# Patient Record
Sex: MALE | Race: WHITE | NOT HISPANIC OR LATINO | Employment: OTHER | URBAN - METROPOLITAN AREA
[De-identification: names, ages, dates, MRNs, and addresses within clinical notes are randomized per-mention and may not be internally consistent; named-entity substitution may affect disease eponyms.]

---

## 2017-01-03 ENCOUNTER — LAB REQUISITION (OUTPATIENT)
Dept: LAB | Facility: HOSPITAL | Age: 52
End: 2017-01-03
Payer: MEDICARE

## 2017-01-03 DIAGNOSIS — M54.16 RADICULOPATHY OF LUMBAR REGION: ICD-10-CM

## 2017-01-03 PROCEDURE — 80307 DRUG TEST PRSMV CHEM ANLYZR: CPT | Performed by: NURSE PRACTITIONER

## 2017-01-03 PROCEDURE — 80301: CPT | Performed by: NURSE PRACTITIONER

## 2017-01-09 LAB
OXYCODONE UR QL CFM: 386 NG/ML
OXYCODONE+OXYMORPHONE SERPLBLD QL SCN: POSITIVE
OXYCODONE+OXYMORPHONE UR QL SCN: NORMAL NG/ML
OXYCODONE+OXYMORPHONE UR QL SCN: POSITIVE
OXYMORPHONE UR CFM-MCNC: 738 NG/ML
OXYMORPHONE UR QL CFM: POSITIVE

## 2017-01-17 ENCOUNTER — ALLSCRIPTS OFFICE VISIT (OUTPATIENT)
Dept: FAMILY MEDICINE CLINIC | Facility: CLINIC | Age: 52
End: 2017-01-17
Payer: MEDICARE

## 2017-01-17 PROCEDURE — 99213 OFFICE O/P EST LOW 20 MIN: CPT | Performed by: NURSE PRACTITIONER

## 2017-01-31 ENCOUNTER — GENERIC CONVERSION - ENCOUNTER (OUTPATIENT)
Dept: OTHER | Facility: OTHER | Age: 52
End: 2017-01-31

## 2017-02-14 ENCOUNTER — ALLSCRIPTS OFFICE VISIT (OUTPATIENT)
Dept: FAMILY MEDICINE CLINIC | Facility: CLINIC | Age: 52
End: 2017-02-14
Payer: MEDICARE

## 2017-02-14 PROCEDURE — 99213 OFFICE O/P EST LOW 20 MIN: CPT | Performed by: NURSE PRACTITIONER

## 2017-03-13 ENCOUNTER — ALLSCRIPTS OFFICE VISIT (OUTPATIENT)
Dept: FAMILY MEDICINE CLINIC | Facility: CLINIC | Age: 52
End: 2017-03-13
Payer: MEDICARE

## 2017-03-13 PROCEDURE — 99213 OFFICE O/P EST LOW 20 MIN: CPT | Performed by: NURSE PRACTITIONER

## 2017-04-10 ENCOUNTER — ALLSCRIPTS OFFICE VISIT (OUTPATIENT)
Dept: FAMILY MEDICINE CLINIC | Facility: CLINIC | Age: 52
End: 2017-04-10
Payer: MEDICARE

## 2017-04-10 ENCOUNTER — LAB REQUISITION (OUTPATIENT)
Dept: LAB | Facility: HOSPITAL | Age: 52
End: 2017-04-10
Payer: MEDICARE

## 2017-04-10 DIAGNOSIS — M54.16 RADICULOPATHY OF LUMBAR REGION: ICD-10-CM

## 2017-04-10 PROCEDURE — 99213 OFFICE O/P EST LOW 20 MIN: CPT | Performed by: NURSE PRACTITIONER

## 2017-04-10 PROCEDURE — 80307 DRUG TEST PRSMV CHEM ANLYZR: CPT | Performed by: NURSE PRACTITIONER

## 2017-04-15 LAB
OXYCODONE UR QL CFM: 494 NG/ML
OXYCODONE+OXYMORPHONE SERPLBLD QL SCN: POSITIVE
OXYCODONE+OXYMORPHONE UR QL SCN: POSITIVE
OXYMORPHONE UR QL CFM: NEGATIVE

## 2017-05-08 ENCOUNTER — ALLSCRIPTS OFFICE VISIT (OUTPATIENT)
Dept: FAMILY MEDICINE CLINIC | Facility: CLINIC | Age: 52
End: 2017-05-08
Payer: MEDICARE

## 2017-05-08 PROCEDURE — 99213 OFFICE O/P EST LOW 20 MIN: CPT | Performed by: NURSE PRACTITIONER

## 2017-06-05 ENCOUNTER — ALLSCRIPTS OFFICE VISIT (OUTPATIENT)
Dept: FAMILY MEDICINE CLINIC | Facility: CLINIC | Age: 52
End: 2017-06-05
Payer: MEDICARE

## 2017-06-05 PROCEDURE — 96372 THER/PROPH/DIAG INJ SC/IM: CPT | Performed by: NURSE PRACTITIONER

## 2017-06-05 PROCEDURE — 99213 OFFICE O/P EST LOW 20 MIN: CPT | Performed by: NURSE PRACTITIONER

## 2017-06-26 ENCOUNTER — APPOINTMENT (OUTPATIENT)
Dept: LAB | Facility: HOSPITAL | Age: 52
End: 2017-06-26
Payer: MEDICARE

## 2017-06-26 ENCOUNTER — APPOINTMENT (OUTPATIENT)
Dept: FAMILY MEDICINE CLINIC | Facility: CLINIC | Age: 52
End: 2017-06-26
Payer: MEDICARE

## 2017-06-26 ENCOUNTER — GENERIC CONVERSION - ENCOUNTER (OUTPATIENT)
Dept: OTHER | Facility: OTHER | Age: 52
End: 2017-06-26

## 2017-06-26 DIAGNOSIS — M54.16 RADICULOPATHY OF LUMBAR REGION: ICD-10-CM

## 2017-06-26 LAB
ANION GAP SERPL CALCULATED.3IONS-SCNC: 7 MMOL/L (ref 4–13)
BUN SERPL-MCNC: 11 MG/DL (ref 5–25)
CALCIUM SERPL-MCNC: 9.4 MG/DL (ref 8.3–10.1)
CHLORIDE SERPL-SCNC: 108 MMOL/L (ref 100–108)
CO2 SERPL-SCNC: 26 MMOL/L (ref 21–32)
CREAT SERPL-MCNC: 0.72 MG/DL (ref 0.6–1.3)
ERYTHROCYTE [DISTWIDTH] IN BLOOD BY AUTOMATED COUNT: 14 % (ref 11.6–15.1)
ERYTHROCYTE [SEDIMENTATION RATE] IN BLOOD: 5 MM/HOUR (ref 0–10)
GFR SERPL CREATININE-BSD FRML MDRD: >60 ML/MIN/1.73SQ M
GLUCOSE SERPL-MCNC: 94 MG/DL (ref 65–140)
HCT VFR BLD AUTO: 45.1 % (ref 36.5–49.3)
HGB BLD-MCNC: 15.4 G/DL (ref 12–17)
MCH RBC QN AUTO: 31.5 PG (ref 26.8–34.3)
MCHC RBC AUTO-ENTMCNC: 34.1 G/DL (ref 31.4–37.4)
MCV RBC AUTO: 92 FL (ref 82–98)
PLATELET # BLD AUTO: 341 THOUSANDS/UL (ref 149–390)
PMV BLD AUTO: 10 FL (ref 8.9–12.7)
POTASSIUM SERPL-SCNC: 4.6 MMOL/L (ref 3.5–5.3)
RBC # BLD AUTO: 4.89 MILLION/UL (ref 3.88–5.62)
SODIUM SERPL-SCNC: 141 MMOL/L (ref 136–145)
WBC # BLD AUTO: 7.62 THOUSAND/UL (ref 4.31–10.16)

## 2017-06-26 PROCEDURE — 99213 OFFICE O/P EST LOW 20 MIN: CPT | Performed by: FAMILY MEDICINE

## 2017-06-26 PROCEDURE — 36415 COLL VENOUS BLD VENIPUNCTURE: CPT

## 2017-06-26 PROCEDURE — 85027 COMPLETE CBC AUTOMATED: CPT

## 2017-06-26 PROCEDURE — 85652 RBC SED RATE AUTOMATED: CPT

## 2017-06-26 PROCEDURE — 80048 BASIC METABOLIC PNL TOTAL CA: CPT

## 2017-06-28 ENCOUNTER — GENERIC CONVERSION - ENCOUNTER (OUTPATIENT)
Dept: OTHER | Facility: OTHER | Age: 52
End: 2017-06-28

## 2017-06-28 ENCOUNTER — APPOINTMENT (OUTPATIENT)
Dept: FAMILY MEDICINE CLINIC | Facility: CLINIC | Age: 52
End: 2017-06-28
Payer: MEDICARE

## 2017-06-28 PROCEDURE — 99213 OFFICE O/P EST LOW 20 MIN: CPT | Performed by: FAMILY MEDICINE

## 2017-07-27 ENCOUNTER — APPOINTMENT (OUTPATIENT)
Dept: LAB | Facility: HOSPITAL | Age: 52
End: 2017-07-27
Payer: MEDICARE

## 2017-07-27 ENCOUNTER — LAB REQUISITION (OUTPATIENT)
Dept: LAB | Facility: HOSPITAL | Age: 52
End: 2017-07-27
Payer: MEDICARE

## 2017-07-27 ENCOUNTER — ALLSCRIPTS OFFICE VISIT (OUTPATIENT)
Dept: FAMILY MEDICINE CLINIC | Facility: CLINIC | Age: 52
End: 2017-07-27
Payer: MEDICARE

## 2017-07-27 DIAGNOSIS — M54.16 RADICULOPATHY OF LUMBAR REGION: ICD-10-CM

## 2017-07-27 PROCEDURE — 99213 OFFICE O/P EST LOW 20 MIN: CPT | Performed by: FAMILY MEDICINE

## 2017-07-27 PROCEDURE — 80365 DRUG SCREENING OXYCODONE: CPT

## 2017-07-27 PROCEDURE — 80307 DRUG TEST PRSMV CHEM ANLYZR: CPT

## 2017-07-28 LAB
AMPHETAMINES UR QL SCN: NEGATIVE NG/ML
BARBITURATES UR QL SCN: NEGATIVE NG/ML
BENZODIAZ UR QL SCN: NEGATIVE NG/ML
BZE UR QL SCN: NEGATIVE NG/ML
CANNABINOIDS UR QL SCN: NEGATIVE NG/ML
METHADONE UR QL SCN: NEGATIVE NG/ML
OPIATES UR QL: NEGATIVE NG/ML
PCP UR QL: NEGATIVE NG/ML
PROPOXYPH UR QL: NEGATIVE NG/ML

## 2017-08-01 LAB
OXYCODONE+OXYMORPHONE SERPLBLD QL SCN: NEGATIVE
OXYCODONE+OXYMORPHONE UR QL SCN: POSITIVE
OXYMORPHONE UR CFM-MCNC: 866 NG/ML
OXYMORPHONE UR QL CFM: POSITIVE

## 2017-08-24 ENCOUNTER — ALLSCRIPTS OFFICE VISIT (OUTPATIENT)
Dept: FAMILY MEDICINE CLINIC | Facility: CLINIC | Age: 52
End: 2017-08-24
Payer: MEDICARE

## 2017-08-24 PROCEDURE — 99213 OFFICE O/P EST LOW 20 MIN: CPT | Performed by: FAMILY MEDICINE

## 2017-09-21 ENCOUNTER — LAB REQUISITION (OUTPATIENT)
Dept: LAB | Facility: HOSPITAL | Age: 52
End: 2017-09-21
Payer: MEDICARE

## 2017-09-21 ENCOUNTER — GENERIC CONVERSION - ENCOUNTER (OUTPATIENT)
Dept: OTHER | Facility: OTHER | Age: 52
End: 2017-09-21

## 2017-09-21 ENCOUNTER — APPOINTMENT (OUTPATIENT)
Dept: FAMILY MEDICINE CLINIC | Facility: CLINIC | Age: 52
End: 2017-09-21
Payer: MEDICARE

## 2017-09-21 DIAGNOSIS — M54.9 DORSALGIA: ICD-10-CM

## 2017-09-21 PROCEDURE — 99213 OFFICE O/P EST LOW 20 MIN: CPT | Performed by: FAMILY MEDICINE

## 2017-09-21 PROCEDURE — 80365 DRUG SCREENING OXYCODONE: CPT | Performed by: NURSE PRACTITIONER

## 2017-09-26 LAB
OXYCODONE UR QL CFM: 253 NG/ML
OXYCODONE+OXYMORPHONE SERPLBLD QL SCN: POSITIVE
OXYCODONE+OXYMORPHONE UR QL SCN: POSITIVE
OXYMORPHONE UR CFM-MCNC: 382 NG/ML
OXYMORPHONE UR QL CFM: POSITIVE

## 2017-10-18 ENCOUNTER — APPOINTMENT (OUTPATIENT)
Dept: FAMILY MEDICINE CLINIC | Facility: CLINIC | Age: 52
End: 2017-10-18
Payer: MEDICARE

## 2017-10-18 ENCOUNTER — GENERIC CONVERSION - ENCOUNTER (OUTPATIENT)
Dept: OTHER | Facility: OTHER | Age: 52
End: 2017-10-18

## 2017-10-18 PROCEDURE — 99213 OFFICE O/P EST LOW 20 MIN: CPT | Performed by: FAMILY MEDICINE

## 2017-11-15 ENCOUNTER — APPOINTMENT (OUTPATIENT)
Dept: FAMILY MEDICINE CLINIC | Facility: CLINIC | Age: 52
End: 2017-11-15
Payer: MEDICARE

## 2017-11-15 ENCOUNTER — GENERIC CONVERSION - ENCOUNTER (OUTPATIENT)
Dept: OTHER | Facility: OTHER | Age: 52
End: 2017-11-15

## 2017-11-15 PROCEDURE — 99213 OFFICE O/P EST LOW 20 MIN: CPT | Performed by: FAMILY MEDICINE

## 2017-12-12 ENCOUNTER — GENERIC CONVERSION - ENCOUNTER (OUTPATIENT)
Dept: OTHER | Facility: OTHER | Age: 52
End: 2017-12-12

## 2017-12-12 ENCOUNTER — APPOINTMENT (OUTPATIENT)
Dept: FAMILY MEDICINE CLINIC | Facility: CLINIC | Age: 52
End: 2017-12-12
Payer: MEDICARE

## 2017-12-12 PROCEDURE — 99213 OFFICE O/P EST LOW 20 MIN: CPT | Performed by: FAMILY MEDICINE

## 2018-01-09 ENCOUNTER — APPOINTMENT (OUTPATIENT)
Dept: FAMILY MEDICINE CLINIC | Facility: CLINIC | Age: 53
End: 2018-01-09
Payer: MEDICARE

## 2018-01-09 ENCOUNTER — GENERIC CONVERSION - ENCOUNTER (OUTPATIENT)
Dept: OTHER | Facility: OTHER | Age: 53
End: 2018-01-09

## 2018-01-09 PROCEDURE — 99213 OFFICE O/P EST LOW 20 MIN: CPT | Performed by: FAMILY MEDICINE

## 2018-01-12 VITALS
WEIGHT: 150 LBS | SYSTOLIC BLOOD PRESSURE: 106 MMHG | DIASTOLIC BLOOD PRESSURE: 72 MMHG | BODY MASS INDEX: 21.47 KG/M2 | RESPIRATION RATE: 17 BRPM | OXYGEN SATURATION: 99 % | HEIGHT: 70 IN | HEART RATE: 118 BPM | TEMPERATURE: 96.7 F

## 2018-01-12 VITALS
WEIGHT: 151 LBS | HEART RATE: 102 BPM | RESPIRATION RATE: 20 BRPM | BODY MASS INDEX: 21.62 KG/M2 | DIASTOLIC BLOOD PRESSURE: 74 MMHG | SYSTOLIC BLOOD PRESSURE: 112 MMHG | TEMPERATURE: 97.6 F | HEIGHT: 70 IN | OXYGEN SATURATION: 98 %

## 2018-01-12 VITALS
RESPIRATION RATE: 20 BRPM | HEART RATE: 93 BPM | TEMPERATURE: 96 F | DIASTOLIC BLOOD PRESSURE: 70 MMHG | SYSTOLIC BLOOD PRESSURE: 110 MMHG | OXYGEN SATURATION: 96 % | BODY MASS INDEX: 21.33 KG/M2 | WEIGHT: 149 LBS | HEIGHT: 70 IN

## 2018-01-12 VITALS
WEIGHT: 146 LBS | SYSTOLIC BLOOD PRESSURE: 142 MMHG | HEIGHT: 70 IN | TEMPERATURE: 97.6 F | DIASTOLIC BLOOD PRESSURE: 82 MMHG | OXYGEN SATURATION: 97 % | RESPIRATION RATE: 16 BRPM | BODY MASS INDEX: 20.9 KG/M2 | HEART RATE: 110 BPM

## 2018-01-12 NOTE — PROGRESS NOTES
Chief Complaint  Toradol shot      Active Problems     1  Asthma (493 90) (J45 909)   2  Backache (724 5) (M54 9)   3  Bilateral back pain, unspecified location   4  Bipolar disorder (296 80) (F31 9)   5  Chronic obstructive pulmonary disease (496) (J44 9)   6  Chronic thoracic back pain (724 1,338 29) (M54 6,G89 29)   7  Encounter for screening fecal occult blood testing (V76 51) (Z12 11)   8  Encounter for smoking cessation counseling (V65 42,305 1) (Z71 6,Z72 0)   9  Esophageal reflux (530 81) (K21 9)   10  Fatigue (780 79) (R53 83)   11  Flu vaccine need (V04 81) (Z23)   12  Hypercholesterolemia (272 0) (E78 00)   13  Hyperlipidemia (272 4) (E78 5)   14  Hyperthyroidism (242 90) (E05 90)   15  Migraine headache (346 90) (G43 909)   16  Need for vaccine for TD (tetanus-diphtheria) (V06 5) (Z23)   17  Screening for colon cancer (V76 51) (Z12 11)   18  Screening PSA (prostate specific antigen) (V76 44) (Z12 5)   19  Tooth Abscess    Depression (311) (F32 9)       Anxiety disorder (300 00) (F41 9)       Chronic lumbar radiculopathy (724 4) (M54 16)       Encounter for screening colonoscopy (V76 51) (Z12 11)          Current Meds   1  Atorvastatin Calcium 20 MG Oral Tablet; one daily in the evening for cholesterol; Therapy: 38UVL8579 to [phone])  Requested for: 18BLX4567; Last   Rx:10Jun2016 Ordered   2  Butalbital-APAP-Caffeine -40 MG Oral Capsule; TAKE 1 TO 2 CAPSULES EVERY   4 TO 6 HOURS AS NEEDED FOR PAIN;   Therapy: 72BVO0650 to (Last Rx:42Gbd4903)  Requested for: 31Rnw0120 Ordered   3  Metoclopramide HCl - 5 MG Oral Tablet; TAKE 1 TABLET 3 TIMES DAILY; Therapy: 75AIK3311 to [phone])  Requested for: 49MWA8613; Last   Rx:15Ulu4829 Ordered   4  Omeprazole 20 MG Oral Capsule Delayed Release; TAKE 1 CAPSULE DAILY EVERY   MORNING BEFORE BREAKFAST; Therapy: 02Apr2013 to (Evaluate:73Zon9903)  Requested for: 25BOB0049; Last   Rx:10Jun2016 Ordered   5   Oxycodone-Acetaminophen 5-325 MG Oral Tablet; TAKE 1 TABLET TWICE DAILY AS   NEEDED FOR PAIN;   Therapy: 47LBW0771 to (Evaluate:23Nov2016); Last Rx:26Oct2016 Ordered   6  Ventolin  (90 Base) MCG/ACT Inhalation Aerosol Solution; INHALE 2 PUFFS   EVERY 4 HOURS AS NEEDED; Therapy: 51URR4034 to (Last Rx:10Jun2016)  Requested for: 10Jun2016 Ordered    Allergies    1  Toradol SOLN    Vitals  Signs    Temperature: 98 2 F  Heart Rate: 90  Respiration: 18  Systolic: 404  Diastolic: 78  Height: 5 ft 10 in  Weight: 155 lb   BMI Calculated: 22 24  BSA Calculated: 1 87  O2 Saturation: 99    Plan   Chronic lumbar radiculopathy    · Ketorolac Tromethamine 30 MG/ML Injection Solution    Chronic lumbar radiculopathy (724 4) (M54 16)          Future Appointments    Date/Time Provider Specialty Site   02/14/2017 07:30 AM Jennifer Walker,  [facility]     Signatures   Electronically signed by : Eli Sanchez, ; Oct 27 2016  3:10PM EST                       (Author)    Electronically signed by :  CHACHA Guerrero; Oct 27 2016  3:17PM EST                       (Author)    Electronically signed by : Gurinder Tipton MD; Feb 9 2017  6:28PM EST                       (Author)

## 2018-01-13 VITALS
OXYGEN SATURATION: 98 % | BODY MASS INDEX: 21.62 KG/M2 | DIASTOLIC BLOOD PRESSURE: 70 MMHG | TEMPERATURE: 97.4 F | SYSTOLIC BLOOD PRESSURE: 110 MMHG | WEIGHT: 151 LBS | HEIGHT: 70 IN | HEART RATE: 98 BPM | RESPIRATION RATE: 20 BRPM

## 2018-01-13 VITALS
SYSTOLIC BLOOD PRESSURE: 128 MMHG | BODY MASS INDEX: 21.33 KG/M2 | HEART RATE: 98 BPM | RESPIRATION RATE: 16 BRPM | TEMPERATURE: 97.1 F | WEIGHT: 149 LBS | HEIGHT: 70 IN | DIASTOLIC BLOOD PRESSURE: 70 MMHG | OXYGEN SATURATION: 99 %

## 2018-01-13 VITALS
HEIGHT: 70 IN | RESPIRATION RATE: 16 BRPM | DIASTOLIC BLOOD PRESSURE: 78 MMHG | SYSTOLIC BLOOD PRESSURE: 110 MMHG | BODY MASS INDEX: 21.33 KG/M2 | HEART RATE: 88 BPM | TEMPERATURE: 98.1 F | OXYGEN SATURATION: 99 % | WEIGHT: 149 LBS

## 2018-01-14 VITALS
BODY MASS INDEX: 21.76 KG/M2 | TEMPERATURE: 96.2 F | SYSTOLIC BLOOD PRESSURE: 120 MMHG | RESPIRATION RATE: 16 BRPM | DIASTOLIC BLOOD PRESSURE: 68 MMHG | WEIGHT: 152 LBS | HEART RATE: 94 BPM | OXYGEN SATURATION: 98 % | HEIGHT: 70 IN

## 2018-01-14 NOTE — MISCELLANEOUS
Message  LAB notified office of error with urine drug screen  Message from lab consisted of mistake of cobalt for oxycodone  Repeat urine will be needed  To contact patient  Active Problems    1  Anxiety disorder (300 00) (F41 9)   2  Asthma (493 90) (J45 909)   3  Backache (724 5) (M54 9)   4  Bilateral back pain, unspecified location   5  Bipolar disorder (296 80) (F31 9)   6  Chronic lumbar radiculopathy (724 4) (M54 16)   7  Chronic obstructive pulmonary disease (496) (J44 9)   8  Chronic thoracic back pain (724 1,338 29) (M54 6,G89 29)   9  Depression (311) (F32 9)   10  Encounter for screening colonoscopy (V76 51) (Z12 11)   11  Encounter for screening fecal occult blood testing (V76 51) (Z12 11)   12  Encounter for smoking cessation counseling (V65 42,305 1) (Z71 6,Z72 0)   13  Esophageal reflux (530 81) (K21 9)   14  Fatigue (780 79) (R53 83)   15  Flu vaccine need (V04 81) (Z23)   16  Hypercholesterolemia (272 0) (E78 00)   17  Hyperlipidemia (272 4) (E78 5)   18  Hyperthyroidism (242 90) (E05 90)   19  Migraine headache (346 90) (G43 909)   20  Need for vaccine for TD (tetanus-diphtheria) (V06 5) (Z23)   21  Screening for colon cancer (V76 51) (Z12 11)   22  Screening PSA (prostate specific antigen) (V76 44) (Z12 5)   23  Tooth Abscess    Current Meds   1  Atorvastatin Calcium 20 MG Oral Tablet; one daily in the evening for cholesterol; Therapy: 21RSK8538 to (Sheryle Barbone)  Requested for: 36ERP8223; Last   Rx:04Bzp6675 Ordered   2  Butalbital-APAP-Caffeine -40 MG Oral Capsule; TAKE 1 TO 2 CAPSULES EVERY   4 TO 6 HOURS AS NEEDED FOR PAIN;   Therapy: 10VKP0197 to (Last Rx:11Tlr0975)  Requested for: 60Cfx6966 Ordered   3  Metoclopramide HCl - 5 MG Oral Tablet; TAKE 1 TABLET 3 TIMES DAILY; Therapy: 95VSY9197 to [phone])  Requested for: 69NWI2084; Last   Rx:30Saz2213 Ordered   4   Omeprazole 20 MG Oral Capsule Delayed Release (PriLOSEC); TAKE 1 CAPSULE   DAILY EVERY MORNING BEFORE BREAKFAST; Therapy: 02Apr2013 to (Evaluate:82Nde0719)  Requested for: 56ZSW5974; Last   Rx:10Jun2016 Ordered   5  Oxycodone-Acetaminophen 5-325 MG Oral Tablet; TAKE 1 TABLET TWICE DAILY AS   NEEDED FOR PAIN;   Therapy: 85JZJ9057 to (Evaluate:28Oct2016); Last Rx:30Sep2016 Ordered   6  Ventolin  (90 Base) MCG/ACT Inhalation Aerosol Solution; INHALE 2 PUFFS   EVERY 4 HOURS AS NEEDED; Therapy: 07TXP4632 to (Last Rx:10Jun2016)  Requested for: 10Jun2016 Ordered    Allergies    1  Toradol SOLN    Signatures   Electronically signed by :  CHACHA Babin; Oct 14 2016  2:33PM EST                       (Author)

## 2018-01-22 VITALS
HEIGHT: 70 IN | WEIGHT: 149 LBS | OXYGEN SATURATION: 98 % | SYSTOLIC BLOOD PRESSURE: 140 MMHG | DIASTOLIC BLOOD PRESSURE: 88 MMHG | HEART RATE: 110 BPM | BODY MASS INDEX: 21.33 KG/M2 | RESPIRATION RATE: 16 BRPM | TEMPERATURE: 97.3 F

## 2018-01-22 VITALS
HEIGHT: 70 IN | OXYGEN SATURATION: 97 % | HEART RATE: 95 BPM | WEIGHT: 147 LBS | RESPIRATION RATE: 17 BRPM | DIASTOLIC BLOOD PRESSURE: 74 MMHG | SYSTOLIC BLOOD PRESSURE: 128 MMHG | BODY MASS INDEX: 21.05 KG/M2 | TEMPERATURE: 96.5 F

## 2018-01-22 VITALS
SYSTOLIC BLOOD PRESSURE: 154 MMHG | DIASTOLIC BLOOD PRESSURE: 90 MMHG | OXYGEN SATURATION: 98 % | RESPIRATION RATE: 16 BRPM | BODY MASS INDEX: 21.33 KG/M2 | TEMPERATURE: 97.3 F | WEIGHT: 149 LBS | HEART RATE: 102 BPM | HEIGHT: 70 IN

## 2018-01-22 VITALS
SYSTOLIC BLOOD PRESSURE: 138 MMHG | OXYGEN SATURATION: 97 % | HEART RATE: 98 BPM | TEMPERATURE: 96.5 F | HEIGHT: 70 IN | DIASTOLIC BLOOD PRESSURE: 82 MMHG | BODY MASS INDEX: 20.62 KG/M2 | RESPIRATION RATE: 16 BRPM | WEIGHT: 144 LBS

## 2018-01-22 VITALS
RESPIRATION RATE: 16 BRPM | WEIGHT: 144 LBS | OXYGEN SATURATION: 98 % | HEART RATE: 118 BPM | TEMPERATURE: 97 F | DIASTOLIC BLOOD PRESSURE: 76 MMHG | BODY MASS INDEX: 20.62 KG/M2 | HEIGHT: 70 IN | SYSTOLIC BLOOD PRESSURE: 114 MMHG

## 2018-01-24 VITALS
HEART RATE: 98 BPM | RESPIRATION RATE: 17 BRPM | HEIGHT: 70 IN | BODY MASS INDEX: 21.19 KG/M2 | SYSTOLIC BLOOD PRESSURE: 126 MMHG | DIASTOLIC BLOOD PRESSURE: 76 MMHG | TEMPERATURE: 96.7 F | OXYGEN SATURATION: 97 % | WEIGHT: 148 LBS

## 2018-01-24 VITALS
BODY MASS INDEX: 21.47 KG/M2 | HEIGHT: 70 IN | TEMPERATURE: 98.1 F | WEIGHT: 150 LBS | HEART RATE: 99 BPM | OXYGEN SATURATION: 99 % | RESPIRATION RATE: 16 BRPM

## 2018-02-05 RX ORDER — ALBUTEROL SULFATE 90 UG/1
2 AEROSOL, METERED RESPIRATORY (INHALATION) EVERY 4 HOURS PRN
COMMUNITY
Start: 2016-02-11 | End: 2018-02-06 | Stop reason: SDUPTHER

## 2018-02-05 RX ORDER — OXYCODONE HYDROCHLORIDE AND ACETAMINOPHEN 5; 325 MG/1; MG/1
1 TABLET ORAL 2 TIMES DAILY PRN
COMMUNITY
Start: 2015-10-19 | End: 2018-02-06 | Stop reason: SDUPTHER

## 2018-02-05 RX ORDER — OMEPRAZOLE 40 MG/1
1 CAPSULE, DELAYED RELEASE ORAL
COMMUNITY
Start: 2017-08-24 | End: 2018-05-02 | Stop reason: SDUPTHER

## 2018-02-05 RX ORDER — FLUTICASONE PROPIONATE 50 MCG
2 SPRAY, SUSPENSION (ML) NASAL DAILY
COMMUNITY
Start: 2017-10-18 | End: 2018-11-08

## 2018-02-06 ENCOUNTER — OFFICE VISIT (OUTPATIENT)
Dept: FAMILY MEDICINE CLINIC | Facility: CLINIC | Age: 53
End: 2018-02-06
Payer: MEDICARE

## 2018-02-06 VITALS
BODY MASS INDEX: 20.9 KG/M2 | SYSTOLIC BLOOD PRESSURE: 130 MMHG | OXYGEN SATURATION: 98 % | HEART RATE: 95 BPM | HEIGHT: 70 IN | RESPIRATION RATE: 18 BRPM | WEIGHT: 146 LBS | DIASTOLIC BLOOD PRESSURE: 78 MMHG | TEMPERATURE: 97 F

## 2018-02-06 DIAGNOSIS — G89.29 CHRONIC BILATERAL LOW BACK PAIN WITHOUT SCIATICA: Primary | ICD-10-CM

## 2018-02-06 DIAGNOSIS — M54.50 CHRONIC BILATERAL LOW BACK PAIN WITHOUT SCIATICA: Primary | ICD-10-CM

## 2018-02-06 DIAGNOSIS — J45.20 MILD INTERMITTENT ASTHMA WITHOUT COMPLICATION: ICD-10-CM

## 2018-02-06 PROBLEM — J30.9 ALLERGIC RHINITIS: Status: ACTIVE | Noted: 2017-10-18

## 2018-02-06 PROCEDURE — 99213 OFFICE O/P EST LOW 20 MIN: CPT | Performed by: FAMILY MEDICINE

## 2018-02-06 RX ORDER — OXYCODONE HYDROCHLORIDE AND ACETAMINOPHEN 5; 325 MG/1; MG/1
1 TABLET ORAL 2 TIMES DAILY PRN
Qty: 56 TABLET | Refills: 0 | Status: SHIPPED | OUTPATIENT
Start: 2018-02-06 | End: 2018-03-05 | Stop reason: SDUPTHER

## 2018-02-06 RX ORDER — ALBUTEROL SULFATE 90 UG/1
2 AEROSOL, METERED RESPIRATORY (INHALATION) EVERY 4 HOURS PRN
Qty: 1 INHALER | Refills: 0 | Status: SHIPPED | OUTPATIENT
Start: 2018-02-06 | End: 2018-02-08 | Stop reason: CLARIF

## 2018-02-06 NOTE — PROGRESS NOTES
OFFICE VISIT  Jamaica Hull 47 y o  male MRN: [id number]      Assessment / Plan:  Diagnoses and all orders for this visit:    Chronic bilateral low back pain without sciatica  -     oxyCODONE-acetaminophen (PERCOCET) 5-325 mg per tablet; Take 1 tablet by mouth 2 (two) times a day as needed for moderate pain Max Daily Amount: 2 tablets    Mild intermittent asthma without complication  -     albuterol (VENTOLIN HFA) 90 mcg/act inhaler; Inhale 2 puffs every 4 (four) hours as needed for shortness of breath     Discharge summary-  Mild intermittent asthma, Ventolin renewed  Patient made aware of worsening symptoms to call office  Chronic low back pain-medication renewed  PDMP portal reviewed, no red flags  Reason For Visit / Chief Complaint  Chief Complaint   Patient presents with    Medication Refill        HPI:  Jamaica Hull is a 47 y o  male presents today for chronic lower back pain  He has been having pain for years  He is contracted through this office with good compliance  He is taking a Percocet twice daily at most for chronic back pain relief  His pain is aggravated with bending and lifting  He has tried alternative methods in the past without relief  He is unable to establish with pain management due to transportation  He has known anxiety, which he is established with  DS  He has known asthma, has used his inhaler 3 times this month  He reports his asthma is triggered by cold  Today he reports no shortness of breath, no cough no wheeze  Historical Information   No past medical history on file  No past surgical history on file    Social History   History   Alcohol Use No     History   Drug Use No     History   Smoking Status    Current Every Day Smoker   Smokeless Tobacco    Never Used     Family History   Problem Relation Age of Onset    Heart disease Mother     Hyperthyroidism Mother     Heart disease Father     Hyperthyroidism Father        Meds/Allergies   Allergies   Allergen Reactions    Ketorolac Hives       Meds:    Current Outpatient Prescriptions:     albuterol (VENTOLIN HFA) 90 mcg/act inhaler, Inhale 2 puffs every 4 (four) hours as needed for shortness of breath, Disp: 1 Inhaler, Rfl: 0    fluticasone (FLONASE) 50 mcg/act nasal spray, 2 sprays into each nostril daily, Disp: , Rfl:     omeprazole (PriLOSEC) 40 MG capsule, Take 1 capsule by mouth daily before breakfast, Disp: , Rfl:     oxyCODONE-acetaminophen (PERCOCET) 5-325 mg per tablet, Take 1 tablet by mouth 2 (two) times a day as needed for moderate pain Max Daily Amount: 2 tablets, Disp: 56 tablet, Rfl: 0      REVIEW OF SYSTEMS  A comprehensive review of systems was negative  Current Vitals:   Blood Pressure: 130/78 (02/06/18 0754)  Pulse: 95 (02/06/18 0754)  Temperature: (!) 97 °F (36 1 °C) (02/06/18 0754)  Respirations: 18 (02/06/18 0754)  Height: 5' 10" (177 8 cm) (02/06/18 0754)  Weight - Scale: 66 2 kg (146 lb) (02/06/18 0754)  SpO2: 98 % (02/06/18 0754)  [unfilled]    PHYSICAL EXAMS:  General appearance: alert and oriented, in no acute distress  Lungs: clear to auscultation bilaterally  Heart: regular rate and rhythm, S1, S2 normal, no murmur, click, rub or LJBPAA{JGD/YX:25        Follow up at this office in28  days    Counseling / Coordination of Care  Total floor / unit time spent today 20 minutes  Greater than 50% of total time was spent with the patient and / or family counseling and / or coordination of care

## 2018-02-07 NOTE — PROGRESS NOTES
I have reviewed the notes, assessments, and/or procedures performed by [facility] and agree with the above    Brendalyn Formica D O

## 2018-02-08 RX ORDER — FLUTICASONE PROPIONATE 220 UG/1
2 AEROSOL, METERED RESPIRATORY (INHALATION) EVERY 4 HOURS PRN
COMMUNITY
End: 2018-11-08

## 2018-03-05 ENCOUNTER — OFFICE VISIT (OUTPATIENT)
Dept: FAMILY MEDICINE CLINIC | Facility: CLINIC | Age: 53
End: 2018-03-05
Payer: MEDICARE

## 2018-03-05 VITALS
HEIGHT: 70 IN | BODY MASS INDEX: 20.9 KG/M2 | HEART RATE: 100 BPM | DIASTOLIC BLOOD PRESSURE: 80 MMHG | RESPIRATION RATE: 18 BRPM | TEMPERATURE: 97.6 F | SYSTOLIC BLOOD PRESSURE: 130 MMHG | WEIGHT: 146 LBS | OXYGEN SATURATION: 99 %

## 2018-03-05 DIAGNOSIS — M54.50 CHRONIC BILATERAL LOW BACK PAIN WITHOUT SCIATICA: ICD-10-CM

## 2018-03-05 DIAGNOSIS — G89.29 CHRONIC BILATERAL LOW BACK PAIN WITHOUT SCIATICA: ICD-10-CM

## 2018-03-05 DIAGNOSIS — M54.16 CHRONIC LUMBAR RADICULOPATHY: ICD-10-CM

## 2018-03-05 DIAGNOSIS — J45.20 MILD INTERMITTENT ASTHMA WITHOUT COMPLICATION: Primary | ICD-10-CM

## 2018-03-05 PROCEDURE — 99213 OFFICE O/P EST LOW 20 MIN: CPT | Performed by: FAMILY MEDICINE

## 2018-03-05 RX ORDER — OXYCODONE HYDROCHLORIDE AND ACETAMINOPHEN 5; 325 MG/1; MG/1
1 TABLET ORAL 2 TIMES DAILY PRN
Qty: 56 TABLET | Refills: 0 | Status: SHIPPED | OUTPATIENT
Start: 2018-03-05 | End: 2018-04-02 | Stop reason: SDUPTHER

## 2018-03-05 NOTE — PROGRESS NOTES
OFFICE VISIT  Didi Perea 47 y o  male MRN: [id number]      Assessment / Plan:  Diagnoses and all orders for this visit:    Mild intermittent asthma without complication    Chronic bilateral low back pain without sciatica  -     oxyCODONE-acetaminophen (PERCOCET) 5-325 mg per tablet; Take 1 tablet by mouth 2 (two) times a day as needed for moderate pain Max Daily Amount: 2 tablets    Chronic lumbar radiculopathy    PDMP portal reviewed, no red flags      Reason For Visit / Chief Complaint  Chief Complaint   Patient presents with    Back Pain        HPI:  Didi Perea is a 47 y o  male presents today for routine monthly appt  Pt has known asthma, controlled  He reports no use of inhaler over the last month  He has no desire to quit smoking at this time  He has chronic lower back pain, he has tried multple modalities to help pain, stretching, exercise, tens unit  He has been taking percocet twice daily with good relief  Historical Information   No past medical history on file  No past surgical history on file    Social History   History   Alcohol Use No     History   Drug Use No     History   Smoking Status    Current Every Day Smoker   Smokeless Tobacco    Never Used     Family History   Problem Relation Age of Onset    Heart disease Mother     Hyperthyroidism Mother     Heart disease Father     Hyperthyroidism Father        Meds/Allergies   Allergies   Allergen Reactions    Ketorolac Hives       Meds:    Current Outpatient Prescriptions:     fluticasone (FLONASE) 50 mcg/act nasal spray, 2 sprays into each nostril daily, Disp: , Rfl:     fluticasone (FLOVENT HFA) 220 mcg/act inhaler, Inhale 2 puffs every 4 (four) hours as needed, Disp: , Rfl:     omeprazole (PriLOSEC) 40 MG capsule, Take 1 capsule by mouth daily before breakfast, Disp: , Rfl:     oxyCODONE-acetaminophen (PERCOCET) 5-325 mg per tablet, Take 1 tablet by mouth 2 (two) times a day as needed for moderate pain Max Daily Amount: 2 tablets, Disp: 56 tablet, Rfl: 0      REVIEW OF SYSTEMS  A comprehensive review of systems was negative except for: chronic pain       Current Vitals:   Blood Pressure: 130/80 (03/05/18 1204)  Pulse: 100 (03/05/18 1204)  Temperature: 97 6 °F (36 4 °C) (03/05/18 1204)  Respirations: 18 (03/05/18 1204)  Height: 5' 10" (177 8 cm) (03/05/18 1204)  Weight - Scale: 66 2 kg (146 lb) (03/05/18 1204)  SpO2: 99 % (03/05/18 1204)  [unfilled]    PHYSICAL EXAMS:  General appearance: alert and oriented, in no acute distress  Lungs: clear to auscultation bilaterally  Heart: regular rate and rhythm, S1, S2 normal, no murmur, click, rub or gallop  Pulses: 2+ and symmetric  Skin: Skin color, texture, turgor normal  No rashes or lesions{YES/NO:20        Follow up at this office in 28 days   Counseling / Coordination of Care  Total floor / unit time spent today 20 minutes  Greater than 50% of total time was spent with the patient and / or family counseling and / or coordination of care

## 2018-04-02 ENCOUNTER — OFFICE VISIT (OUTPATIENT)
Dept: FAMILY MEDICINE CLINIC | Facility: CLINIC | Age: 53
End: 2018-04-02
Payer: MEDICARE

## 2018-04-02 VITALS
OXYGEN SATURATION: 98 % | WEIGHT: 146 LBS | TEMPERATURE: 97.6 F | RESPIRATION RATE: 18 BRPM | HEIGHT: 70 IN | BODY MASS INDEX: 20.9 KG/M2 | DIASTOLIC BLOOD PRESSURE: 80 MMHG | HEART RATE: 97 BPM | SYSTOLIC BLOOD PRESSURE: 132 MMHG

## 2018-04-02 DIAGNOSIS — M54.50 CHRONIC BILATERAL LOW BACK PAIN WITHOUT SCIATICA: ICD-10-CM

## 2018-04-02 DIAGNOSIS — G89.29 CHRONIC BILATERAL LOW BACK PAIN WITHOUT SCIATICA: ICD-10-CM

## 2018-04-02 PROCEDURE — 99213 OFFICE O/P EST LOW 20 MIN: CPT | Performed by: FAMILY MEDICINE

## 2018-04-02 RX ORDER — OXYCODONE HYDROCHLORIDE AND ACETAMINOPHEN 5; 325 MG/1; MG/1
1 TABLET ORAL 2 TIMES DAILY PRN
Qty: 56 TABLET | Refills: 0 | Status: SHIPPED | OUTPATIENT
Start: 2018-04-02 | End: 2018-04-30 | Stop reason: SDUPTHER

## 2018-04-02 NOTE — PROGRESS NOTES
Assessment/Plan:      Diagnoses and all orders for this visit:    Chronic bilateral low back pain without sciatica  -     oxyCODONE-acetaminophen (PERCOCET) 5-325 mg per tablet; Take 1 tablet by mouth 2 (two) times a day as needed for moderate pain Max Daily Amount: 2 tablets        PDMP reviewed  F/U 1 month  Subjective:     Patient ID: Mini Dailey is a 46 y o  male  Patient presents to the office today for med refill  Patient has PMHx of chronic LBP that is managed with opiod therapy  Review of Systems   Constitutional: Negative  Respiratory: Negative  Cardiovascular: Negative  Objective:     Physical Exam   Constitutional: He is oriented to person, place, and time  He appears well-developed and well-nourished  HENT:   Head: Normocephalic and atraumatic  Eyes: Pupils are equal, round, and reactive to light  Neck: Neck supple  Neurological: He is alert and oriented to person, place, and time  Psychiatric: He has a normal mood and affect   His behavior is normal

## 2018-04-30 ENCOUNTER — OFFICE VISIT (OUTPATIENT)
Dept: FAMILY MEDICINE CLINIC | Facility: CLINIC | Age: 53
End: 2018-04-30
Payer: MEDICARE

## 2018-04-30 VITALS
TEMPERATURE: 97.3 F | RESPIRATION RATE: 18 BRPM | DIASTOLIC BLOOD PRESSURE: 82 MMHG | HEIGHT: 70 IN | HEART RATE: 109 BPM | SYSTOLIC BLOOD PRESSURE: 130 MMHG | WEIGHT: 149 LBS | OXYGEN SATURATION: 97 % | BODY MASS INDEX: 21.33 KG/M2

## 2018-04-30 DIAGNOSIS — M54.50 CHRONIC BILATERAL LOW BACK PAIN WITHOUT SCIATICA: ICD-10-CM

## 2018-04-30 DIAGNOSIS — G89.29 CHRONIC BILATERAL LOW BACK PAIN WITHOUT SCIATICA: ICD-10-CM

## 2018-04-30 PROCEDURE — 99213 OFFICE O/P EST LOW 20 MIN: CPT | Performed by: FAMILY MEDICINE

## 2018-04-30 RX ORDER — OXYCODONE HYDROCHLORIDE AND ACETAMINOPHEN 5; 325 MG/1; MG/1
1 TABLET ORAL 2 TIMES DAILY PRN
Qty: 56 TABLET | Refills: 0 | Status: SHIPPED | OUTPATIENT
Start: 2018-04-30 | End: 2018-05-29 | Stop reason: SDUPTHER

## 2018-04-30 NOTE — PROGRESS NOTES
Assessment/Plan:     Diagnoses and all orders for this visit:    Chronic bilateral low back pain without sciatica  -     oxyCODONE-acetaminophen (PERCOCET) 5-325 mg per tablet; Take 1 tablet by mouth 2 (two) times a day as needed for moderate pain Max Daily Amount: 2 tablets        Discussion/Plan:  Chronic low back pain - Percocet renewed x 28 days  PDMP reviewed  F/U 28 days or sooner if needed  Subjective:      Patient ID: Mini Dailey is a 46 y o  male  Chief Complaint   Patient presents with    Anxiety     Med refill       Patient presents to the office today for med refill on percocet that is used for chronic pain  Patient has tried other modalities without relief  He takes percocet twice daily with relief  The following portions of the patient's history were reviewed and updated as appropriate: allergies, current medications, past family history, past medical history, past social history, past surgical history and problem list     Review of Systems   Constitutional: Negative  Respiratory: Negative  Cardiovascular: Negative  Musculoskeletal: Positive for arthralgias and back pain  Neurological: Negative  Objective:      /82   Pulse (!) 109   Temp (!) 97 3 °F (36 3 °C)   Resp 18   Ht 5' 10" (1 778 m)   Wt 67 6 kg (149 lb)   SpO2 97%   BMI 21 38 kg/m²          Physical Exam   Constitutional: He is oriented to person, place, and time  He appears well-developed and well-nourished  HENT:   Head: Normocephalic and atraumatic  Eyes: Conjunctivae are normal  Pupils are equal, round, and reactive to light  Neck: Neck supple  Cardiovascular: Normal rate and regular rhythm  Pulmonary/Chest: Effort normal and breath sounds normal    Neurological: He is alert and oriented to person, place, and time  Skin: Skin is warm and dry

## 2018-05-02 DIAGNOSIS — K21.9 GASTROESOPHAGEAL REFLUX DISEASE WITHOUT ESOPHAGITIS: Primary | ICD-10-CM

## 2018-05-02 RX ORDER — OMEPRAZOLE 40 MG/1
CAPSULE, DELAYED RELEASE ORAL
Qty: 30 CAPSULE | Refills: 3 | Status: SHIPPED | OUTPATIENT
Start: 2018-05-02 | End: 2018-09-07 | Stop reason: SDUPTHER

## 2018-05-29 ENCOUNTER — OFFICE VISIT (OUTPATIENT)
Dept: FAMILY MEDICINE CLINIC | Facility: CLINIC | Age: 53
End: 2018-05-29
Payer: MEDICARE

## 2018-05-29 VITALS
RESPIRATION RATE: 16 BRPM | OXYGEN SATURATION: 98 % | HEIGHT: 70 IN | SYSTOLIC BLOOD PRESSURE: 110 MMHG | TEMPERATURE: 97.7 F | WEIGHT: 156 LBS | BODY MASS INDEX: 22.33 KG/M2 | HEART RATE: 104 BPM | DIASTOLIC BLOOD PRESSURE: 84 MMHG

## 2018-05-29 DIAGNOSIS — G89.29 CHRONIC BILATERAL LOW BACK PAIN WITHOUT SCIATICA: ICD-10-CM

## 2018-05-29 DIAGNOSIS — F31.76 BIPOLAR DISORDER, IN FULL REMISSION, MOST RECENT EPISODE DEPRESSED (HCC): ICD-10-CM

## 2018-05-29 DIAGNOSIS — M54.50 CHRONIC BILATERAL LOW BACK PAIN WITHOUT SCIATICA: ICD-10-CM

## 2018-05-29 DIAGNOSIS — M54.2 NECK PAIN: Primary | ICD-10-CM

## 2018-05-29 PROCEDURE — 99213 OFFICE O/P EST LOW 20 MIN: CPT | Performed by: FAMILY MEDICINE

## 2018-05-29 RX ORDER — METHOCARBAMOL 500 MG/1
500 TABLET, FILM COATED ORAL 3 TIMES DAILY
Qty: 90 TABLET | Refills: 0 | Status: SHIPPED | OUTPATIENT
Start: 2018-05-29 | End: 2018-09-26 | Stop reason: SDUPTHER

## 2018-05-29 RX ORDER — OXYCODONE HYDROCHLORIDE AND ACETAMINOPHEN 5; 325 MG/1; MG/1
1 TABLET ORAL 2 TIMES DAILY PRN
Qty: 56 TABLET | Refills: 0 | Status: SHIPPED | OUTPATIENT
Start: 2018-05-29 | End: 2018-06-26 | Stop reason: SDUPTHER

## 2018-05-29 RX ORDER — KETOROLAC TROMETHAMINE 30 MG/ML
30 INJECTION, SOLUTION INTRAMUSCULAR; INTRAVENOUS ONCE
Status: COMPLETED | OUTPATIENT
Start: 2018-05-29 | End: 2018-05-29

## 2018-05-29 RX ADMIN — KETOROLAC TROMETHAMINE 30 MG: 30 INJECTION, SOLUTION INTRAMUSCULAR; INTRAVENOUS at 07:58

## 2018-05-29 NOTE — PROGRESS NOTES
OFFICE VISIT  Yesenia Reynoso 46 y o  male MRN: [id number]      Assessment / Plan:  Diagnoses and all orders for this visit:    Bipolar disorder, in full remission, most recent episode depressed (HCC)    Chronic bilateral low back pain without sciatica  -     oxyCODONE-acetaminophen (PERCOCET) 5-325 mg per tablet; Take 1 tablet by mouth 2 (two) times a day as needed for moderate pain Max Daily Amount: 2 tablets  -     methocarbamol (ROBAXIN) 500 mg tablet; Take 1 tablet (500 mg total) by mouth 3 (three) times a day    Other orders  -     ketorolac (TORADOL) injection 30 mg; Inject 1 mL (30 mg total) into the shoulder, thigh, or buttocks once     PDMP portal reviewed, no red flags    He denies colonoscopy  Reason For Visit / Chief Complaint  Chief Complaint   Patient presents with    Medication Refill    Neck Pain        HPI:  Yesenia Reynoso is a 46 y o  male presents today for routine check up  Pt has known chronic pain in lower back and neck  He has tried other modalities in the past without relief, he is contracted at this office  He reports increase neck pain for the last week  He has tried heat, nsaids without relief   Has hx of bipolar, controlled under own therapeutic exercises at home     Historical Information   Past Medical History:   Diagnosis Date    COPD [facility]chronic obstructive pulmonary disease) ([facility] [address] )     Hypercholesterolemia     Hyperthyroidism      Past Surgical History:   Procedure Laterality Date    HERNIA REPAIR       Social History   History   Alcohol Use No     History   Drug Use No     History   Smoking Status    Current Every Day Smoker   Smokeless Tobacco    Never Used     Family History   Problem Relation Age of Onset    Heart disease Mother     Hyperthyroidism Mother     Heart disease Father     Hyperthyroidism Father        Meds/Allergies   Allergies   Allergen Reactions    Ketorolac Hives       Meds:    Current Outpatient Prescriptions:     fluticasone (FLONASE) 50 mcg/act nasal spray, 2 sprays into each nostril daily, Disp: , Rfl:     fluticasone (FLOVENT HFA) 220 mcg/act inhaler, Inhale 2 puffs every 4 (four) hours as needed, Disp: , Rfl:     omeprazole (PriLOSEC) 40 MG capsule, TAKE 1 CAPSULE BY MOUTH EVERY MORNING BEFORE BREAKFAST, Disp: 30 capsule, Rfl: 3    oxyCODONE-acetaminophen (PERCOCET) 5-325 mg per tablet, Take 1 tablet by mouth 2 (two) times a day as needed for moderate pain Max Daily Amount: 2 tablets, Disp: 56 tablet, Rfl: 0    methocarbamol (ROBAXIN) 500 mg tablet, Take 1 tablet (500 mg total) by mouth 3 (three) times a day, Disp: 90 tablet, Rfl: 0      REVIEW OF SYSTEMS  A comprehensive review of systems was negative except for: Musculoskeletal: positive for  joint stiffness and muscle pain      Current Vitals:   Blood Pressure: 110/84 (05/29/18 0730)  Pulse: 104 (05/29/18 0730)  Temperature: 97 7 °F (36 5 °C) (05/29/18 0730)  Temp Source: Tympanic (05/29/18 0730)  Respirations: 16 (05/29/18 0730)  Height: 5' 10" (177 8 cm) (05/29/18 0730)  Weight - Scale: 70 8 kg (156 lb) (05/29/18 0730)  SpO2: 98 % (05/29/18 0730)  [unfilled]    PHYSICAL EXAMS:  General appearance: alert and oriented, in no acute distress  Lungs: clear to auscultation bilaterally  Heart: regular rate and rhythm, S1, S2 normal, no murmur, click, rub or gallop  Neurologic: Grossly normal{YES/NO:20        Follow up at this office in 28 days     Counseling / Coordination of Care  Total floor / unit time spent today 20 minutes  Greater than 50% of total time was spent with the patient and / or family counseling and / or coordination of care

## 2018-05-30 DIAGNOSIS — M62.838 MUSCLE SPASM: Primary | ICD-10-CM

## 2018-05-30 RX ORDER — CYCLOBENZAPRINE HCL 5 MG
5 TABLET ORAL 2 TIMES DAILY
Qty: 60 TABLET | Refills: 0 | Status: SHIPPED | OUTPATIENT
Start: 2018-05-30 | End: 2018-06-29 | Stop reason: SDUPTHER

## 2018-06-14 ENCOUNTER — TELEPHONE (OUTPATIENT)
Dept: FAMILY MEDICINE CLINIC | Facility: CLINIC | Age: 53
End: 2018-06-14

## 2018-06-14 DIAGNOSIS — Z71.6 ENCOUNTER FOR SMOKING CESSATION COUNSELING: Primary | ICD-10-CM

## 2018-06-14 RX ORDER — NICOTINE 21 MG/24HR
1 PATCH, TRANSDERMAL 24 HOURS TRANSDERMAL EVERY 24 HOURS
Qty: 28 PATCH | Refills: 0 | Status: SHIPPED | OUTPATIENT
Start: 2018-06-14 | End: 2018-09-18

## 2018-06-26 ENCOUNTER — OFFICE VISIT (OUTPATIENT)
Dept: FAMILY MEDICINE CLINIC | Facility: CLINIC | Age: 53
End: 2018-06-26
Payer: MEDICARE

## 2018-06-26 VITALS
HEIGHT: 70 IN | SYSTOLIC BLOOD PRESSURE: 132 MMHG | RESPIRATION RATE: 15 BRPM | DIASTOLIC BLOOD PRESSURE: 84 MMHG | HEART RATE: 108 BPM | WEIGHT: 153 LBS | BODY MASS INDEX: 21.9 KG/M2 | OXYGEN SATURATION: 98 % | TEMPERATURE: 98.3 F

## 2018-06-26 DIAGNOSIS — M54.50 CHRONIC BILATERAL LOW BACK PAIN WITHOUT SCIATICA: ICD-10-CM

## 2018-06-26 DIAGNOSIS — G89.29 CHRONIC BILATERAL LOW BACK PAIN WITHOUT SCIATICA: ICD-10-CM

## 2018-06-26 PROCEDURE — 99213 OFFICE O/P EST LOW 20 MIN: CPT | Performed by: FAMILY MEDICINE

## 2018-06-26 RX ORDER — OXYCODONE HYDROCHLORIDE AND ACETAMINOPHEN 5; 325 MG/1; MG/1
1 TABLET ORAL 2 TIMES DAILY PRN
Qty: 56 TABLET | Refills: 0 | Status: SHIPPED | OUTPATIENT
Start: 2018-06-26 | End: 2018-07-24 | Stop reason: SDUPTHER

## 2018-06-26 NOTE — PROGRESS NOTES
Assessment/Plan:     Diagnoses and all orders for this visit:    Chronic bilateral low back pain without sciatica  -     oxyCODONE-acetaminophen (PERCOCET) 5-325 mg per tablet; Take 1 tablet by mouth 2 (two) times a day as needed for moderate pain Max Daily Amount: 2 tablets      Discussion/Plan:    Chronic bilateral low back pain - percocet renewed x 28 days  pdmp reviewed without red flags  Anxiety - symptoms controlled at this time  Monitor symptoms and f/u if symptoms worsen or seek ED if SI/HI/AH/VH/manic sx  F/U in 1 month for medication refill and evaluation or sooner if needed  Subjective:      Patient ID: Yehuda Gordon is a 46 y o  male  Chief Complaint   Patient presents with    Follow-up    Medication Refill     percocet    Back Pain       Patient is a 46year old male who presents to the office today for medication refill for chronic low back pain/lumbar radiculopathy  He is on chronic opioid therapy for this and has tried other modalities without relief  He is on Perocet 5-235 mg BID PRN with relief  He is feeling well, and  has no other complaints/concerns today  He reports some anxiety and stress lately, but he states he is managing on his own  He states he is coping on his own without an issue  He denies depressive symptoms, SI/HI/AH/VH, feeling of euphoria  He states he gets anxiety about dirt at times, but keeps house clean to control anxiety  No trouble eating or sleeping           The following portions of the patient's history were reviewed and updated as appropriate: allergies, current medications, past family history, past medical history, past social history, past surgical history and problem list     Patient Active Problem List   Diagnosis    Allergic rhinitis    Anxiety disorder    Bipolar disorder ([facility] [address] )    Chronic lumbar radiculopathy    Contact dermatitis    Depression    Esophageal reflux    Hyperlipidemia    Migraine headache    Mild intermittent asthma without complication     Current Outpatient Prescriptions on File Prior to Visit   Medication Sig Dispense Refill    cyclobenzaprine (FLEXERIL) 5 mg tablet Take 1 tablet (5 mg total) by mouth 2 (two) times a day 60 tablet 0    fluticasone (FLONASE) 50 mcg/act nasal spray 2 sprays into each nostril daily      fluticasone (FLOVENT HFA) 220 mcg/act inhaler Inhale 2 puffs every 4 (four) hours as needed      nicotine (NICODERM CQ) 21 mg/24 hr TD 24 hr patch Place 1 patch on the skin every 24 hours 28 patch 0    omeprazole (PriLOSEC) 40 MG capsule TAKE 1 CAPSULE BY MOUTH EVERY MORNING BEFORE BREAKFAST 30 capsule 3    [DISCONTINUED] oxyCODONE-acetaminophen (PERCOCET) 5-325 mg per tablet Take 1 tablet by mouth 2 (two) times a day as needed for moderate pain Max Daily Amount: 2 tablets 56 tablet 0    methocarbamol (ROBAXIN) 500 mg tablet Take 1 tablet (500 mg total) by mouth 3 (three) times a day 90 tablet 0     No current facility-administered medications on file prior to visit  Review of Systems   Respiratory: Negative  Cardiovascular: Negative  Gastrointestinal: Negative  Genitourinary: Negative  Musculoskeletal: Positive for arthralgias and back pain  Neurological: Negative  Psychiatric/Behavioral: Negative for agitation, behavioral problems, confusion, decreased concentration, dysphoric mood, hallucinations, self-injury, sleep disturbance and suicidal ideas  The patient is nervous/anxious  The patient is not hyperactive  Objective:      /84 (BP Location: Left arm, Patient Position: Sitting, Cuff Size: Large)   Pulse (!) 108   Temp 98 3 °F (36 8 °C) (Tympanic)   Resp 15   Ht 5' 10" (1 778 m)   Wt 69 4 kg (153 lb)   SpO2 98%   BMI 21 95 kg/m²          Physical Exam   Constitutional: He is oriented to person, place, and time  He appears well-developed and well-nourished  HENT:   Head: Normocephalic and atraumatic     Left Ear: External ear normal    Nose: Nose normal  Mouth/Throat: Oropharynx is clear and moist    Eyes: Conjunctivae are normal  Pupils are equal, round, and reactive to light  Neck: Neck supple  Cardiovascular: Normal rate and regular rhythm  Pulmonary/Chest: Effort normal and breath sounds normal    Abdominal: Soft  Bowel sounds are normal    Musculoskeletal:        Lumbar back: He exhibits tenderness and bony tenderness  (+) mild tenderness to palpation of lumbar spinous process and paraspinal muscles   Neurological: He is alert and oriented to person, place, and time  He has normal reflexes  Skin: Skin is warm and dry  Psychiatric: He has a normal mood and affect   His behavior is normal  Judgment and thought content normal

## 2018-06-29 DIAGNOSIS — M62.838 MUSCLE SPASM: ICD-10-CM

## 2018-07-01 RX ORDER — CYCLOBENZAPRINE HCL 5 MG
TABLET ORAL
Qty: 60 TABLET | Refills: 0 | Status: SHIPPED | OUTPATIENT
Start: 2018-07-01 | End: 2018-07-27 | Stop reason: SDUPTHER

## 2018-07-24 ENCOUNTER — OFFICE VISIT (OUTPATIENT)
Dept: FAMILY MEDICINE CLINIC | Facility: CLINIC | Age: 53
End: 2018-07-24
Payer: MEDICARE

## 2018-07-24 VITALS
DIASTOLIC BLOOD PRESSURE: 82 MMHG | OXYGEN SATURATION: 97 % | TEMPERATURE: 97.7 F | HEIGHT: 70 IN | HEART RATE: 85 BPM | BODY MASS INDEX: 22.19 KG/M2 | SYSTOLIC BLOOD PRESSURE: 126 MMHG | RESPIRATION RATE: 17 BRPM | WEIGHT: 155 LBS

## 2018-07-24 DIAGNOSIS — G89.29 CHRONIC BILATERAL LOW BACK PAIN WITHOUT SCIATICA: ICD-10-CM

## 2018-07-24 DIAGNOSIS — F31.76 BIPOLAR DISORDER, IN FULL REMISSION, MOST RECENT EPISODE DEPRESSED (HCC): Primary | ICD-10-CM

## 2018-07-24 DIAGNOSIS — M54.50 CHRONIC BILATERAL LOW BACK PAIN WITHOUT SCIATICA: ICD-10-CM

## 2018-07-24 PROCEDURE — 99213 OFFICE O/P EST LOW 20 MIN: CPT | Performed by: FAMILY MEDICINE

## 2018-07-24 RX ORDER — OXYCODONE HYDROCHLORIDE AND ACETAMINOPHEN 5; 325 MG/1; MG/1
1 TABLET ORAL 2 TIMES DAILY PRN
Qty: 56 TABLET | Refills: 0 | Status: SHIPPED | OUTPATIENT
Start: 2018-07-24 | End: 2018-08-21 | Stop reason: SDUPTHER

## 2018-07-24 NOTE — PROGRESS NOTES
OFFICE VISIT  Herbert Orta 46 y o  male MRN: [id number]      Assessment / Plan:  Diagnoses and all orders for this visit:    Chronic bilateral low back pain without sciatica  -     oxyCODONE-acetaminophen (PERCOCET) 5-325 mg per tablet; Take 1 tablet by mouth 2 (two) times a day as needed for moderate pain Max Daily Amount: 2 tablets    PDMP portal reviewed, no red flags      Reason For Visit / Chief Complaint  Chief Complaint   Patient presents with    Follow-up        HPI:  Herbert Orta is a 46 y o  male presents today for routine follow up, Pt has been having chronic lower back pain  He is contracted here at this office, good compliance  He is able to perform light duties with pain control with use of percocet prn  He has known bipolar, controlled on no medication at this time  He surrounds himself with friends, spirits good       Historical Information   Past Medical History:   Diagnosis Date    COPD [facility]chronic obstructive [facility] disease) ([facility] [address]     Hypercholesterolemia     Hyperthyroidism      Past Surgical History:   Procedure Laterality Date    HERNIA REPAIR       Social History   History   Alcohol Use No     History   Drug Use No     History   Smoking Status    Current Every Day Smoker   Smokeless Tobacco    Never Used     Family History   Problem Relation Age of Onset    Heart disease Mother     Hyperthyroidism Mother     Heart disease Father     Hyperthyroidism Father        Meds/Allergies   Allergies   Allergen Reactions    Ketorolac Hives       Meds:    Current Outpatient Prescriptions:     cyclobenzaprine (FLEXERIL) 5 mg tablet, TAKE 1 TABLET TWICE DAILY, Disp: 60 tablet, Rfl: 0    fluticasone (FLONASE) 50 mcg/act nasal spray, 2 sprays into each nostril daily, Disp: , Rfl:     fluticasone (FLOVENT HFA) 220 mcg/act inhaler, Inhale 2 puffs every 4 (four) hours as needed, Disp: , Rfl:     methocarbamol (ROBAXIN) 500 mg tablet, Take 1 tablet (500 mg total) by mouth 3 (three) times a day, Disp: 90 tablet, Rfl: 0    nicotine (NICODERM CQ) 21 mg/24 hr TD 24 hr patch, Place 1 patch on the skin every 24 hours, Disp: 28 patch, Rfl: 0    omeprazole (PriLOSEC) 40 MG capsule, TAKE 1 CAPSULE BY MOUTH EVERY MORNING BEFORE BREAKFAST, Disp: 30 capsule, Rfl: 3    oxyCODONE-acetaminophen (PERCOCET) 5-325 mg per tablet, Take 1 tablet by mouth 2 (two) times a day as needed for moderate pain Max Daily Amount: 2 tablets, Disp: 56 tablet, Rfl: 0      REVIEW OF SYSTEMS  A comprehensive review of systems was negative except for: Musculoskeletal: positive for  chronic low back pain       Current Vitals:   Blood Pressure: 126/82 (07/24/18 0727)  Pulse: 85 (07/24/18 0727)  Temperature: 97 7 °F (36 5 °C) (07/24/18 0727)  Respirations: 17 (07/24/18 0727)  Height: 5' 10" (177 8 cm) (07/24/18 0727)  Weight - Scale: 70 3 kg (155 lb) (07/24/18 0727)  SpO2: 97 % (07/24/18 0727)  [unfilled]    PHYSICAL EXAMS:  General appearance: alert and oriented, in no acute distress  Lungs: clear to auscultation bilaterally  Heart: regular rate and rhythm, S1, S2 normal, no murmur, click, rub or gallop  Extremities: extremities normal, warm and well-perfused; no cyanosis, clubbing, or edema  Pulses: 2+ and symmetric  Skin: Skin color, texture, turgor normal  No rashes or lesions  Lymph nodes: Cervical, supraclavicular, and axillary nodes normal   Neurologic: Grossly normal{YES/NO:20        Follow up at this office in 28 days    Counseling / Coordination of Care  Total floor / unit time spent today 20 minutes  Greater than 50% of total time was spent with the patient and / or family counseling and / or coordination of care

## 2018-07-27 DIAGNOSIS — M62.838 MUSCLE SPASM: ICD-10-CM

## 2018-07-27 RX ORDER — CYCLOBENZAPRINE HCL 5 MG
TABLET ORAL
Qty: 60 TABLET | Refills: 0 | Status: SHIPPED | OUTPATIENT
Start: 2018-07-27 | End: 2018-08-29 | Stop reason: SDUPTHER

## 2018-08-21 ENCOUNTER — OFFICE VISIT (OUTPATIENT)
Dept: FAMILY MEDICINE CLINIC | Facility: CLINIC | Age: 53
End: 2018-08-21
Payer: COMMERCIAL

## 2018-08-21 VITALS
TEMPERATURE: 97.6 F | HEART RATE: 94 BPM | BODY MASS INDEX: 22.19 KG/M2 | RESPIRATION RATE: 17 BRPM | SYSTOLIC BLOOD PRESSURE: 130 MMHG | WEIGHT: 155 LBS | OXYGEN SATURATION: 97 % | HEIGHT: 70 IN | DIASTOLIC BLOOD PRESSURE: 80 MMHG

## 2018-08-21 DIAGNOSIS — G89.29 CHRONIC BILATERAL LOW BACK PAIN WITHOUT SCIATICA: ICD-10-CM

## 2018-08-21 DIAGNOSIS — M54.50 CHRONIC BILATERAL LOW BACK PAIN WITHOUT SCIATICA: ICD-10-CM

## 2018-08-21 PROCEDURE — 99213 OFFICE O/P EST LOW 20 MIN: CPT | Performed by: FAMILY MEDICINE

## 2018-08-21 RX ORDER — OXYCODONE HYDROCHLORIDE AND ACETAMINOPHEN 5; 325 MG/1; MG/1
1 TABLET ORAL 2 TIMES DAILY PRN
Qty: 56 TABLET | Refills: 0 | Status: SHIPPED | OUTPATIENT
Start: 2018-08-21 | End: 2018-09-18 | Stop reason: SDUPTHER

## 2018-08-21 NOTE — PROGRESS NOTES
OFFICE VISIT  Herbert Orta 46 y o  male MRN: [id number]      Assessment / Plan:  Diagnoses and all orders for this visit:    Chronic bilateral low back pain without sciatica  -     oxyCODONE-acetaminophen (PERCOCET) 5-325 mg per tablet; Take 1 tablet by mouth 2 (two) times a day as needed for moderate pain Max Daily Amount: 2 tablets          Reason For Visit / Chief Complaint  Chief Complaint   Patient presents with    Follow-up        HPI:  Herbert Orta is a 46 y o  male who presents today for routine follow up   Pt has known chronic lower back pain, aggravated by sitting, standing  He has tried many alternative methods without relief  He is contracted, good compliance, He takes one percocet daily and as needed with good relief     Historical Information   Past Medical History:   Diagnosis Date    COPD (chronic obstructive pulmonary disease) ([facility] [address] )     Hypercholesterolemia     Hyperthyroidism      Past Surgical History:   Procedure Laterality Date    HERNIA REPAIR       Social History   History   Alcohol Use No     History   Drug Use No     History   Smoking Status    Current Every Day Smoker   Smokeless Tobacco    Never Used     Family History   Problem Relation Age of Onset    Heart disease Mother     Hyperthyroidism Mother     Heart disease Father     Hyperthyroidism Father        Meds/Allergies   Allergies   Allergen Reactions    Ketorolac Hives       Meds:    Current Outpatient Prescriptions:     cyclobenzaprine (FLEXERIL) 5 mg tablet, TAKE 1 TABLET TWICE DAILY, Disp: 60 tablet, Rfl: 0    fluticasone (FLONASE) 50 mcg/act nasal spray, 2 sprays into each nostril daily, Disp: , Rfl:     fluticasone (FLOVENT HFA) 220 mcg/act inhaler, Inhale 2 puffs every 4 (four) hours as needed, Disp: , Rfl:     methocarbamol (ROBAXIN) 500 mg tablet, Take 1 tablet (500 mg total) by mouth 3 (three) times a day, Disp: 90 tablet, Rfl: 0    nicotine (NICODERM CQ) 21 mg/24 hr TD 24 hr patch, Place 1 patch on the skin every 24 hours, Disp: 28 patch, Rfl: 0    omeprazole (PriLOSEC) 40 MG capsule, TAKE 1 CAPSULE BY MOUTH EVERY MORNING BEFORE BREAKFAST, Disp: 30 capsule, Rfl: 3    oxyCODONE-acetaminophen (PERCOCET) 5-325 mg per tablet, Take 1 tablet by mouth 2 (two) times a day as needed for moderate pain Max Daily Amount: 2 tablets, Disp: 56 tablet, Rfl: 0      REVIEW OF SYSTEMS  Review of Systems   Constitutional: Negative for activity change, chills, fatigue and fever  HENT: Negative  Negative for tinnitus and trouble swallowing  Eyes: Negative for photophobia, pain, discharge, itching and visual disturbance  Respiratory: Negative for cough, chest tightness, shortness of breath and wheezing  Cardiovascular: Negative for chest pain and leg swelling  Gastrointestinal: Negative for abdominal pain  Endocrine: Negative for polydipsia, polyphagia and polyuria  Genitourinary: Negative for dysuria and frequency  Musculoskeletal: Positive for arthralgias and back pain  Negative for myalgias, neck pain and neck stiffness  Skin: Negative for color change  Neurological: Negative for dizziness, syncope, weakness, numbness and headaches  Hematological: Does not bruise/bleed easily  Psychiatric/Behavioral: Negative for confusion, sleep disturbance and suicidal ideas  Current Vitals:   Blood Pressure: 130/80 (08/21/18 0923)  Pulse: 94 (08/21/18 0923)  Temperature: 97 6 °F (36 4 °C) (08/21/18 0923)  Respirations: 17 (08/21/18 0923)  Height: 5' 10" (177 8 cm) (08/21/18 6619)  Weight - Scale: 70 3 kg (155 lb) (08/21/18 0923)  SpO2: 97 % (08/21/18 0923)  [unfilled]    PHYSICAL EXAMS:  Physical Exam   Constitutional: He is oriented to person, place, and time  He appears well-nourished  HENT:   Head: Normocephalic and atraumatic     Right Ear: External ear normal    Left Ear: External ear normal    Nose: Nose normal    Mouth/Throat: Oropharynx is clear and moist    Eyes: Conjunctivae are normal  Pupils are equal, round, and reactive to light  Right eye exhibits no discharge  Left eye exhibits no discharge  Neck: Normal range of motion  Neck supple  Cardiovascular: Regular rhythm and normal heart sounds  Pulmonary/Chest: Effort normal and breath sounds normal    Abdominal: Soft  Bowel sounds are normal    Musculoskeletal: Normal range of motion  He exhibits tenderness  Neurological: He is alert and oriented to person, place, and time  Skin: Skin is dry  Follow up at this office in one month     Counseling / Coordination of Care  Total floor / unit time spent today 20 minutes  Greater than 50% of total time was spent with the patient and / or family counseling and / or coordination of care

## 2018-08-29 DIAGNOSIS — M62.838 MUSCLE SPASM: ICD-10-CM

## 2018-08-29 RX ORDER — CYCLOBENZAPRINE HCL 5 MG
TABLET ORAL
Qty: 60 TABLET | Refills: 0 | Status: SHIPPED | OUTPATIENT
Start: 2018-08-29 | End: 2018-09-26 | Stop reason: SDUPTHER

## 2018-09-07 DIAGNOSIS — K21.9 GASTROESOPHAGEAL REFLUX DISEASE WITHOUT ESOPHAGITIS: ICD-10-CM

## 2018-09-07 RX ORDER — OMEPRAZOLE 40 MG/1
CAPSULE, DELAYED RELEASE ORAL
Qty: 30 CAPSULE | Refills: 3 | Status: SHIPPED | OUTPATIENT
Start: 2018-09-07 | End: 2018-11-08

## 2018-09-18 ENCOUNTER — OFFICE VISIT (OUTPATIENT)
Dept: FAMILY MEDICINE CLINIC | Facility: CLINIC | Age: 53
End: 2018-09-18
Payer: COMMERCIAL

## 2018-09-18 VITALS
WEIGHT: 140 LBS | RESPIRATION RATE: 17 BRPM | OXYGEN SATURATION: 99 % | BODY MASS INDEX: 20.04 KG/M2 | HEART RATE: 107 BPM | DIASTOLIC BLOOD PRESSURE: 88 MMHG | HEIGHT: 70 IN | TEMPERATURE: 98.6 F | SYSTOLIC BLOOD PRESSURE: 148 MMHG

## 2018-09-18 DIAGNOSIS — M54.50 CHRONIC BILATERAL LOW BACK PAIN WITHOUT SCIATICA: ICD-10-CM

## 2018-09-18 DIAGNOSIS — G89.29 CHRONIC BILATERAL LOW BACK PAIN WITHOUT SCIATICA: ICD-10-CM

## 2018-09-18 DIAGNOSIS — R03.0 ELEVATED BLOOD PRESSURE READING: ICD-10-CM

## 2018-09-18 DIAGNOSIS — Z11.59 NEED FOR HEPATITIS C SCREENING TEST: Primary | ICD-10-CM

## 2018-09-18 DIAGNOSIS — F31.76 BIPOLAR DISORDER, IN FULL REMISSION, MOST RECENT EPISODE DEPRESSED (HCC): ICD-10-CM

## 2018-09-18 DIAGNOSIS — E78.00 HYPERCHOLESTEREMIA: ICD-10-CM

## 2018-09-18 DIAGNOSIS — Z12.5 SCREENING PSA (PROSTATE SPECIFIC ANTIGEN): ICD-10-CM

## 2018-09-18 PROCEDURE — 82570 ASSAY OF URINE CREATININE: CPT | Performed by: NURSE PRACTITIONER

## 2018-09-18 PROCEDURE — 82043 UR ALBUMIN QUANTITATIVE: CPT | Performed by: NURSE PRACTITIONER

## 2018-09-18 PROCEDURE — 99213 OFFICE O/P EST LOW 20 MIN: CPT | Performed by: FAMILY MEDICINE

## 2018-09-18 RX ORDER — OXYCODONE HYDROCHLORIDE AND ACETAMINOPHEN 5; 325 MG/1; MG/1
1 TABLET ORAL 2 TIMES DAILY PRN
Qty: 56 TABLET | Refills: 0 | Status: SHIPPED | OUTPATIENT
Start: 2018-09-18 | End: 2018-11-08

## 2018-09-18 NOTE — PROGRESS NOTES
OFFICE VISIT  Mariel White 46 y o  male MRN: [id number]      Assessment / Plan:  Diagnoses and all orders for this visit:    Need for hepatitis C screening test  -     Hepatitis C antibody; Future    Chronic bilateral low back pain without sciatica  -     oxyCODONE-acetaminophen (PERCOCET) 5-325 mg per tablet; Take 1 tablet by mouth 2 (two) times a day as needed for moderate pain Max Daily Amount: 2 tablets    Screening PSA (prostate specific antigen)  -     PSA, total and free; Future    Elevated blood pressure reading  -     CBC and differential; Future  -     TSH, 3rd generation with Free T4 reflex; Future  -     Microalbumin / creatinine urine ratio  -     Comprehensive metabolic panel; Future  -     Lipid Panel with Direct LDL reflex; Future    Bipolar disorder, in full remission, most recent episode depressed ([facility] [address] )  -     TSH, 3rd generation with Free T4 reflex; Future    Hypercholesteremia  -     Lipid Panel with Direct LDL reflex; Future      PDMP portal reviewed, no red flags  Declined colonoscopy     Reason For Visit / Chief Complaint  Chief Complaint   Patient presents with    Back Pain        HPI:  Mariel White is a 46 y o  male who presents today for routine med refill  Patient has chronic low back pain, he has tried multiple therapies over the years  He has noted elevated blood pressure today  He is excited over non payment of landlord   He has known bipolar, no longer seeing [facility], mood stable/    Historical Information   Past Medical History:   Diagnosis Date    [facility]     Hypercholesterolemia     Hyperthyroidism      Past Surgical History:   Procedure Laterality Date    HERNIA REPAIR       Social History   History   Alcohol Use No     History   Drug Use No     History   Smoking Status    Current Every Day Smoker   Smokeless Tobacco    Never Used     Family History   Problem Relation Age of Onset    Heart disease Mother     Hyperthyroidism Mother  Heart disease Father     Hyperthyroidism Father        Meds/Allergies   Allergies   Allergen Reactions    Ketorolac Hives       Meds:    Current Outpatient Prescriptions:     cyclobenzaprine (FLEXERIL) 5 mg tablet, TAKE 1 TABLET TWICE DAILY, Disp: 60 tablet, Rfl: 0    fluticasone (FLONASE) 50 mcg/act nasal spray, 2 sprays into each nostril daily, Disp: , Rfl:     fluticasone (FLOVENT HFA) 220 mcg/act inhaler, Inhale 2 puffs every 4 (four) hours as needed, Disp: , Rfl:     methocarbamol (ROBAXIN) 500 mg tablet, Take 1 tablet (500 mg total) by mouth 3 (three) times a day, Disp: 90 tablet, Rfl: 0    nicotine (NICODERM CQ) 21 mg/24 hr TD 24 hr patch, Place 1 patch on the skin every 24 hours, Disp: 28 patch, Rfl: 0    omeprazole (PriLOSEC) 40 MG capsule, TAKE 1 CAPSULE BY MOUTH EVERY MORNING BEFORE BREAKFAST, Disp: 30 capsule, Rfl: 3    oxyCODONE-acetaminophen (PERCOCET) 5-325 mg per tablet, Take 1 tablet by mouth 2 (two) times a day as needed for moderate pain Max Daily Amount: 2 tablets, Disp: 56 tablet, Rfl: 0      REVIEW OF SYSTEMS  Review of Systems   Constitutional: Negative for appetite change, fatigue and fever  HENT: Negative for congestion, ear discharge, ear pain and postnasal drip  Eyes: Negative for pain, discharge, redness, itching and visual disturbance  Respiratory: Negative for chest tightness, shortness of breath and wheezing  Cardiovascular: Negative for chest pain, palpitations and leg swelling  Gastrointestinal: Negative for abdominal distention, abdominal pain, blood in stool, diarrhea, nausea and vomiting  Endocrine: Negative for cold intolerance, heat intolerance, polydipsia, polyphagia and polyuria  Genitourinary: Negative for decreased urine volume, difficulty urinating, dysuria, frequency, hematuria, testicular pain and urgency  Musculoskeletal: Positive for arthralgias and back pain  Negative for myalgias, neck pain and neck stiffness     Skin: Negative for color change, pallor, rash and wound  Neurological: Negative for dizziness, light-headedness, numbness and headaches  Hematological: Negative for adenopathy  Does not bruise/bleed easily  Psychiatric/Behavioral: Negative for agitation, behavioral problems, self-injury, sleep disturbance and suicidal ideas  The patient is not nervous/anxious  Current Vitals:   Blood Pressure: 148/88 (09/18/18 0732)  Pulse: (!) 107 (09/18/18 0732)  Temperature: 98 6 °F (37 °C) (09/18/18 0732)  Respirations: 17 (09/18/18 0732)  Height: 5' 10" (177 8 cm) (09/18/18 0732)  Weight - Scale: 63 5 kg (140 lb) (09/18/18 0732)  SpO2: 99 % (09/18/18 0732)  [unfilled]    PHYSICAL EXAMS:  Physical Exam   Constitutional: He is oriented to person, place, and time  He appears well-developed and well-nourished  HENT:   Head: Normocephalic and atraumatic  Right Ear: External ear normal    Left Ear: External ear normal    Nose: Nose normal    Mouth/Throat: Oropharynx is clear and moist    Eyes: Conjunctivae are normal  Pupils are equal, round, and reactive to light  Right eye exhibits no discharge  Left eye exhibits no discharge  Neck: Normal range of motion  Neck supple  No thyromegaly present  Cardiovascular: Normal rate, regular rhythm and normal heart sounds  Pulmonary/Chest: Effort normal and breath sounds normal    Abdominal: Soft  Bowel sounds are normal  He exhibits no distension  There is no tenderness  Musculoskeletal: Normal range of motion  He exhibits tenderness  He exhibits no edema or deformity  Neurological: He is alert and oriented to person, place, and time  Skin: Skin is warm and dry  No rash noted  No erythema  Psychiatric: He has a normal mood and affect  His behavior is normal            Follow up at this office in 28 days     Counseling / Coordination of Care  Total floor / unit time spent today 20 minutes    Greater than 50% of total time was spent with the patient and / or family counseling and / or coordination of care

## 2018-09-21 ENCOUNTER — HOSPITAL ENCOUNTER (INPATIENT)
Facility: HOSPITAL | Age: 53
LOS: 1 days | Discharge: HOME | DRG: 917 | End: 2018-09-22
Attending: EMERGENCY MEDICINE | Admitting: FAMILY MEDICINE
Payer: COMMERCIAL

## 2018-09-21 ENCOUNTER — APPOINTMENT (EMERGENCY)
Dept: CT IMAGING | Facility: HOSPITAL | Age: 53
DRG: 917 | End: 2018-09-21
Payer: COMMERCIAL

## 2018-09-21 ENCOUNTER — APPOINTMENT (EMERGENCY)
Dept: RADIOLOGY | Facility: HOSPITAL | Age: 53
DRG: 917 | End: 2018-09-21
Payer: COMMERCIAL

## 2018-09-21 DIAGNOSIS — R74.8 ABNORMAL TRANSAMINASES: ICD-10-CM

## 2018-09-21 DIAGNOSIS — J69.0 ASPIRATION PNEUMONIA OF LOWER LOBE, UNSPECIFIED ASPIRATION PNEUMONIA TYPE, UNSPECIFIED LATERALITY (HCC): ICD-10-CM

## 2018-09-21 DIAGNOSIS — R41.82 ALTERED MENTAL STATUS: Primary | ICD-10-CM

## 2018-09-21 DIAGNOSIS — R41.82 ALTERED MENTAL STATUS, UNSPECIFIED ALTERED MENTAL STATUS TYPE: ICD-10-CM

## 2018-09-21 DIAGNOSIS — J69.0 ASPIRATION PNEUMONITIS (HCC): ICD-10-CM

## 2018-09-21 DIAGNOSIS — F19.10 POLYSUBSTANCE ABUSE (HCC): ICD-10-CM

## 2018-09-21 DIAGNOSIS — F11.929 OPIOID INTOXICATION (HCC): ICD-10-CM

## 2018-09-21 PROBLEM — F31.9 BIPOLAR DEPRESSION (HCC): Chronic | Status: ACTIVE | Noted: 2018-09-21

## 2018-09-21 PROBLEM — J43.8 OTHER EMPHYSEMA (HCC): Status: ACTIVE | Noted: 2018-09-21

## 2018-09-21 PROBLEM — J96.02 ACUTE RESPIRATORY FAILURE WITH HYPERCAPNIA (HCC): Status: ACTIVE | Noted: 2018-09-21

## 2018-09-21 PROBLEM — E87.2 LACTIC ACIDOSIS: Status: ACTIVE | Noted: 2018-09-21

## 2018-09-21 PROBLEM — R82.5 POSITIVE URINE DRUG SCREEN: Status: ACTIVE | Noted: 2018-09-21

## 2018-09-21 PROBLEM — D72.829 LEUKOCYTOSIS: Status: ACTIVE | Noted: 2018-09-21

## 2018-09-21 PROBLEM — G93.40 ACUTE ENCEPHALOPATHY: Status: ACTIVE | Noted: 2018-09-21

## 2018-09-21 LAB
ALBUMIN SERPL BCP-MCNC: 3 G/DL (ref 3.5–5)
ALBUMIN SERPL BCP-MCNC: 3.2 G/DL (ref 3.5–5)
ALP SERPL-CCNC: 104 U/L (ref 46–116)
ALP SERPL-CCNC: 98 U/L (ref 46–116)
ALT SERPL W P-5'-P-CCNC: 107 U/L (ref 12–78)
ALT SERPL W P-5'-P-CCNC: 108 U/L (ref 12–78)
AMMONIA PLAS-SCNC: 25 UMOL/L (ref 11–35)
AMPHETAMINES SERPL QL SCN: POSITIVE
ANION GAP BLD CALC-SCNC: 16 MMOL/L (ref 4–13)
ANION GAP SERPL CALCULATED.3IONS-SCNC: 3 MMOL/L (ref 4–13)
ANION GAP SERPL CALCULATED.3IONS-SCNC: 7 MMOL/L (ref 4–13)
APAP SERPL-MCNC: <2 UG/ML (ref 10–30)
ARTERIAL PATENCY WRIST A: YES
AST SERPL W P-5'-P-CCNC: 66 U/L (ref 5–45)
AST SERPL W P-5'-P-CCNC: 83 U/L (ref 5–45)
ATRIAL RATE: 112 BPM
BACTERIA UR QL AUTO: ABNORMAL /HPF
BARBITURATES UR QL: NEGATIVE
BASE EXCESS BLDA CALC-SCNC: -0.4 MMOL/L
BASE EXCESS BLDA CALC-SCNC: -3 MMOL/L (ref -2–3)
BASOPHILS # BLD AUTO: 0.03 THOUSANDS/ΜL (ref 0–0.1)
BASOPHILS # BLD AUTO: 0.05 THOUSANDS/ΜL (ref 0–0.1)
BASOPHILS NFR BLD AUTO: 0 % (ref 0–1)
BASOPHILS NFR BLD AUTO: 0 % (ref 0–1)
BENZODIAZ UR QL: NEGATIVE
BILIRUB SERPL-MCNC: 0.2 MG/DL (ref 0.2–1)
BILIRUB SERPL-MCNC: 0.6 MG/DL (ref 0.2–1)
BILIRUB UR QL STRIP: NEGATIVE
BUN BLD-MCNC: 20 MG/DL (ref 5–25)
BUN SERPL-MCNC: 16 MG/DL (ref 5–25)
BUN SERPL-MCNC: 18 MG/DL (ref 5–25)
CA-I BLD-SCNC: 1.18 MMOL/L (ref 1.12–1.32)
CA-I BLD-SCNC: 1.19 MMOL/L (ref 1.12–1.32)
CALCIUM SERPL-MCNC: 8.1 MG/DL (ref 8.3–10.1)
CALCIUM SERPL-MCNC: 8.2 MG/DL (ref 8.3–10.1)
CHLORIDE BLD-SCNC: 107 MMOL/L (ref 100–108)
CHLORIDE SERPL-SCNC: 105 MMOL/L (ref 100–108)
CHLORIDE SERPL-SCNC: 108 MMOL/L (ref 100–108)
CK SERPL-CCNC: 158 U/L (ref 39–308)
CLARITY UR: ABNORMAL
CO2 SERPL-SCNC: 26 MMOL/L (ref 21–32)
CO2 SERPL-SCNC: 28 MMOL/L (ref 21–32)
COCAINE UR QL: NEGATIVE
COLOR UR: YELLOW
CREAT BLD-MCNC: 1 MG/DL (ref 0.6–1.3)
CREAT SERPL-MCNC: 0.7 MG/DL (ref 0.6–1.3)
CREAT SERPL-MCNC: 1.1 MG/DL (ref 0.6–1.3)
EOSINOPHIL # BLD AUTO: 0.04 THOUSAND/ΜL (ref 0–0.61)
EOSINOPHIL # BLD AUTO: 0.13 THOUSAND/ΜL (ref 0–0.61)
EOSINOPHIL NFR BLD AUTO: 0 % (ref 0–6)
EOSINOPHIL NFR BLD AUTO: 1 % (ref 0–6)
ERYTHROCYTE [DISTWIDTH] IN BLOOD BY AUTOMATED COUNT: 14.1 % (ref 11.6–15.1)
ERYTHROCYTE [DISTWIDTH] IN BLOOD BY AUTOMATED COUNT: 14.2 % (ref 11.6–15.1)
ETHANOL SERPL-MCNC: <3 MG/DL (ref 0–3)
FIO2 GAS DIL.REBREATH: 60 L
GFR SERPL CREATININE-BSD FRML MDRD: 107 ML/MIN/1.73SQ M
GFR SERPL CREATININE-BSD FRML MDRD: 76 ML/MIN/1.73SQ M
GFR SERPL CREATININE-BSD FRML MDRD: 85 ML/MIN/1.73SQ M
GLUCOSE SERPL-MCNC: 108 MG/DL (ref 65–140)
GLUCOSE SERPL-MCNC: 47 MG/DL (ref 65–140)
GLUCOSE SERPL-MCNC: 62 MG/DL (ref 65–140)
GLUCOSE SERPL-MCNC: 67 MG/DL (ref 65–140)
GLUCOSE SERPL-MCNC: 76 MG/DL (ref 65–140)
GLUCOSE SERPL-MCNC: 84 MG/DL (ref 65–140)
GLUCOSE SERPL-MCNC: 88 MG/DL (ref 65–140)
GLUCOSE UR STRIP-MCNC: NEGATIVE MG/DL
HCO3 BLDA-SCNC: 25.1 MMOL/L (ref 22–28)
HCO3 BLDA-SCNC: 25.7 MMOL/L (ref 22–28)
HCT VFR BLD AUTO: 35.9 % (ref 36.5–49.3)
HCT VFR BLD AUTO: 43.4 % (ref 36.5–49.3)
HCT VFR BLD CALC: 34 % (ref 36.5–49.3)
HCT VFR BLD CALC: 43 % (ref 36.5–49.3)
HGB BLD-MCNC: 11.8 G/DL (ref 12–17)
HGB BLD-MCNC: 14.1 G/DL (ref 12–17)
HGB BLDA-MCNC: 11.6 G/DL (ref 12–17)
HGB BLDA-MCNC: 14.6 G/DL (ref 12–17)
HGB UR QL STRIP.AUTO: ABNORMAL
HOROWITZ INDEX BLDA+IHG-RTO: 30 MM[HG]
I-TIME: 1
IMM GRANULOCYTES # BLD AUTO: 0.05 THOUSAND/UL (ref 0–0.2)
IMM GRANULOCYTES # BLD AUTO: 0.2 THOUSAND/UL (ref 0–0.2)
IMM GRANULOCYTES NFR BLD AUTO: 0 % (ref 0–2)
IMM GRANULOCYTES NFR BLD AUTO: 1 % (ref 0–2)
INR PPP: 1.06 (ref 0.86–1.17)
KETONES UR STRIP-MCNC: NEGATIVE MG/DL
LACTATE SERPL-SCNC: 0.6 MMOL/L (ref 0.5–2)
LACTATE SERPL-SCNC: 2.3 MMOL/L (ref 0.5–2)
LACTATE SERPL-SCNC: 4 MMOL/L (ref 0.5–2)
LEUKOCYTE ESTERASE UR QL STRIP: NEGATIVE
LYMPHOCYTES # BLD AUTO: 0.98 THOUSANDS/ΜL (ref 0.6–4.47)
LYMPHOCYTES # BLD AUTO: 1.36 THOUSANDS/ΜL (ref 0.6–4.47)
LYMPHOCYTES NFR BLD AUTO: 11 % (ref 14–44)
LYMPHOCYTES NFR BLD AUTO: 5 % (ref 14–44)
MAGNESIUM SERPL-MCNC: 2.1 MG/DL (ref 1.6–2.6)
MCH RBC QN AUTO: 30.6 PG (ref 26.8–34.3)
MCH RBC QN AUTO: 30.7 PG (ref 26.8–34.3)
MCHC RBC AUTO-ENTMCNC: 32.5 G/DL (ref 31.4–37.4)
MCHC RBC AUTO-ENTMCNC: 32.9 G/DL (ref 31.4–37.4)
MCV RBC AUTO: 93 FL (ref 82–98)
MCV RBC AUTO: 95 FL (ref 82–98)
METHADONE UR QL: NEGATIVE
MONOCYTES # BLD AUTO: 0.89 THOUSAND/ΜL (ref 0.17–1.22)
MONOCYTES # BLD AUTO: 1.95 THOUSAND/ΜL (ref 0.17–1.22)
MONOCYTES NFR BLD AUTO: 8 % (ref 4–12)
MONOCYTES NFR BLD AUTO: 9 % (ref 4–12)
NEUTROPHILS # BLD AUTO: 18.48 THOUSANDS/ΜL (ref 1.85–7.62)
NEUTROPHILS # BLD AUTO: 9.52 THOUSANDS/ΜL (ref 1.85–7.62)
NEUTS SEG NFR BLD AUTO: 81 % (ref 43–75)
NEUTS SEG NFR BLD AUTO: 84 % (ref 43–75)
NITRITE UR QL STRIP: NEGATIVE
NON-SQ EPI CELLS URNS QL MICRO: ABNORMAL /HPF
NRBC BLD AUTO-RTO: 0 /100 WBCS
NRBC BLD AUTO-RTO: 0 /100 WBCS
O2 CT BLDA-SCNC: 17.1 ML/DL (ref 16–23)
OPIATES UR QL SCN: NEGATIVE
OXYHGB MFR BLDA: 94.6 % (ref 94–97)
P AXIS: 76 DEGREES
PCO2 BLD: 25 MMOL/L (ref 21–32)
PCO2 BLD: 28 MMOL/L (ref 21–32)
PCO2 BLD: 60.7 MM HG (ref 36–44)
PCO2 BLDA: 44.6 MM HG (ref 36–44)
PCP UR QL: NEGATIVE
PEEP RESPIRATORY: 5 CM[H2O]
PH BLD: 7.23 [PH] (ref 7.35–7.45)
PH BLDA: 7.37 [PH] (ref 7.35–7.45)
PH UR STRIP.AUTO: 5.5 [PH] (ref 4.5–8)
PLATELET # BLD AUTO: 228 THOUSANDS/UL (ref 149–390)
PLATELET # BLD AUTO: 350 THOUSANDS/UL (ref 149–390)
PMV BLD AUTO: 8.5 FL (ref 8.9–12.7)
PMV BLD AUTO: 9.2 FL (ref 8.9–12.7)
PO2 BLD: 292 MM HG (ref 75–129)
PO2 BLDA: 83.8 MM HG (ref 75–129)
POTASSIUM BLD-SCNC: 4.3 MMOL/L (ref 3.5–5.3)
POTASSIUM BLD-SCNC: 4.4 MMOL/L (ref 3.5–5.3)
POTASSIUM SERPL-SCNC: 4.5 MMOL/L (ref 3.5–5.3)
POTASSIUM SERPL-SCNC: 5.3 MMOL/L (ref 3.5–5.3)
PR INTERVAL: 134 MS
PROCALCITONIN SERPL-MCNC: 0.56 NG/ML
PROT SERPL-MCNC: 6.5 G/DL (ref 6.4–8.2)
PROT SERPL-MCNC: 6.6 G/DL (ref 6.4–8.2)
PROT UR STRIP-MCNC: ABNORMAL MG/DL
PROTHROMBIN TIME: 13.3 SECONDS (ref 11.8–14.2)
QRS AXIS: 50 DEGREES
QRSD INTERVAL: 92 MS
QT INTERVAL: 350 MS
QTC INTERVAL: 477 MS
RBC # BLD AUTO: 3.85 MILLION/UL (ref 3.88–5.62)
RBC # BLD AUTO: 4.59 MILLION/UL (ref 3.88–5.62)
RBC #/AREA URNS AUTO: ABNORMAL /HPF
SALICYLATES SERPL-MCNC: <3 MG/DL (ref 3–20)
SAO2 % BLD FROM PO2: 100 % (ref 95–98)
SODIUM BLD-SCNC: 138 MMOL/L (ref 136–145)
SODIUM BLD-SCNC: 143 MMOL/L (ref 136–145)
SODIUM SERPL-SCNC: 136 MMOL/L (ref 136–145)
SODIUM SERPL-SCNC: 141 MMOL/L (ref 136–145)
SP GR UR STRIP.AUTO: >=1.03 (ref 1–1.03)
SPECIMEN SOURCE: ABNORMAL
T WAVE AXIS: 74 DEGREES
THC UR QL: NEGATIVE
TROPONIN I SERPL-MCNC: <0.02 NG/ML
TSH SERPL DL<=0.05 MIU/L-ACNC: 0.32 UIU/ML (ref 0.36–3.74)
UROBILINOGEN UR QL STRIP.AUTO: 0.2 E.U./DL
VENT AC: 24
VENT- AC: AC
VENTRICULAR RATE: 112 BPM
VT SETTING VENT: 400 ML
WBC # BLD AUTO: 11.89 THOUSAND/UL (ref 4.31–10.16)
WBC # BLD AUTO: 21.79 THOUSAND/UL (ref 4.31–10.16)
WBC #/AREA URNS AUTO: ABNORMAL /HPF

## 2018-09-21 PROCEDURE — 82140 ASSAY OF AMMONIA: CPT | Performed by: EMERGENCY MEDICINE

## 2018-09-21 PROCEDURE — 84443 ASSAY THYROID STIM HORMONE: CPT | Performed by: INTERNAL MEDICINE

## 2018-09-21 PROCEDURE — 96361 HYDRATE IV INFUSION ADD-ON: CPT

## 2018-09-21 PROCEDURE — 94760 N-INVAS EAR/PLS OXIMETRY 1: CPT

## 2018-09-21 PROCEDURE — 82550 ASSAY OF CK (CPK): CPT | Performed by: EMERGENCY MEDICINE

## 2018-09-21 PROCEDURE — 96365 THER/PROPH/DIAG IV INF INIT: CPT

## 2018-09-21 PROCEDURE — 96374 THER/PROPH/DIAG INJ IV PUSH: CPT

## 2018-09-21 PROCEDURE — 83605 ASSAY OF LACTIC ACID: CPT | Performed by: INTERNAL MEDICINE

## 2018-09-21 PROCEDURE — 81001 URINALYSIS AUTO W/SCOPE: CPT | Performed by: EMERGENCY MEDICINE

## 2018-09-21 PROCEDURE — 93005 ELECTROCARDIOGRAM TRACING: CPT

## 2018-09-21 PROCEDURE — 74177 CT ABD & PELVIS W/CONTRAST: CPT

## 2018-09-21 PROCEDURE — 82330 ASSAY OF CALCIUM: CPT

## 2018-09-21 PROCEDURE — 80329 ANALGESICS NON-OPIOID 1 OR 2: CPT | Performed by: EMERGENCY MEDICINE

## 2018-09-21 PROCEDURE — 83605 ASSAY OF LACTIC ACID: CPT | Performed by: EMERGENCY MEDICINE

## 2018-09-21 PROCEDURE — 36415 COLL VENOUS BLD VENIPUNCTURE: CPT

## 2018-09-21 PROCEDURE — 94002 VENT MGMT INPAT INIT DAY: CPT

## 2018-09-21 PROCEDURE — 84295 ASSAY OF SERUM SODIUM: CPT

## 2018-09-21 PROCEDURE — 80053 COMPREHEN METABOLIC PANEL: CPT | Performed by: EMERGENCY MEDICINE

## 2018-09-21 PROCEDURE — 82948 REAGENT STRIP/BLOOD GLUCOSE: CPT

## 2018-09-21 PROCEDURE — 85025 COMPLETE CBC W/AUTO DIFF WBC: CPT | Performed by: EMERGENCY MEDICINE

## 2018-09-21 PROCEDURE — 96375 TX/PRO/DX INJ NEW DRUG ADDON: CPT

## 2018-09-21 PROCEDURE — 84132 ASSAY OF SERUM POTASSIUM: CPT

## 2018-09-21 PROCEDURE — 85025 COMPLETE CBC W/AUTO DIFF WBC: CPT | Performed by: INTERNAL MEDICINE

## 2018-09-21 PROCEDURE — 36415 COLL VENOUS BLD VENIPUNCTURE: CPT | Performed by: EMERGENCY MEDICINE

## 2018-09-21 PROCEDURE — 93010 ELECTROCARDIOGRAM REPORT: CPT | Performed by: INTERNAL MEDICINE

## 2018-09-21 PROCEDURE — 84145 PROCALCITONIN (PCT): CPT | Performed by: INTERNAL MEDICINE

## 2018-09-21 PROCEDURE — 99291 CRITICAL CARE FIRST HOUR: CPT | Performed by: INTERNAL MEDICINE

## 2018-09-21 PROCEDURE — 86803 HEPATITIS C AB TEST: CPT | Performed by: INTERNAL MEDICINE

## 2018-09-21 PROCEDURE — 99291 CRITICAL CARE FIRST HOUR: CPT

## 2018-09-21 PROCEDURE — 80320 DRUG SCREEN QUANTALCOHOLS: CPT | Performed by: EMERGENCY MEDICINE

## 2018-09-21 PROCEDURE — 70450 CT HEAD/BRAIN W/O DYE: CPT

## 2018-09-21 PROCEDURE — 5A1935Z RESPIRATORY VENTILATION, LESS THAN 24 CONSECUTIVE HOURS: ICD-10-PCS | Performed by: EMERGENCY MEDICINE

## 2018-09-21 PROCEDURE — 80047 BASIC METABLC PNL IONIZED CA: CPT

## 2018-09-21 PROCEDURE — 94640 AIRWAY INHALATION TREATMENT: CPT

## 2018-09-21 PROCEDURE — 82803 BLOOD GASES ANY COMBINATION: CPT

## 2018-09-21 PROCEDURE — 71045 X-RAY EXAM CHEST 1 VIEW: CPT

## 2018-09-21 PROCEDURE — 71260 CT THORAX DX C+: CPT

## 2018-09-21 PROCEDURE — 85610 PROTHROMBIN TIME: CPT | Performed by: EMERGENCY MEDICINE

## 2018-09-21 PROCEDURE — 0BH18EZ INSERTION OF ENDOTRACHEAL AIRWAY INTO TRACHEA, VIA NATURAL OR ARTIFICIAL OPENING ENDOSCOPIC: ICD-10-PCS | Performed by: EMERGENCY MEDICINE

## 2018-09-21 PROCEDURE — 80053 COMPREHEN METABOLIC PANEL: CPT | Performed by: INTERNAL MEDICINE

## 2018-09-21 PROCEDURE — 82805 BLOOD GASES W/O2 SATURATION: CPT | Performed by: EMERGENCY MEDICINE

## 2018-09-21 PROCEDURE — 80307 DRUG TEST PRSMV CHEM ANLYZR: CPT | Performed by: EMERGENCY MEDICINE

## 2018-09-21 PROCEDURE — 87040 BLOOD CULTURE FOR BACTERIA: CPT | Performed by: EMERGENCY MEDICINE

## 2018-09-21 PROCEDURE — 83735 ASSAY OF MAGNESIUM: CPT | Performed by: EMERGENCY MEDICINE

## 2018-09-21 PROCEDURE — 36600 WITHDRAWAL OF ARTERIAL BLOOD: CPT

## 2018-09-21 PROCEDURE — 84484 ASSAY OF TROPONIN QUANT: CPT | Performed by: EMERGENCY MEDICINE

## 2018-09-21 PROCEDURE — 85014 HEMATOCRIT: CPT

## 2018-09-21 PROCEDURE — 72125 CT NECK SPINE W/O DYE: CPT

## 2018-09-21 PROCEDURE — 82947 ASSAY GLUCOSE BLOOD QUANT: CPT

## 2018-09-21 RX ORDER — DOCUSATE SODIUM 100 MG/1
100 CAPSULE, LIQUID FILLED ORAL 2 TIMES DAILY
Status: DISCONTINUED | OUTPATIENT
Start: 2018-09-21 | End: 2018-09-22 | Stop reason: HOSPADM

## 2018-09-21 RX ORDER — FLUTICASONE PROPIONATE 220 UG/1
2 AEROSOL, METERED RESPIRATORY (INHALATION) EVERY 4 HOURS PRN
COMMUNITY
End: 2019-02-27 | Stop reason: SDUPTHER

## 2018-09-21 RX ORDER — OXYCODONE HYDROCHLORIDE AND ACETAMINOPHEN 5; 325 MG/1; MG/1
1 TABLET ORAL 2 TIMES DAILY PRN
COMMUNITY
End: 2018-11-08

## 2018-09-21 RX ORDER — PROPOFOL 10 MG/ML
50 INJECTION, EMULSION INTRAVENOUS ONCE
Status: COMPLETED | OUTPATIENT
Start: 2018-09-21 | End: 2018-09-21

## 2018-09-21 RX ORDER — METHOCARBAMOL 500 MG/1
500 TABLET, FILM COATED ORAL 3 TIMES DAILY
Status: ON HOLD | COMMUNITY
End: 2018-09-21 | Stop reason: CLARIF

## 2018-09-21 RX ORDER — CHLORHEXIDINE GLUCONATE 0.12 MG/ML
15 RINSE ORAL EVERY 12 HOURS SCHEDULED
Status: DISCONTINUED | OUTPATIENT
Start: 2018-09-21 | End: 2018-09-21

## 2018-09-21 RX ORDER — NICOTINE 21 MG/24HR
21 PATCH, TRANSDERMAL 24 HOURS TRANSDERMAL EVERY 24 HOURS
Status: DISCONTINUED | OUTPATIENT
Start: 2018-09-21 | End: 2018-09-22 | Stop reason: HOSPADM

## 2018-09-21 RX ORDER — ETOMIDATE 2 MG/ML
20 INJECTION INTRAVENOUS ONCE
Status: COMPLETED | OUTPATIENT
Start: 2018-09-21 | End: 2018-09-21

## 2018-09-21 RX ORDER — LEVALBUTEROL 1.25 MG/.5ML
1.25 SOLUTION, CONCENTRATE RESPIRATORY (INHALATION)
Status: DISCONTINUED | OUTPATIENT
Start: 2018-09-21 | End: 2018-09-22 | Stop reason: HOSPADM

## 2018-09-21 RX ORDER — SODIUM CHLORIDE 9 MG/ML
125 INJECTION, SOLUTION INTRAVENOUS CONTINUOUS
Status: DISCONTINUED | OUTPATIENT
Start: 2018-09-21 | End: 2018-09-21

## 2018-09-21 RX ORDER — METHOCARBAMOL 500 MG/1
500 TABLET, FILM COATED ORAL 3 TIMES DAILY
Status: DISCONTINUED | OUTPATIENT
Start: 2018-09-21 | End: 2018-09-21

## 2018-09-21 RX ORDER — ALBUTEROL SULFATE 2.5 MG/3ML
2.5 SOLUTION RESPIRATORY (INHALATION) EVERY 6 HOURS PRN
Status: DISCONTINUED | OUTPATIENT
Start: 2018-09-21 | End: 2018-09-22 | Stop reason: HOSPADM

## 2018-09-21 RX ORDER — ONDANSETRON 2 MG/ML
4 INJECTION INTRAMUSCULAR; INTRAVENOUS EVERY 6 HOURS PRN
Status: DISCONTINUED | OUTPATIENT
Start: 2018-09-21 | End: 2018-09-22 | Stop reason: HOSPADM

## 2018-09-21 RX ORDER — PANTOPRAZOLE SODIUM 40 MG/1
40 TABLET, DELAYED RELEASE ORAL
Status: DISCONTINUED | OUTPATIENT
Start: 2018-09-22 | End: 2018-09-22 | Stop reason: HOSPADM

## 2018-09-21 RX ORDER — VECURONIUM BROMIDE 1 MG/ML
0.1 INJECTION, POWDER, LYOPHILIZED, FOR SOLUTION INTRAVENOUS ONCE
Status: COMPLETED | OUTPATIENT
Start: 2018-09-21 | End: 2018-09-21

## 2018-09-21 RX ORDER — PROPOFOL 10 MG/ML
5-50 INJECTION, EMULSION INTRAVENOUS
Status: DISCONTINUED | OUTPATIENT
Start: 2018-09-21 | End: 2018-09-21

## 2018-09-21 RX ORDER — FLUTICASONE PROPIONATE 50 MCG
2 SPRAY, SUSPENSION (ML) NASAL DAILY
Status: DISCONTINUED | OUTPATIENT
Start: 2018-09-21 | End: 2018-09-22 | Stop reason: HOSPADM

## 2018-09-21 RX ORDER — CYCLOBENZAPRINE HCL 10 MG
5 TABLET ORAL 2 TIMES DAILY
Status: DISCONTINUED | OUTPATIENT
Start: 2018-09-21 | End: 2018-09-22 | Stop reason: HOSPADM

## 2018-09-21 RX ORDER — OMEPRAZOLE 40 MG/1
40 CAPSULE, DELAYED RELEASE ORAL
COMMUNITY
End: 2020-02-19 | Stop reason: SDUPTHER

## 2018-09-21 RX ORDER — LEVALBUTEROL 1.25 MG/.5ML
1.25 SOLUTION, CONCENTRATE RESPIRATORY (INHALATION)
Status: DISCONTINUED | OUTPATIENT
Start: 2018-09-21 | End: 2018-09-21

## 2018-09-21 RX ORDER — CYCLOBENZAPRINE HCL 5 MG
5 TABLET ORAL 2 TIMES DAILY
COMMUNITY
End: 2018-11-08

## 2018-09-21 RX ORDER — FLUTICASONE PROPIONATE 50 MCG
2 SPRAY, SUSPENSION (ML) NASAL DAILY
COMMUNITY
End: 2020-02-19 | Stop reason: SDUPTHER

## 2018-09-21 RX ORDER — LEVALBUTEROL INHALATION SOLUTION 0.63 MG/3ML
0.63 SOLUTION RESPIRATORY (INHALATION)
Status: DISCONTINUED | OUTPATIENT
Start: 2018-09-21 | End: 2018-09-21

## 2018-09-21 RX ORDER — OXYCODONE HYDROCHLORIDE AND ACETAMINOPHEN 5; 325 MG/1; MG/1
1 TABLET ORAL 2 TIMES DAILY PRN
Status: DISCONTINUED | OUTPATIENT
Start: 2018-09-21 | End: 2018-09-22 | Stop reason: HOSPADM

## 2018-09-21 RX ORDER — ALBUTEROL SULFATE 2.5 MG/3ML
2.5 SOLUTION RESPIRATORY (INHALATION) EVERY 4 HOURS PRN
Status: DISCONTINUED | OUTPATIENT
Start: 2018-09-21 | End: 2018-09-21

## 2018-09-21 RX ORDER — DEXTROSE MONOHYDRATE 25 G/50ML
25 INJECTION, SOLUTION INTRAVENOUS ONCE
Status: COMPLETED | OUTPATIENT
Start: 2018-09-21 | End: 2018-09-21

## 2018-09-21 RX ORDER — DEXTROSE AND SODIUM CHLORIDE 5; .45 G/100ML; G/100ML
75 INJECTION, SOLUTION INTRAVENOUS CONTINUOUS
Status: DISCONTINUED | OUTPATIENT
Start: 2018-09-21 | End: 2018-09-22 | Stop reason: HOSPADM

## 2018-09-21 RX ADMIN — DEXTROSE MONOHYDRATE 25 ML: 500 INJECTION PARENTERAL at 10:25

## 2018-09-21 RX ADMIN — DEXTROSE AND SODIUM CHLORIDE 75 ML/HR: 5; .45 INJECTION, SOLUTION INTRAVENOUS at 23:36

## 2018-09-21 RX ADMIN — VECURONIUM BROMIDE 7 MG: 1 INJECTION, POWDER, LYOPHILIZED, FOR SOLUTION INTRAVENOUS at 08:53

## 2018-09-21 RX ADMIN — LEVALBUTEROL 1.25 MG: 1.25 SOLUTION, CONCENTRATE RESPIRATORY (INHALATION) at 19:30

## 2018-09-21 RX ADMIN — PROPOFOL 10 MCG/KG/MIN: 10 INJECTION, EMULSION INTRAVENOUS at 10:28

## 2018-09-21 RX ADMIN — PROPOFOL 50 MG: 10 INJECTION, EMULSION INTRAVENOUS at 10:24

## 2018-09-21 RX ADMIN — CEFEPIME HYDROCHLORIDE 2000 MG: 2 INJECTION, SOLUTION INTRAVENOUS at 11:13

## 2018-09-21 RX ADMIN — DEXTROSE AND SODIUM CHLORIDE 75 ML/HR: 5; .45 INJECTION, SOLUTION INTRAVENOUS at 10:36

## 2018-09-21 RX ADMIN — IOHEXOL 100 ML: 350 INJECTION, SOLUTION INTRAVENOUS at 10:02

## 2018-09-21 RX ADMIN — SODIUM CHLORIDE, SODIUM LACTATE, POTASSIUM CHLORIDE, AND CALCIUM CHLORIDE 1000 ML: .6; .31; .03; .02 INJECTION, SOLUTION INTRAVENOUS at 09:26

## 2018-09-21 RX ADMIN — VANCOMYCIN HYDROCHLORIDE 1000 MG: 1 INJECTION, POWDER, LYOPHILIZED, FOR SOLUTION INTRAVENOUS at 12:11

## 2018-09-21 RX ADMIN — IPRATROPIUM BROMIDE 0.5 MG: 0.5 SOLUTION RESPIRATORY (INHALATION) at 13:37

## 2018-09-21 RX ADMIN — FLUTICASONE PROPIONATE 2 SPRAY: 50 SPRAY, METERED NASAL at 16:00

## 2018-09-21 RX ADMIN — OXYCODONE HYDROCHLORIDE AND ACETAMINOPHEN 1 TABLET: 5; 325 TABLET ORAL at 20:05

## 2018-09-21 RX ADMIN — Medication 70 MG: at 09:33

## 2018-09-21 RX ADMIN — NICOTINE 21 MG: 21 PATCH, EXTENDED RELEASE TRANSDERMAL at 15:37

## 2018-09-21 RX ADMIN — SODIUM CHLORIDE 1000 ML: 0.9 INJECTION, SOLUTION INTRAVENOUS at 08:45

## 2018-09-21 RX ADMIN — DOCUSATE SODIUM 100 MG: 100 CAPSULE, LIQUID FILLED ORAL at 17:25

## 2018-09-21 RX ADMIN — CYCLOBENZAPRINE HYDROCHLORIDE 5 MG: 10 TABLET, FILM COATED ORAL at 17:25

## 2018-09-21 RX ADMIN — LEVALBUTEROL 1.25 MG: 1.25 SOLUTION, CONCENTRATE RESPIRATORY (INHALATION) at 13:38

## 2018-09-21 RX ADMIN — IPRATROPIUM BROMIDE 0.5 MG: 0.5 SOLUTION RESPIRATORY (INHALATION) at 19:30

## 2018-09-21 RX ADMIN — ETOMIDATE 20 MG: 20 INJECTION, SOLUTION INTRAVENOUS at 08:54

## 2018-09-21 NOTE — SOCIAL WORK
The patient lives in a half double home and he has a cane at home that he does not use he has a bed and bath on the 2nd floor and he does drive

## 2018-09-21 NOTE — RESPIRATORY THERAPY NOTE
Pt was extubated to Room Air, doctor and nurse bedside, no stridor, held a full conversation  Breath sound were coarse

## 2018-09-21 NOTE — H&P
H&P- Wash Marc 1965, 46 y o  male MRN: [id number]    Unit/Bed#:  Encounter: [id number]    Primary Care Provider: CHACHA Bates   Date and time admitted to hospital: 9/21/2018  8:41 AM        * Acute encephalopathy   Assessment & Plan    Patient presented with altered mental status, required intubation due to severe encephalopathy, hypercapnic respiratory failure  Patient's mental status appears to have returned to baseline, patient extubated upon arrival to the critical care unit        Aspiration pneumonia [facility])   Assessment & Plan    Suspected aspiration event, possible aspiration pneumonia, patient received antibiotics, will check a procalcitonin level, and continue monitor off of antibiotics for any fever, plan repeat chest x-ray tomorrow        Acute respiratory failure with hypercapnia [facility])   Assessment & Plan    Patient reported to have apneic episode by EMS, patient was intubated early today due to hypercapnic respiratory failure  Patient had significant acute encephalopathy  Lactic acidosis   Assessment & Plan    Improving with supportive care        Leukocytosis   Assessment & Plan    Follow up repeat labs, likely reactive due to aspiration and acute respiratory failure with hypercapnia        Positive urine drug screen   Assessment & Plan    Suspected history of both methamphetamine use and misuse of oral narcotics        Other emphysema (HCC)   Assessment & Plan    Chronic tobacco use, nicotine patch ordered  Bipolar depression [facility])   Assessment & Plan    Outpatient follow-up with PCP, patient denies any suicidal ideation  VTE Prophylaxis: Enoxaparin (Lovenox)  / sequential compression device       Anticipated Length of Stay:  Patient will be admitted on an Inpatient basis with an anticipated length of stay of  greater than 2 midnights     Justification for Hospital Stay:  Continued respiratory monitoring in the ICU, patient is increasingly somnolent despite being extubated    Total Time for Visit, including Counseling / Coordination of Care: 45 minutes  Critical care time 35 minutes      Chief Complaint:   Unresponsive episode    History of Present Illness:    Matilda Chavez is a 46 y o  male who presents with unresponsive EMS  Patient seen and examined the ICU, he has no recollection of the events that led to his hospital stay  History is obtained by review of the chart and review of ER notes as recorded below by Dr Roslyn White  "Overdose - Accidental        Called for unresponsive  Unknown substance  Patient improved with narcan"      "21-year-old male presents from home by EMS for evaluation of altered mental status  EMS dispatched after patient's housemate returned home to find the patient lying on the floor, unconscious and unresponsive to voice and physical stimulation  Patient had last been seen awake/alert and reportedly at his baseline at midnight last night  Patient's housemate dumped water on him without any response  Responding EMS found patient again lying on floor unresponsive and apneic; he did have a pulse  Was administered 2 mg naloxone intranasally with gradual return of spontaneous respirations and subsequently was given 0 5 mg naloxone IV; he did awaken slightly to the point where he was continuously moaning and occasionally vocalizing single words after IV naloxone  He was then able to actively turn on his right side and move himself into a lateral recumbent position which he maintained throughout EMS transport  No history is available from the patient secondary to his altered mental status  Per EMS, patient has a history of oral opiate abuse and methamphetamine use also  No known PMH at time of my initial evaluation   No hx available regarding allergies/medications/etc       Patient was seen immediately upon arrival   Because of patient's profoundly altered mental status and inability to cooperate with the exam, I elected to intubate him for airway control and to assist with further diagnostic evaluation   "     Review of Systems:    Review of Systems   Unable to perform ROS: Acuity of condition       Past Medical and Surgical History:     Past Medical History:   Diagnosis Date    Asthma     Bipolar disorder ([facility] [place] [facility] )     COPD (chronic obstructive pulmonary disease) ([facility] [place] [facility] )     Disease of thyroid gland     Hypercholesteremia        Past Surgical History:   Procedure Laterality Date    HERNIA REPAIR         Meds/Allergies:    Prior to Admission medications    Medication Sig Start Date End Date Taking? Authorizing Provider   cyclobenzaprine (FLEXERIL) 5 mg tablet Take 5 mg by mouth 2 (two) times a day   Yes Historical Provider, MD   fluticasone (FLONASE) 50 mcg/act nasal spray 2 sprays into each nostril daily   Yes Historical Provider, MD   fluticasone (FLOVENT HFA) 220 mcg/act inhaler Inhale 2 puffs every 4 (four) hours as needed Rinse mouth after use  Yes Historical Provider, MD   methocarbamol (ROBAXIN) 500 mg tablet Take 500 mg by mouth 3 (three) times a day   Yes Historical Provider, MD   omeprazole (PriLOSEC) 40 MG capsule Take 40 mg by mouth daily before breakfast   Yes Historical Provider, MD   oxyCODONE-acetaminophen (PERCOCET) 5-325 mg per tablet Take 1 tablet by mouth 2 (two) times a day as needed for moderate pain   Yes Historical Provider, MD     I have reviewed home medications with a medical source (PCP, Pharmacy, other)  Allergies:  Allergies not on file    Social History:     Marital Status: Single   Substance Use History:   History   Alcohol Use No     History   Smoking Status    Current Every Day Smoker    Packs/day: 3 00   Smokeless Tobacco    Never Used     History   Drug Use No       Family History:    non-contributory    Physical Exam:     Vitals:   Blood Pressure: 101/58 (09/21/18 1212)  Pulse: 93 (09/21/18 1212)  Temperature: 98 1 °F (36 7 °C) (09/21/18 1200)  Temp Source: Temporal (09/21/18 4258)  Respirations: 22 (09/21/18 1212)  Weight - Scale: 67 3 kg (148 lb 5 9 oz) (09/21/18 1009)  SpO2: 100 % (09/21/18 1338)    Physical Exam   Constitutional: He is oriented to person, place, and time  He appears well-developed and well-nourished  No distress  Cardiovascular: Normal rate, regular rhythm and intact distal pulses  Exam reveals no friction rub  No murmur heard  Pulmonary/Chest: Effort normal and breath sounds normal  No respiratory distress  He has no wheezes  Abdominal: Soft  Bowel sounds are normal  He exhibits no distension  There is no tenderness  Musculoskeletal: Normal range of motion  Neurological: He is alert and oriented to person, place, and time  No cranial nerve deficit  Coordination normal    Skin: Skin is warm and dry  No rash noted  He is not diaphoretic  No erythema  Psychiatric: He has a normal mood and affect  His behavior is normal  Judgment and thought content normal    Nursing note and vitals reviewed  Additional Data:     Lab Results: I have personally reviewed pertinent reports  Results from last 7 days  Lab Units 09/21/18  1011 09/21/18  0918   WBC Thousand/uL  --  21 79*   HEMOGLOBIN g/dL  --  14 1   I STAT HEMOGLOBIN g/dl 11 6* 14 6   HEMATOCRIT % 34* 43 4  43   PLATELETS Thousands/uL  --  350   NEUTROS PCT %  --  84*   LYMPHS PCT %  --  5*   MONOS PCT %  --  9   EOS PCT %  --  1       Results from last 7 days  Lab Units 09/21/18  1011 09/21/18  0918   SODIUM mmol/L  --  141   POTASSIUM mmol/L  --  4 5   CHLORIDE mmol/L  --  108   CO2 mmol/L  --  26   BUN mg/dL  --  18   CREATININE mg/dL  --  1 10   CALCIUM mg/dL  --  8 1*   ALK PHOS U/L  --  104   ALT U/L  --  107*   AST U/L  --  66*   GLUCOSE, ISTAT mg/dl 88 47*       Results from last 7 days  Lab Units 09/21/18  1033   INR  1 06       Imaging: I have personally reviewed pertinent reports        Ct Head Without Contrast    Result Date: 9/21/2018  Narrative: CT BRAIN - WITHOUT CONTRAST INDICATION: unresponsive from suspected overdose; no focal neurologic deficit  COMPARISON:  None  TECHNIQUE:  CT examination of the brain was performed  In addition to axial images, coronal 2D reformatted images were created and submitted for interpretation  Radiation dose length product (DLP) for this visit:  1161 47 mGy-cm   This examination, like all CT scans performed in the [facility], was performed utilizing techniques to minimize radiation dose exposure, including the use of iterative reconstruction and automated exposure control  IMAGE QUALITY:  Diagnostic  FINDINGS: PARENCHYMA:  No intracranial mass, mass effect or midline shift  No CT signs of acute infarction  No acute parenchymal hemorrhage  VENTRICLES AND EXTRA-AXIAL SPACES:  Normal for the patient's age  VISUALIZED ORBITS AND PARANASAL SINUSES:  Mild mucosal thickening seen in the ethmoidal air cells  Mild mucosal thickening seen in the inferior aspect of the both frontal sinuses  CALVARIUM AND EXTRACRANIAL SOFT TISSUES:  Normal  Fluid noted within the nasopharynx  related to pooling of secretions    Impression: No acute intracranial hemorrhage seen No extra-axial collection seen No mass effect or midline shift seen Workstation performed: [id number]     Ct Spine Cervical Wo Contrast    Result Date: 9/21/2018  Narrative: CT CERVICAL SPINE - WITHOUT CONTRAST INDICATION:   unresponsive; no known hx of trauma  COMPARISON:  None  TECHNIQUE:  CT examination of the cervical spine was performed without intravenous contrast   Contiguous axial images were obtained  Sagittal and coronal reconstructions were performed  Radiation dose length product (DLP) for this visit:  450 48 mGy-cm   This examination, like all CT scans performed in the [facility], was performed utilizing techniques to minimize radiation dose exposure, including the use of iterative  reconstruction and automated exposure control  IMAGE QUALITY:  Diagnostic  FINDINGS: ALIGNMENT:  Mild retrolisthesis of C3 over C4 seen VERTEBRAL BODIES:  No acute compression collapse of the vertebra seen DEGENERATIVE CHANGES:  C2-3 level appear unremarkable C3-4 level demonstrates degenerative changes with the loss of the disc height  There are changes of intervertebral osteochondrosis  There is a ridging  There is severe right and severe left foraminal narrowing  There is mild central canal narrowing  At C4-5 level there is uncovertebral spurring with mild left foraminal narrowing  There is facet joint disease and no significant right foraminal narrowing  At C5-6 level degenerative changes are seen within the distal radius with uncovertebral spurring with mild right and mild left foraminal narrowing  There is facet joint disease At C6/7 there is uncovertebral spurring with no significant right and moderate left foraminal narrowing  At C7-T1 level there is no significant central canal narrowing PREVERTEBRAL AND PARASPINAL SOFT TISSUES:  Unremarkable  THORACIC INLET:  Emphysema seen A feeding tube is seen extending into the esophagus     Impression: No acute compression collapse of the vertebra seen Degenerative cervical disc disease at C3-4  Workstation performed: [id number]     Xr Chest 1 View Portable    Result Date: 9/21/2018  Narrative: CHEST INDICATION:   f/u intubation  COMPARISON:  None EXAM PERFORMED/VIEWS:  XR CHEST 1 VIEW FINDINGS:  ET tube is noted with tip approximately 3 3 cm from the aram  A feeding tube is seen extending down below the dome of diaphragm Cardiomediastinal silhouette appears unremarkable  No acute consolidation seen  Mild increased lung markings seen Osseous structures appear within normal limits for patient age       Impression: ET tube in appropriate position Feeding tube seen extending below the dome of diaphragm No acute consolidation Mild increased markings seen in the right lung, nonspecific Workstation performed: [id number]     Ct Chest Abdomen Pelvis W Contrast    Result Date: 9/21/2018  Narrative: CT CHEST, ABDOMEN AND PELVIS WITH IV CONTRAST INDICATION:   unresponsive 2/2 overdose; unknown hx of preceding trauma  COMPARISON:  None  TECHNIQUE: CT examination of the chest, abdomen and pelvis was performed  Axial, sagittal, and coronal 2D reformatted images were created from the source data and submitted for interpretation  Radiation dose length product (DLP) for this visit:  760 17 mGy-cm   This examination, like all CT scans performed in the [facility], was performed utilizing techniques to minimize radiation dose exposure, including the use of iterative  reconstruction and automated exposure control  IV Contrast:  100 mL of iohexol (OMNIPAQUE) Enteric Contrast: Enteric contrast was administered  FINDINGS: CHEST LUNGS:  Mild emphysema seen  Patchy opacity seen in the right upper lobe at its posterior aspect, suggest aspiration  Nodular groundglass density is also noted just into area of this patchy opacity measuring about 2 cm PLEURA:  Right basilar density seen suggests atelectasis HEART/GREAT VESSELS:  The ascending aorta measures 4 1 cm A feeding tube is seen extending into the stomach MEDIASTINUM AND JAMARI:  The ET tube is in appropriate position Feeding tube is seen extending into stomach CHEST WALL AND LOWER NECK: Left-sided thyroid nodule nodule which measures about 9 mm  Incidental discovery of one or more thyroid nodule(s) measuring less than 1 5 cm and without suspicious features is noted in this patient who is above 28years old; according to guidelines published in the February 2015 white paper on incidental thyroid nodules in the Journal of the [employer] of Radiology Vito Noble), no further evaluation is recommended  ABDOMEN LIVER/BILIARY TREE:  Mild periportal edema seen no liver laceration seen GALLBLADDER:  Trace pericholecystic fluid SPLEEN:  Intact   PANCREAS:  Multiple liver foci of calcification seen within the pancreas  Suggested chronic pancreatitis Prominent the pancreatic duct seen ADRENAL GLANDS:  Unremarkable  KIDNEYS/URETERS:  Unremarkable  No hydronephrosis  STOMACH AND BOWEL:  A feeding tube is seen within the stomach  The stomach remains distended  A feeding tube appears to be appropriately placed APPENDIX:  No findings to suggest appendicitis  ABDOMINOPELVIC CAVITY:  No ascites or free intraperitoneal air  No lymphadenopathy  VESSELS:  The celiac trunk, SMA are patent PELVIS REPRODUCTIVE ORGANS:  Unremarkable for patient's age  URINARY BLADDER:  Urinary bladder catheter is noted  Air is noted in the urinary bladder related to recent instrumentation ABDOMINAL WALL/INGUINAL REGIONS:  [ OSSEOUS STRUCTURES:  No acute compression collapse of the vertebra seen No gross lytic lesion seen     Impression: No solid visceral injury seen Patchy airspace opacity within groundglass nodularity suggest aspiration  Follow-up suggested in 3 months to demonstrate complete resolution Emphysema Right basal density seen suggests atelectasis Distended stomach however the feeding tube is in appropriate position , evaluate Chronic pancreatitis Dilated ascending aorta measuring about 4 1 cm, consider cardiology evaluation on nonemergent basis to exclude any aortic valvular disease The study was marked in EPIC for immediate notification  Workstation performed: [id number]         [employer] / [employer] Records Reviewed: No     ** Please Note: This note has been constructed using a voice recognition system   **

## 2018-09-21 NOTE — ASSESSMENT & PLAN NOTE
Patient presented with altered mental status, required intubation due to severe encephalopathy, hypercapnic respiratory failure      Patient's mental status appears to have returned to baseline, patient extubated upon arrival to the critical care unit

## 2018-09-21 NOTE — SOCIAL WORK
Case management met with the patient to assess the prior level of function and form a safe d/c plan  The patient was given and I reviewed the case management  discharge checklist with the patient  The patient is aware that the d/c planning starts on the day of admission and that if he has any questions or needs they  is to contact case management  The patient lives home alone and he is independent at home with adls the patient has cats at home and his neighbor has a key and they will take care of his cats  He has a daughter Jassi Mantilla and she is aware that he is here and extubated

## 2018-09-21 NOTE — ASSESSMENT & PLAN NOTE
Suspected aspiration event, possible aspiration pneumonia, patient received antibiotics, will check a procalcitonin level, and continue monitor off of antibiotics for any fever, plan repeat chest x-ray tomorrow

## 2018-09-21 NOTE — ED PROVIDER NOTES
History  Chief Complaint   Patient presents with    Overdose - Accidental     Called for unresponsive  Unknown substance  Patient improved with narcan     62-year-old male presents from home by EMS for evaluation of altered mental status  EMS dispatched after patient's housemate returned home to find the patient lying on the floor, unconscious and unresponsive to voice and physical stimulation  Patient had last been seen awake/alert and reportedly at his baseline at midnight last night  Patient's housemate dumped water on him without any response  Responding EMS found patient again lying on floor unresponsive and apneic; he did have a pulse  Was administered 2 mg naloxone intranasally with gradual return of spontaneous respirations and subsequently was given 0 5 mg naloxone IV; he did awaken slightly to the point where he was continuously moaning and occasionally vocalizing single words after IV naloxone  He was then able to actively turn on his right side and move himself into a lateral recumbent position which he maintained throughout EMS transport  No history is available from the patient secondary to his altered mental status  Per EMS, patient has a history of oral opiate abuse and methamphetamine use also  No known PMH at time of my initial evaluation  No hx available regarding allergies/medications/etc      Patient was seen immediately upon arrival   Because of patient's profoundly altered mental status and inability to cooperate with the exam, I elected to intubate him for airway control and to assist with further diagnostic evaluation  He will undergo extensive metabolic evaluation to check for potential causes of his symptoms as well as any end-organ damage given what I suspect was a prolonged period of hypoxemia and immobilization  Will check CT head/C-spine/chest abdomen pelvis to further evaluate any traumatic or other occult etiologies of symptoms  Critical care admission          History provided by:  EMS personnel and medical records  Overdose - Accidental       Prior to Admission Medications   Prescriptions Last Dose Informant Patient Reported? Taking? cyclobenzaprine (FLEXERIL) 5 mg tablet   Yes Yes   Sig: Take 5 mg by mouth 2 (two) times a day   fluticasone (FLONASE) 50 mcg/act nasal spray   Yes Yes   Si sprays into each nostril daily   fluticasone (FLOVENT HFA) 220 mcg/act inhaler   Yes Yes   Sig: Inhale 2 puffs every 4 (four) hours as needed Rinse mouth after use  methocarbamol (ROBAXIN) 500 mg tablet   Yes Yes   Sig: Take 500 mg by mouth 3 (three) times a day   omeprazole (PriLOSEC) 40 MG capsule   Yes Yes   Sig: Take 40 mg by mouth daily before breakfast   oxyCODONE-acetaminophen (PERCOCET) 5-325 mg per tablet   Yes Yes   Sig: Take 1 tablet by mouth 2 (two) times a day as needed for moderate pain      Facility-Administered Medications: None       Past Medical History:   Diagnosis Date    Asthma     Bipolar disorder ([facility] [address] )     COPD [facility]ch[facility] obstructive [facility] disease) [facility]     Disease of thyroid gland     Hypercholesteremia        Past Surgical History:   Procedure Laterality Date    HERNIA REPAIR         History reviewed  No pertinent family history  I have reviewed and agree with the history as documented  Social History   Substance Use Topics    Smoking status: Current Every Day Smoker     Packs/day: 3 00    Smokeless tobacco: Never Used    Alcohol use No        Review of Systems   Unable to perform ROS: Mental status change       Physical Exam  Physical Exam   Constitutional: He appears cachectic  He is uncooperative  He appears distressed (patient lying in right lateral recumbent position upon arrival and transfer from EMS stretcher, moaning and crying)  HENT:   Head: Normocephalic and atraumatic     Right Ear: Hearing and external ear normal    Left Ear: Hearing and external ear normal    Nose: Nose normal    Mouth/Throat: Uvula is midline, oropharynx is clear and moist and mucous membranes are normal  No oropharyngeal exudate  Eyes: Conjunctivae and lids are normal  Pupils are equal, round, and reactive to light  Neck: Trachea normal, normal range of motion and phonation normal  Neck supple  No JVD present  No tracheal tenderness, no spinous process tenderness and no muscular tenderness present  No tracheal deviation present  No thyroid mass and no thyromegaly present  Cardiovascular: Normal rate, regular rhythm, S1 normal, S2 normal, normal heart sounds and intact distal pulses  Exam reveals no gallop and no friction rub  No murmur heard  Pulses:       Radial pulses are 2+ on the right side, and 2+ on the left side  Dorsalis pedis pulses are 2+ on the right side, and 2+ on the left side  Posterior tibial pulses are 2+ on the right side, and 2+ on the left side  Pulmonary/Chest: Effort normal and breath sounds normal  No stridor  Tachypnea (RR 25 on my exam) noted  No respiratory distress  He has no decreased breath sounds  He has no wheezes  He has no rhonchi  He has no rales  He exhibits no tenderness  Abdominal: Soft  He exhibits no distension and no mass  There is no tenderness  There is no rigidity, no rebound, no guarding and no CVA tenderness  Musculoskeletal: Normal range of motion  He exhibits no edema, tenderness or deformity  Lymphadenopathy:     He has no cervical adenopathy  Neurological: Disoriented: Not able to state person/place/time  He exhibits abnormal muscle tone (Forceful and suspected voluntary hypertonicity of bilateral UE and LE at elbow/hip/knee)  GCS eye subscore is 2  GCS verbal subscore is 2  GCS motor subscore is 6  PERRLA  Unable to assess remainder of cranial nerves for full exam for strength/sensation/DTR secondary to patient's altered mental status      He does not actively range either upper or lower extremity but does actively resist movement of the upper and lower extremities; he was able to cooperate when asked to relax his right arm for placement of blood pressure cuff  Skin: Skin is warm and dry  Capillary refill takes less than 2 seconds  Abrasion (Superficial paired 3 cm abrasions upper L chest wall without crepitus/deformity) noted  No rash noted  He is not diaphoretic  No erythema  Psychiatric:   Unable to assess secondary to altered mental status   Nursing note and vitals reviewed        Vital Signs  ED Triage Vitals [09/21/18 0843]   Temperature Pulse Respirations Blood Pressure SpO2   (!) 97 3 °F (36 3 °C) (!) 121 (!) 25 135/66 100 %      Temp Source Heart Rate Source Patient Position - Orthostatic VS BP Location FiO2 (%)   Temporal Monitor Lying Right arm --      Pain Score       No Pain           Vitals:    09/21/18 1115 09/21/18 1145 09/21/18 1200 09/21/18 1212   BP: 123/69 113/60 98/54 101/58   Pulse: 93 94 94 93   Patient Position - Orthostatic VS: Lying          Visual Acuity      ED Medications  Medications   dextrose 5 % and sodium chloride 0 45 % infusion (75 mL/hr Intravenous New Bag 9/21/18 1036)   docusate sodium (COLACE) capsule 100 mg (not administered)   ondansetron (ZOFRAN) injection 4 mg (not administered)   ipratropium (ATROVENT) 0 02 % inhalation solution 0 5 mg (0 5 mg Nebulization Given 9/21/18 1337)   albuterol inhalation solution 2 5 mg (not administered)   nicotine (NICODERM CQ) 21 mg/24 hr TD 24 hr patch 21 mg (not administered)   levalbuterol (XOPENEX) inhalation solution 1 25 mg (1 25 mg Nebulization Given 9/21/18 1338)   cyclobenzaprine (FLEXERIL) tablet 5 mg (not administered)   fluticasone (FLONASE) 50 mcg/act nasal spray 2 spray (not administered)   methocarbamol (ROBAXIN) tablet 500 mg (not administered)   pantoprazole (PROTONIX) EC tablet 40 mg (not administered)   oxyCODONE-acetaminophen (PERCOCET) 5-325 mg per tablet 1 tablet (not administered)   sodium chloride 0 9 % bolus 1,000 mL (0 mL Intravenous Stopped 9/21/18 0902)   lactated ringers bolus 1,000 mL (0 mL Intravenous Stopped 9/21/18 1005)   ketamine (KETALAR) 10 mg/mL IV use 70 mg (70 mg Intravenous Given 9/21/18 0933)   etomidate (AMIDATE) 2 mg/mL injection 20 mg (20 mg Intravenous Given 9/21/18 0854)   vecuronium (NORCURON) injection 7 mg (7 mg Intravenous Given 9/21/18 0853)    EMS REPLENISHMENT MED ( Does not apply Given to EMS 9/21/18 0915)   dextrose 50 % IV solution 25 mL (25 mL Intravenous Given 9/21/18 1025)   propofol (DIPRIVAN) 200 MG/20ML bolus injection 50 mg (50 mg Intravenous Given by Other 9/21/18 1024)   iohexol (OMNIPAQUE) 350 MG/ML injection (MULTI-DOSE) 100 mL (100 mL Intravenous Given 9/21/18 1002)   vancomycin (VANCOCIN) 1,000 mg in sodium chloride 0 9 % 250 mL IVPB (1,000 mg Intravenous New Bag 9/21/18 1211)   cefepime (MAXIPIME) IVPB (premix) 2,000 mg (0 mg Intravenous Stopped 9/21/18 1150)       Diagnostic Studies  Results Reviewed     Procedure Component Value Units Date/Time    Blood gas, arterial [[id number]]  (Abnormal) Collected:  09/21/18 1152    Lab Status:  Final result Specimen:  Blood, Arterial from Radial, Right Updated:  09/21/18 1209     pH, Arterial 7 369     pCO2, Arterial 44 6 (H) mm Hg      pO2, Arterial 83 8 mm Hg      HCO3, Arterial 25 1 mmol/L      Base Excess, Arterial -0 4 mmol/L      O2 Content, Arterial 17 1 mL/dL      O2 HGB,Arterial  94 6 %      SOURCE Radial, Right     FLOR TEST Yes     Vent Type- AC AC     AC Rate 24     Tidal Volume 400 ml      I-TIME 1     Inspired Air (FIO2) 30     PEEP 5    Narrative:        Therapeutic levels (1 mg/mL and 2 mg/mL) of hydroxocobalamin may interfere with the fCOHb and fMetHb where it may cause lower than expected values    Fingerstick Glucose (POCT) [[id number]]  (Normal) Collected:  09/21/18 1140    Lab Status:  Final result Updated:  09/21/18 1141     POC Glucose 76 mg/dl     Lactic acid, plasma [[id number]]  (Abnormal) Collected:  09/21/18 1021    Lab Status:  Final result Specimen:  Blood from Arm, Right Updated: 09/21/18 1053     LACTIC ACID 2 3 (HH) mmol/L     Narrative:         Result may be elevated if tourniquet was used during collection  Priscila Rivera [[id number]]  (Normal) Collected:  09/21/18 1033    Lab Status:  Final result Specimen:  Blood from Arm, Right Updated:  09/21/18 1048     Protime 13 3 seconds      INR 1 06    Blood culture #1 [[id number]] Collected:  09/21/18 1028    Lab Status: In process Specimen:  Blood from Hand, Right Updated:  09/21/18 1037    Blood culture #2 [[id number]] Collected:  09/21/18 1027    Lab Status: In process Specimen:  Blood from Arm, Left Updated:  89/29/50 5934    Salicylate level [[id number]]  (Abnormal) Collected:  09/21/18 0918    Lab Status:  Final result Specimen:  Blood from Arm, Left Updated:  05/30/47 1770     Salicylate Lvl <3 (L) mg/dL     Comprehensive metabolic panel [[id number]]  (Abnormal) Collected:  09/21/18 0918    Lab Status:  Final result Specimen:  Blood from Arm, Left Updated:  09/21/18 1025     Sodium 141 mmol/L      Potassium 4 5 mmol/L      Chloride 108 mmol/L      CO2 26 mmol/L      ANION GAP 7 mmol/L      BUN 18 mg/dL      Creatinine 1 10 mg/dL      Glucose 67 mg/dL      Calcium 8 1 (L) mg/dL      AST 66 (H) U/L       (H) U/L      Alkaline Phosphatase 104 U/L      Total Protein 6 5 g/dL      Albumin 3 2 (L) g/dL      Total Bilirubin 0 20 mg/dL      eGFR 76 ml/min/1 73sq m     Narrative:         National Kidney Disease Education Program recommendations are as follows:  GFR calculation is accurate only with a steady state creatinine  Chronic Kidney disease less than 60 ml/min/1 73 sq  meters  Kidney failure less than 15 ml/min/1 73 sq  meters      Magnesium [[id number]]  (Normal) Collected:  09/21/18 0918    Lab Status:  Final result Specimen:  Blood from Arm, Left Updated:  09/21/18 1025     Magnesium 2 1 mg/dL     Acetaminophen level [[id number]]  (Abnormal) Collected:  09/21/18 0918    Lab Status:  Final result Specimen:  Blood from Arm, Left Updated: 09/21/18 1019     Acetaminophen Level <2 (L) ug/mL     POCT Blood Gas (CG8+) [[id number]]  (Abnormal) Collected:  09/21/18 1011    Lab Status:  Final result Updated:  09/21/18 1015     pH, Art i-STAT 7 234 (LL)     pCO2, Art i-STAT 60 7 (H) mm HG      pO2, ART i-STAT 292 0 (H) mm HG      BE, i-STAT -3 (L) mmol/L      HCO3, Art i-STAT 25 7 mmol/L      CO2, i-STAT 28 mmol/L      O2 Sat, i-STAT 100 (H) %      SODIUM, I-STAT 138 mmol/l      Potassium, i-STAT 4 3 mmol/L      Calcium, Ionized i-STAT 1 19 mmol/L      Hct, i-STAT 34 (L) %      Hgb, i-STAT 11 6 (L) g/dl      Glucose, i-STAT 88 mg/dl      POC FIO2 60 L      Specimen Type ARTERIAL    CK (with reflex to MB) [[id number]]  (Normal) Collected:  09/21/18 0918    Lab Status:  Final result Specimen:  Blood from Arm, Left Updated:  09/21/18 1014     Total  U/L     Ethanol [[id number]]  (Normal) Collected:  09/21/18 0918    Lab Status:  Final result Specimen:  Blood from Arm, Left Updated:  09/21/18 1011     Ethanol Lvl <3 mg/dL     Lactic acid, plasma [[id number]]  (Abnormal) Collected:  09/21/18 0918    Lab Status:  Final result Specimen:  Blood from Arm, Left Updated:  09/21/18 1007     LACTIC ACID 4 0 (HH) mmol/L     Narrative:         Result may be elevated if tourniquet was used during collection      Troponin I [[id number]]  (Normal) Collected:  09/21/18 0918    Lab Status:  Final result Specimen:  Blood from Arm, Left Updated:  09/21/18 0957     Troponin I <0 02 ng/mL     Urine Microscopic [[id number]]  (Abnormal) Collected:  09/21/18 0922    Lab Status:  Final result Specimen:  Urine from Urine, Indwelling [place]wick Catheter Updated:  09/21/18 0957     RBC, UA 4-10 (A) /hpf      WBC, UA 2-4 (A) /hpf      Epithelial Cells Occasional /hpf      Bacteria, UA Moderate (A) /hpf     Rapid drug screen, urine [[id number]]  (Abnormal) Collected:  09/21/18 0922    Lab Status:  Final result Specimen:  Urine from Urine, Catheter Updated:  09/21/18 0954     Amph/Meth UR Positive (A) Barbiturate Ur Negative     Benzodiazepine Urine Negative     Cocaine Urine Negative     Methadone Urine Negative     Opiate Urine Negative     PCP Ur Negative     THC Urine Negative    Narrative:         FOR MEDICAL PURPOSES ONLY  IF CONFIRMATION NEEDED PLEASE CONTACT THE LAB WITHIN 5 DAYS  Drug Screen Cutoff Levels:  AMPHETAMINE/METHAMPHETAMINES  1000 ng/mL  BARBITURATES     200 ng/mL  BENZODIAZEPINES     200 ng/mL  COCAINE      300 ng/mL  METHADONE      300 ng/mL  OPIATES      300 ng/mL  PHENCYCLIDINE     25 ng/mL  THC       50 ng/mL    Ammonia [[id number]]  (Normal) Collected:  09/21/18 0918    Lab Status:  Final result Specimen:  Blood from Arm, Left Updated:  09/21/18 0948     Ammonia 25 umol/L     [place] draw [[id number]] Collected:  09/21/18 0922    Lab Status:  Final result Specimen:  Blood Updated:  09/21/18 0948    Narrative: The following orders were created for panel order [place] draw  Procedure                               Abnormality         Status                     ---------                               -----------         ------                     [place] Top 7ml on SCMA[[id number]]                          Final result                 Please view results for these tests on the individual orders      UA w Reflex to Microscopic w Reflex to Culture [[id number]]  (Abnormal) Collected:  09/21/18 0922    Lab Status:  Final result Specimen:  Urine from Urine, Indwelling Chadwick Catheter Updated:  09/21/18 0933     Color, UA Yellow     Clarity, UA Slightly Cloudy     Specific Gravity, UA >=1 030     pH, UA 5 5     Leukocytes, UA Negative     Nitrite, UA Negative     Protein, UA 30 (1+) (A) mg/dl      Glucose, UA Negative mg/dl      Ketones, UA Negative mg/dl      Urobilinogen, UA 0 2 E U /dl      Bilirubin, UA Negative     Blood, UA Moderate (A)    CBC and differential [[id number]]  (Abnormal) Collected:  09/21/18 0918    Lab Status:  Final result Specimen:  Blood from Arm, Left Updated:  09/21/18 0929     WBC 21 79 (H) Thousand/uL      RBC 4 59 Million/uL      Hemoglobin 14 1 g/dL      Hematocrit 43 4 %      MCV 95 fL      MCH 30 7 pg      MCHC 32 5 g/dL      RDW 14 1 %      MPV 8 5 (L) fL      Platelets 041 Thousands/uL      nRBC 0 /100 WBCs      Neutrophils Relative 84 (H) %      Immat GRANS % 1 %      Lymphocytes Relative 5 (L) %      Monocytes Relative 9 %      Eosinophils Relative 1 %      Basophils Relative 0 %      Neutrophils Absolute 18 48 (H) Thousands/µL      Immature Grans Absolute 0 20 Thousand/uL      Lymphocytes Absolute 0 98 Thousands/µL      Monocytes Absolute 1 95 (H) Thousand/µL      Eosinophils Absolute 0 13 Thousand/µL      Basophils Absolute 0 05 Thousands/µL     POCT Chem 8+ [[id number]]  (Abnormal) Collected:  09/21/18 0918    Lab Status:  Final result Updated:  09/21/18 0923     SODIUM, I-STAT 143 mmol/l      Potassium, i-STAT 4 4 mmol/L      Chloride, istat 107 mmol/L      CO2, i-STAT 25 mmol/L      Anion Gap, Istat 16 (H) mmol/L      Calcium, Ionized i-STAT 1 18 mmol/L      BUN, I-STAT 20 mg/dl      Creatinine, i-STAT 1 0 mg/dl      eGFR 85 ml/min/1 73sq m      Glucose, i-STAT 47 (L) mg/dl      Hct, i-STAT 43 %      Hgb, i-STAT 14 6 g/dl      Specimen Type VENOUS    Fingerstick Glucose (POCT) [[id number]]  (Abnormal) Collected:  09/21/18 0916    Lab Status:  Final result Updated:  09/21/18 0917     POC Glucose 62 (L) mg/dl                  CT chest abdomen pelvis w contrast   Final Result by Mari Huang MD (09/21 1034)      No solid visceral injury seen      Patchy airspace opacity within groundglass nodularity suggest aspiration    Follow-up suggested in 3 months to demonstrate complete resolution      Emphysema      Right basal density seen suggests atelectasis      Distended stomach however the feeding tube is in appropriate position , evaluate      Chronic pancreatitis      Dilated ascending aorta measuring about 4 1 cm, consider cardiology evaluation on nonemergent basis to exclude any aortic valvular disease       The study was marked in EPIC for immediate notification         Workstation performed: [id number]         CT spine cervical wo contrast   Final Result by Jerrell Gonzalez MD (09/21 1020)      No acute compression collapse of the vertebra seen   Degenerative cervical disc disease at C3-4                Workstation performed: [id number]         CT head without contrast   Final Result by Jerrell Gonzalez MD (09/21 1006)      No acute intracranial hemorrhage seen   No extra-axial collection seen   No mass effect or midline shift seen                  Workstation performed: [id number]         XR chest 1 view portable   Final Result by Jerrell Gonzalez MD (09/21 0935)      ET tube in appropriate position   Feeding tube seen extending below the dome of diaphragm   No acute consolidation   Mild increased markings seen in the right lung, nonspecific         Workstation performed: [id number]         XR chest portable    (Results Pending)              Procedures  ECG 12 Lead Documentation  Date/Time: 9/21/2018 9:08 AM  Performed by: Teddy Ku  Authorized by: Teddy Ku     Indications / Diagnosis:  Altered mental status/suspected overdose  ECG reviewed by me, the ED Provider: yes    Patient location:  ED  Previous ECG:     Previous ECG:  Unavailable    Comparison to cardiac monitor: Yes    Interpretation:     Interpretation: abnormal    Rate:     ECG rate:  112    ECG rate assessment: tachycardic    Rhythm:     Rhythm: sinus tachycardia    Ectopy:     Ectopy: PVCs      PVCs:  Infrequent and unifocal  QRS:     QRS axis:  Normal    QRS intervals:  Normal  Conduction:     Conduction: normal    ST segments:     ST segments:  Normal  T waves:     T waves: normal    Comments:      Pr 134 qrs 92qtc 477  Intubation  Date/Time: 9/21/2018 8:45 AM  Performed by: Cal Mcguire by: Teddy Ku     Patient location:  ED  Consent:     Consent obtained:  Emergent situation  Universal protocol:     Patient identity confirmed:  Arm band  Pre-procedure details:     Patient status:  Altered mental status    Mallampati score:  1    Pretreatment medications:  Etomidate (20 mg)    Paralytics:  Vecuronium (7 mg)  Procedure details:     Preoxygenation:  Nonrebreather mask    CPR in progress: no      Intubation method:  Oral    Oral intubation technique:  Direct    Laryngoscope blade: Mac 4    Tube size (mm):  8 0    Tube type:  Cuffed    Number of attempts:  1    Cricoid pressure: no      Tube visualized through cords: yes    Placement assessment:     ETT to lip:  23 cm    ETT to teeth:  22 cm    Tube secured with:  ETT reed    Breath sounds:  Equal    Placement verification: chest rise, CXR verification, direct visualization, equal breath sounds, fiberoptic scope and capnography      End Tidal CO2 (mm HG):  45    CXR findings:  ETT in proper place    Ventilator settings:  AC RR 18  FiO2 0 6 PEEP 5  Post-procedure details:     Patient tolerance of procedure: Tolerated well, no immediate complications           Phone Contacts  ED Phone Contact    ED Course  ED Course as of Sep 21 1421   Fri Sep 21, 2018   0946 1  WBC markedly elevated likely  2  Hg/Hcg wnl  3  Plt wnl   4  UA: 1+ proteinuria with moderate blood  Otherwise wnl  Concentrated sample  5  Chem-8: Electrolytes wnl  BUN/Cr wnl  Hypoglycemia: will give 25 g D50x1 and follow with D5 0 5 NS infusion  4329 CTs completed and awaiting interpretation at this time  1010 Elevated lactic acid  Will give IV fluid bolus and recheck after infusion complete  Empiric antibiotic therapy including cefepime/vancomycin: suspect either GI source or aspiration pneumonia given the preceding hx  Otherwise, troponin negative  UDS positive for amphetamine  Ammonia wnl     1023 ABG: respiratory acidosis with hyperoxia  RR increased following this result  CPK wnl  Acetaminophen/EtOH wnl   CMP: electrolyte wnl apart from hypocalcemia which corrects for hypoalbuminemia  Mild elevation of transaminases likely related to period of hypoxemia  1058 CT results noted: no acute intracranial abnormality  No C-spine abnormality  RUL infiltrate likely related to aspiration given clinical hx  No traumatic findings  Proximal aortic abnormality that will require further evaluation nonemergently  Repeat lactic acid now improving  INR wnl  When effect of sedation wanes, patient is intermittently dyssynchronous with the ventilator and attempts to lift his head off the bed  Does not open eyes spontaneously  Does not follow commands  Propofol sedation started with good effect  Page to hospitalist to discuss critical care admission  1128 D/w Dr Bola Farris of [facility]  Accepts in inpatient admission to ICU     1200 Patient able to transiently open eyes and nod/shake head appropriately in response to questions  Remains synchronous with ventilator now without issue  Repeat ABG pending  Pending physical transfer to ICU now            MDM  Number of Diagnoses or Management Options  Abnormal transaminases: new and requires workup  Altered mental status: new and requires workup  Aspiration pneumonitis ([address] ): new and requires workup  Opioid intoxication ([address] ): established and improving  Polysubstance abuse: new and requires workup     Amount and/or Complexity of Data Reviewed  Clinical lab tests: ordered and reviewed  Tests in the radiology section of CPT®: ordered and reviewed  Decide to obtain previous medical records or to obtain history from someone other than the patient: yes  Obtain history from someone other than the patient: yes  Review and summarize past medical records: yes  Discuss the patient with other providers: yes  Independent visualization of images, tracings, or specimens: yes    Risk of Complications, Morbidity, and/or Mortality  Presenting problems: high  Diagnostic procedures: high  Management options: high    Patient Progress  Patient progress: improved    The patient presented with a condition in which there was a high probability of imminent or life-threatening deterioration, and critical care services (excluding separately billable procedures) totalled  minutes  Disposition  Final diagnoses: Altered mental status   Opioid intoxication ([facility] [address] )   Aspiration pneumonitis ([facility] [address] )   Abnormal transaminases   Polysubstance abuse     Time reflects when diagnosis was documented in both MDM as applicable and the Disposition within this note     Time User Action Codes Description Comment    9/21/2018 11:29 AM Guerline Pee Add [R41 82] Altered mental status     9/21/2018 11:29 AM Guerline Pee Add [R77 652] Opioid intoxication ([facility] [address] )     9/21/2018 11:29 AM Guerline Pee Add [J69 0] Aspiration pneumonitis ([facility] [address] )     9/21/2018 11:29 AM Guerline Pee Add [R74 8] Abnormal transaminases     9/21/2018 11:29 AM Guerline Pee Add [F19 10] Polysubstance abuse     9/21/2018 12:02 PM Sneha Sánchez A Add [R41 82] Altered mental status, unspecified altered mental status type       ED Disposition     ED Disposition Condition Comment    Admit  Case was discussed with Dr Kylie Olmedo and the patient's admission status was agreed to be Admission Status: inpatient status to the service of Dr Kristofer Calderón  Follow-up Information    None         Current Discharge Medication List      CONTINUE these medications which have NOT CHANGED    Details   cyclobenzaprine (FLEXERIL) 5 mg tablet Take 5 mg by mouth 2 (two) times a day      fluticasone (FLONASE) 50 mcg/act nasal spray 2 sprays into each nostril daily      fluticasone (FLOVENT HFA) 220 mcg/act inhaler Inhale 2 puffs every 4 (four) hours as needed Rinse mouth after use        methocarbamol (ROBAXIN) 500 mg tablet Take 500 mg by mouth 3 (three) times a day      omeprazole (PriLOSEC) 40 MG capsule Take 40 mg by mouth daily before breakfast      oxyCODONE-acetaminophen (PERCOCET) 5-325 mg per tablet Take 1 tablet by mouth 2 (two) times a day as needed for moderate pain           No discharge procedures on file      ED Provider  Electronically Signed by           Laura Najera DO  09/21/18 Jolene Philippe,   09/21/18 [id number]

## 2018-09-21 NOTE — ASSESSMENT & PLAN NOTE
Patient reported to have apneic episode by EMS, patient was intubated early today due to hypercapnic respiratory failure  Patient had significant acute encephalopathy

## 2018-09-21 NOTE — RESPIRATORY THERAPY NOTE
RT Protocol Note  Hardik Tran 46 y o  male MRN: [id number]  Unit/Bed#:  Encounter: [id number]    Assessment    Active Problems:    * No active hospital problems  *      Home Pulmonary Medications:  Albuterol 4-6 times a day  History reviewed  No pertinent past medical history  Social History     Social History    Marital status: Single     Spouse name: N/A    Number of children: N/A    Years of education: N/A     Social History Main Topics    Smoking status: None    Smokeless tobacco: None    Alcohol use None    Drug use: Unknown    Sexual activity: Not Asked     Other Topics Concern    None     Social History Narrative    None       Subjective         Objective    Physical Exam:   Assessment Type: Assess only  General Appearance: Alert, Awake  Respiratory Pattern: Assisted  Chest Assessment: Chest expansion symmetrical  R Breath Sounds: Clear, Diminished  L Breath Sounds: Coarse, Diminished  O2 Device: Vent    Vitals:  Blood pressure 101/58, pulse 93, temperature 98 1 °F (36 7 °C), resp  rate 22, weight 67 3 kg (148 lb 5 9 oz), SpO2 100 %  Results from last 7 days  Lab Units 09/21/18  1152   PH ART  7 369   PCO2 ART mm Hg 44 6*   PO2 ART mm Hg 83 8   HCO3 ART mmol/L 25 1   BASE EXC ART mmol/L -0 4   O2 CONTENT ART mL/dL 17 1   O2 HGB, ARTERIAL % 94 6   ABG SOURCE  Radial, Right   FLOR TEST  Yes       Imaging and other studies: I have personally reviewed pertinent reports  O2 Device: Vent     Plan    Respiratory Plan: Vent/NIV/HFNC  Airway Clearance Plan: Flutter     Resp Comments: increased RR to 24 and Decreased FiO2 to 30% as per ABG  Ordering TID Xopenx and atrovent and PRN Albuterol  Pt smokes 2-3 packs of cigarettes a day

## 2018-09-21 NOTE — ASSESSMENT & PLAN NOTE
Follow up repeat labs, likely reactive due to aspiration and acute respiratory failure with hypercapnia

## 2018-09-21 NOTE — PLAN OF CARE
CARDIOVASCULAR - ADULT     Maintains optimal cardiac output and hemodynamic stability Progressing     Absence of cardiac dysrhythmias or at baseline rhythm Progressing        GASTROINTESTINAL - ADULT     Minimal or absence of nausea and/or vomiting Progressing     Maintains or returns to baseline bowel function Progressing     Maintains adequate nutritional intake Progressing        GENITOURINARY - ADULT     Maintains or returns to baseline urinary function Progressing     Absence of urinary retention Progressing     Urinary catheter remains patent Progressing        HEMATOLOGIC - ADULT     Maintains hematologic stability Progressing        METABOLIC, FLUID AND ELECTROLYTES - ADULT     Electrolytes maintained within normal limits Progressing     Fluid balance maintained Progressing     Glucose maintained within target range Progressing        MUSCULOSKELETAL - ADULT     Maintain or return mobility to safest level of function Progressing     Maintain proper alignment of affected body part Progressing        NEUROSENSORY - ADULT     Achieves stable or improved neurological status Progressing     Achieves maximal functionality and self care Progressing        Nutrition/Hydration-ADULT     Nutrient/Hydration intake appropriate for improving, restoring or maintaining nutritional needs Progressing        Potential for Falls     Patient will remain free of falls Progressing        RESPIRATORY - ADULT     Achieves optimal ventilation and oxygenation Progressing        SKIN/TISSUE INTEGRITY - ADULT     Skin integrity remains intact Progressing     Oral mucous membranes remain intact Progressing

## 2018-09-22 ENCOUNTER — APPOINTMENT (INPATIENT)
Dept: RADIOLOGY | Facility: HOSPITAL | Age: 53
DRG: 917 | End: 2018-09-22
Payer: COMMERCIAL

## 2018-09-22 VITALS
SYSTOLIC BLOOD PRESSURE: 140 MMHG | HEART RATE: 97 BPM | TEMPERATURE: 97.1 F | OXYGEN SATURATION: 99 % | RESPIRATION RATE: 14 BRPM | WEIGHT: 148.37 LBS | DIASTOLIC BLOOD PRESSURE: 78 MMHG

## 2018-09-22 PROBLEM — J96.02 ACUTE RESPIRATORY FAILURE WITH HYPERCAPNIA (HCC): Status: RESOLVED | Noted: 2018-09-21 | Resolved: 2018-09-22

## 2018-09-22 PROBLEM — E87.2 LACTIC ACIDOSIS: Status: RESOLVED | Noted: 2018-09-21 | Resolved: 2018-09-22

## 2018-09-22 PROBLEM — R82.5 POSITIVE URINE DRUG SCREEN: Status: RESOLVED | Noted: 2018-09-21 | Resolved: 2018-09-22

## 2018-09-22 PROBLEM — G93.40 ACUTE ENCEPHALOPATHY: Status: RESOLVED | Noted: 2018-09-21 | Resolved: 2018-09-22

## 2018-09-22 LAB
ALBUMIN SERPL BCP-MCNC: 2.8 G/DL (ref 3.5–5)
ALP SERPL-CCNC: 99 U/L (ref 46–116)
ALT SERPL W P-5'-P-CCNC: 110 U/L (ref 12–78)
ANION GAP SERPL CALCULATED.3IONS-SCNC: 5 MMOL/L (ref 4–13)
AST SERPL W P-5'-P-CCNC: 64 U/L (ref 5–45)
BASOPHILS # BLD AUTO: 0.05 THOUSANDS/ΜL (ref 0–0.1)
BASOPHILS NFR BLD AUTO: 0 % (ref 0–1)
BILIRUB SERPL-MCNC: 0.7 MG/DL (ref 0.2–1)
BUN SERPL-MCNC: 9 MG/DL (ref 5–25)
CALCIUM SERPL-MCNC: 8.3 MG/DL (ref 8.3–10.1)
CHLORIDE SERPL-SCNC: 99 MMOL/L (ref 100–108)
CO2 SERPL-SCNC: 30 MMOL/L (ref 21–32)
CREAT SERPL-MCNC: 0.66 MG/DL (ref 0.6–1.3)
EOSINOPHIL # BLD AUTO: 0.34 THOUSAND/ΜL (ref 0–0.61)
EOSINOPHIL NFR BLD AUTO: 2 % (ref 0–6)
ERYTHROCYTE [DISTWIDTH] IN BLOOD BY AUTOMATED COUNT: 14 % (ref 11.6–15.1)
GFR SERPL CREATININE-BSD FRML MDRD: 110 ML/MIN/1.73SQ M
GLUCOSE SERPL-MCNC: 110 MG/DL (ref 65–140)
HCT VFR BLD AUTO: 36.4 % (ref 36.5–49.3)
HCV AB SER QL: ABNORMAL
HGB BLD-MCNC: 12.3 G/DL (ref 12–17)
IMM GRANULOCYTES # BLD AUTO: 0.07 THOUSAND/UL (ref 0–0.2)
IMM GRANULOCYTES NFR BLD AUTO: 0 % (ref 0–2)
INR PPP: 1.11 (ref 0.86–1.17)
LYMPHOCYTES # BLD AUTO: 1.33 THOUSANDS/ΜL (ref 0.6–4.47)
LYMPHOCYTES NFR BLD AUTO: 8 % (ref 14–44)
MAGNESIUM SERPL-MCNC: 1.7 MG/DL (ref 1.6–2.6)
MCH RBC QN AUTO: 31 PG (ref 26.8–34.3)
MCHC RBC AUTO-ENTMCNC: 33.8 G/DL (ref 31.4–37.4)
MCV RBC AUTO: 92 FL (ref 82–98)
MONOCYTES # BLD AUTO: 1.2 THOUSAND/ΜL (ref 0.17–1.22)
MONOCYTES NFR BLD AUTO: 7 % (ref 4–12)
NEUTROPHILS # BLD AUTO: 14.2 THOUSANDS/ΜL (ref 1.85–7.62)
NEUTS SEG NFR BLD AUTO: 83 % (ref 43–75)
NRBC BLD AUTO-RTO: 0 /100 WBCS
PHOSPHATE SERPL-MCNC: 2.9 MG/DL (ref 2.7–4.5)
PLATELET # BLD AUTO: 276 THOUSANDS/UL (ref 149–390)
PMV BLD AUTO: 8.5 FL (ref 8.9–12.7)
POTASSIUM SERPL-SCNC: 3.8 MMOL/L (ref 3.5–5.3)
PROCALCITONIN SERPL-MCNC: 0.56 NG/ML
PROT SERPL-MCNC: 6.4 G/DL (ref 6.4–8.2)
PROTHROMBIN TIME: 13.8 SECONDS (ref 11.8–14.2)
RBC # BLD AUTO: 3.97 MILLION/UL (ref 3.88–5.62)
SODIUM SERPL-SCNC: 134 MMOL/L (ref 136–145)
WBC # BLD AUTO: 17.19 THOUSAND/UL (ref 4.31–10.16)

## 2018-09-22 PROCEDURE — 94761 N-INVAS EAR/PLS OXIMETRY MLT: CPT

## 2018-09-22 PROCEDURE — 94760 N-INVAS EAR/PLS OXIMETRY 1: CPT

## 2018-09-22 PROCEDURE — 71045 X-RAY EXAM CHEST 1 VIEW: CPT

## 2018-09-22 PROCEDURE — 85610 PROTHROMBIN TIME: CPT | Performed by: INTERNAL MEDICINE

## 2018-09-22 PROCEDURE — 85025 COMPLETE CBC W/AUTO DIFF WBC: CPT | Performed by: INTERNAL MEDICINE

## 2018-09-22 PROCEDURE — 83735 ASSAY OF MAGNESIUM: CPT | Performed by: INTERNAL MEDICINE

## 2018-09-22 PROCEDURE — 99239 HOSP IP/OBS DSCHRG MGMT >30: CPT | Performed by: INTERNAL MEDICINE

## 2018-09-22 PROCEDURE — 94640 AIRWAY INHALATION TREATMENT: CPT

## 2018-09-22 PROCEDURE — 80053 COMPREHEN METABOLIC PANEL: CPT | Performed by: INTERNAL MEDICINE

## 2018-09-22 PROCEDURE — 84145 PROCALCITONIN (PCT): CPT | Performed by: INTERNAL MEDICINE

## 2018-09-22 PROCEDURE — 90732 PPSV23 VACC 2 YRS+ SUBQ/IM: CPT | Performed by: INTERNAL MEDICINE

## 2018-09-22 PROCEDURE — 84100 ASSAY OF PHOSPHORUS: CPT | Performed by: INTERNAL MEDICINE

## 2018-09-22 PROCEDURE — G0009 ADMIN PNEUMOCOCCAL VACCINE: HCPCS | Performed by: INTERNAL MEDICINE

## 2018-09-22 RX ORDER — LEVOFLOXACIN 750 MG/1
750 TABLET ORAL EVERY 24 HOURS
Qty: 5 TABLET | Refills: 0 | Status: SHIPPED | OUTPATIENT
Start: 2018-09-22 | End: 2018-09-27

## 2018-09-22 RX ADMIN — CYCLOBENZAPRINE HYDROCHLORIDE 5 MG: 10 TABLET, FILM COATED ORAL at 08:33

## 2018-09-22 RX ADMIN — FLUTICASONE PROPIONATE 2 SPRAY: 50 SPRAY, METERED NASAL at 08:34

## 2018-09-22 RX ADMIN — LEVALBUTEROL 1.25 MG: 1.25 SOLUTION, CONCENTRATE RESPIRATORY (INHALATION) at 09:25

## 2018-09-22 RX ADMIN — PNEUMOCOCCAL VACCINE POLYVALENT 0.5 ML
25; 25; 25; 25; 25; 25; 25; 25; 25; 25; 25; 25; 25; 25; 25; 25; 25; 25; 25; 25; 25; 25; 25 INJECTION, SOLUTION INTRAMUSCULAR; SUBCUTANEOUS at 11:01

## 2018-09-22 RX ADMIN — DOCUSATE SODIUM 100 MG: 100 CAPSULE, LIQUID FILLED ORAL at 08:32

## 2018-09-22 RX ADMIN — PANTOPRAZOLE SODIUM 40 MG: 40 TABLET, DELAYED RELEASE ORAL at 05:20

## 2018-09-22 RX ADMIN — OXYCODONE HYDROCHLORIDE AND ACETAMINOPHEN 1 TABLET: 5; 325 TABLET ORAL at 08:33

## 2018-09-22 RX ADMIN — IPRATROPIUM BROMIDE 0.5 MG: 0.5 SOLUTION RESPIRATORY (INHALATION) at 09:25

## 2018-09-22 NOTE — DISCHARGE SUMMARY
Discharge- Sofia Alves 1965, 46 y o  male MRN: [id number]    Unit/Bed#:  Encounter: [id number]    Primary Care Provider: CHACHA Luz   Date and time admitted to hospital: 9/21/2018  8:41 AM        * Acute encephalopathy   Assessment & Plan    Patient presented with altered mental status, required intubation due to severe encephalopathy, hypercapnic respiratory failure  Patient's mental status appears to have returned to baseline, patient extubated upon arrival to the critical care unit    Patient doing well without complaint, will check ambulatory pulse ox and discharge patient home, acute encephalopathy has resolved        Aspiration pneumonia ([facility] [address] )   Assessment & Plan    Suspected aspiration event, possible aspiration pneumonia, patient received antibiotics  Discharge on 5 day course of p o  Levaquin        Acute respiratory failure with hypercapnia (HCC)   Assessment & Plan    Resolved        Lactic acidosis   Assessment & Plan    Resolved        Leukocytosis   Assessment & Plan    Likely reactive due to aspiration, possible aspiration pneumonia, patient is medically stable for discharge, recommend follow-up with PCP, repeat lab work in 2 weeks        Positive urine drug screen   Assessment & Plan    Suspected history of both methamphetamine use and misuse of oral narcotics        Other emphysema (HCC)   Assessment & Plan    Chronic tobacco use, nicotine patch ordered  Smoking cessation advised at time of discharge, patient does not think that is going to be able to quit smoking    No nicotine patch will be ordered at discharge        Bipolar depression [facility])   Assessment & Plan    Outpatient follow-up with PCP, patient denies any suicidal ideation            Discharging Physician / Practitioner: Pai Yates DO  PCP: Zeyad Luz [facility]   Admission Date:   Admission Orders     Ordered        09/21/18 1128  Inpatient Admission  Once             Discharge Date: 09/22/18    Resolved Problems  Date Reviewed: 9/22/2018    None          Consultations During Hospital Stay:  · None      Significant Findings / Test Results:     · None    Incidental Findings:   · Patchy airspace opacity, recommended follow-up 3 months     Test Results Pending at Discharge (will require follow up): · Hepatitis-C antibody screen     Outpatient Tests Requested:  · Repeat blood work 2 weeks    Complications:  None    Reason for Admission:  Acute encephalopathy with lactic acidosis, acute respiratory failure with hypercapnia    Hospital Course:     Trudy Chritsine is a 46 y o  male patient who originally presented to the hospital on 9/21/2018 due to unresponsive episode, reports of possible apnea, no reported loss of pulse, patient intubated in the emergency room for airway protection, patient had rapid resolution of symptoms and was extubated within 12 hours  Patient is awake and alert, he has no acute complaints other than a mild cough, patient is medically stable for discharge home, complete 5 days of p o  Levaquin    Please see above list of diagnoses and related plan for additional information  Condition at Discharge: good     Discharge Day Visit / Exam:     Subjective:  No pain, minimal cough  Vitals: Blood Pressure: 140/78 (09/22/18 0836)  Pulse: 97 (09/22/18 0836)  Temperature: (!) 97 1 °F (36 2 °C) (09/22/18 0700)  Temp Source: Temporal (09/22/18 0700)  Respirations: 14 (09/22/18 0836)  Weight - Scale: 67 3 kg (148 lb 5 9 oz) (09/21/18 1009)  SpO2: 99 % (09/22/18 0836)  Exam:   Physical Exam   Constitutional: He is oriented to person, place, and time  He appears well-developed and well-nourished  No distress  Cardiovascular: Normal rate  Pulmonary/Chest: Effort normal and breath sounds normal  No respiratory distress  He has no wheezes  Abdominal: Soft  Bowel sounds are normal    Neurological: He is alert and oriented to person, place, and time  No cranial nerve deficit  Coordination normal    Skin: He is not diaphoretic  Psychiatric: He has a normal mood and affect  His behavior is normal  Judgment and thought content normal    Nursing note and vitals reviewed  Discharge instructions/Information to patient and family:   See after visit summary for information provided to patient and family  Provisions for Follow-Up Care:  See after visit summary for information related to follow-up care and any pertinent home health orders  Disposition:     Home    For Discharges to [facility] SNF:   · Not Applicable to this Patient - Not Applicable to this Patient    Planned Readmission:  No     Discharge Statement:  I spent 35 minutes discharging the patient  This time was spent on the day of discharge  I had direct contact with the patient on the day of discharge  Greater than 50% of the total time was spent examining patient, answering all patient questions, arranging and discussing plan of care with patient as well as directly providing post-discharge instructions  Additional time then spent on discharge activities  Discharge Medications:  See after visit summary for reconciled discharge medications provided to patient and family        ** Please Note: This note has been constructed using a voice recognition system **

## 2018-09-22 NOTE — SOCIAL WORK
The patient was d/c to home with no needs he is aware of the importance of following up with the oco and he states that he will make an appointment with her on Monday he is transported home by family

## 2018-09-22 NOTE — PLAN OF CARE
Problem: Potential for Falls  Goal: Patient will remain free of falls  INTERVENTIONS:  - Assess patient frequently for physical needs  -  Identify cognitive and physical deficits and behaviors that affect risk of falls  -  [place] fall precautions as indicated by assessment   - Educate patient/family on patient safety including physical limitations  - Instruct patient to call for assistance with activity based on assessment  - Modify environment to reduce risk of injury  - Consider OT/PT consult to assist with strengthening/mobility   Outcome: Adequate for Discharge      Problem: Nutrition/Hydration-ADULT  Goal: Nutrient/Hydration intake appropriate for improving, restoring or maintaining nutritional needs  Monitor and assess patient's nutrition/hydration status for malnutrition (ex- brittle hair, bruises, dry skin, pale skin and conjunctiva, muscle wasting, smooth red tongue, and disorientation)  Collaborate with interdisciplinary team and initiate plan and interventions as ordered  Monitor patient's weight and dietary intake as ordered or per policy  Utilize nutrition screening tool and intervene per policy  Determine patient's food preferences and provide high-protein, high-caloric foods as appropriate       INTERVENTIONS:  - Monitor oral intake, urinary output, labs, and treatment plans  - Assess nutrition and hydration status and recommend course of action  - Evaluate amount of meals eaten  - Assist patient with eating if necessary   - Allow adequate time for meals  - Recommend/ encourage appropriate diets, oral nutritional supplements, and vitamin/mineral supplements  - Order, calculate, and assess calorie counts as needed  - Recommend, monitor, and adjust tube feedings and TPN/PPN based on assessed needs  - Assess need for intravenous fluids  - Provide specific nutrition/hydration education as appropriate  - Include patient/family/caregiver in decisions related to nutrition   Outcome: Not Progressing      Problem: NEUROSENSORY - ADULT  Goal: Achieves stable or improved neurological status  INTERVENTIONS  - Monitor and report changes in neurological status  - Initiate measures to prevent increased intracranial pressure  - Maintain blood pressure and fluid volume within ordered parameters to optimize cerebral perfusion  - Monitor temperature, glucose, and sodium or any other associated labs  Initiate appropriate interventions as ordered  - Monitor for seizure activity   - Administer anti-seizure medications as ordered   Outcome: Adequate for Discharge    Goal: Achieves maximal functionality and self care  INTERVENTIONS  - Monitor swallowing and airway patency with patient fatigue and changes in neurological status  - Encourage and assist patient to increase activity and self care with guidance from rehab services  - Encourage visually impaired, hearing impaired and aphasic patients to use assistive/communication devices   Outcome: Adequate for Discharge      Problem: CARDIOVASCULAR - ADULT  Goal: Maintains optimal cardiac output and hemodynamic stability  INTERVENTIONS:  - Monitor I/O, vital signs and rhythm  - Monitor for S/S and trends of decreased cardiac output i e  bleeding, hypotension  - Administer and titrate ordered vasoactive medications to optimize hemodynamic stability  - Assess quality of pulses, skin color and temperature  - Assess for signs of decreased coronary artery perfusion - ex   Angina  - Instruct patient to report change in severity of symptoms   Outcome: Adequate for Discharge    Goal: Absence of cardiac dysrhythmias or at baseline rhythm  INTERVENTIONS:  - Continuous cardiac monitoring, monitor vital signs, obtain 12 lead EKG if indicated  - Administer antiarrhythmic and heart rate control medications as ordered  - Monitor electrolytes and administer replacement therapy as ordered   Outcome: Adequate for Discharge      Problem: RESPIRATORY - ADULT  Goal: Achieves optimal ventilation and oxygenation  INTERVENTIONS:  - Assess for changes in respiratory status  - Assess for changes in mentation and behavior  - Position to facilitate oxygenation and minimize respiratory effort  - Oxygen administration by appropriate delivery method based on oxygen saturation (per order) or ABGs  - Initiate smoking cessation education as indicated  - Encourage broncho-pulmonary hygiene including cough, deep breathe, Incentive Spirometry  - Assess the need for suctioning and aspirate as needed  - Assess and instruct to report SOB or any respiratory difficulty  - Respiratory Therapy support as indicated   Outcome: Adequate for Discharge      Problem: GASTROINTESTINAL - ADULT  Goal: Minimal or absence of nausea and/or vomiting  INTERVENTIONS:  - Administer IV fluids as ordered to ensure adequate hydration  - Maintain NPO status until nausea and vomiting are resolved  - Nasogastric tube as ordered  - Administer ordered antiemetic medications as needed  - Provide nonpharmacologic comfort measures as appropriate  - Advance diet as tolerated, if ordered  - Nutrition services referral to assist patient with adequate nutrition and appropriate food choices   Outcome: Adequate for Discharge    Goal: Maintains or returns to baseline bowel function  INTERVENTIONS:  - Assess bowel function  - Encourage oral fluids to ensure adequate hydration  - Administer IV fluids as ordered to ensure adequate hydration  - Administer ordered medications as needed  - Encourage mobilization and activity  - Nutrition services referral to assist patient with appropriate food choices   Outcome: Adequate for Discharge    Goal: Maintains adequate nutritional intake  INTERVENTIONS:  - Monitor percentage of each meal consumed  - Identify factors contributing to decreased intake, treat as appropriate  - Assist with meals as needed  - Monitor I&O, WT and lab values  - Obtain nutrition services referral as needed   Outcome: Adequate for Discharge      Problem: GENITOURINARY - ADULT  Goal: Maintains or returns to baseline urinary function  INTERVENTIONS:  - Assess urinary function  - Encourage oral fluids to ensure adequate hydration  - Administer IV fluids as ordered to ensure adequate hydration  - Administer ordered medications as needed  - Offer frequent toileting  - Follow urinary retention protocol if ordered   Outcome: Adequate for Discharge    Goal: Absence of urinary retention  INTERVENTIONS:  - Assess patients ability to void and empty bladder  - Monitor I/O  - Bladder scan as needed  - Discuss with physician/AP medications to alleviate retention as needed  - Discuss catheterization for long term situations as appropriate   Outcome: Adequate for Discharge    Goal: Urinary catheter remains patent  INTERVENTIONS:  - Assess patency of urinary catheter  - If patient has a chronic green, consider changing catheter if non-functioning  - Follow guidelines for intermittent irrigation of non-functioning urinary catheter   Outcome: Adequate for Discharge      Problem: METABOLIC, FLUID AND ELECTROLYTES - ADULT  Goal: Electrolytes maintained within normal limits  INTERVENTIONS:  - Monitor labs and assess patient for signs and symptoms of electrolyte imbalances  - Administer electrolyte replacement as ordered  - Monitor response to electrolyte replacements, including repeat lab results as appropriate  - Instruct patient on fluid and nutrition as appropriate   Outcome: Adequate for Discharge    Goal: Fluid balance maintained  INTERVENTIONS:  - Monitor labs and assess for signs and symptoms of volume excess or deficit  - Monitor I/O and WT  - Instruct patient on fluid and nutrition as appropriate   Outcome: Adequate for Discharge    Goal: Glucose maintained within target range  INTERVENTIONS:  - Monitor Blood Glucose as ordered  - Assess for signs and symptoms of hyperglycemia and hypoglycemia  - Administer ordered medications to maintain glucose within target range  - Assess nutritional intake and initiate nutrition service referral as needed   Outcome: Adequate for Discharge      Problem: SKIN/TISSUE INTEGRITY - ADULT  Goal: Skin integrity remains intact  INTERVENTIONS  - Identify patients at risk for skin breakdown  - Assess and monitor skin integrity  - Assess and monitor nutrition and hydration status  - Monitor labs (i e  albumin)  - Assess for incontinence   - Turn and reposition patient  - Assist with mobility/ambulation  - Relieve pressure over bony prominences  - Avoid friction and shearing  - Provide appropriate hygiene as needed including keeping skin clean and dry  - Evaluate need for skin moisturizer/barrier cream  - Collaborate with interdisciplinary team (i e  Nutrition, Rehabilitation, etc )   - Patient/family teaching   Outcome: Adequate for Discharge    Goal: Oral mucous membranes remain intact  INTERVENTIONS  - Assess oral mucosa and hygiene practices  - Implement preventative oral hygiene regimen  - Implement oral medicated treatments as ordered  - Initiate Nutrition services referral as needed   Outcome: Adequate for Discharge      Problem: HEMATOLOGIC - ADULT  Goal: Maintains hematologic stability  INTERVENTIONS  - Assess for signs and symptoms of bleeding or hemorrhage  - Monitor labs  - Administer supportive blood products/factors as ordered and appropriate   Outcome: Adequate for Discharge      Problem: MUSCULOSKELETAL - ADULT  Goal: Maintain or return mobility to safest level of function  INTERVENTIONS:  - Assess patient's ability to carry out ADLs; assess patient's baseline for ADL function and identify physical deficits which impact ability to perform ADLs (bathing, care of mouth/teeth, toileting, grooming, dressing, etc )  - Assess/evaluate cause of self-care deficits   - Assess range of motion  - Assess patient's mobility; develop plan if impaired  - Assess patient's need for assistive devices and provide as appropriate  - Encourage maximum independence but intervene and supervise when necessary  - Involve family in performance of ADLs  - Assess for home care needs following discharge   - Request OT consult to assist with ADL evaluation and planning for discharge  - Provide patient education as appropriate   Outcome: Adequate for Discharge    Goal: Maintain proper alignment of affected body part  INTERVENTIONS:  - Support, maintain and protect limb and body alignment  - Provide pt/fam with appropriate education   Outcome: Adequate for Discharge      Problem: DISCHARGE PLANNING - CARE MANAGEMENT  Goal: Discharge to post-acute care or home with appropriate resources  INTERVENTIONS:  - Conduct assessment to determine patient/family and health care team treatment goals, and need for post-acute services based on payer coverage, community resources, and patient preferences, and barriers to discharge  - Address psychosocial, clinical, and financial barriers to discharge as identified in assessment in conjunction with the patient/family and health care team  - Arrange appropriate level of post-acute services according to patient's   needs and preference and payer coverage in collaboration with the physician and health care team  - Communicate with and update the patient/family, physician, and health care team regarding progress on the discharge plan  - Arrange appropriate transportation to post-acute venues   Outcome: Adequate for Discharge

## 2018-09-22 NOTE — ASSESSMENT & PLAN NOTE
Suspected aspiration event, possible aspiration pneumonia, patient received antibiotics  Discharge on 5 day course of p o   Levaquin

## 2018-09-22 NOTE — RESPIRATORY THERAPY NOTE
Home Oxygen Qualifying Test       Patient name: Gustave Meckel        : 1965   Date of Test:  2018  Diagnosis: Leukocytosis     Home Oxygen Test:    **Medicare Guidelines require item(s) 1-5 on all ambulatory patients or 1 and 2 on non-ambulatory patients  1   Baseline SPO2 on Room Air at rest 99 %  2   SPO2 during exercise on Room Air  99 %  During exercise monitor SpO2  If SPO2 increases >=88% with ambulation do not add supplemental             oxygen  If < 88% on room air add O2 via NC and titrate patient  Patient must be ambulated with O2 and titrated to > 88% with exertion  3   SPO2 on Oxygen at rest n/a % n/a lpm     4   SPO2 during exercise on Oxygen  n/a% a liter flow of n/a lpm     5   Exercise performed:          walking, duration 10 (min), distance 250 (feet)          []  Supplemental Home Oxygen is indicated  [x]  Client does not qualify for home oxygen        Respiratory Additional Notes-   None    Jose Eduardo Farley, RT

## 2018-09-22 NOTE — ASSESSMENT & PLAN NOTE
Patient presented with altered mental status, required intubation due to severe encephalopathy, hypercapnic respiratory failure      Patient's mental status appears to have returned to baseline, patient extubated upon arrival to the critical care unit    Patient doing well without complaint, will check ambulatory pulse ox and discharge patient home, acute encephalopathy has resolved

## 2018-09-22 NOTE — ASSESSMENT & PLAN NOTE
Likely reactive due to aspiration, possible aspiration pneumonia, patient is medically stable for discharge, recommend follow-up with PCP, repeat lab work in 2 weeks

## 2018-09-25 NOTE — CASE MANAGEMENT
Initial Clinical Review  Admission: Date/Time/Statement: 9/21/18 @ 1129   Orders Placed This Encounter   Procedures    Inpatient Admission     Standing Status:   Standing     Number of Occurrences:   1     Order Specific Question:   Admitting Physician     Answer:   Primo Diaz [[id number]]     Order Specific Question:   Level of Care     Answer:   Critical Care [15]     Order Specific Question:   Estimated length of stay     Answer:   More than 2 Midnights     Order Specific Question:   Certification     Answer:   I certify that inpatient services are medically necessary for this patient for a duration of greater than two midnights  See H&P and MD Progress Notes for additional information about the patient's course of treatment  ED: Date/Time/Mode of Arrival:   ED Arrival Information     Expected Arrival Acuity Means of Arrival Escorted By Service Admission Type    - 9/21/2018 08:41 Emergent [address] [place] [address] Ambulance General Medicine Emergency    Arrival Complaint    overdose      Chief Complaint:   Chief Complaint   Patient presents with    Overdose - Accidental     Called for unresponsive  Unknown substance  Patient improved with narcan   History of Illness:   59-year-old male presents from home by EMS for evaluation of altered mental status  EMS dispatched after patient's housemate returned home to find the patient lying on the floor, unconscious and unresponsive to voice and physical stimulation  Patient had Patient's housemate dumped water on him without any response  Responding EMS found patient again lying on floor unresponsive and apneic; he did have a pulse  Was administered 2 mg naloxone intranasally with gradual return of spontaneous respirations and subsequently was given 0 5 mg naloxone IV; he did awaken slightly to the point where he was continuously moaning and occasionally vocalizing single words after IV naloxone   No history is available from the patient secondary to his altered mental status  Per EMS, patient has a history of oral opiate abuse and methamphetamine use also  Patient was seen immediately upon arrival   Because of patient's profoundly altered mental status and inability to cooperate with the exam, I elected to intubate him for airway control and to assist with further diagnostic evaluation      ED Vital Signs:   ED Triage Vitals [09/21/18 0843]   Temperature Pulse Respirations Blood Pressure SpO2   (!) 97 3 °F (36 3 °C) (!) 121 (!) 25 135/66 100 %      Temp Source Heart Rate Source Patient Position - Orthostatic VS BP Location FiO2 (%)   Temporal Monitor Lying Right arm --      Pain Score       No Pain        Wt Readings from Last 1 Encounters:   09/21/18 67 3 kg (148 lb 5 9 oz)   Vital Signs (abnormal):   T 96 1  Abnormal Labs/Diagnostic Test Results:   ANION GAP 16 GLUC 47 WBC 21 79 CA 8 1 AST 66  ALB 3 2  LACTIC ACID 4 0   Procalcitonin <=0 25 ng/ml 0 56     Hepatitis C Ab  High Reactive   U/A+PROT, BLOOD, BACTERIA  UDS+AMPH/METH  pH, Art i-STAT 7 350 - 7 450 7 234   LL    pCO2, Art i-STAT 36 0 - 44 0 mm HG 60 7   H    pO2, ART i-STAT 75 0 - 129 0 mm  0   H    BE, i-STAT -2 - 3 mmol/L -3   L    HCO3, Art i-STAT 22 0 - 28 0 mmol/L 25 7     CO2, i-STAT 21 - 32 mmol/L 28     O2 Sat, i-STAT 95 - 98 % 100   H    SODIUM, I-STAT 136 - 145 mmol/l 138     Potassium, i-STAT 3 5 - 5 3 mmol/L 4 3     Calcium, Ionized i-STAT 1 12 - 1 32 mmol/L 1 19     Hct, i-STAT 36 5 - 49 3 % 34   L    Hgb, i-STAT 12 0 - 17 0 g/dl 11 6   L    Glucose, i-STAT 65 - 140 mg/dl 88     POC FIO2 L 60       CXR=ET tube in appropriate position  Feeding tube seen extending below the dome of diaphragm  No acute consolidation  Mild increased markings seen in the right lung, nonspecific  CT HEAD=No acute intracranial hemorrhage seen  No extra-axial collection seen  No mass effect or midline shift seen  CT CERVICAL SPINE=No acute compression collapse of the vertebra seen  Degenerative cervical disc disease at C3-4  CT CHEST, A/P=No solid visceral injury seen  Patchy airspace opacity within groundglass nodularity suggest aspiration    Follow-up suggested in 3 months to demonstrate complete resolution  Emphysema  Right basal density seen suggests atelectasis  Distended stomach however the feeding tube is in appropriate position , evaluate  Chronic pancreatitis  Dilated ascending aorta measuring about 4 1 cm, consider cardiology evaluation on nonemergent basis to exclude any aortic valvular disease   EKG=  Ventricular Rate     Atrial Rate     AR Interval ms 134    QRSD Interval ms 92    QT Interval ms 350    QTC Interval ms 477    P Axis degrees 76    QRS Axis degrees 50    T Wave Axis degrees 74      Sinus tachycardia with occasional Premature ventricular complexes      ED Treatment:   Medication Administration from 09/21/2018 0841 to 09/21/2018 1237       Date/Time Order Dose Route Action Action by Comments     09/21/2018 0928 sodium chloride 0 9 % bolus 1,000 mL 0 mL Intravenous Stopped Misti Franklin RN      09/21/2018 0845 sodium chloride 0 9 % bolus 1,000 mL 1,000 mL Intravenous New Bag Misti Franklin RN      09/21/2018 1005 lactated ringers bolus 1,000 mL 0 mL Intravenous Stopped Misti Franklin RN      09/21/2018 8654 lactated ringers bolus 1,000 mL 1,000 mL Intravenous New Bag Misti Franklin RN      09/21/2018 0933 ketamine (KETALAR) 10 mg/mL IV use 70 mg 70 mg Intravenous Given Misti Franklin RN      09/21/2018 0854 etomidate (AMIDATE) 2 mg/mL injection 20 mg 20 mg Intravenous Given Misti Franklin RN      09/21/2018 0853 vecuronium (NORCURON) injection 7 mg 7 mg Intravenous Given Misti Franklin RN      09/21/2018 0915  EMS REPLENISHMENT MED 0  Does not apply Given to EMS Butch Wall RN      09/21/2018 1212 chlorhexidine (PERIDEX) 0 12 % oral rinse 15 mL 15 mL Swish & Spit Not Given Noelle Chew RN      09/21/2018 1025 dextrose 50 % IV solution 25 mL 25 mL Intravenous Given Aj Alexander RN      09/21/2018 1036 dextrose 5 % and sodium chloride 0 45 % infusion 75 mL/hr Intravenous New Bag Aj Alexander RN      09/21/2018 1024 propofol (DIPRIVAN) 200 MG/20ML bolus injection 50 mg 50 mg Intravenous Given by Other Aj Alexander RN Given by Dr Tammi Fonseca     09/21/2018 1028 propofol (DIPRIVAN) 1000 mg in 100 mL infusion (premix) 10 mcg/kg/min Intravenous [address] Aj Alexander RN      09/21/2018 1002 iohexol (OMNIPAQUE) 350 MG/ML injection (MULTI-DOSE) 100 mL 100 mL Intravenous Given Morenita Julien      09/21/2018 1211 vancomycin (VANCOCIN) 1,000 mg in sodium chloride 0 9 % 250 mL IVPB 1,000 mg Intravenous New [facility] Elvia Max RN      09/21/2018 1150 cefepime (MAXIPIME) IVPB (premix) 2,000 mg 0 mg Intravenous Stopped Linda Tang RN      09/21/2018 1113 cefepime (MAXIPIME) IVPB (premix) 2,000 mg 2,000 mg Intravenous New Bag Aj Alexander RN       Past Medical/Surgical History: Active Ambulatory Problems     Diagnosis Date Noted    No Active Ambulatory Problems     Resolved Ambulatory Problems     Diagnosis Date Noted    No Resolved Ambulatory Problems     Past Medical History:   Diagnosis Date    Asthma     [facility]     COPD (chronic obstructive pulmonary disease) ([facility] )     Disease of thyroid gland     Hypercholesteremia    Admitting Diagnosis: Aspiration pneumonitis ([facility] ) [J69 0]  Opioid intoxication ([facility] ) [F11 929]  Overdose [T50 901A]  Altered mental status [R41 82]  Polysubstance abuse [F19 10]  Abnormal transaminases [R74 8]  Altered mental status, unspecified altered mental status type [R41 82]  Age/Sex: 46 y o  male  Assessment/Plan:   47 YO MALE TO ER VIA EMS FROM HOME  FOUND UNRESPONSIVE & APNEIC BY ROOMMATE  NARCAN GIVEN IN THE FIELD BY EMS WITH SOME RESPONSE  PT INTUBATED FOR AIRWAY PROTECTION  ADMITTED TO INPATIENT STATUS FOR ASPIRATION PNEUMONITIS, S/P OVERDOSE WITH ALTERED MENTAL STATUS     Admission Orders:  ICU  O2 TO KEEP SATS>92%; VENTED  NPO/OGT  [place] CATH  PT/OT EVAL & TX  Scheduled Meds: FLEXERIL BID  COLACE BID  FLONASE QD  ATROVENT/XOPENEX NEBS TID  Continuous Infusions:   IV DIPRIVAN GTT  IVF @ 75CC/HR  PRN Meds:   ALBUTEROL NEBS  IV ZOFRAN  PERCOCET   Thank you,  [address] Utilization Review Department  Phone: [phone]; Fax [phone]  ATTENTION: Please call with any questions or concerns to [phone]  and carefully follow the prompts so that you are directed to the right person  Send all requests for admission clinical reviews, approved or denied determinations and any other requests to fax [phone]   All voicemails are confidential

## 2018-09-26 DIAGNOSIS — M62.838 MUSCLE SPASM: ICD-10-CM

## 2018-09-26 LAB
BACTERIA BLD CULT: NORMAL
BACTERIA BLD CULT: NORMAL

## 2018-09-26 RX ORDER — CYCLOBENZAPRINE HCL 5 MG
TABLET ORAL
Qty: 60 TABLET | Refills: 0 | Status: SHIPPED | OUTPATIENT
Start: 2018-09-26 | End: 2018-10-26 | Stop reason: SDUPTHER

## 2018-09-27 NOTE — CASE MANAGEMENT
Notification of Discharge  This is a Notification of Discharge from our facility [address]  Please be advised that this patient has been discharge from our facility  Below you will find the admission and discharge date and time including the patients disposition  PRESENTATION DATE: 9/21/2018  8:41 AM  IP ADMISSION DATE: 9/21/18 1129  DISCHARGE DATE: 9/22/2018 11:32 AM  DISPOSITION: Home/Self Care    St  [address] [facility] in the [facility] [place] Utilization Review Department  Phone: [phone]; Fax [phone]  ATTENTION: The Network Utilization Review Department is now centralized for our 9 Facilities  Make a note that we have a new phone and fax numbers for our Department  Please call with any questions or concerns to [phone] and carefully follow the prompts so that you are directed to the right person  All voicemails are confidential  Fax any determinations, approvals, denials, and requests for initial or continue stay review clinical to [phone]  Due to HIGH CALL volume, it would be easier if you could please send faxed requests to expedite your requests and in part, help us provide discharge notifications faster

## 2018-10-26 DIAGNOSIS — M62.838 MUSCLE SPASM: ICD-10-CM

## 2018-10-26 RX ORDER — CYCLOBENZAPRINE HCL 5 MG
TABLET ORAL
Qty: 60 TABLET | Refills: 0 | Status: SHIPPED | OUTPATIENT
Start: 2018-10-26 | End: 2018-12-03 | Stop reason: SDUPTHER

## 2018-11-07 DIAGNOSIS — Z12.5 SCREENING PSA (PROSTATE SPECIFIC ANTIGEN): ICD-10-CM

## 2018-11-07 DIAGNOSIS — F31.76 BIPOLAR DISORDER, IN FULL REMISSION, MOST RECENT EPISODE DEPRESSED (HCC): ICD-10-CM

## 2018-11-07 DIAGNOSIS — E78.00 HYPERCHOLESTEREMIA: ICD-10-CM

## 2018-11-07 DIAGNOSIS — R03.0 ELEVATED BLOOD PRESSURE READING: ICD-10-CM

## 2018-11-07 LAB
ALBUMIN SERPL BCP-MCNC: 3.7 G/DL (ref 3.5–5)
ALP SERPL-CCNC: 104 U/L (ref 46–116)
ALT SERPL W P-5'-P-CCNC: 43 U/L (ref 12–78)
ANION GAP SERPL CALCULATED.3IONS-SCNC: 6 MMOL/L (ref 4–13)
AST SERPL W P-5'-P-CCNC: 31 U/L (ref 5–45)
BASOPHILS # BLD AUTO: 0.06 THOUSANDS/ΜL (ref 0–0.1)
BASOPHILS NFR BLD AUTO: 1 % (ref 0–1)
BILIRUB SERPL-MCNC: 0.78 MG/DL (ref 0.2–1)
BUN SERPL-MCNC: 7 MG/DL (ref 5–25)
CALCIUM SERPL-MCNC: 9 MG/DL (ref 8.3–10.1)
CHLORIDE SERPL-SCNC: 104 MMOL/L (ref 100–108)
CHOLEST SERPL-MCNC: 163 MG/DL (ref 50–200)
CO2 SERPL-SCNC: 27 MMOL/L (ref 21–32)
CREAT SERPL-MCNC: 0.75 MG/DL (ref 0.6–1.3)
CREAT UR-MCNC: 16.1 MG/DL
EOSINOPHIL # BLD AUTO: 0.36 THOUSAND/ΜL (ref 0–0.61)
EOSINOPHIL NFR BLD AUTO: 5 % (ref 0–6)
ERYTHROCYTE [DISTWIDTH] IN BLOOD BY AUTOMATED COUNT: 14.3 % (ref 11.6–15.1)
GFR SERPL CREATININE-BSD FRML MDRD: 104 ML/MIN/1.73SQ M
GLUCOSE SERPL-MCNC: 98 MG/DL (ref 65–140)
HCT VFR BLD AUTO: 41.7 % (ref 36.5–49.3)
HDLC SERPL-MCNC: 43 MG/DL (ref 40–60)
HGB BLD-MCNC: 13.7 G/DL (ref 12–17)
IMM GRANULOCYTES # BLD AUTO: 0.02 THOUSAND/UL (ref 0–0.2)
IMM GRANULOCYTES NFR BLD AUTO: 0 % (ref 0–2)
LDLC SERPL CALC-MCNC: 98 MG/DL (ref 0–100)
LYMPHOCYTES # BLD AUTO: 1.4 THOUSANDS/ΜL (ref 0.6–4.47)
LYMPHOCYTES NFR BLD AUTO: 20 % (ref 14–44)
MCH RBC QN AUTO: 30.9 PG (ref 26.8–34.3)
MCHC RBC AUTO-ENTMCNC: 32.9 G/DL (ref 31.4–37.4)
MCV RBC AUTO: 94 FL (ref 82–98)
MICROALBUMIN UR-MCNC: <5 MG/L (ref 0–20)
MICROALBUMIN/CREAT 24H UR: <31 MG/G CREATININE (ref 0–30)
MONOCYTES # BLD AUTO: 0.64 THOUSAND/ΜL (ref 0.17–1.22)
MONOCYTES NFR BLD AUTO: 9 % (ref 4–12)
NEUTROPHILS # BLD AUTO: 4.55 THOUSANDS/ΜL (ref 1.85–7.62)
NEUTS SEG NFR BLD AUTO: 65 % (ref 43–75)
NRBC BLD AUTO-RTO: 0 /100 WBCS
PLATELET # BLD AUTO: 365 THOUSANDS/UL (ref 149–390)
PMV BLD AUTO: 9.9 FL (ref 8.9–12.7)
POTASSIUM SERPL-SCNC: 4.7 MMOL/L (ref 3.5–5.3)
PROT SERPL-MCNC: 7.3 G/DL (ref 6.4–8.2)
RBC # BLD AUTO: 4.43 MILLION/UL (ref 3.88–5.62)
SODIUM SERPL-SCNC: 137 MMOL/L (ref 136–145)
T4 FREE SERPL-MCNC: 1.17 NG/DL (ref 0.76–1.46)
TRIGL SERPL-MCNC: 111 MG/DL
TSH SERPL DL<=0.05 MIU/L-ACNC: 0.29 UIU/ML (ref 0.36–3.74)
WBC # BLD AUTO: 7.03 THOUSAND/UL (ref 4.31–10.16)

## 2018-11-07 PROCEDURE — 84153 ASSAY OF PSA TOTAL: CPT | Performed by: NURSE PRACTITIONER

## 2018-11-07 PROCEDURE — 84154 ASSAY OF PSA FREE: CPT | Performed by: NURSE PRACTITIONER

## 2018-11-07 PROCEDURE — 84443 ASSAY THYROID STIM HORMONE: CPT | Performed by: NURSE PRACTITIONER

## 2018-11-07 PROCEDURE — 80061 LIPID PANEL: CPT | Performed by: NURSE PRACTITIONER

## 2018-11-07 PROCEDURE — 84439 ASSAY OF FREE THYROXINE: CPT

## 2018-11-07 PROCEDURE — 85025 COMPLETE CBC W/AUTO DIFF WBC: CPT | Performed by: NURSE PRACTITIONER

## 2018-11-07 PROCEDURE — 80053 COMPREHEN METABOLIC PANEL: CPT | Performed by: NURSE PRACTITIONER

## 2018-11-08 ENCOUNTER — OFFICE VISIT (OUTPATIENT)
Dept: FAMILY MEDICINE CLINIC | Facility: CLINIC | Age: 53
End: 2018-11-08
Payer: COMMERCIAL

## 2018-11-08 VITALS
RESPIRATION RATE: 17 BRPM | DIASTOLIC BLOOD PRESSURE: 80 MMHG | BODY MASS INDEX: 20.9 KG/M2 | HEART RATE: 97 BPM | OXYGEN SATURATION: 98 % | TEMPERATURE: 97.2 F | SYSTOLIC BLOOD PRESSURE: 130 MMHG | WEIGHT: 146 LBS | HEIGHT: 70 IN

## 2018-11-08 DIAGNOSIS — J43.8 OTHER EMPHYSEMA (HCC): ICD-10-CM

## 2018-11-08 DIAGNOSIS — R79.89 LOW THYROID STIMULATING HORMONE (TSH) LEVEL: Primary | ICD-10-CM

## 2018-11-08 DIAGNOSIS — R93.89 ABNORMAL CHEST XRAY: ICD-10-CM

## 2018-11-08 LAB
PSA FREE MFR SERPL: 14 %
PSA FREE SERPL-MCNC: 0.07 NG/ML
PSA SERPL-MCNC: 0.5 NG/ML (ref 0–4)

## 2018-11-08 PROCEDURE — 99213 OFFICE O/P EST LOW 20 MIN: CPT | Performed by: FAMILY MEDICINE

## 2018-11-08 NOTE — PROGRESS NOTES
OFFICE VISIT  Jamaica Hull 46 y o  male MRN: [id number]      Assessment / Plan:  Diagnoses and all orders for this visit:    Low thyroid stimulating hormone (TSH) level  -     NM thyroid imaging w uptake(s); Future    Abnormal chest xray  -     XR chest pa & lateral; Future    Other emphysema ([place] [address] )  -     Nebulizer Supplies          Reason For Visit / Chief Complaint  Chief Complaint   Patient presents with    Arm Pain     Wrist pain        HPI:  Jamaica Hull is a 46 y o  male who presents today for wrist pain  He is requesting pain medication for bilateral pain  We discussed his recent hospitalization and overdose, opioids not warranted  He then requested something for his nerves, specifically ativan or clonazepam  Discussed other options, declined  Pt has known copd, he is smoking one pack daily  He reports smoking more with his nerves  He is not interested in smoking  Chest xray in sept requesting follow up  He has intermittent sob, he is using flovent and ventolin  He report his nebulizer is broken  Blood work reveaeld low tsh, previous workup 10 years ago, per patient  He is requesting pain mediation     Historical Information   Past Medical History:   Diagnosis Date    Asthma     Bipolar disorder ([place] [address] )     [facility]ch[facility] disease) [facility] )     Disease of thyroid gland     Hypercholesteremia     Hypercholesterolemia     Hyperthyroidism      Past Surgical History:   Procedure Laterality Date    HERNIA REPAIR       Social History   History   Alcohol Use No     History   Drug Use No     History   Smoking Status    Current Every Day Smoker    Packs/day: 3 00   Smokeless Tobacco    Never Used     Family History   Problem Relation Age of Onset    Heart disease Mother     Hyperthyroidism Mother     Heart disease Father     Hyperthyroidism Father        Meds/Allergies   Allergies   Allergen Reactions    Ketorolac Hives       Meds:    Current Outpatient Prescriptions:    cyclobenzaprine (FLEXERIL) 5 mg tablet, TAKE 1 TABLET TWICE DAILY, Disp: 60 tablet, Rfl: 0    fluticasone (FLONASE) 50 mcg/act nasal spray, 2 sprays into each nostril daily, Disp: , Rfl:     fluticasone (FLOVENT HFA) 220 mcg/act inhaler, Inhale 2 puffs every 4 (four) hours as needed Rinse mouth after use , Disp: , Rfl:     omeprazole (PriLOSEC) 40 MG capsule, Take 40 mg by mouth daily before breakfast, Disp: , Rfl:       REVIEW OF SYSTEMS  Review of Systems   Constitutional: Negative for appetite change, fatigue and fever  HENT: Negative for congestion, ear discharge, ear pain and postnasal drip  Eyes: Negative for pain, discharge, redness, itching and visual disturbance  Respiratory: Negative for chest tightness, shortness of breath and wheezing  Cardiovascular: Negative for chest pain, palpitations and leg swelling  Gastrointestinal: Negative for abdominal distention, abdominal pain, blood in stool, diarrhea, nausea and vomiting  Endocrine: Negative for cold intolerance, heat intolerance, polydipsia, polyphagia and polyuria  Genitourinary: Negative for decreased urine volume, difficulty urinating, dysuria, frequency, hematuria, testicular pain and urgency  Musculoskeletal: Positive for arthralgias  Negative for back pain, myalgias, neck pain and neck stiffness  Skin: Negative for color change, pallor, rash and wound  Neurological: Negative for dizziness, light-headedness, numbness and headaches  Hematological: Negative for adenopathy  Does not bruise/bleed easily  Psychiatric/Behavioral: Negative for agitation, behavioral problems, self-injury, sleep disturbance and suicidal ideas  The patient is not nervous/anxious              Current Vitals:   Blood Pressure: 130/80 (11/08/18 0727)  Pulse: 97 (11/08/18 0727)  Temperature: (!) 97 2 °F (36 2 °C) (11/08/18 0727)  Respirations: 17 (11/08/18 0727)  Height: 5' 10" (177 8 cm) (11/08/18 0727)  Weight - Scale: 66 2 kg (146 lb) (11/08/18 7549)  SpO2: 98 % (11/08/18 0727)  [unfilled]    PHYSICAL EXAMS:  Physical Exam   Constitutional: He is oriented to person, place, and time  He appears well-developed and well-nourished  HENT:   Head: Normocephalic and atraumatic  Right Ear: External ear normal    Left Ear: External ear normal    Nose: Nose normal    Mouth/Throat: Oropharynx is clear and moist    Eyes: Pupils are equal, round, and reactive to light  Conjunctivae are normal  Right eye exhibits no discharge  Left eye exhibits no discharge  Neck: Normal range of motion  Neck supple  No thyromegaly present  Cardiovascular: Normal rate, regular rhythm and normal heart sounds  Pulmonary/Chest: Effort normal and breath sounds normal    Abdominal: Soft  Bowel sounds are normal  He exhibits no distension  There is no tenderness  Musculoskeletal: Normal range of motion  He exhibits no edema, tenderness or deformity  Neurological: He is alert and oriented to person, place, and time  Skin: Skin is warm and dry  No rash noted  No erythema  Psychiatric: He has a normal mood and affect  His behavior is normal            Follow up at this office in 6 months, will call with results  Counseling / Coordination of Care  Total floor / unit time spent today 20 minutes  Greater than 50% of total time was spent with the patient and / or family counseling and / or coordination of care

## 2018-12-03 DIAGNOSIS — M62.838 MUSCLE SPASM: ICD-10-CM

## 2018-12-03 RX ORDER — CYCLOBENZAPRINE HCL 5 MG
TABLET ORAL
Qty: 60 TABLET | Refills: 0 | Status: SHIPPED | OUTPATIENT
Start: 2018-12-03 | End: 2020-03-18 | Stop reason: SDUPTHER

## 2018-12-28 ENCOUNTER — TELEPHONE (OUTPATIENT)
Dept: FAMILY MEDICINE CLINIC | Facility: CLINIC | Age: 53
End: 2018-12-28

## 2019-01-10 ENCOUNTER — OFFICE VISIT (OUTPATIENT)
Dept: FAMILY MEDICINE CLINIC | Facility: CLINIC | Age: 54
End: 2019-01-10
Payer: COMMERCIAL

## 2019-01-10 VITALS
BODY MASS INDEX: 21.05 KG/M2 | HEIGHT: 70 IN | SYSTOLIC BLOOD PRESSURE: 122 MMHG | RESPIRATION RATE: 16 BRPM | DIASTOLIC BLOOD PRESSURE: 70 MMHG | WEIGHT: 147 LBS | HEART RATE: 90 BPM | OXYGEN SATURATION: 98 % | TEMPERATURE: 98 F

## 2019-01-10 DIAGNOSIS — K04.7 TOOTH ABSCESS: Primary | ICD-10-CM

## 2019-01-10 PROCEDURE — 99213 OFFICE O/P EST LOW 20 MIN: CPT | Performed by: FAMILY MEDICINE

## 2019-01-10 RX ORDER — AMOXICILLIN 500 MG/1
500 TABLET, FILM COATED ORAL 3 TIMES DAILY
Qty: 30 TABLET | Refills: 0 | Status: SHIPPED | OUTPATIENT
Start: 2019-01-10 | End: 2019-01-20

## 2019-01-10 NOTE — PROGRESS NOTES
OFFICE VISIT  Javy Miranda 46 y o  male MRN: [id number]      Assessment / Plan:  Diagnoses and all orders for this visit:    Tooth abscess  -     amoxicillin (AMOXIL) 500 MG tablet; Take 1 tablet (500 mg total) by mouth 3 (three) times a day for 10 days          Reason For Visit / Chief Complaint  Chief Complaint   Patient presents with    Abscess        HPI:  Javy Miranda is a 46 y o  male who presents today for acute sick visit  Patient reports intense pain and swelling to his left upper gum region  He reports a pocket of pus draining over the last day, with chills, increased sensitivity  No other complaints reported      Historical Information   Past Medical History:   Diagnosis Date    Asthma     [facility] )     COPD [facility]chronic obstructive pulmonary disease) ([facility] )     Disease of thyroid gland     Hypercholesteremia     Hypercholesterolemia     Hyperthyroidism      Past Surgical History:   Procedure Laterality Date    HERNIA REPAIR       Social History   History   Alcohol Use No     History   Drug Use No     History   Smoking Status    Current Every Day Smoker    Packs/day: 3 00   Smokeless Tobacco    Never Used     Family History   Problem Relation Age of Onset    Heart disease Mother     Hyperthyroidism Mother     Heart disease Father     Hyperthyroidism Father        Meds/Allergies   Allergies   Allergen Reactions    Ketorolac Hives       Meds:    Current Outpatient Prescriptions:     cyclobenzaprine (FLEXERIL) 5 mg tablet, TAKE 1 TABLET BY MOUTH TWICE A DAY, Disp: 60 tablet, Rfl: 0    fluticasone (FLONASE) 50 mcg/act nasal spray, 2 sprays into each nostril daily, Disp: , Rfl:     fluticasone (FLOVENT HFA) 220 mcg/act inhaler, Inhale 2 puffs every 4 (four) hours as needed Rinse mouth after use , Disp: , Rfl:     omeprazole (PriLOSEC) 40 MG capsule, Take 40 mg by mouth daily before breakfast, Disp: , Rfl:     amoxicillin (AMOXIL) 500 MG tablet, Take 1 tablet (500 mg total) by mouth 3 (three) times a day for 10 days, Disp: 30 tablet, Rfl: 0      REVIEW OF SYSTEMS  Review of Systems   Constitutional: Positive for chills  Negative for appetite change, fatigue and fever  HENT: Positive for dental problem  Negative for congestion, ear discharge, ear pain and postnasal drip  Eyes: Negative for pain, discharge, redness, itching and visual disturbance  Respiratory: Negative for chest tightness, shortness of breath and wheezing  Cardiovascular: Negative for chest pain, palpitations and leg swelling  Gastrointestinal: Negative for abdominal distention, abdominal pain, blood in stool, diarrhea, nausea and vomiting  Endocrine: Negative for cold intolerance, heat intolerance, polydipsia, polyphagia and polyuria  Genitourinary: Negative for decreased urine volume, difficulty urinating, dysuria, frequency, hematuria, testicular pain and urgency  Musculoskeletal: Negative for arthralgias, back pain, myalgias, neck pain and neck stiffness  Skin: Negative for color change, pallor, rash and wound  Neurological: Negative for dizziness, light-headedness, numbness and headaches  Hematological: Negative for adenopathy  Does not bruise/bleed easily  Psychiatric/Behavioral: Negative for agitation, behavioral problems, self-injury, sleep disturbance and suicidal ideas  The patient is not nervous/anxious  Current Vitals:   Blood Pressure: 122/70 (01/10/19 1125)  Pulse: 90 (01/10/19 1125)  Temperature: 98 °F (36 7 °C) (01/10/19 1125)  Temp Source: Tympanic (01/10/19 1125)  Respirations: 16 (01/10/19 1125)  Height: 5' 10" (177 8 cm) (01/10/19 1125)  Weight - Scale: 66 7 kg (147 lb) (01/10/19 1125)  SpO2: 98 % (01/10/19 1125)  [unfilled]    PHYSICAL EXAMS:  Physical Exam   Constitutional: He is oriented to person, place, and time  He appears well-developed and well-nourished  HENT:   Head: Normocephalic and atraumatic     Right Ear: External ear normal    Left Ear: External ear normal    Nose: Nose normal    Mouth/Throat: Oropharynx is clear and moist  Oral lesions present  Abnormal dentition  Eyes: Pupils are equal, round, and reactive to light  Conjunctivae are normal  Right eye exhibits no discharge  Left eye exhibits no discharge  Neck: Normal range of motion  Neck supple  No thyromegaly present  Cardiovascular: Normal rate, regular rhythm and normal heart sounds  Pulmonary/Chest: Effort normal and breath sounds normal    Abdominal: Soft  Bowel sounds are normal  He exhibits no distension  There is no tenderness  Musculoskeletal: Normal range of motion  He exhibits no edema, tenderness or deformity  Neurological: He is alert and oriented to person, place, and time  Skin: Skin is warm and dry  No rash noted  No erythema  Psychiatric: He has a normal mood and affect  His behavior is normal            Follow up at this office in if not better     Counseling / Coordination of Care  Total floor / unit time spent today 20 minutes  Greater than 50% of total time was spent with the patient and / or family counseling and / or coordination of care

## 2019-01-21 DIAGNOSIS — K21.9 GASTROESOPHAGEAL REFLUX DISEASE WITHOUT ESOPHAGITIS: ICD-10-CM

## 2019-01-22 RX ORDER — OMEPRAZOLE 40 MG/1
CAPSULE, DELAYED RELEASE ORAL
Qty: 30 CAPSULE | Refills: 3 | Status: SHIPPED | OUTPATIENT
Start: 2019-01-22 | End: 2019-02-27

## 2019-02-25 ENCOUNTER — TELEPHONE (OUTPATIENT)
Dept: OTHER | Facility: OTHER | Age: 54
End: 2019-02-25

## 2019-02-26 ENCOUNTER — TELEPHONE (OUTPATIENT)
Dept: FAMILY MEDICINE CLINIC | Facility: CLINIC | Age: 54
End: 2019-02-26

## 2019-02-27 ENCOUNTER — OFFICE VISIT (OUTPATIENT)
Dept: FAMILY MEDICINE CLINIC | Facility: CLINIC | Age: 54
End: 2019-02-27
Payer: COMMERCIAL

## 2019-02-27 VITALS
HEART RATE: 90 BPM | TEMPERATURE: 96.4 F | DIASTOLIC BLOOD PRESSURE: 74 MMHG | SYSTOLIC BLOOD PRESSURE: 132 MMHG | OXYGEN SATURATION: 99 % | WEIGHT: 151 LBS | HEIGHT: 70 IN | RESPIRATION RATE: 16 BRPM | BODY MASS INDEX: 21.62 KG/M2

## 2019-02-27 DIAGNOSIS — J45.20 MILD INTERMITTENT ASTHMA WITHOUT COMPLICATION: ICD-10-CM

## 2019-02-27 DIAGNOSIS — F31.76 BIPOLAR DISORDER, IN FULL REMISSION, MOST RECENT EPISODE DEPRESSED (HCC): ICD-10-CM

## 2019-02-27 DIAGNOSIS — J43.8 OTHER EMPHYSEMA (HCC): Primary | ICD-10-CM

## 2019-02-27 DIAGNOSIS — F17.200 TOBACCO USE DISORDER: ICD-10-CM

## 2019-02-27 PROCEDURE — 99213 OFFICE O/P EST LOW 20 MIN: CPT | Performed by: FAMILY MEDICINE

## 2019-02-27 RX ORDER — FLUTICASONE PROPIONATE 220 UG/1
2 AEROSOL, METERED RESPIRATORY (INHALATION) EVERY 4 HOURS PRN
Qty: 1 INHALER | Refills: 5 | Status: SHIPPED | OUTPATIENT
Start: 2019-02-27 | End: 2020-02-19 | Stop reason: SDUPTHER

## 2019-02-27 RX ORDER — VARENICLINE TARTRATE 25 MG
KIT ORAL
Qty: 53 TABLET | Refills: 0 | Status: SHIPPED | OUTPATIENT
Start: 2019-02-27 | End: 2019-03-27 | Stop reason: SDUPTHER

## 2019-02-27 NOTE — LETTER
Date: 2/27/2019    To whom it may concern: This is to certify that Frankie has been under my care for the following diagnosis: Bipolar Disorder, Emphysema  I feel that he is unable to serve on Jury Duty at this time for the above mentioned medical reasons                    Sincerely,   Elizabeth Skinner PA-C

## 2019-02-27 NOTE — PROGRESS NOTES
Assessment/Plan:     Diagnoses and all orders for this visit:    Other emphysema ([facility] [address] [facility] )    Mild intermittent asthma without complication  -     fluticasone (FLOVENT HFA) 220 mcg/act inhaler; Inhale 2 puffs every 4 (four) hours as needed (sob) Rinse mouth after use  Tobacco use disorder  -     varenicline (CHANTIX KIZZY) 0 5 MG X 11 & 1 MG X 42 tablet; Take one 0 5mg tab by mouth 1x daily for 3 days, then increase to one 0 5mg tab 2x daily for 3 days, then increase to one 1mg tab 2x daily    Bipolar disorder, in full remission, most recent episode depressed (HCC)          Discussion/Plan:  Emphysema/Asthma/COPD - flovent renewed  Chantix sent to [facility] for smoking cessation  Bipolar disorder - f/u psychiatry as scheduled  RTC for routine f/u or sooner if needed  Subjective:      Patient ID: Yoel Whiting is a 46 y o  male  Chief Complaint   Patient presents with   [employer] Duty Letter    Nicotine Dependence    Medication Refill       Patient is a 46year old male who presents to the office today for jury duty letter and refill on inhaler for asthma/emphysema  He also wants to quit smoking, is interested in Chantix  He states he tried chantix about one year ago with some relief, quit for a few weeks  He states his "nerves" got to him, causing him to start smoking again  He states he tolerated chantix well without complication  He is requesting note for jury duty due to Bipolar disorder and COPD  He will be seeing psychiatry/psychotherapy in [place]  No SI/HI/AH/VH/manic sx          The following portions of the patient's history were reviewed and updated as appropriate: allergies, current medications, past family history, past medical history, past social history, past surgical history and problem list   Patient Active Problem List   Diagnosis    Allergic rhinitis    Anxiety disorder    Bipolar disorder ([facility] [address] )    Chronic lumbar radiculopathy    Contact dermatitis    Depression    Esophageal reflux  Hyperlipidemia    Migraine headache    Mild intermittent asthma without complication    Leukocytosis    Aspiration pneumonia (HCC)    Other emphysema (HCC)    Bipolar depression (HCC)     Current Outpatient Medications on File Prior to Visit   Medication Sig Dispense Refill    cyclobenzaprine (FLEXERIL) 5 mg tablet TAKE 1 TABLET BY MOUTH TWICE A DAY 60 tablet 0    fluticasone (FLONASE) 50 mcg/act nasal spray 2 sprays into each nostril daily      omeprazole (PriLOSEC) 40 MG capsule Take 40 mg by mouth daily before breakfast      [DISCONTINUED] fluticasone (FLOVENT HFA) 220 mcg/act inhaler Inhale 2 puffs every 4 (four) hours as needed Rinse mouth after use   [DISCONTINUED] omeprazole (PriLOSEC) 40 MG capsule TAKE 1 CAPSULE BY MOUTH EVERY MORNING BEFORE BREAKFAST 30 capsule 3     No current facility-administered medications on file prior to visit  Review of Systems   Constitutional: Negative  Respiratory: Positive for cough, shortness of breath and wheezing  Chronic   Cardiovascular: Negative  Gastrointestinal: Negative  Psychiatric/Behavioral: Positive for behavioral problems, decreased concentration, dysphoric mood and sleep disturbance  Negative for agitation, confusion, hallucinations, self-injury and suicidal ideas  The patient is nervous/anxious  The patient is not hyperactive  Chronic         Objective:      /74 (BP Location: Right arm, Patient Position: Sitting, Cuff Size: Standard)   Pulse 90   Temp (!) 96 4 °F (35 8 °C) (Tympanic)   Resp 16   Ht 5' 10" (1 778 m)   Wt 68 5 kg (151 lb)   SpO2 99%   BMI 21 67 kg/m²          Physical Exam   Constitutional: He is oriented to person, place, and time  He appears well-developed and well-nourished  HENT:   Head: Normocephalic and atraumatic  Eyes: Pupils are equal, round, and reactive to light  Conjunctivae are normal    Neck: Neck supple     Cardiovascular: Normal rate, regular rhythm and normal heart sounds  Pulmonary/Chest: Effort normal  He has decreased breath sounds  He has no wheezes  He has no rhonchi  He has no rales  Abdominal: Soft  Bowel sounds are normal    Neurological: He is alert and oriented to person, place, and time  Skin: Skin is warm and dry  Psychiatric: His behavior is normal  Thought content normal  His mood appears anxious

## 2019-03-21 ENCOUNTER — TELEPHONE (OUTPATIENT)
Dept: FAMILY MEDICINE CLINIC | Facility: CLINIC | Age: 54
End: 2019-03-21

## 2019-03-27 DIAGNOSIS — F17.200 TOBACCO USE DISORDER: ICD-10-CM

## 2019-03-27 RX ORDER — VARENICLINE TARTRATE 25 MG
KIT ORAL
Qty: 53 TABLET | Refills: 0 | Status: SHIPPED | OUTPATIENT
Start: 2019-03-27 | End: 2020-04-27

## 2019-03-28 ENCOUNTER — TELEPHONE (OUTPATIENT)
Dept: FAMILY MEDICINE CLINIC | Facility: CLINIC | Age: 54
End: 2019-03-28

## 2019-03-28 NOTE — TELEPHONE ENCOUNTER
Patient was informed that I will not give him percocet secondary to history of opioid abuse/overdose  He states he will be seeking emergency care secondary to severe back pain

## 2019-05-02 DIAGNOSIS — F41.9 ANXIETY: Primary | ICD-10-CM

## 2019-05-02 DIAGNOSIS — F43.21 GRIEF REACTION: ICD-10-CM

## 2019-05-02 RX ORDER — HYDROXYZINE HYDROCHLORIDE 25 MG/1
25 TABLET, FILM COATED ORAL 2 TIMES DAILY PRN
Qty: 10 TABLET | Refills: 0 | Status: SHIPPED | OUTPATIENT
Start: 2019-05-02 | End: 2020-02-19 | Stop reason: SDUPTHER

## 2019-06-03 ENCOUNTER — TELEPHONE (OUTPATIENT)
Dept: OTHER | Facility: OTHER | Age: 54
End: 2019-06-03

## 2019-06-22 DIAGNOSIS — K21.9 GASTROESOPHAGEAL REFLUX DISEASE WITHOUT ESOPHAGITIS: ICD-10-CM

## 2019-06-24 RX ORDER — OMEPRAZOLE 40 MG/1
CAPSULE, DELAYED RELEASE ORAL
Qty: 30 CAPSULE | Refills: 3 | Status: SHIPPED | OUTPATIENT
Start: 2019-06-24 | End: 2020-02-19 | Stop reason: SDUPTHER

## 2020-02-19 ENCOUNTER — OFFICE VISIT (OUTPATIENT)
Dept: FAMILY MEDICINE CLINIC | Facility: CLINIC | Age: 55
End: 2020-02-19

## 2020-02-19 VITALS
SYSTOLIC BLOOD PRESSURE: 142 MMHG | WEIGHT: 150.8 LBS | TEMPERATURE: 98.5 F | HEART RATE: 90 BPM | BODY MASS INDEX: 24.23 KG/M2 | HEIGHT: 66 IN | RESPIRATION RATE: 18 BRPM | DIASTOLIC BLOOD PRESSURE: 98 MMHG

## 2020-02-19 DIAGNOSIS — E78.00 HYPERCHOLESTEREMIA: ICD-10-CM

## 2020-02-19 DIAGNOSIS — J30.9 ALLERGIC RHINITIS, UNSPECIFIED SEASONALITY, UNSPECIFIED TRIGGER: ICD-10-CM

## 2020-02-19 DIAGNOSIS — Z91.89 NEED FOR DENTAL CARE: ICD-10-CM

## 2020-02-19 DIAGNOSIS — K21.9 GASTROESOPHAGEAL REFLUX DISEASE WITHOUT ESOPHAGITIS: ICD-10-CM

## 2020-02-19 DIAGNOSIS — J45.20 MILD INTERMITTENT ASTHMA WITHOUT COMPLICATION: Primary | ICD-10-CM

## 2020-02-19 DIAGNOSIS — F41.9 ANXIETY: ICD-10-CM

## 2020-02-19 RX ORDER — FLUTICASONE PROPIONATE 220 UG/1
2 AEROSOL, METERED RESPIRATORY (INHALATION) 2 TIMES DAILY
Qty: 1 INHALER | Refills: 5 | Status: SHIPPED | OUTPATIENT
Start: 2020-02-19 | End: 2020-08-31 | Stop reason: SDUPTHER

## 2020-02-19 RX ORDER — HYDROXYZINE 50 MG/1
50 TABLET, FILM COATED ORAL EVERY 6 HOURS PRN
Qty: 60 TABLET | Refills: 0 | Status: SHIPPED | OUTPATIENT
Start: 2020-02-19 | End: 2020-03-02

## 2020-02-19 RX ORDER — FLUTICASONE PROPIONATE 50 MCG
2 SPRAY, SUSPENSION (ML) NASAL DAILY
Qty: 1 BOTTLE | Refills: 2 | Status: SHIPPED | OUTPATIENT
Start: 2020-02-19 | End: 2020-05-12

## 2020-02-19 RX ORDER — ALBUTEROL SULFATE 90 UG/1
2 AEROSOL, METERED RESPIRATORY (INHALATION) EVERY 6 HOURS PRN
Qty: 18 G | Refills: 2 | Status: SHIPPED | OUTPATIENT
Start: 2020-02-19 | End: 2020-08-31 | Stop reason: SDUPTHER

## 2020-02-19 RX ORDER — OMEPRAZOLE 40 MG/1
40 CAPSULE, DELAYED RELEASE ORAL
Qty: 30 CAPSULE | Refills: 2 | Status: SHIPPED | OUTPATIENT
Start: 2020-02-19 | End: 2020-05-12 | Stop reason: SDUPTHER

## 2020-02-19 NOTE — PROGRESS NOTES
Assessment/Plan:     Diagnoses and all orders for this visit:    Mild intermittent asthma without complication  -     fluticasone (FLOVENT HFA) 220 mcg/act inhaler; Inhale 2 puffs 2 (two) times a day Rinse mouth after use  -     albuterol (Ventolin HFA) 90 mcg/act inhaler; Inhale 2 puffs every 6 (six) hours as needed for wheezing  -     Complete PFT with post bronchodilator; Future    Gastroesophageal reflux disease without esophagitis  -     omeprazole (PriLOSEC) 40 MG capsule; Take 1 capsule (40 mg total) by mouth daily before breakfast    Allergic rhinitis, unspecified seasonality, unspecified trigger  -     fluticasone (FLONASE) 50 mcg/act nasal spray; 2 sprays into each nostril daily    Anxiety  -     CBC and differential; Future  -     Comprehensive metabolic panel; Future  -     TSH, 3rd generation with Free T4 reflex; Future  -     hydrOXYzine HCL (ATARAX) 50 mg tablet; Take 1 tablet (50 mg total) by mouth every 6 (six) hours as needed for anxiety    Hypercholesteremia  -     Lipid panel; Future    Need for dental care  -     Ambulatory referral to Dentistry; Future        - Will refill Flovent BID and albuterol PRN  Will order PFT  - Patient has history of drug and alcohol abuse  Will prescribe hydroxyzine PRN anxiety  - Patient is to call [facility] to be set up with psychiatry for further management of anxiety and bipolar diagnosis  Return in about 3 months (around 5/19/2020) for Next scheduled follow up  Subjective:        Patient ID: Mahsa Raman is a 53 y o  male  Chief Complaint   Patient presents with   Bianca Hill is a 53year old male presenting for routine follow up  Patient was released from senior care on 2/11/20 for drug possession  Had intake this morning for drug and alcohol counseling  History of GERD, previously on omeprazole 40 mg daily  Has been out of this medication  States he is having heart burn daily    History of Asthma, possible COPD, previously on Flovent and albuterol  Needs refills on these medications  States he often gets SOB and has wheezing with activity  Is very anxious today  History of bipolar disorder, not currently on medication  States he has a lot of adjusting with just getting out of half-way and is currently living with his daughter  He is requesting something to help with "jitters"  He does have the number for [facility] and plans to get set up with them for psych care  Denies SI/HI  The following portions of the patient's history were reviewed and updated as appropriate: allergies, current medications, past family history, past medical history, past social history, past surgical history and problem list     Patient Active Problem List   Diagnosis    Allergic rhinitis    Anxiety disorder    Bipolar disorder ([facility] )    Chronic lumbar radiculopathy    Contact dermatitis    Depression    Esophageal reflux    Hyperlipidemia    Migraine headache    Mild intermittent asthma without complication    Leukocytosis    Aspiration pneumonia ([facility] )    Other emphysema ([facility] )    Bipolar depression ([place] [address] )       Current Outpatient Medications   Medication Sig Dispense Refill    fluticasone (FLOVENT HFA) 220 mcg/act inhaler Inhale 2 puffs 2 (two) times a day Rinse mouth after use   1 Inhaler 5    omeprazole (PriLOSEC) 40 MG capsule Take 1 capsule (40 mg total) by mouth daily before breakfast 30 capsule 2    albuterol (Ventolin HFA) 90 mcg/act inhaler Inhale 2 puffs every 6 (six) hours as needed for wheezing 18 g 2    cyclobenzaprine (FLEXERIL) 5 mg tablet TAKE 1 TABLET BY MOUTH TWICE A DAY 60 tablet 0    fluticasone (FLONASE) 50 mcg/act nasal spray 2 sprays into each nostril daily 1 Bottle 2    hydrOXYzine HCL (ATARAX) 50 mg tablet Take 1 tablet (50 mg total) by mouth every 6 (six) hours as needed for anxiety 60 tablet 0    varenicline (CHANTIX STARTING MONTH KIZZY) 0 5 MG X 11 & 1 MG X 42 tablet TAKE 1 TABLET DAILY X 3 DAYS,THEN INCR TO 1 TABLET TWICE [id number],THEN 1MG TWICE DAILY 53 tablet 0     No current facility-administered medications for this visit  Past Medical History:   Diagnosis Date    Asthma     Bipolar disorder ([facility] )     COPD (chronic obstructive pulmonary disease) ([facility] )     Disease of thyroid gland     Hypercholesteremia     Hypercholesterolemia     Hyperthyroidism         Past Surgical History:   Procedure Laterality Date    HERNIA REPAIR          Social History     Socioeconomic History    Marital status: Single     Spouse name: Not on file    Number of children: Not on file    Years of education: Not on file    Highest education level: Not on file   Occupational History    Not on file   Social Needs    Financial resource strain: Not on file    Food insecurity:     Worry: Not on file     Inability: Not on file    Transportation needs:     Medical: Not on file     Non-medical: Not on file   Tobacco Use    Smoking status: Current Every Day Smoker     Packs/day: 3 00    Smokeless tobacco: Never Used   Substance and Sexual Activity    Alcohol use: No    Drug use: No    Sexual activity: Yes     Partners: Female   Lifestyle    Physical activity:     Days per week: Not on file     Minutes per session: Not on file    Stress: Not on file   Relationships    Social connections:     Talks on phone: Not on file     Gets together: Not on file     Attends [employer] service: Not on file     Active member of club or organization: Not on file     Attends meetings of clubs or organizations: Not on file     Relationship status: Not on file    Intimate partner violence:     Fear of current or ex partner: Not on file     Emotionally abused: Not on file     Physically abused: Not on file     Forced sexual activity: Not on file   Other Topics Concern    Not on file   Social History Narrative    ** Merged History Encounter **               Review of Systems   Constitutional: Negative for chills, diaphoresis and fever  HENT: Positive for rhinorrhea and sneezing  Negative for congestion, ear pain, sinus pressure, sinus pain and sore throat  Eyes: Negative for pain, discharge, redness, itching and visual disturbance  Respiratory: Positive for shortness of breath and wheezing  Negative for cough and chest tightness  Cardiovascular: Negative for chest pain, palpitations and leg swelling  Gastrointestinal: Negative for abdominal pain, blood in stool, constipation, diarrhea and vomiting  Skin: Negative for rash and wound  Allergic/Immunologic: Positive for environmental allergies  Neurological: Negative for dizziness, syncope, light-headedness and headaches  Psychiatric/Behavioral: Negative for self-injury and suicidal ideas  The patient is nervous/anxious and is hyperactive  Objective:      /98   Pulse 90   Temp 98 5 °F (36 9 °C)   Resp 18   Ht 5' 6" (1 676 m)   Wt 68 4 kg (150 lb 12 8 oz)   BMI 24 34 kg/m²          Physical Exam   Constitutional: He is oriented to person, place, and time  He appears well-developed and well-nourished  No distress  HENT:   Head: Normocephalic and atraumatic  Right Ear: External ear normal    Left Ear: External ear normal    Mouth/Throat: Oropharynx is clear and moist  No oropharyngeal exudate  Eyes: Pupils are equal, round, and reactive to light  Conjunctivae and EOM are normal    Neck: Normal range of motion  Neck supple  Cardiovascular: Normal rate, regular rhythm and normal heart sounds  No murmur heard  Pulmonary/Chest: Effort normal  No respiratory distress  He has decreased breath sounds  He has no wheezes  Musculoskeletal: Normal range of motion  He exhibits no edema  Lymphadenopathy:     He has no cervical adenopathy  Neurological: He is alert and oriented to person, place, and time  Skin: Skin is warm and dry  Psychiatric: His mood appears anxious  His speech is rapid and/or pressured     Nursing note and vitals reviewed

## 2020-02-29 DIAGNOSIS — F41.9 ANXIETY: ICD-10-CM

## 2020-03-02 RX ORDER — HYDROXYZINE 50 MG/1
50 TABLET, FILM COATED ORAL EVERY 6 HOURS PRN
Qty: 60 TABLET | Refills: 0 | Status: SHIPPED | OUTPATIENT
Start: 2020-03-02 | End: 2020-04-27

## 2020-03-03 DIAGNOSIS — I10 ESSENTIAL HYPERTENSION: Primary | ICD-10-CM

## 2020-03-03 RX ORDER — LISINOPRIL 10 MG/1
10 TABLET ORAL DAILY
Qty: 30 TABLET | Refills: 2 | Status: SHIPPED | OUTPATIENT
Start: 2020-03-03 | End: 2020-05-12 | Stop reason: SDUPTHER

## 2020-03-10 DIAGNOSIS — F41.9 ANXIETY: ICD-10-CM

## 2020-03-11 ENCOUNTER — CLINICAL SUPPORT (OUTPATIENT)
Dept: FAMILY MEDICINE CLINIC | Facility: CLINIC | Age: 55
End: 2020-03-11
Payer: COMMERCIAL

## 2020-03-11 VITALS
DIASTOLIC BLOOD PRESSURE: 80 MMHG | OXYGEN SATURATION: 99 % | RESPIRATION RATE: 18 BRPM | SYSTOLIC BLOOD PRESSURE: 140 MMHG | HEART RATE: 73 BPM

## 2020-03-11 DIAGNOSIS — E78.00 HYPERCHOLESTEREMIA: ICD-10-CM

## 2020-03-11 DIAGNOSIS — F41.9 ANXIETY: ICD-10-CM

## 2020-03-11 LAB
ALBUMIN SERPL BCP-MCNC: 3.7 G/DL (ref 3.5–5)
ALP SERPL-CCNC: 109 U/L (ref 46–116)
ALT SERPL W P-5'-P-CCNC: 15 U/L (ref 12–78)
ANION GAP SERPL CALCULATED.3IONS-SCNC: 4 MMOL/L (ref 4–13)
AST SERPL W P-5'-P-CCNC: 15 U/L (ref 5–45)
BASOPHILS # BLD AUTO: 0.08 THOUSANDS/ΜL (ref 0–0.1)
BASOPHILS NFR BLD AUTO: 1 % (ref 0–1)
BILIRUB SERPL-MCNC: 0.61 MG/DL (ref 0.2–1)
BUN SERPL-MCNC: 19 MG/DL (ref 5–25)
CALCIUM SERPL-MCNC: 9.1 MG/DL (ref 8.3–10.1)
CHLORIDE SERPL-SCNC: 106 MMOL/L (ref 100–108)
CHOLEST SERPL-MCNC: 172 MG/DL (ref 50–200)
CO2 SERPL-SCNC: 26 MMOL/L (ref 21–32)
CREAT SERPL-MCNC: 1 MG/DL (ref 0.6–1.3)
EOSINOPHIL # BLD AUTO: 0.54 THOUSAND/ΜL (ref 0–0.61)
EOSINOPHIL NFR BLD AUTO: 7 % (ref 0–6)
ERYTHROCYTE [DISTWIDTH] IN BLOOD BY AUTOMATED COUNT: 14.7 % (ref 11.6–15.1)
GFR SERPL CREATININE-BSD FRML MDRD: 84 ML/MIN/1.73SQ M
GLUCOSE P FAST SERPL-MCNC: 91 MG/DL (ref 65–99)
HCT VFR BLD AUTO: 41.1 % (ref 36.5–49.3)
HDLC SERPL-MCNC: 46 MG/DL
HGB BLD-MCNC: 13.3 G/DL (ref 12–17)
IMM GRANULOCYTES # BLD AUTO: 0.02 THOUSAND/UL (ref 0–0.2)
IMM GRANULOCYTES NFR BLD AUTO: 0 % (ref 0–2)
LDLC SERPL CALC-MCNC: 109 MG/DL (ref 0–100)
LYMPHOCYTES # BLD AUTO: 1.84 THOUSANDS/ΜL (ref 0.6–4.47)
LYMPHOCYTES NFR BLD AUTO: 25 % (ref 14–44)
MCH RBC QN AUTO: 30.5 PG (ref 26.8–34.3)
MCHC RBC AUTO-ENTMCNC: 32.4 G/DL (ref 31.4–37.4)
MCV RBC AUTO: 94 FL (ref 82–98)
MONOCYTES # BLD AUTO: 0.94 THOUSAND/ΜL (ref 0.17–1.22)
MONOCYTES NFR BLD AUTO: 13 % (ref 4–12)
NEUTROPHILS # BLD AUTO: 4.03 THOUSANDS/ΜL (ref 1.85–7.62)
NEUTS SEG NFR BLD AUTO: 54 % (ref 43–75)
NONHDLC SERPL-MCNC: 126 MG/DL
NRBC BLD AUTO-RTO: 0 /100 WBCS
PLATELET # BLD AUTO: 315 THOUSANDS/UL (ref 149–390)
PMV BLD AUTO: 9.5 FL (ref 8.9–12.7)
POTASSIUM SERPL-SCNC: 4.5 MMOL/L (ref 3.5–5.3)
PROT SERPL-MCNC: 7.4 G/DL (ref 6.4–8.2)
RBC # BLD AUTO: 4.36 MILLION/UL (ref 3.88–5.62)
SODIUM SERPL-SCNC: 136 MMOL/L (ref 136–145)
T4 FREE SERPL-MCNC: 1.31 NG/DL (ref 0.76–1.46)
TRIGL SERPL-MCNC: 85 MG/DL
TSH SERPL DL<=0.05 MIU/L-ACNC: 0.09 UIU/ML (ref 0.36–3.74)
WBC # BLD AUTO: 7.45 THOUSAND/UL (ref 4.31–10.16)

## 2020-03-11 PROCEDURE — 84443 ASSAY THYROID STIM HORMONE: CPT

## 2020-03-11 PROCEDURE — 84439 ASSAY OF FREE THYROXINE: CPT

## 2020-03-11 PROCEDURE — 80061 LIPID PANEL: CPT

## 2020-03-11 PROCEDURE — 80053 COMPREHEN METABOLIC PANEL: CPT

## 2020-03-11 PROCEDURE — 85025 COMPLETE CBC W/AUTO DIFF WBC: CPT

## 2020-03-11 NOTE — PROGRESS NOTES
Patient came today for blood pressure check after starting Omeprazole 40mg daily since last visit  Previous blood pressure was 142/98 (2/19/20)  Today's BP was 140/80  Labs were also collected today  Patient states he will be available via mobile phone number if any medications changes will be made

## 2020-03-17 ENCOUNTER — TELEPHONE (OUTPATIENT)
Dept: FAMILY MEDICINE CLINIC | Facility: CLINIC | Age: 55
End: 2020-03-17

## 2020-03-17 NOTE — TELEPHONE ENCOUNTER
Pt called an would like to know if he can come in for a Toradol injection for his shoulder/bursitis

## 2020-03-18 ENCOUNTER — OFFICE VISIT (OUTPATIENT)
Dept: FAMILY MEDICINE CLINIC | Facility: CLINIC | Age: 55
End: 2020-03-18
Payer: COMMERCIAL

## 2020-03-18 VITALS
TEMPERATURE: 98.1 F | HEART RATE: 88 BPM | HEIGHT: 66 IN | SYSTOLIC BLOOD PRESSURE: 112 MMHG | OXYGEN SATURATION: 98 % | RESPIRATION RATE: 18 BRPM | WEIGHT: 152.8 LBS | BODY MASS INDEX: 24.56 KG/M2 | DIASTOLIC BLOOD PRESSURE: 70 MMHG

## 2020-03-18 DIAGNOSIS — M25.512 LEFT SHOULDER PAIN, UNSPECIFIED CHRONICITY: Primary | ICD-10-CM

## 2020-03-18 DIAGNOSIS — M62.838 MUSCLE SPASM: ICD-10-CM

## 2020-03-18 DIAGNOSIS — M54.2 NECK PAIN: ICD-10-CM

## 2020-03-18 DIAGNOSIS — Z11.4 SCREENING FOR HIV (HUMAN IMMUNODEFICIENCY VIRUS): ICD-10-CM

## 2020-03-18 PROCEDURE — 99213 OFFICE O/P EST LOW 20 MIN: CPT | Performed by: FAMILY MEDICINE

## 2020-03-18 PROCEDURE — 96372 THER/PROPH/DIAG INJ SC/IM: CPT | Performed by: PHYSICIAN ASSISTANT

## 2020-03-18 RX ORDER — CYCLOBENZAPRINE HCL 5 MG
5 TABLET ORAL 3 TIMES DAILY PRN
Qty: 30 TABLET | Refills: 0 | Status: SHIPPED | OUTPATIENT
Start: 2020-03-18 | End: 2020-04-27

## 2020-03-18 RX ORDER — KETOROLAC TROMETHAMINE 30 MG/ML
30 INJECTION, SOLUTION INTRAMUSCULAR; INTRAVENOUS ONCE
Status: COMPLETED | OUTPATIENT
Start: 2020-03-18 | End: 2020-03-18

## 2020-03-18 RX ORDER — HYDROXYZINE 50 MG/1
50 TABLET, FILM COATED ORAL EVERY 6 HOURS PRN
Qty: 60 TABLET | Refills: 0 | OUTPATIENT
Start: 2020-03-18

## 2020-03-18 RX ORDER — NAPROXEN 500 MG/1
500 TABLET ORAL 2 TIMES DAILY WITH MEALS
Qty: 60 TABLET | Refills: 0 | Status: SHIPPED | OUTPATIENT
Start: 2020-03-18 | End: 2020-04-14

## 2020-03-18 RX ADMIN — KETOROLAC TROMETHAMINE 30 MG: 30 INJECTION, SOLUTION INTRAMUSCULAR; INTRAVENOUS at 10:40

## 2020-03-18 NOTE — PROGRESS NOTES
Assessment/Plan:     Diagnoses and all orders for this visit:    Left shoulder pain, unspecified chronicity  -     Ambulatory referral to Physical Therapy; Future  -     ketorolac (TORADOL) injection 30 mg  -     naproxen (NAPROSYN) 500 mg tablet; Take 1 tablet (500 mg total) by mouth 2 (two) times a day with meals    Neck pain    Muscle spasm  -     cyclobenzaprine (FLEXERIL) 5 mg tablet; Take 1 tablet (5 mg total) by mouth 3 (three) times a day as needed for muscle spasms    Screening for HIV (human immunodeficiency virus)  -     HIV 1/2 Antigen/Antibody (4th Generation) w Reflex [facility]; Future        - Pain likely due to muscle strain  Will give toradol injection in the office today  Sent scripts for flexeril and naproxen  Advised patient not to take naproxen for 24 hours after receiving toradol injection  Do not take naproxen with other NSAIDs   - PT referral given if pain persists    Return in 2 months (on 5/19/2020) for Next scheduled follow up  Subjective:        Patient ID: Katherine White is a 53 y o  male  Chief Complaint   Patient presents with    Shoulder Pain     left side/ pt requests toradol injection       Wash is a 53year old male with history of chronic neck and back pain, presenting with left shoulder pain  States this has been going on for a few months but has been worse this week  Denies injury  States the pain is shooting and throbbing, 8/10 today  Pain is worse with lifting  Pain radiates into his neck and down his arm  Has been using icy hot, heat, ice, and alternating tylenol and motrin with no relief  Patient has Ketorolac allergy listed  Patient states he had a cutaneous reaction to Toradol in pill form many years ago  He had a Toradol injection in 2018 with no reaction  Has also taken ibuprofen, tylenol, and aleve without problems        The following portions of the patient's history were reviewed and updated as appropriate: allergies, current medications, past family history, past medical history, past social history, past surgical history and problem list     Patient Active Problem List   Diagnosis    Allergic rhinitis    Anxiety disorder    Bipolar disorder ([place] [address] )    Chronic lumbar radiculopathy    Contact dermatitis    Depression    Esophageal reflux    Hyperlipidemia    Migraine headache    Mild intermittent asthma without complication    Leukocytosis    [facility])    Other emphysema ([place] [address] )    Bipolar depression ([facility])       Current Outpatient Medications   Medication Sig Dispense Refill    albuterol (Ventolin HFA) 90 mcg/act inhaler Inhale 2 puffs every 6 (six) hours as needed for wheezing 18 g 2    cyclobenzaprine (FLEXERIL) 5 mg tablet Take 1 tablet (5 mg total) by mouth 3 (three) times a day as needed for muscle spasms 30 tablet 0    fluticasone (FLONASE) 50 mcg/act nasal spray 2 sprays into each nostril daily 1 Bottle 2    fluticasone (FLOVENT HFA) 220 mcg/act inhaler Inhale 2 puffs 2 (two) times a day Rinse mouth after use  1 Inhaler 5    lisinopril (ZESTRIL) 10 mg tablet Take 1 tablet (10 mg total) by mouth daily 30 tablet 2    omeprazole (PriLOSEC) 40 MG capsule Take 1 capsule (40 mg total) by mouth daily before breakfast 30 capsule 2    hydrOXYzine HCL (ATARAX) 50 mg tablet TAKE 1 TABLET (50 MG TOTAL) BY MOUTH EVERY 6 (SIX) HOURS AS NEEDED FOR ANXIETY (Patient not taking: Reported on 3/18/2020) 60 tablet 0    naproxen (NAPROSYN) 500 mg tablet Take 1 tablet (500 mg total) by mouth 2 (two) times a day with meals 60 tablet 0    varenicline (CHANTIX STARTING MONTH PAK) 0 5 MG X 11 & 1 MG X 42 tablet TAKE 1 TABLET DAILY X 3 DAYS,THEN INCR TO 1 TABLET TWICE [id number],THEN 1MG TWICE DAILY (Patient not taking: Reported on 3/18/2020) 53 tablet 0     No current facility-administered medications for this visit           Past Medical History:   Diagnosis Date    Asthma     Bipolar disorder ([place] [address] )     [facility]     Disease of thyroid gland     Hypercholesteremia     Hypercholesterolemia     Hyperthyroidism         Past Surgical History:   Procedure Laterality Date    HERNIA REPAIR          Social History     Socioeconomic History    Marital status: Single     Spouse name: Not on file    Number of children: Not on file    Years of education: Not on file    Highest education level: Not on file   Occupational History    Not on file   Social Needs    Financial resource strain: Not on file    Food insecurity:     Worry: Not on file     Inability: Not on file    Transportation needs:     Medical: Not on file     Non-medical: Not on file   Tobacco Use    Smoking status: Current Every Day Smoker     Packs/day: 3 00    Smokeless tobacco: Never Used   Substance and Sexual Activity    Alcohol use: No    Drug use: No    Sexual activity: Yes     Partners: Female   Lifestyle    Physical activity:     Days per week: Not on file     Minutes per session: Not on file    Stress: Not on file   Relationships    Social connections:     Talks on phone: Not on file     Gets together: Not on file     Attends [employer] service: Not on file     Active member of club or organization: Not on file     Attends meetings of clubs or organizations: Not on file     Relationship status: Not on file    Intimate partner violence:     Fear of current or ex partner: Not on file     Emotionally abused: Not on file     Physically abused: Not on file     Forced sexual activity: Not on file   Other Topics Concern    Not on file   Social History Narrative    ** Merged History Encounter **             Review of Systems   Constitutional: Negative for chills, diaphoresis, fever and unexpected weight change  Respiratory: Negative for cough, chest tightness, shortness of breath and wheezing  Cardiovascular: Negative for chest pain, palpitations and leg swelling     Gastrointestinal: Negative for abdominal pain, constipation, diarrhea, nausea and vomiting  Musculoskeletal: Positive for arthralgias, myalgias, neck pain and neck stiffness  Negative for gait problem and joint swelling  Skin: Negative for rash and wound  Neurological: Negative for dizziness, syncope, weakness, light-headedness, numbness and headaches  Objective:      /70 (BP Location: Left arm, Patient Position: Sitting, Cuff Size: Adult)   Pulse 88   Temp 98 1 °F (36 7 °C) (Tympanic)   Resp 18   Ht 5' 6" (1 676 m)   Wt 69 3 kg (152 lb 12 8 oz)   SpO2 98%   BMI 24 66 kg/m²          Physical Exam   Constitutional: He is oriented to person, place, and time  He appears well-developed and well-nourished  No distress  Neck: Muscular tenderness present  No spinous process tenderness present  Decreased range of motion present  Limited range of motion and tenderness with palpation over cervical paraspinal muscles  No spinal tenderness   Cardiovascular: Normal rate, regular rhythm and normal heart sounds  No murmur heard  Pulmonary/Chest: Effort normal and breath sounds normal  No respiratory distress  He has no wheezes  Musculoskeletal: He exhibits no edema or deformity  Left shoulder: He exhibits decreased range of motion and tenderness  He exhibits no bony tenderness, no swelling and no deformity  Tenderness to palpation over left levator scapulae  Limited flexion, extension, and abduction of left shoulder with tenderness with palpation over deltoid   Neurological: He is alert and oriented to person, place, and time  Skin: Skin is warm and dry  No rash noted  Nursing note and vitals reviewed

## 2020-03-18 NOTE — PATIENT INSTRUCTIONS
Safe Use of NSAIDs   WHAT YOU NEED TO KNOW:   NSAIDs are medicines that are used to decrease pain, swelling, and fever  NSAIDs are available with or without a doctor's order  NSAIDs that you can buy without a doctor's order include aspirin, ibuprofen, and naproxen  DISCHARGE INSTRUCTIONS:   Return to the emergency department if:   · You have swelling around your mouth or trouble breathing  · You are breathing fast or you have a fast heartbeat  · You have nausea, vomiting, or abdominal pain  · You have blood in your vomit or bowel movements  · You have a seizure  Contact your healthcare provider if:   · You have a headache or become confused  · You develop hearing loss or ringing in your ears  · You develop itching, a rash, or hives  · You have swelling around your lower legs, feet, ankles, and hands  · You do not know how much NSAIDs to give to your child  · You have questions or concerns about your condition or care  How to give NSAIDs to your child safely:   · Read the directions on the label  Find out if the medicine is right for your child's age and how much to give to your child  The dose for your child's weight or age should be listed  Do not  give your child more than the recommended amount  · Use the measuring tool that came with the medicine  Do not  use another measuring tool, such as a kitchen spoon  Other measuring tools do not provide the right amount of medicine  How to take NSAIDs safely:   · Read the directions on the label to learn how much medicine you should take and often to take it  Do not take more than the recommended amount  · Talk to your healthcare provider if you need take NSAIDs for more than 30 days  The longer you take NSAIDs, the higher your risk of side effects will be  You may need to take other medicines to decrease your risk of side effects such as stomach bleeding  · Do not take an over-the-counter NSAIDs with prescription NSAIDs  The combined amount of NSAIDs may be too high  · Tell your healthcare provider about other medicines you take  Some medicines can increase the risk of side effects from NSAIDs  Your healthcare provider will tell you if it is okay to take NSAIDs and how to take them  Who should not take NSAIDs:  Certain people should avoid or limit NSAIDs  Do not  give NSAIDs to children under 10months of age without direction from your child's doctor  Do not give aspirin to children under 25years of age  Your child could develop Reye syndrome if he takes aspirin  Reye syndrome can cause life-threatening brain and liver damage  Check your child's medicine labels for aspirin, salicylates, or oil of wintergreen  Talk to your healthcare provider before you take NSAIDs if any of the following apply to you:  · You have reflux disease, a peptic ulcer, H pylori infection, or bleeding in your stomach or intestines  · You have a bleeding disorder, or you take blood-thinning medicine  · You are allergic to aspirin or other NSAIDs  · You have liver or kidney or disease  · You have high blood pressure or heart disease  · You have 3 or more alcoholic drinks each day  · You are pregnant  What you need to know about an NSAID overdose:  Certain health problems can occur if you take too much NSAID medicine at one time or over time  Problems include nausea, vomiting, and abdominal pain  You may develop gastritis, peptic ulcers, and stomach bleeding  You may also develop fluid retention, heart problems, and kidney problems  NSAIDs can worsen high blood pressure  You may become confused, or you may have a headache, hearing loss, or hallucinations  An overdose of aspirin may also cause rapid breathing, a rapid heartbeat, or seizures  What to do if you think you or your child took too much NSAID medicine:  Call the [facility] at [phone] immediately      © 2017 [employer] Information is for End User's use only and may not be sold, redistributed or otherwise used for commercial purposes  All illustrations and images included in CareNotes® are the copyrighted property of A D A M , Inc  or [employer]  The above information is an  only  It is not intended as medical advice for individual conditions or treatments  Talk to your doctor, nurse or pharmacist before following any medical regimen to see if it is safe and effective for you

## 2020-03-20 DIAGNOSIS — E05.90 SUBCLINICAL HYPERTHYROIDISM: Primary | ICD-10-CM

## 2020-03-23 ENCOUNTER — TELEPHONE (OUTPATIENT)
Dept: FAMILY MEDICINE CLINIC | Facility: CLINIC | Age: 55
End: 2020-03-23

## 2020-03-23 NOTE — TELEPHONE ENCOUNTER
Pt called wanted me to tell you that since yesterday when he bends over to get something or reaches down he becomes lightheaded and feels like he is going to pass out  He was wondering if it may be the meds causing it or if you think he's over exerting himself  Appt  was offered but at this time he just wanted to know what you thought

## 2020-03-25 ENCOUNTER — OFFICE VISIT (OUTPATIENT)
Dept: FAMILY MEDICINE CLINIC | Facility: CLINIC | Age: 55
End: 2020-03-25
Payer: COMMERCIAL

## 2020-03-25 VITALS
HEIGHT: 66 IN | SYSTOLIC BLOOD PRESSURE: 138 MMHG | BODY MASS INDEX: 25.1 KG/M2 | WEIGHT: 156.2 LBS | HEART RATE: 98 BPM | RESPIRATION RATE: 18 BRPM | TEMPERATURE: 97.9 F | OXYGEN SATURATION: 98 % | DIASTOLIC BLOOD PRESSURE: 78 MMHG

## 2020-03-25 DIAGNOSIS — F31.9 BIPOLAR AFFECTIVE DISORDER, REMISSION STATUS UNSPECIFIED (HCC): ICD-10-CM

## 2020-03-25 DIAGNOSIS — R42 INTERMITTENT LIGHTHEADEDNESS: Primary | ICD-10-CM

## 2020-03-25 DIAGNOSIS — E05.90 SUBCLINICAL HYPERTHYROIDISM: ICD-10-CM

## 2020-03-25 DIAGNOSIS — I10 ESSENTIAL HYPERTENSION: ICD-10-CM

## 2020-03-25 DIAGNOSIS — M25.512 LEFT SHOULDER PAIN, UNSPECIFIED CHRONICITY: ICD-10-CM

## 2020-03-25 PROCEDURE — 99213 OFFICE O/P EST LOW 20 MIN: CPT | Performed by: FAMILY MEDICINE

## 2020-03-25 NOTE — PROGRESS NOTES
Assessment/Plan:     Diagnoses and all orders for this visit:    Intermittent lightheadedness    Bipolar affective disorder, remission status unspecified (HCC)    Subclinical hyperthyroidism    Left shoulder pain, unspecified chronicity    Essential hypertension        - Encouraged increased water intake, avoid caffeine  Advised to rise slowly from seated or supine positions  - Follow up with Dr Santa Flores (psych) as scheduled  Continue with drug and alcohol counseling   - Encouraged stress relieving techniques and deep breathing exercises  - Advised to only take flexeril at night or when he will not be driving or leaving the house  Will start PT when offices re-open  Continue naproxen PRN  - Patient encouraged to schedule NM thyroid uptake scan as previously ordered to evaluate for thyroid nodules  Will then discuss treatment/endo referral   - Continue lisinopril 10 mg daily  CMP WNL on 3/11/20  Return if symptoms persist or worsen  Return in 8 weeks (on 5/19/2020) for Next scheduled follow up  Subjective:        Patient ID: Linh Noe is a 63 y o  male  Chief Complaint   Patient presents with    Follow-up     lightheadedness x4days w/vision disturbance       Rand Colmenares is a 63year old male with history of HTN, bipolar disorder, chronic neck and back pain, and sublinical hyperthyroidism, presenting with complain of lightheadedness x 4 days  Reports lightheadedness when he bends over or gets up too fast   Also reports blurred vision which he states has been intermittent for a few years and has been attributed to his anxiety  Denies HA, syncope, chest pain, palpitations  Reports drinking iced tea and coffee daily with water throughout the day  States he feels anxious because his daughter scratched his car today  Is also nervous and anxious as he has trial for his previous possesion charges coming up and is facing potential snf time  HTN managed with lisinopril 10 mg daily    /78 today   History of bipolar disorder, has not been on meds for over 4 years  Tried  hydroxyzine which patient states he stopped taking 2 weeks ago because it was not helping  Has an appointment with Dr Jeanette Lu (psych) in [place] in April  Continues with drug and alcohol counseling weekly  Patient was seen in the office 1 week ago for shoulder and neck pain, given Toradol shot in office and scripts for naproxen and flexeril  States that this has slightly improved  Was not able to start PT with current coronavirus closures  TSH noted to be low at 0 093 on 3/11  TSH has been trending down, previously 0 294 in 2018  Previous thyroid scan from 2015 did not show any nodules  Repeat scan was ordered in 2018 which was not completed  Patient has not been on any medication for this  The following portions of the patient's history were reviewed and updated as appropriate: allergies, current medications, past family history, past medical history, past social history, past surgical history and problem list     Patient Active Problem List   Diagnosis    Allergic rhinitis    Anxiety disorder    [facility] disorder [facility]    Chronic lumbar radiculopathy    Contact dermatitis    Esophageal reflux    Migraine headache    Mild intermittent asthma without complication    Other emphysema ([facility]    Subclinical hyperthyroidism    Essential hypertension       Current Outpatient Medications   Medication Sig Dispense Refill    albuterol (Ventolin HFA) 90 mcg/act inhaler Inhale 2 puffs every 6 (six) hours as needed for wheezing 18 g 2    cyclobenzaprine (FLEXERIL) 5 mg tablet Take 1 tablet (5 mg total) by mouth 3 (three) times a day as needed for muscle spasms 30 tablet 0    fluticasone (FLONASE) 50 mcg/act nasal spray 2 sprays into each nostril daily 1 Bottle 2    fluticasone (FLOVENT HFA) 220 mcg/act inhaler Inhale 2 puffs 2 (two) times a day Rinse mouth after use   1 Inhaler 5    lisinopril (ZESTRIL) 10 mg tablet Take 1 tablet (10 mg total) by mouth daily 30 tablet 2    naproxen (NAPROSYN) 500 mg tablet Take 1 tablet (500 mg total) by mouth 2 (two) times a day with meals 60 tablet 0    omeprazole (PriLOSEC) 40 MG capsule Take 1 capsule (40 mg total) by mouth daily before breakfast 30 capsule 2    hydrOXYzine HCL (ATARAX) 50 mg tablet TAKE 1 TABLET (50 MG TOTAL) BY MOUTH EVERY 6 (SIX) HOURS AS NEEDED FOR ANXIETY (Patient not taking: Reported on 3/18/2020) 60 tablet 0    varenicline (CHANTIX STARTING MONTH PAK) 0 5 MG X 11 & 1 MG X 42 tablet TAKE 1 TABLET DAILY X 3 DAYS,THEN INCR TO 1 TABLET TWICE [id number],THEN 1MG TWICE DAILY (Patient not taking: Reported on 3/18/2020) 53 tablet 0     No current facility-administered medications for this visit           Past Medical History:   Diagnosis Date    Asthma     Bipolar disorder ([facility] )     COPD (chronic obstructive pulmonary disease) ([facility] )     Disease of thyroid gland     Hypercholesteremia     Hypercholesterolemia     Hyperthyroidism         Past Surgical History:   Procedure Laterality Date    HERNIA REPAIR          Social History     Socioeconomic History    Marital status: Single     Spouse name: Not on file    Number of children: Not on file    Years of education: Not on file    Highest education level: Not on file   Occupational History    Not on file   Social Needs    Financial resource strain: Not on file    Food insecurity:     Worry: Not on file     Inability: Not on file    Transportation needs:     Medical: Not on file     Non-medical: Not on file   Tobacco Use    Smoking status: Current Every Day Smoker     Packs/day: 3 00    Smokeless tobacco: Never Used   Substance and Sexual Activity    Alcohol use: No    Drug use: No    Sexual activity: Yes     Partners: Female   Lifestyle    Physical activity:     Days per week: Not on file     Minutes per session: Not on file    Stress: Not on file   Relationships    Social connections:     Talks on phone: Not on file     Gets together: Not on file     Attends Islam service: Not on file     Active member of club or organization: Not on file     Attends meetings of clubs or organizations: Not on file     Relationship status: Not on file    Intimate partner violence:     Fear of current or ex partner: Not on file     Emotionally abused: Not on file     Physically abused: Not on file     Forced sexual activity: Not on file   Other Topics Concern    Not on file   Social History Narrative    ** Merged History Encounter **             Review of Systems   Constitutional: Negative for appetite change, chills, diaphoresis, fatigue, fever and unexpected weight change  HENT: Negative for congestion, ear pain, postnasal drip, rhinorrhea, sinus pressure, sinus pain and sore throat  Eyes: Positive for visual disturbance  Negative for photophobia, pain, discharge, redness and itching  Respiratory: Negative for cough, chest tightness, shortness of breath and wheezing  Cardiovascular: Negative for chest pain, palpitations and leg swelling  Gastrointestinal: Negative for abdominal pain, constipation, diarrhea, nausea and vomiting  Genitourinary: Negative for difficulty urinating, dysuria, hematuria and urgency  Musculoskeletal: Positive for arthralgias, myalgias and neck stiffness  Negative for gait problem and joint swelling  Skin: Negative for rash and wound  Neurological: Positive for light-headedness  Negative for dizziness, syncope, weakness and headaches  Psychiatric/Behavioral: Positive for decreased concentration  Negative for dysphoric mood, self-injury and suicidal ideas  The patient is nervous/anxious and is hyperactive            Objective:      /78 (BP Location: Right arm, Patient Position: Standing, Cuff Size: Adult)   Pulse 98   Temp 97 9 °F (36 6 °C) (Tympanic)   Resp 18   Ht 5' 6" (1 676 m)   Wt 70 9 kg (156 lb 3 2 oz)   SpO2 98%   BMI 25 21 kg/m²     Orthostatic vitals:  - supine 140/80, HR 98  - sitting 148/98,   - standing 138/78, HR 98     Physical Exam   Constitutional: He is oriented to person, place, and time  He appears well-developed and well-nourished  No distress  HENT:   Head: Normocephalic and atraumatic  Right Ear: Tympanic membrane, external ear and ear canal normal    Left Ear: Tympanic membrane, external ear and ear canal normal    Mouth/Throat: Oropharynx is clear and moist  No oropharyngeal exudate  Eyes: Pupils are equal, round, and reactive to light  Conjunctivae and EOM are normal    Neck: Normal range of motion  Neck supple  Cardiovascular: Normal rate, regular rhythm and normal heart sounds  No murmur heard  Pulmonary/Chest: Effort normal and breath sounds normal  No respiratory distress  He has no wheezes  Musculoskeletal: He exhibits no edema  Lymphadenopathy:     He has no cervical adenopathy  Neurological: He is alert and oriented to person, place, and time  No cranial nerve deficit  Coordination and gait normal    Skin: Skin is warm and dry  Psychiatric: His mood appears anxious  He is hyperactive  Nursing note and vitals reviewed

## 2020-03-27 ENCOUNTER — TELEPHONE (OUTPATIENT)
Dept: FAMILY MEDICINE CLINIC | Facility: CLINIC | Age: 55
End: 2020-03-27

## 2020-04-02 ENCOUNTER — TELEPHONE (OUTPATIENT)
Dept: FAMILY MEDICINE CLINIC | Facility: CLINIC | Age: 55
End: 2020-04-02

## 2020-04-06 ENCOUNTER — TELEMEDICINE (OUTPATIENT)
Dept: FAMILY MEDICINE CLINIC | Facility: CLINIC | Age: 55
End: 2020-04-06
Payer: COMMERCIAL

## 2020-04-06 DIAGNOSIS — F31.9 BIPOLAR AFFECTIVE DISORDER, REMISSION STATUS UNSPECIFIED (HCC): Primary | ICD-10-CM

## 2020-04-06 PROCEDURE — G0071 COMM SVCS BY RHC/FQHC 5 MIN: HCPCS | Performed by: FAMILY MEDICINE

## 2020-04-06 RX ORDER — CLONAZEPAM 0.5 MG/1
0.5 TABLET ORAL 2 TIMES DAILY PRN
Qty: 14 TABLET | Refills: 0 | Status: SHIPPED | OUTPATIENT
Start: 2020-04-06 | End: 2021-04-01

## 2020-04-07 ENCOUNTER — TELEPHONE (OUTPATIENT)
Dept: OTOLARYNGOLOGY | Facility: CLINIC | Age: 55
End: 2020-04-07

## 2020-04-14 DIAGNOSIS — M25.512 LEFT SHOULDER PAIN, UNSPECIFIED CHRONICITY: ICD-10-CM

## 2020-04-14 RX ORDER — NAPROXEN 500 MG/1
TABLET ORAL
Qty: 60 TABLET | Refills: 0 | Status: SHIPPED | OUTPATIENT
Start: 2020-04-14 | End: 2020-04-27

## 2020-04-27 ENCOUNTER — TELEMEDICINE (OUTPATIENT)
Dept: FAMILY MEDICINE CLINIC | Facility: CLINIC | Age: 55
End: 2020-04-27
Payer: COMMERCIAL

## 2020-04-27 DIAGNOSIS — K08.89 PAIN, DENTAL: Primary | ICD-10-CM

## 2020-04-27 DIAGNOSIS — M62.838 MUSCLE SPASM: ICD-10-CM

## 2020-04-27 PROCEDURE — 99213 OFFICE O/P EST LOW 20 MIN: CPT | Performed by: FAMILY MEDICINE

## 2020-04-27 RX ORDER — ESCITALOPRAM OXALATE 10 MG/1
10 TABLET ORAL
COMMUNITY
Start: 2020-04-14 | End: 2020-10-21

## 2020-04-27 RX ORDER — AMOXICILLIN 500 MG/1
CAPSULE ORAL
Qty: 10 CAPSULE | Refills: 0 | Status: SHIPPED | OUTPATIENT
Start: 2020-04-27 | End: 2020-05-01

## 2020-04-27 RX ORDER — ZOLPIDEM TARTRATE 10 MG/1
TABLET ORAL
COMMUNITY
Start: 2020-04-14 | End: 2021-06-07 | Stop reason: ALTCHOICE

## 2020-04-27 RX ORDER — METHOCARBAMOL 500 MG/1
500 TABLET, FILM COATED ORAL 3 TIMES DAILY PRN
Qty: 30 TABLET | Refills: 0 | Status: SHIPPED | OUTPATIENT
Start: 2020-04-27 | End: 2020-06-03

## 2020-04-30 ENCOUNTER — TELEPHONE (OUTPATIENT)
Dept: OTHER | Facility: OTHER | Age: 55
End: 2020-04-30

## 2020-05-01 ENCOUNTER — TELEMEDICINE (OUTPATIENT)
Dept: FAMILY MEDICINE CLINIC | Facility: CLINIC | Age: 55
End: 2020-05-01
Payer: MEDICARE

## 2020-05-01 DIAGNOSIS — K08.89 PAIN, DENTAL: Primary | ICD-10-CM

## 2020-05-01 DIAGNOSIS — M54.16 CHRONIC LUMBAR RADICULOPATHY: ICD-10-CM

## 2020-05-01 PROCEDURE — G2025 DIS SITE TELE SVCS RHC/FQHC: HCPCS | Performed by: FAMILY MEDICINE

## 2020-05-01 PROCEDURE — 99213 OFFICE O/P EST LOW 20 MIN: CPT | Performed by: FAMILY MEDICINE

## 2020-05-01 RX ORDER — ACETAMINOPHEN AND CODEINE PHOSPHATE 300; 30 MG/1; MG/1
1 TABLET ORAL EVERY 6 HOURS PRN
Qty: 28 TABLET | Refills: 0 | Status: SHIPPED | OUTPATIENT
Start: 2020-05-01 | End: 2020-05-14 | Stop reason: SDUPTHER

## 2020-05-05 ENCOUNTER — TELEPHONE (OUTPATIENT)
Dept: PHYSICAL THERAPY | Facility: OTHER | Age: 55
End: 2020-05-05

## 2020-05-06 ENCOUNTER — TELEPHONE (OUTPATIENT)
Dept: PHYSICAL THERAPY | Facility: OTHER | Age: 55
End: 2020-05-06

## 2020-05-08 DIAGNOSIS — J45.20 MILD INTERMITTENT ASTHMA WITHOUT COMPLICATION: ICD-10-CM

## 2020-05-12 ENCOUNTER — TELEMEDICINE (OUTPATIENT)
Dept: FAMILY MEDICINE CLINIC | Facility: CLINIC | Age: 55
End: 2020-05-12
Payer: MEDICARE

## 2020-05-12 ENCOUNTER — APPOINTMENT (OUTPATIENT)
Dept: LAB | Facility: CLINIC | Age: 55
End: 2020-05-12
Payer: MEDICARE

## 2020-05-12 DIAGNOSIS — R30.0 DYSURIA: ICD-10-CM

## 2020-05-12 DIAGNOSIS — K21.9 GASTROESOPHAGEAL REFLUX DISEASE WITHOUT ESOPHAGITIS: ICD-10-CM

## 2020-05-12 DIAGNOSIS — J30.9 ALLERGIC RHINITIS, UNSPECIFIED SEASONALITY, UNSPECIFIED TRIGGER: ICD-10-CM

## 2020-05-12 DIAGNOSIS — I10 ESSENTIAL HYPERTENSION: ICD-10-CM

## 2020-05-12 DIAGNOSIS — N50.811 PAIN IN RIGHT TESTICLE: Primary | ICD-10-CM

## 2020-05-12 DIAGNOSIS — N50.811 PAIN IN RIGHT TESTICLE: ICD-10-CM

## 2020-05-12 LAB
BILIRUB UR QL STRIP: NEGATIVE
CLARITY UR: CLEAR
COLOR UR: YELLOW
GLUCOSE UR STRIP-MCNC: NEGATIVE MG/DL
HGB UR QL STRIP.AUTO: NEGATIVE
KETONES UR STRIP-MCNC: NEGATIVE MG/DL
LEUKOCYTE ESTERASE UR QL STRIP: NEGATIVE
NITRITE UR QL STRIP: NEGATIVE
PH UR STRIP.AUTO: 6.5 [PH]
PROT UR STRIP-MCNC: NEGATIVE MG/DL
SP GR UR STRIP.AUTO: 1.01 (ref 1–1.03)
UROBILINOGEN UR QL STRIP.AUTO: 0.2 E.U./DL

## 2020-05-12 PROCEDURE — 87591 N.GONORRHOEAE DNA AMP PROB: CPT

## 2020-05-12 PROCEDURE — G2025 DIS SITE TELE SVCS RHC/FQHC: HCPCS | Performed by: FAMILY MEDICINE

## 2020-05-12 PROCEDURE — 81003 URINALYSIS AUTO W/O SCOPE: CPT | Performed by: PHYSICIAN ASSISTANT

## 2020-05-12 PROCEDURE — 99213 OFFICE O/P EST LOW 20 MIN: CPT | Performed by: FAMILY MEDICINE

## 2020-05-12 PROCEDURE — 87491 CHLMYD TRACH DNA AMP PROBE: CPT

## 2020-05-12 RX ORDER — FLUTICASONE PROPIONATE 50 MCG
SPRAY, SUSPENSION (ML) NASAL
Qty: 1 BOTTLE | Refills: 5 | Status: SHIPPED | OUTPATIENT
Start: 2020-05-12 | End: 2020-10-24 | Stop reason: SDUPTHER

## 2020-05-12 RX ORDER — OMEPRAZOLE 40 MG/1
40 CAPSULE, DELAYED RELEASE ORAL
Qty: 30 CAPSULE | Refills: 5 | Status: SHIPPED | OUTPATIENT
Start: 2020-05-12 | End: 2020-08-27 | Stop reason: SDUPTHER

## 2020-05-12 RX ORDER — LISINOPRIL 10 MG/1
10 TABLET ORAL DAILY
Qty: 30 TABLET | Refills: 5 | Status: SHIPPED | OUTPATIENT
Start: 2020-05-12 | End: 2020-08-22 | Stop reason: HOSPADM

## 2020-05-13 ENCOUNTER — TELEPHONE (OUTPATIENT)
Dept: FAMILY MEDICINE CLINIC | Facility: CLINIC | Age: 55
End: 2020-05-13

## 2020-05-14 ENCOUNTER — TELEPHONE (OUTPATIENT)
Dept: FAMILY MEDICINE CLINIC | Facility: CLINIC | Age: 55
End: 2020-05-14

## 2020-05-14 DIAGNOSIS — K08.89 PAIN, DENTAL: ICD-10-CM

## 2020-05-14 DIAGNOSIS — N50.811 PAIN IN RIGHT TESTICLE: Primary | ICD-10-CM

## 2020-05-14 LAB
C TRACH DNA SPEC QL NAA+PROBE: NEGATIVE
N GONORRHOEA DNA SPEC QL NAA+PROBE: NEGATIVE

## 2020-05-14 RX ORDER — ACETAMINOPHEN AND CODEINE PHOSPHATE 300; 30 MG/1; MG/1
1 TABLET ORAL EVERY 6 HOURS PRN
Qty: 28 TABLET | Refills: 0 | Status: SHIPPED | OUTPATIENT
Start: 2020-05-14 | End: 2020-06-11 | Stop reason: ALTCHOICE

## 2020-05-14 RX ORDER — ACETAMINOPHEN AND CODEINE PHOSPHATE 300; 30 MG/1; MG/1
1 TABLET ORAL EVERY 6 HOURS PRN
Qty: 28 TABLET | Refills: 0 | Status: CANCELLED | OUTPATIENT
Start: 2020-05-14

## 2020-05-20 ENCOUNTER — TELEPHONE (OUTPATIENT)
Dept: FAMILY MEDICINE CLINIC | Facility: CLINIC | Age: 55
End: 2020-05-20

## 2020-05-22 ENCOUNTER — HOSPITAL ENCOUNTER (OUTPATIENT)
Dept: PULMONOLOGY | Facility: HOSPITAL | Age: 55
Discharge: HOME | End: 2020-05-22
Payer: MEDICARE

## 2020-05-22 ENCOUNTER — DOCUMENTATION (OUTPATIENT)
Dept: FAMILY MEDICINE CLINIC | Facility: CLINIC | Age: 55
End: 2020-05-22

## 2020-05-22 ENCOUNTER — TELEPHONE (OUTPATIENT)
Dept: OTHER | Facility: HOSPITAL | Age: 55
End: 2020-05-22

## 2020-05-22 ENCOUNTER — HOSPITAL ENCOUNTER (EMERGENCY)
Facility: HOSPITAL | Age: 55
Discharge: HOME | End: 2020-05-22
Attending: EMERGENCY MEDICINE
Payer: MEDICARE

## 2020-05-22 ENCOUNTER — HOSPITAL ENCOUNTER (OUTPATIENT)
Dept: ULTRASOUND IMAGING | Facility: HOSPITAL | Age: 55
Discharge: HOME | End: 2020-05-22
Payer: MEDICARE

## 2020-05-22 ENCOUNTER — APPOINTMENT (EMERGENCY)
Dept: CT IMAGING | Facility: HOSPITAL | Age: 55
End: 2020-05-22
Payer: MEDICARE

## 2020-05-22 VITALS
WEIGHT: 151.9 LBS | RESPIRATION RATE: 16 BRPM | DIASTOLIC BLOOD PRESSURE: 64 MMHG | BODY MASS INDEX: 24.52 KG/M2 | HEART RATE: 81 BPM | TEMPERATURE: 98.1 F | OXYGEN SATURATION: 99 % | SYSTOLIC BLOOD PRESSURE: 122 MMHG

## 2020-05-22 DIAGNOSIS — N50.811 PAIN IN RIGHT TESTICLE: ICD-10-CM

## 2020-05-22 DIAGNOSIS — N13.30 HYDRONEPHROSIS OF RIGHT KIDNEY: Primary | ICD-10-CM

## 2020-05-22 DIAGNOSIS — J45.20 MILD INTERMITTENT ASTHMA WITHOUT COMPLICATION: ICD-10-CM

## 2020-05-22 PROCEDURE — 94010 BREATHING CAPACITY TEST: CPT

## 2020-05-22 PROCEDURE — 94727 GAS DIL/WSHOT DETER LNG VOL: CPT | Performed by: INTERNAL MEDICINE

## 2020-05-22 PROCEDURE — 76870 US EXAM SCROTUM: CPT

## 2020-05-22 PROCEDURE — 94010 BREATHING CAPACITY TEST: CPT | Performed by: INTERNAL MEDICINE

## 2020-05-22 PROCEDURE — 74176 CT ABD & PELVIS W/O CONTRAST: CPT

## 2020-05-22 PROCEDURE — 94729 DIFFUSING CAPACITY: CPT | Performed by: INTERNAL MEDICINE

## 2020-05-22 PROCEDURE — 99284 EMERGENCY DEPT VISIT MOD MDM: CPT | Performed by: EMERGENCY MEDICINE

## 2020-05-22 PROCEDURE — 94727 GAS DIL/WSHOT DETER LNG VOL: CPT

## 2020-05-22 PROCEDURE — 99284 EMERGENCY DEPT VISIT MOD MDM: CPT

## 2020-05-22 PROCEDURE — 94729 DIFFUSING CAPACITY: CPT

## 2020-05-22 PROCEDURE — 94760 N-INVAS EAR/PLS OXIMETRY 1: CPT

## 2020-05-22 RX ORDER — OXYCODONE HYDROCHLORIDE AND ACETAMINOPHEN 5; 325 MG/1; MG/1
1 TABLET ORAL EVERY 6 HOURS PRN
Qty: 21 TABLET | Refills: 0 | Status: SHIPPED | OUTPATIENT
Start: 2020-05-22 | End: 2020-05-27 | Stop reason: SDUPTHER

## 2020-05-22 RX ORDER — NAPROXEN 500 MG/1
500 TABLET ORAL 2 TIMES DAILY WITH MEALS
Qty: 10 TABLET | Refills: 0 | Status: SHIPPED | OUTPATIENT
Start: 2020-05-22 | End: 2020-05-27 | Stop reason: SDUPTHER

## 2020-05-22 RX ORDER — OXYCODONE HYDROCHLORIDE AND ACETAMINOPHEN 5; 325 MG/1; MG/1
1 TABLET ORAL ONCE
Status: DISCONTINUED | OUTPATIENT
Start: 2020-05-22 | End: 2020-05-22 | Stop reason: HOSPADM

## 2020-05-22 RX ORDER — ONDANSETRON 4 MG/1
4 TABLET, ORALLY DISINTEGRATING ORAL EVERY 6 HOURS PRN
Qty: 30 TABLET | Refills: 0 | Status: SHIPPED | OUTPATIENT
Start: 2020-05-22 | End: 2020-06-03

## 2020-05-22 RX ORDER — ONDANSETRON 4 MG/1
4 TABLET, ORALLY DISINTEGRATING ORAL ONCE
Status: COMPLETED | OUTPATIENT
Start: 2020-05-22 | End: 2020-05-22

## 2020-05-22 RX ADMIN — ONDANSETRON 4 MG: 4 TABLET, ORALLY DISINTEGRATING ORAL at 13:04

## 2020-05-27 ENCOUNTER — TELEMEDICINE (OUTPATIENT)
Dept: FAMILY MEDICINE CLINIC | Facility: CLINIC | Age: 55
End: 2020-05-27
Payer: MEDICARE

## 2020-05-27 ENCOUNTER — TELEPHONE (OUTPATIENT)
Dept: OTHER | Facility: OTHER | Age: 55
End: 2020-05-27

## 2020-05-27 DIAGNOSIS — N13.30 HYDRONEPHROSIS OF RIGHT KIDNEY: ICD-10-CM

## 2020-05-27 PROCEDURE — 99213 OFFICE O/P EST LOW 20 MIN: CPT | Performed by: FAMILY MEDICINE

## 2020-05-27 PROCEDURE — G2025 DIS SITE TELE SVCS RHC/FQHC: HCPCS | Performed by: FAMILY MEDICINE

## 2020-05-27 RX ORDER — OXYCODONE HYDROCHLORIDE AND ACETAMINOPHEN 5; 325 MG/1; MG/1
1 TABLET ORAL EVERY 6 HOURS PRN
Qty: 30 TABLET | Refills: 0 | Status: SHIPPED | OUTPATIENT
Start: 2020-05-27 | End: 2020-06-11 | Stop reason: ALTCHOICE

## 2020-05-27 RX ORDER — NAPROXEN 500 MG/1
500 TABLET ORAL 2 TIMES DAILY WITH MEALS
Qty: 30 TABLET | Refills: 0 | Status: SHIPPED | OUTPATIENT
Start: 2020-05-27 | End: 2020-07-06

## 2020-05-29 ENCOUNTER — TELEMEDICINE (OUTPATIENT)
Dept: UROLOGY | Facility: CLINIC | Age: 55
End: 2020-05-29
Payer: MEDICARE

## 2020-05-29 ENCOUNTER — TELEPHONE (OUTPATIENT)
Dept: UROLOGY | Facility: AMBULATORY SURGERY CENTER | Age: 55
End: 2020-05-29

## 2020-05-29 ENCOUNTER — NURSE TRIAGE (OUTPATIENT)
Dept: OTHER | Facility: OTHER | Age: 55
End: 2020-05-29

## 2020-05-29 DIAGNOSIS — N28.89 URETERAL MASS: ICD-10-CM

## 2020-05-29 DIAGNOSIS — N13.30 HYDRONEPHROSIS OF RIGHT KIDNEY: Primary | ICD-10-CM

## 2020-05-29 DIAGNOSIS — D49.59 URETERAL TUMOR: Primary | ICD-10-CM

## 2020-05-29 PROCEDURE — 99203 OFFICE O/P NEW LOW 30 MIN: CPT | Performed by: PHYSICIAN ASSISTANT

## 2020-05-29 RX ORDER — KETOROLAC TROMETHAMINE 10 MG/1
10 TABLET, FILM COATED ORAL EVERY 6 HOURS PRN
Qty: 15 TABLET | Refills: 0 | Status: SHIPPED | OUTPATIENT
Start: 2020-05-29 | End: 2020-06-03

## 2020-06-01 ENCOUNTER — TELEPHONE (OUTPATIENT)
Dept: FAMILY MEDICINE CLINIC | Facility: CLINIC | Age: 55
End: 2020-06-01

## 2020-06-01 ENCOUNTER — TELEPHONE (OUTPATIENT)
Dept: UROLOGY | Facility: CLINIC | Age: 55
End: 2020-06-01

## 2020-06-01 RX ORDER — CEFAZOLIN SODIUM 1 G/50ML
1000 SOLUTION INTRAVENOUS ONCE
Status: CANCELLED | OUTPATIENT
Start: 2020-06-15 | End: 2020-06-01

## 2020-06-03 ENCOUNTER — TELEPHONE (OUTPATIENT)
Dept: UROLOGY | Facility: CLINIC | Age: 55
End: 2020-06-03

## 2020-06-04 ENCOUNTER — TELEPHONE (OUTPATIENT)
Dept: UROLOGY | Facility: MEDICAL CENTER | Age: 55
End: 2020-06-04

## 2020-06-05 ENCOUNTER — TELEPHONE (OUTPATIENT)
Dept: FAMILY MEDICINE CLINIC | Facility: CLINIC | Age: 55
End: 2020-06-05

## 2020-06-11 ENCOUNTER — APPOINTMENT (OUTPATIENT)
Dept: LAB | Facility: MEDICAL CENTER | Age: 55
End: 2020-06-11
Payer: MEDICARE

## 2020-06-11 ENCOUNTER — TELEMEDICINE (OUTPATIENT)
Dept: FAMILY MEDICINE CLINIC | Facility: CLINIC | Age: 55
End: 2020-06-11
Payer: MEDICARE

## 2020-06-11 DIAGNOSIS — Z11.4 SCREENING FOR HIV (HUMAN IMMUNODEFICIENCY VIRUS): ICD-10-CM

## 2020-06-11 DIAGNOSIS — N13.30 HYDRONEPHROSIS OF RIGHT KIDNEY: ICD-10-CM

## 2020-06-11 DIAGNOSIS — N13.30 HYDRONEPHROSIS OF RIGHT KIDNEY: Primary | ICD-10-CM

## 2020-06-11 DIAGNOSIS — Z11.59 SPECIAL SCREENING EXAMINATION FOR VIRAL DISEASE: ICD-10-CM

## 2020-06-11 LAB
ANION GAP SERPL CALCULATED.3IONS-SCNC: 2 MMOL/L (ref 4–13)
BASOPHILS # BLD AUTO: 0.08 THOUSANDS/ΜL (ref 0–0.1)
BASOPHILS NFR BLD AUTO: 1 % (ref 0–1)
BUN SERPL-MCNC: 15 MG/DL (ref 5–25)
CALCIUM SERPL-MCNC: 9.4 MG/DL (ref 8.3–10.1)
CHLORIDE SERPL-SCNC: 108 MMOL/L (ref 100–108)
CO2 SERPL-SCNC: 26 MMOL/L (ref 21–32)
CREAT SERPL-MCNC: 0.96 MG/DL (ref 0.6–1.3)
EOSINOPHIL # BLD AUTO: 0.56 THOUSAND/ΜL (ref 0–0.61)
EOSINOPHIL NFR BLD AUTO: 7 % (ref 0–6)
ERYTHROCYTE [DISTWIDTH] IN BLOOD BY AUTOMATED COUNT: 14.7 % (ref 11.6–15.1)
GFR SERPL CREATININE-BSD FRML MDRD: 88 ML/MIN/1.73SQ M
GLUCOSE P FAST SERPL-MCNC: 97 MG/DL (ref 65–99)
HCT VFR BLD AUTO: 39.6 % (ref 36.5–49.3)
HGB BLD-MCNC: 12.8 G/DL (ref 12–17)
IMM GRANULOCYTES # BLD AUTO: 0.04 THOUSAND/UL (ref 0–0.2)
IMM GRANULOCYTES NFR BLD AUTO: 1 % (ref 0–2)
LYMPHOCYTES # BLD AUTO: 1.62 THOUSANDS/ΜL (ref 0.6–4.47)
LYMPHOCYTES NFR BLD AUTO: 19 % (ref 14–44)
MCH RBC QN AUTO: 30.9 PG (ref 26.8–34.3)
MCHC RBC AUTO-ENTMCNC: 32.3 G/DL (ref 31.4–37.4)
MCV RBC AUTO: 96 FL (ref 82–98)
MONOCYTES # BLD AUTO: 0.95 THOUSAND/ΜL (ref 0.17–1.22)
MONOCYTES NFR BLD AUTO: 11 % (ref 4–12)
NEUTROPHILS # BLD AUTO: 5.37 THOUSANDS/ΜL (ref 1.85–7.62)
NEUTS SEG NFR BLD AUTO: 61 % (ref 43–75)
NRBC BLD AUTO-RTO: 0 /100 WBCS
PLATELET # BLD AUTO: 357 THOUSANDS/UL (ref 149–390)
PMV BLD AUTO: 10.3 FL (ref 8.9–12.7)
POTASSIUM SERPL-SCNC: 4.3 MMOL/L (ref 3.5–5.3)
RBC # BLD AUTO: 4.14 MILLION/UL (ref 3.88–5.62)
SODIUM SERPL-SCNC: 136 MMOL/L (ref 136–145)
WBC # BLD AUTO: 8.62 THOUSAND/UL (ref 4.31–10.16)

## 2020-06-11 PROCEDURE — 80048 BASIC METABOLIC PNL TOTAL CA: CPT

## 2020-06-11 PROCEDURE — 87389 HIV-1 AG W/HIV-1&-2 AB AG IA: CPT

## 2020-06-11 PROCEDURE — 87086 URINE CULTURE/COLONY COUNT: CPT

## 2020-06-11 PROCEDURE — 99213 OFFICE O/P EST LOW 20 MIN: CPT | Performed by: FAMILY MEDICINE

## 2020-06-11 PROCEDURE — 85025 COMPLETE CBC W/AUTO DIFF WBC: CPT

## 2020-06-11 PROCEDURE — 36415 COLL VENOUS BLD VENIPUNCTURE: CPT

## 2020-06-11 PROCEDURE — G2025 DIS SITE TELE SVCS RHC/FQHC: HCPCS | Performed by: FAMILY MEDICINE

## 2020-06-11 PROCEDURE — U0003 INFECTIOUS AGENT DETECTION BY NUCLEIC ACID (DNA OR RNA); SEVERE ACUTE RESPIRATORY SYNDROME CORONAVIRUS 2 (SARS-COV-2) (CORONAVIRUS DISEASE [COVID-19]), AMPLIFIED PROBE TECHNIQUE, MAKING USE OF HIGH THROUGHPUT TECHNOLOGIES AS DESCRIBED BY CMS-2020-01-R: HCPCS

## 2020-06-12 LAB
BACTERIA UR CULT: NORMAL
HIV 1+2 AB+HIV1 P24 AG SERPL QL IA: NORMAL
SARS-COV-2 RNA SPEC QL NAA+PROBE: NOT DETECTED

## 2020-06-15 ENCOUNTER — APPOINTMENT (OUTPATIENT)
Dept: RADIOLOGY | Facility: HOSPITAL | Age: 55
End: 2020-06-15
Payer: MEDICARE

## 2020-06-15 ENCOUNTER — HOSPITAL ENCOUNTER (OUTPATIENT)
Facility: HOSPITAL | Age: 55
Setting detail: OUTPATIENT SURGERY
Discharge: HOME | End: 2020-06-15
Attending: UROLOGY | Admitting: UROLOGY
Payer: MEDICARE

## 2020-06-15 ENCOUNTER — TELEPHONE (OUTPATIENT)
Dept: UROLOGY | Facility: CLINIC | Age: 55
End: 2020-06-15

## 2020-06-15 ENCOUNTER — ANESTHESIA (OUTPATIENT)
Dept: PERIOP | Facility: HOSPITAL | Age: 55
End: 2020-06-15
Payer: MEDICARE

## 2020-06-15 ENCOUNTER — ANESTHESIA EVENT (OUTPATIENT)
Dept: PERIOP | Facility: HOSPITAL | Age: 55
End: 2020-06-15
Payer: MEDICARE

## 2020-06-15 VITALS
WEIGHT: 152 LBS | TEMPERATURE: 98.5 F | SYSTOLIC BLOOD PRESSURE: 102 MMHG | RESPIRATION RATE: 20 BRPM | HEIGHT: 66 IN | HEART RATE: 72 BPM | BODY MASS INDEX: 24.43 KG/M2 | OXYGEN SATURATION: 97 % | DIASTOLIC BLOOD PRESSURE: 50 MMHG

## 2020-06-15 DIAGNOSIS — G89.18 POSTOPERATIVE PAIN: Primary | ICD-10-CM

## 2020-06-15 DIAGNOSIS — N13.30 HYDRONEPHROSIS OF RIGHT KIDNEY: ICD-10-CM

## 2020-06-15 DIAGNOSIS — N13.5 URETERAL OBSTRUCTION, RIGHT: ICD-10-CM

## 2020-06-15 PROCEDURE — C1769 GUIDE WIRE: HCPCS | Performed by: UROLOGY

## 2020-06-15 PROCEDURE — NC001 PR NO CHARGE: Performed by: UROLOGY

## 2020-06-15 PROCEDURE — 74450 X-RAY URETHRA/BLADDER: CPT

## 2020-06-15 PROCEDURE — 52351 CYSTOURETERO & OR PYELOSCOPE: CPT | Performed by: UROLOGY

## 2020-06-15 RX ORDER — ONDANSETRON 2 MG/ML
INJECTION INTRAMUSCULAR; INTRAVENOUS AS NEEDED
Status: DISCONTINUED | OUTPATIENT
Start: 2020-06-15 | End: 2020-06-15 | Stop reason: SURG

## 2020-06-15 RX ORDER — PHENAZOPYRIDINE HYDROCHLORIDE 100 MG/1
200 TABLET, FILM COATED ORAL ONCE
Status: COMPLETED | OUTPATIENT
Start: 2020-06-15 | End: 2020-06-15

## 2020-06-15 RX ORDER — FENTANYL CITRATE/PF 50 MCG/ML
25 SYRINGE (ML) INJECTION
Status: DISCONTINUED | OUTPATIENT
Start: 2020-06-15 | End: 2020-06-15 | Stop reason: HOSPADM

## 2020-06-15 RX ORDER — MAGNESIUM HYDROXIDE 1200 MG/15ML
LIQUID ORAL AS NEEDED
Status: DISCONTINUED | OUTPATIENT
Start: 2020-06-15 | End: 2020-06-15 | Stop reason: HOSPADM

## 2020-06-15 RX ORDER — METOCLOPRAMIDE HYDROCHLORIDE 5 MG/ML
INJECTION INTRAMUSCULAR; INTRAVENOUS AS NEEDED
Status: DISCONTINUED | OUTPATIENT
Start: 2020-06-15 | End: 2020-06-15 | Stop reason: SURG

## 2020-06-15 RX ORDER — PROPOFOL 10 MG/ML
INJECTION, EMULSION INTRAVENOUS AS NEEDED
Status: DISCONTINUED | OUTPATIENT
Start: 2020-06-15 | End: 2020-06-15 | Stop reason: SURG

## 2020-06-15 RX ORDER — FENTANYL CITRATE 50 UG/ML
INJECTION, SOLUTION INTRAMUSCULAR; INTRAVENOUS AS NEEDED
Status: DISCONTINUED | OUTPATIENT
Start: 2020-06-15 | End: 2020-06-15 | Stop reason: SURG

## 2020-06-15 RX ORDER — HYDROCODONE BITARTRATE AND ACETAMINOPHEN 5; 325 MG/1; MG/1
1 TABLET ORAL EVERY 6 HOURS PRN
Status: DISCONTINUED | OUTPATIENT
Start: 2020-06-15 | End: 2020-06-15 | Stop reason: HOSPADM

## 2020-06-15 RX ORDER — ONDANSETRON 2 MG/ML
4 INJECTION INTRAMUSCULAR; INTRAVENOUS ONCE AS NEEDED
Status: DISCONTINUED | OUTPATIENT
Start: 2020-06-15 | End: 2020-06-15 | Stop reason: HOSPADM

## 2020-06-15 RX ORDER — PHENAZOPYRIDINE HYDROCHLORIDE 200 MG/1
200 TABLET, FILM COATED ORAL EVERY 8 HOURS PRN
Qty: 10 TABLET | Refills: 0 | Status: SHIPPED | OUTPATIENT
Start: 2020-06-15 | End: 2020-07-06

## 2020-06-15 RX ORDER — LIDOCAINE HYDROCHLORIDE 10 MG/ML
INJECTION, SOLUTION EPIDURAL; INFILTRATION; INTRACAUDAL; PERINEURAL AS NEEDED
Status: DISCONTINUED | OUTPATIENT
Start: 2020-06-15 | End: 2020-06-15 | Stop reason: SURG

## 2020-06-15 RX ORDER — CEPHALEXIN 500 MG/1
500 CAPSULE ORAL EVERY 6 HOURS SCHEDULED
Qty: 12 CAPSULE | Refills: 0 | Status: SHIPPED | OUTPATIENT
Start: 2020-06-15 | End: 2020-06-18

## 2020-06-15 RX ORDER — HYDROCODONE BITARTRATE AND ACETAMINOPHEN 5; 325 MG/1; MG/1
1-2 TABLET ORAL EVERY 4 HOURS PRN
Qty: 20 TABLET | Refills: 0 | Status: SHIPPED | OUTPATIENT
Start: 2020-06-15 | End: 2020-06-25

## 2020-06-15 RX ORDER — SODIUM CHLORIDE, SODIUM LACTATE, POTASSIUM CHLORIDE, CALCIUM CHLORIDE 600; 310; 30; 20 MG/100ML; MG/100ML; MG/100ML; MG/100ML
125 INJECTION, SOLUTION INTRAVENOUS CONTINUOUS
Status: DISCONTINUED | OUTPATIENT
Start: 2020-06-15 | End: 2020-06-15 | Stop reason: HOSPADM

## 2020-06-15 RX ORDER — CEFAZOLIN SODIUM 1 G/50ML
1000 SOLUTION INTRAVENOUS ONCE
Status: COMPLETED | OUTPATIENT
Start: 2020-06-15 | End: 2020-06-15

## 2020-06-15 RX ORDER — DEXAMETHASONE SODIUM PHOSPHATE 10 MG/ML
INJECTION, SOLUTION INTRAMUSCULAR; INTRAVENOUS AS NEEDED
Status: DISCONTINUED | OUTPATIENT
Start: 2020-06-15 | End: 2020-06-15 | Stop reason: SURG

## 2020-06-15 RX ORDER — EPHEDRINE SULFATE 50 MG/ML
INJECTION INTRAVENOUS AS NEEDED
Status: DISCONTINUED | OUTPATIENT
Start: 2020-06-15 | End: 2020-06-15 | Stop reason: SURG

## 2020-06-15 RX ORDER — PROMETHAZINE HYDROCHLORIDE 25 MG/ML
12.5 INJECTION, SOLUTION INTRAMUSCULAR; INTRAVENOUS ONCE AS NEEDED
Status: DISCONTINUED | OUTPATIENT
Start: 2020-06-15 | End: 2020-06-15 | Stop reason: HOSPADM

## 2020-06-15 RX ADMIN — LIDOCAINE HYDROCHLORIDE 50 MG: 10 INJECTION, SOLUTION EPIDURAL; INFILTRATION; INTRACAUDAL; PERINEURAL at 08:36

## 2020-06-15 RX ADMIN — FENTANYL CITRATE 50 MCG: 50 INJECTION, SOLUTION INTRAMUSCULAR; INTRAVENOUS at 08:36

## 2020-06-15 RX ADMIN — METOCLOPRAMIDE HYDROCHLORIDE 10 MG: 5 INJECTION INTRAMUSCULAR; INTRAVENOUS at 08:57

## 2020-06-15 RX ADMIN — PHENYLEPHRINE HYDROCHLORIDE 100 MCG: 10 INJECTION INTRAVENOUS at 09:08

## 2020-06-15 RX ADMIN — EPHEDRINE SULFATE 5 MG: 50 INJECTION, SOLUTION INTRAVENOUS at 08:51

## 2020-06-15 RX ADMIN — PHENAZOPYRIDINE 200 MG: 100 TABLET ORAL at 10:03

## 2020-06-15 RX ADMIN — PROPOFOL 200 MG: 10 INJECTION, EMULSION INTRAVENOUS at 08:36

## 2020-06-15 RX ADMIN — EPHEDRINE SULFATE 10 MG: 50 INJECTION, SOLUTION INTRAVENOUS at 09:00

## 2020-06-15 RX ADMIN — SODIUM CHLORIDE, SODIUM LACTATE, POTASSIUM CHLORIDE, AND CALCIUM CHLORIDE 125 ML/HR: .6; .31; .03; .02 INJECTION, SOLUTION INTRAVENOUS at 07:47

## 2020-06-15 RX ADMIN — PHENYLEPHRINE HYDROCHLORIDE 100 MCG: 10 INJECTION INTRAVENOUS at 08:44

## 2020-06-15 RX ADMIN — FENTANYL CITRATE 25 MCG: 50 INJECTION, SOLUTION INTRAMUSCULAR; INTRAVENOUS at 09:09

## 2020-06-15 RX ADMIN — FENTANYL CITRATE 25 MCG: 50 INJECTION, SOLUTION INTRAMUSCULAR; INTRAVENOUS at 09:13

## 2020-06-15 RX ADMIN — CEFAZOLIN SODIUM 1000 MG: 1 SOLUTION INTRAVENOUS at 08:40

## 2020-06-15 RX ADMIN — EPHEDRINE SULFATE 10 MG: 50 INJECTION, SOLUTION INTRAVENOUS at 09:21

## 2020-06-15 RX ADMIN — ONDANSETRON HYDROCHLORIDE 4 MG: 2 INJECTION, SOLUTION INTRAVENOUS at 08:57

## 2020-06-15 RX ADMIN — DEXAMETHASONE SODIUM PHOSPHATE 10 MG: 10 INJECTION, SOLUTION INTRAMUSCULAR; INTRAVENOUS at 08:57

## 2020-06-16 ENCOUNTER — TELEPHONE (OUTPATIENT)
Dept: UROLOGY | Facility: AMBULATORY SURGERY CENTER | Age: 55
End: 2020-06-16

## 2020-06-16 NOTE — TELEPHONE ENCOUNTER
Patient managed by Marisa Johnson is calling to ask for his records sent to Dr [facility]  Fax number [phone]  Would need records sent in order to make appointment recommended by Marisa Johnson for pinched nerve  Only needs notes from surgery and cat scan

## 2020-06-17 ENCOUNTER — TELEPHONE (OUTPATIENT)
Dept: UROLOGY | Facility: CLINIC | Age: 55
End: 2020-06-17

## 2020-06-19 ENCOUNTER — TELEPHONE (OUTPATIENT)
Dept: INTERVENTIONAL RADIOLOGY/VASCULAR | Facility: HOSPITAL | Age: 55
End: 2020-06-19

## 2020-06-24 ENCOUNTER — TELEPHONE (OUTPATIENT)
Dept: SURGERY | Facility: HOSPITAL | Age: 55
End: 2020-06-24

## 2020-06-25 ENCOUNTER — TELEPHONE (OUTPATIENT)
Dept: UROLOGY | Facility: CLINIC | Age: 55
End: 2020-06-25

## 2020-06-25 ENCOUNTER — HOSPITAL ENCOUNTER (OUTPATIENT)
Dept: INTERVENTIONAL RADIOLOGY/VASCULAR | Facility: HOSPITAL | Age: 55
Discharge: HOME | End: 2020-06-25
Admitting: RADIOLOGY
Payer: MEDICARE

## 2020-06-25 VITALS
TEMPERATURE: 98.7 F | RESPIRATION RATE: 16 BRPM | HEART RATE: 92 BPM | SYSTOLIC BLOOD PRESSURE: 142 MMHG | DIASTOLIC BLOOD PRESSURE: 66 MMHG | OXYGEN SATURATION: 99 %

## 2020-06-25 DIAGNOSIS — N13.30 HYDRONEPHROSIS OF RIGHT KIDNEY: ICD-10-CM

## 2020-06-25 DIAGNOSIS — N28.89 URETERAL MASS: ICD-10-CM

## 2020-06-25 DIAGNOSIS — G89.18 POSTOPERATIVE PAIN: Primary | ICD-10-CM

## 2020-06-25 DIAGNOSIS — N13.5 URETERAL OBSTRUCTION, RIGHT: ICD-10-CM

## 2020-06-25 DIAGNOSIS — N13.30 HYDRONEPHROSIS OF RIGHT KIDNEY: Primary | ICD-10-CM

## 2020-06-25 PROCEDURE — 99152 MOD SED SAME PHYS/QHP 5/>YRS: CPT

## 2020-06-25 PROCEDURE — C1769 GUIDE WIRE: HCPCS

## 2020-06-25 PROCEDURE — 99152 MOD SED SAME PHYS/QHP 5/>YRS: CPT | Performed by: RADIOLOGY

## 2020-06-25 PROCEDURE — C1729 CATH, DRAINAGE: HCPCS

## 2020-06-25 PROCEDURE — 99153 MOD SED SAME PHYS/QHP EA: CPT

## 2020-06-25 PROCEDURE — C1894 INTRO/SHEATH, NON-LASER: HCPCS

## 2020-06-25 PROCEDURE — C2625 STENT, NON-COR, TEM W/DEL SY: HCPCS

## 2020-06-25 PROCEDURE — C1887 CATHETER, GUIDING: HCPCS

## 2020-06-25 PROCEDURE — 50433 PLMT NEPHROURETERAL CATHETER: CPT

## 2020-06-25 PROCEDURE — 50432 PLMT NEPHROSTOMY CATHETER: CPT | Performed by: RADIOLOGY

## 2020-06-25 RX ORDER — FENTANYL CITRATE 50 UG/ML
INJECTION, SOLUTION INTRAMUSCULAR; INTRAVENOUS CODE/TRAUMA/SEDATION MEDICATION
Status: COMPLETED | OUTPATIENT
Start: 2020-06-25 | End: 2020-06-25

## 2020-06-25 RX ORDER — SODIUM CHLORIDE, SODIUM LACTATE, POTASSIUM CHLORIDE, CALCIUM CHLORIDE 600; 310; 30; 20 MG/100ML; MG/100ML; MG/100ML; MG/100ML
50 INJECTION, SOLUTION INTRAVENOUS CONTINUOUS
Status: DISCONTINUED | OUTPATIENT
Start: 2020-06-25 | End: 2020-06-29 | Stop reason: HOSPADM

## 2020-06-25 RX ORDER — MIDAZOLAM HYDROCHLORIDE 2 MG/2ML
INJECTION, SOLUTION INTRAMUSCULAR; INTRAVENOUS CODE/TRAUMA/SEDATION MEDICATION
Status: COMPLETED | OUTPATIENT
Start: 2020-06-25 | End: 2020-06-25

## 2020-06-25 RX ORDER — LIDOCAINE HYDROCHLORIDE 10 MG/ML
INJECTION, SOLUTION EPIDURAL; INFILTRATION; INTRACAUDAL; PERINEURAL CODE/TRAUMA/SEDATION MEDICATION
Status: COMPLETED | OUTPATIENT
Start: 2020-06-25 | End: 2020-06-25

## 2020-06-25 RX ORDER — CEFAZOLIN SODIUM 1 G/50ML
1000 SOLUTION INTRAVENOUS ONCE
Status: COMPLETED | OUTPATIENT
Start: 2020-06-25 | End: 2020-06-25

## 2020-06-25 RX ORDER — HYDROCODONE BITARTRATE AND ACETAMINOPHEN 5; 325 MG/1; MG/1
1-2 TABLET ORAL EVERY 4 HOURS PRN
Qty: 20 TABLET | Refills: 0 | Status: SHIPPED | OUTPATIENT
Start: 2020-06-25 | End: 2020-07-06

## 2020-06-25 RX ADMIN — FENTANYL CITRATE 50 MCG: 50 INJECTION INTRAMUSCULAR; INTRAVENOUS at 14:37

## 2020-06-25 RX ADMIN — FENTANYL CITRATE 50 MCG: 50 INJECTION INTRAMUSCULAR; INTRAVENOUS at 14:12

## 2020-06-25 RX ADMIN — SODIUM CHLORIDE, SODIUM LACTATE, POTASSIUM CHLORIDE, AND CALCIUM CHLORIDE 50 ML/HR: .6; .31; .03; .02 INJECTION, SOLUTION INTRAVENOUS at 11:26

## 2020-06-25 RX ADMIN — MIDAZOLAM HYDROCHLORIDE 1 MG: 1 INJECTION, SOLUTION INTRAMUSCULAR; INTRAVENOUS at 14:23

## 2020-06-25 RX ADMIN — IOHEXOL 40 ML: 300 INJECTION, SOLUTION INTRAVENOUS at 14:34

## 2020-06-25 RX ADMIN — FENTANYL CITRATE 50 MCG: 50 INJECTION INTRAMUSCULAR; INTRAVENOUS at 15:19

## 2020-06-25 RX ADMIN — FENTANYL CITRATE 50 MCG: 50 INJECTION INTRAMUSCULAR; INTRAVENOUS at 14:23

## 2020-06-25 RX ADMIN — MIDAZOLAM HYDROCHLORIDE 1 MG: 1 INJECTION, SOLUTION INTRAMUSCULAR; INTRAVENOUS at 14:18

## 2020-06-25 RX ADMIN — FENTANYL CITRATE 50 MCG: 50 INJECTION INTRAMUSCULAR; INTRAVENOUS at 14:18

## 2020-06-25 RX ADMIN — LIDOCAINE HYDROCHLORIDE 10 ML: 10 INJECTION, SOLUTION EPIDURAL; INFILTRATION; INTRACAUDAL; PERINEURAL at 14:24

## 2020-06-25 RX ADMIN — CEFAZOLIN SODIUM 1000 MG: 1 SOLUTION INTRAVENOUS at 13:53

## 2020-06-25 RX ADMIN — MIDAZOLAM HYDROCHLORIDE 1 MG: 1 INJECTION, SOLUTION INTRAMUSCULAR; INTRAVENOUS at 14:36

## 2020-06-25 RX ADMIN — MIDAZOLAM HYDROCHLORIDE 1 MG: 1 INJECTION, SOLUTION INTRAMUSCULAR; INTRAVENOUS at 14:12

## 2020-06-26 ENCOUNTER — TELEPHONE (OUTPATIENT)
Dept: OTHER | Facility: OTHER | Age: 55
End: 2020-06-26

## 2020-06-30 ENCOUNTER — TELEPHONE (OUTPATIENT)
Dept: FAMILY MEDICINE CLINIC | Facility: CLINIC | Age: 55
End: 2020-06-30

## 2020-06-30 RX ORDER — HYDROCODONE BITARTRATE AND ACETAMINOPHEN 5; 325 MG/1; MG/1
1 TABLET ORAL EVERY 12 HOURS PRN
Qty: 20 TABLET | Refills: 0 | Status: SHIPPED | OUTPATIENT
Start: 2020-06-30 | End: 2020-07-06

## 2020-06-30 RX ORDER — IBUPROFEN 600 MG/1
600 TABLET ORAL EVERY 12 HOURS PRN
Qty: 20 TABLET | Refills: 0 | Status: SHIPPED | OUTPATIENT
Start: 2020-06-30 | End: 2020-07-06

## 2020-07-02 ENCOUNTER — TELEPHONE (OUTPATIENT)
Dept: UROLOGY | Facility: MEDICAL CENTER | Age: 55
End: 2020-07-02

## 2020-07-02 NOTE — TELEPHONE ENCOUNTER
He's using quite a bit of narcotic medication several refills over the course of the month including norco #20 just two days ago  Now that his kidney is decompressed by the tube it should be less painful  He has an upcoming consultation for major ureteral surgery  It is going to be difficult to manage his acute and chronic pain that was present far before we started seeing him throughout this process  He should see pain management specialist for ongoing opioid use  NSAIDs and heating pad should be recommended for his current pain

## 2020-07-02 NOTE — TELEPHONE ENCOUNTER
Patient left a message on the Medication Refill voice mail line requesting a new prescription for pain medication    "He's in so much pain he doesn't know what to do with himself "

## 2020-07-06 ENCOUNTER — HOSPITAL ENCOUNTER (EMERGENCY)
Facility: HOSPITAL | Age: 55
Discharge: HOME | End: 2020-07-06
Attending: EMERGENCY MEDICINE | Admitting: EMERGENCY MEDICINE
Payer: MEDICARE

## 2020-07-06 ENCOUNTER — APPOINTMENT (EMERGENCY)
Dept: CT IMAGING | Facility: HOSPITAL | Age: 55
End: 2020-07-06
Payer: MEDICARE

## 2020-07-06 ENCOUNTER — NURSE TRIAGE (OUTPATIENT)
Dept: OTHER | Facility: OTHER | Age: 55
End: 2020-07-06

## 2020-07-06 ENCOUNTER — TELEPHONE (OUTPATIENT)
Dept: SURGICAL ONCOLOGY | Facility: CLINIC | Age: 55
End: 2020-07-06

## 2020-07-06 VITALS
HEIGHT: 66 IN | RESPIRATION RATE: 18 BRPM | BODY MASS INDEX: 23.6 KG/M2 | WEIGHT: 146.83 LBS | TEMPERATURE: 97.4 F | HEART RATE: 81 BPM | SYSTOLIC BLOOD PRESSURE: 167 MMHG | DIASTOLIC BLOOD PRESSURE: 70 MMHG | OXYGEN SATURATION: 99 %

## 2020-07-06 DIAGNOSIS — D41.01 UNCERTAIN TUMOR OF KIDNEY AND URETER, RIGHT: ICD-10-CM

## 2020-07-06 DIAGNOSIS — R10.9 RIGHT FLANK PAIN: Primary | ICD-10-CM

## 2020-07-06 DIAGNOSIS — R19.00 RETROPERITONEAL MASS: ICD-10-CM

## 2020-07-06 DIAGNOSIS — D41.21 UNCERTAIN TUMOR OF KIDNEY AND URETER, RIGHT: ICD-10-CM

## 2020-07-06 DIAGNOSIS — N28.89 URETERAL MASS: Primary | ICD-10-CM

## 2020-07-06 LAB
ALBUMIN SERPL BCP-MCNC: 3.5 G/DL (ref 3.5–5)
ALP SERPL-CCNC: 88 U/L (ref 46–116)
ALT SERPL W P-5'-P-CCNC: 26 U/L (ref 12–78)
ANION GAP SERPL CALCULATED.3IONS-SCNC: 8 MMOL/L (ref 4–13)
AST SERPL W P-5'-P-CCNC: 21 U/L (ref 5–45)
BACTERIA UR QL AUTO: ABNORMAL /HPF
BASOPHILS # BLD AUTO: 0.06 THOUSANDS/ΜL (ref 0–0.1)
BASOPHILS NFR BLD AUTO: 1 % (ref 0–1)
BILIRUB SERPL-MCNC: 0.3 MG/DL (ref 0.2–1)
BILIRUB UR QL STRIP: NEGATIVE
BUN SERPL-MCNC: 12 MG/DL (ref 5–25)
CALCIUM SERPL-MCNC: 9 MG/DL (ref 8.3–10.1)
CHLORIDE SERPL-SCNC: 104 MMOL/L (ref 100–108)
CLARITY UR: ABNORMAL
CO2 SERPL-SCNC: 26 MMOL/L (ref 21–32)
COLOR UR: YELLOW
CREAT SERPL-MCNC: 1.1 MG/DL (ref 0.6–1.3)
EOSINOPHIL # BLD AUTO: 0.5 THOUSAND/ΜL (ref 0–0.61)
EOSINOPHIL NFR BLD AUTO: 8 % (ref 0–6)
ERYTHROCYTE [DISTWIDTH] IN BLOOD BY AUTOMATED COUNT: 14.2 % (ref 11.6–15.1)
GFR SERPL CREATININE-BSD FRML MDRD: 75 ML/MIN/1.73SQ M
GLUCOSE SERPL-MCNC: 112 MG/DL (ref 65–140)
GLUCOSE UR STRIP-MCNC: NEGATIVE MG/DL
HCT VFR BLD AUTO: 37.1 % (ref 36.5–49.3)
HGB BLD-MCNC: 12.2 G/DL (ref 12–17)
HGB UR QL STRIP.AUTO: ABNORMAL
IMM GRANULOCYTES # BLD AUTO: 0.01 THOUSAND/UL (ref 0–0.2)
IMM GRANULOCYTES NFR BLD AUTO: 0 % (ref 0–2)
KETONES UR STRIP-MCNC: NEGATIVE MG/DL
LEUKOCYTE ESTERASE UR QL STRIP: ABNORMAL
LYMPHOCYTES # BLD AUTO: 1.19 THOUSANDS/ΜL (ref 0.6–4.47)
LYMPHOCYTES NFR BLD AUTO: 18 % (ref 14–44)
MCH RBC QN AUTO: 31.6 PG (ref 26.8–34.3)
MCHC RBC AUTO-ENTMCNC: 32.9 G/DL (ref 31.4–37.4)
MCV RBC AUTO: 96 FL (ref 82–98)
MONOCYTES # BLD AUTO: 0.69 THOUSAND/ΜL (ref 0.17–1.22)
MONOCYTES NFR BLD AUTO: 10 % (ref 4–12)
MUCOUS THREADS UR QL AUTO: ABNORMAL
NEUTROPHILS # BLD AUTO: 4.22 THOUSANDS/ΜL (ref 1.85–7.62)
NEUTS SEG NFR BLD AUTO: 63 % (ref 43–75)
NITRITE UR QL STRIP: NEGATIVE
NON-SQ EPI CELLS URNS QL MICRO: ABNORMAL /HPF
NRBC BLD AUTO-RTO: 0 /100 WBCS
PH UR STRIP.AUTO: 5.5 [PH]
PLATELET # BLD AUTO: 359 THOUSANDS/UL (ref 149–390)
PMV BLD AUTO: 8.7 FL (ref 8.9–12.7)
POTASSIUM SERPL-SCNC: 4.4 MMOL/L (ref 3.5–5.3)
PROT SERPL-MCNC: 7.2 G/DL (ref 6.4–8.2)
PROT UR STRIP-MCNC: ABNORMAL MG/DL
RBC # BLD AUTO: 3.86 MILLION/UL (ref 3.88–5.62)
RBC #/AREA URNS AUTO: ABNORMAL /HPF
SODIUM SERPL-SCNC: 138 MMOL/L (ref 136–145)
SP GR UR STRIP.AUTO: <=1.005 (ref 1–1.03)
UROBILINOGEN UR QL STRIP.AUTO: 0.2 E.U./DL
WBC # BLD AUTO: 6.67 THOUSAND/UL (ref 4.31–10.16)
WBC #/AREA URNS AUTO: ABNORMAL /HPF

## 2020-07-06 PROCEDURE — 74177 CT ABD & PELVIS W/CONTRAST: CPT

## 2020-07-06 PROCEDURE — 96374 THER/PROPH/DIAG INJ IV PUSH: CPT

## 2020-07-06 PROCEDURE — 99284 EMERGENCY DEPT VISIT MOD MDM: CPT

## 2020-07-06 PROCEDURE — 96361 HYDRATE IV INFUSION ADD-ON: CPT

## 2020-07-06 PROCEDURE — 85025 COMPLETE CBC W/AUTO DIFF WBC: CPT | Performed by: STUDENT IN AN ORGANIZED HEALTH CARE EDUCATION/TRAINING PROGRAM

## 2020-07-06 PROCEDURE — 99285 EMERGENCY DEPT VISIT HI MDM: CPT | Performed by: EMERGENCY MEDICINE

## 2020-07-06 PROCEDURE — 80053 COMPREHEN METABOLIC PANEL: CPT | Performed by: STUDENT IN AN ORGANIZED HEALTH CARE EDUCATION/TRAINING PROGRAM

## 2020-07-06 PROCEDURE — 81001 URINALYSIS AUTO W/SCOPE: CPT | Performed by: STUDENT IN AN ORGANIZED HEALTH CARE EDUCATION/TRAINING PROGRAM

## 2020-07-06 PROCEDURE — 36415 COLL VENOUS BLD VENIPUNCTURE: CPT | Performed by: STUDENT IN AN ORGANIZED HEALTH CARE EDUCATION/TRAINING PROGRAM

## 2020-07-06 RX ORDER — MORPHINE SULFATE 15 MG/1
15 TABLET ORAL EVERY 6 HOURS PRN
Qty: 23 TABLET | Refills: 0 | Status: SHIPPED | OUTPATIENT
Start: 2020-07-06 | End: 2020-07-10 | Stop reason: SDUPTHER

## 2020-07-06 RX ORDER — MORPHINE SULFATE 4 MG/ML
4 INJECTION, SOLUTION INTRAMUSCULAR; INTRAVENOUS ONCE
Status: COMPLETED | OUTPATIENT
Start: 2020-07-06 | End: 2020-07-06

## 2020-07-06 RX ADMIN — MORPHINE SULFATE 4 MG: 4 INJECTION INTRAVENOUS at 08:16

## 2020-07-06 RX ADMIN — SODIUM CHLORIDE 1000 ML: 0.9 INJECTION, SOLUTION INTRAVENOUS at 09:33

## 2020-07-06 RX ADMIN — IOHEXOL 100 ML: 350 INJECTION, SOLUTION INTRAVENOUS at 08:40

## 2020-07-06 NOTE — TELEPHONE ENCOUNTER
Spoke with Kelvin Harris from [employer] and she stated that she would have to request an apt for patient with Dr Laurence Krishnamurthy and will call PT back with @4-$8 hours  Also spoke with Merle Romo from "[employer]" she stated that she would give the patient call to step up the apt  Will check back on Wed

## 2020-07-06 NOTE — TELEPHONE ENCOUNTER
Spoke with [facility]- New CT findings of advancing disease in retroperitoneal nodes and muscle  Being discharged with opioid analgesia  Med onc, rad onc referrals placed  Keep appt with ZP  Next week for surgical opinion

## 2020-07-06 NOTE — TELEPHONE ENCOUNTER
New Patient Encounter    New Patient Intake Form   Patient Details:  Josi Calero  1965  [id number]    Background Information:  [address] starts by opening a telephone encounter and gathering the following information   Who is calling to schedule? If not self, relationship to patient? Provider    Referring Provider Naveen REINA   What is the diagnosis? Ureteral mass    Is this diagnosis confirmed? Yes   When was the diagnosis? 7/6   Is there a confirmed diagnosis from a biopsy/tissue reviewed by pathology? no   Is patient aware of diagnosis? Yes   Is there a personal history of cancer and what kind? Provider unaware   Is there a family history of cancer and what kind? Provider unaware   Reason for visit? New Diagnosis   Have you had any imaging or labs done? If so: when, where? Yes  [facility]   Are records in EPIC? yes   Was the patient told to bring a disk? no   Does the patient smoke or Vape? no   If yes, how many packs or cartridges per day? na   Scheduling Information:   Preferred [place]:  [facility]     Are there any dates/time the patient cannot be seen? na   Miscellaneous: requesting an appointment from Dr Jacob Gallego team   After completing the above information, please route to Financial Counselor and the appropriate Nurse Navigator for review

## 2020-07-06 NOTE — TELEPHONE ENCOUNTER
Reason for Disposition   Patient sounds very sick or weak to the triager    Answer Assessment - Initial Assessment Questions  1  SYMPTOM: "What's the main symptom you're concerned about?" (e g , frequency, incontinence)      Pain  2  ONSET: "When did the  pain  start?"      All night  3  PAIN: "Is there any pain?" If so, ask: "How bad is it?" (Scale: 1-10; mild, moderate, severe)      10/10  4  CAUSE: "What do you think is causing the symptoms?"      Stent  5  OTHER SYMPTOMS: "Do you have any other symptoms?" (e g , fever, flank pain, blood in urine, pain with urination)      Can't get out of bed because of the pain      Protocols used: [facility]

## 2020-07-06 NOTE — TELEPHONE ENCOUNTER
Regarding: stent pain  ----- Message from [facility] sent at 7/6/2020  5:44 AM EDT -----  " I am in excruciating pain and cant take the stent anymore "

## 2020-07-06 NOTE — ED ATTENDING ATTESTATION
7/6/2020  IManuel DO, saw and evaluated the patient  I have discussed the patient with the resident/non-physician practitioner and agree with the resident's/non-physician practitioner's findings, Plan of Care, and MDM as documented in the resident's/non-physician practitioner's note, except where noted  All available labs and Radiology studies were reviewed  I was present for key portions of any procedure(s) performed by the resident/non-physician practitioner and I was immediately available to provide assistance  At this point I agree with the current assessment done in the Emergency Department  I have conducted an independent evaluation of this patient a history and physical is as follows:    59-year-old male with noted past medical history who presents to the emergency department complaining of ongoing right flank/right lower back pain present for approximately the past two weeks following right-sided nephrostomy tube placement for urinary obstruction  Prior history was this: Patient was seen in this emergency department on 22 May for right flank pain  CT demonstrated right hydronephrosis without any obstructing stone  A cystoscopy was performed on 15 Clarisa that demonstrated distal ureteral obstruction; no wire was able to be passed nor any brushings taken for cytology at that time  Patient underwent a right nephrostomy tube placement on 25 June at this facility by Dr Reji Kemp  He has been taking hydrocodone without any significant improvement in his symptoms  States he has subjective fever yesterday and ongoing nausea without vomiting  Did have hematuria for five days after nephrostomy tube placement states that he is now having normal urine output although does have an unusual smell  He notes pain in the right flank at the nephrostomy tube placement site as well as his right hemiabdomen general with some extension into the right thigh    He did contact his urologist ([facility]) about a possible prescription for more pain medications but was directed to the emergency department for further evaluation  ROS as per HPI; otherwise negative  General: Awake/alert  Moderate painful distress  A/o to person/place/time  Vital signs reviewed    HEENT:  Normocephalic/atraumatic  External ear/hearing wnl  Neck:  Phonation normal with no stridor/dysphonia  Normal ROM  No palpable masses or thyromegaly  No overlying skin changes  Cardiac:  Radial pulses 2+ bilaterally  DP/PT pulses 2+ bilaterally  RRR with s1/s2; no m/r/g  Pulmonary:   No respiratory distress  Lungs CTA b/l with no w/c/r    Abdomen:  Nondistended  No palpable masses  Mild diffuse tenderness to palpation without guarding/rebound  There is mild right-sided CVA tenderness to palpation  Skin:  Warm/dry  No diaphoresis  No visible rashes or skin changes  Right nephrostomy tube in place with end capped; mild erythema at site  No drainage (purulent or otherwise) at site  Lymphatic:  No palpable cervical lymphadenopathy  No submandibular/submental/preauricular/posterior auricular lymphadenopathy  Neuro:  GCS 15  A/o to person/place/time  PERRLA; EOMI  Facial expressions symmetric  Tongue/uvual project and elevate in midline  Gait normal     A/p:  No obvious infection at nephrostomy tube site  Check CBC to assess white count and BMP to assess renal function  Urinalysis  Check CT abdomen/pelvis with IV contrast to assess position of tube and for any associated complications such as an abscess/intra-abdominal infection  Consultation with urology  ED Course  ED Course as of Jul 06 1002 Mon Jul 06, 2020   6369 1  WBC wnl   2  Hg/Hct wnl   3  Plt wnl   4  Electrolytes wnl   5  Transaminases wnl  6  UA pending          3283 CT abdomen pelvis with contrast     Disposition:  CT demonstrated worsening metastatic disease  Nephrostomy tube in appropriate position   This result was reviewed with the patient personally by me as well as Dr Mabry Signs  There are no markers to suggest infection or otherwise joe sort of nephrostomy tube dysfunction  He will need prompt urology follow-up with urology: currently scheduled 16 July  Needs discussion of treatment options  [place] PDMP queried prior to Rx  Based on review of records, there is no evidence of controlled substance abuse or repeated inappropriate ED visits to obtain controlled substances  It is therefore reasonable to prescribe a short course of opiate-based analgesic for symptom control with close f/u to PMD also advised  Precautions given to avoid use of opiate medications while driving and to abstain from alcohol while using        Critical Care Time  Procedures

## 2020-07-06 NOTE — ED NOTES
Pt ambulated to the bathroom to provide urine sample at this time     Jennifer Walton [place]  07/06/20 1932

## 2020-07-06 NOTE — ED PROVIDER NOTES
History  Chief Complaint   Patient presents with    Surgical Problem Re-Evaluation     Pt had nephrostomy tube placed 2 wks ago and has pain in the area  "my daughter said it looks like its infected 2 days ago"     HPI  63 yom male presents 2 weeks s/p right nephrostomy tube placement  Since procedure he has been having worsening right flank pain not relieved with prescribed vicodin, subjective fevers and chills  He also reports several episodes of blood in the urine  Endorses the pain is 10/10 sharp and deep, states it occasionally radiates to the groin, and he feels as if the tube has been dislodged and is coming out of his penis  Denies any other symptoms  No recent travel or sick contacts  States he spoke to urology via phone and they advised him to come to the ED for evaluation   Prior to Admission Medications   Prescriptions Last Dose Informant Patient Reported? Taking? albuterol (Ventolin HFA) 90 mcg/act inhaler   No Yes   Sig: Inhale 2 puffs every 6 (six) hours as needed for wheezing   clonazePAM (KlonoPIN) 0 5 mg tablet   No Yes   Sig: Take 1 tablet (0 5 mg total) by mouth 2 (two) times a day as needed for anxiety   escitalopram (LEXAPRO) 10 mg tablet   Yes Yes   Sig: Take 10 mg by mouth daily at bedtime   fluticasone (FLONASE) 50 mcg/act nasal spray   No Yes   Sig: SPRAY 2 SPRAYS INTO EACH NOSTRIL EVERY DAY   fluticasone (FLOVENT HFA) 220 mcg/act inhaler   No Yes   Sig: Inhale 2 puffs 2 (two) times a day Rinse mouth after use     lisinopril (ZESTRIL) 10 mg tablet   No No   Sig: Take 1 tablet (10 mg total) by mouth daily   omeprazole (PriLOSEC) 40 MG capsule   No Yes   Sig: Take 1 capsule (40 mg total) by mouth daily before breakfast   zolpidem (AMBIEN) 10 mg tablet   Yes Yes   Si TAB AT BEDTIME AS NEEDED FOR INSOMNIA      Facility-Administered Medications: None       Past Medical History:   Diagnosis Date    Asthma     [facility] [address] [facility]     COPD (chronic obstructive [facility] disease) [facility] [address] [facility]     Disease of thyroid gland     Hypertension     Hyperthyroidism        Past Surgical History:   Procedure Laterality Date    DENTAL SURGERY      FL RETROGRADE URETHROCYSTOGRAM  6/15/2020    HERNIA REPAIR Left     inguinal    IR TUBE PLACEMENT  6/25/2020    CT CYSTOURETHROSCOPY,URETER CATHETER Right 6/15/2020    Procedure: CYSTOSCOPY RETROGRADE PYELOGRAM and ureteroscopy;  Surgeon: Severa Chol, MD;  Location: [facility];  Service: Urology       Family History   Problem Relation Age of Onset    Heart disease Mother     Hyperthyroidism Mother     Heart disease Father     Hyperthyroidism Father      I have reviewed and agree with the history as documented  E-Cigarette/Vaping    E-Cigarette Use Never User      E-Cigarette/Vaping Substances    Nicotine No     THC No     CBD No     Flavoring No     Other No     Unknown No      Social History     Tobacco Use    Smoking status: Current Every Day Smoker     Packs/day: 1 00    Smokeless tobacco: Never Used   Substance Use Topics    Alcohol use: No    Drug use: No        Review of Systems   Constitutional: Negative for activity change, appetite change, chills, fatigue and fever  HENT: Negative for congestion, dental problem, drooling, ear discharge, ear pain, facial swelling, postnasal drip, rhinorrhea and sinus pain  Eyes: Negative for photophobia, pain, discharge and itching  Respiratory: Negative for apnea, cough, chest tightness and shortness of breath  Cardiovascular: Negative for chest pain and leg swelling  Gastrointestinal: Negative for abdominal distention, abdominal pain, anal bleeding, constipation, diarrhea and nausea  Endocrine: Negative for cold intolerance, heat intolerance and polydipsia  Genitourinary: Positive for hematuria  Negative for difficulty urinating  Right flank pain   Musculoskeletal: Negative for arthralgias, gait problem, joint swelling and myalgias     Skin: Negative for color change and pallor  Allergic/Immunologic: Negative for immunocompromised state  Neurological: Negative for dizziness, seizures, facial asymmetry, weakness, light-headedness, numbness and headaches  Psychiatric/Behavioral: Negative for agitation, behavioral problems, confusion, decreased concentration and dysphoric mood  Physical Exam  ED Triage Vitals [07/06/20 0706]   Temperature Pulse Respirations Blood Pressure SpO2   (!) 97 4 °F (36 3 °C) 103 20 114/75 97 %      Temp Source Heart Rate Source Patient Position - Orthostatic VS BP Location FiO2 (%)   Temporal Monitor Sitting Left arm --      Pain Score       9             Orthostatic Vital Signs  Vitals:    07/06/20 0730 07/06/20 0800 07/06/20 0900 07/06/20 0930   BP: 119/67 122/65 129/58 125/64   Pulse: 92 84 85 85   Patient Position - Orthostatic VS: Sitting Sitting Sitting Sitting       Physical Exam   Constitutional: He is oriented to person, place, and time  He appears well-developed and well-nourished  HENT:   Head: Normocephalic  Eyes: Pupils are equal, round, and reactive to light  EOM are normal    Neck: Normal range of motion  Neck supple  Cardiovascular: Normal rate and regular rhythm  Pulmonary/Chest: Effort normal and breath sounds normal    Abdominal: Soft  Bowel sounds are normal  There is CVA tenderness  Right nephrostomy tube in place, no obvious signs of infection some mild erythema at the surigcal site most likely representing normal post op healing  No drainage noted  Severe right flank tenderness to palp     Musculoskeletal: Normal range of motion  Neurological: He is alert and oriented to person, place, and time  Skin: Skin is warm         ED Medications  Medications   sodium chloride 0 9 % bolus 1,000 mL (1,000 mL Intravenous New Bag 7/6/20 0933)   morphine (PF) 4 mg/mL injection 4 mg (4 mg Intravenous Given 7/6/20 0816)   iohexol (OMNIPAQUE) 350 MG/ML injection (SINGLE-DOSE) 100 mL (100 mL Intravenous Given 7/6/20 0840) Diagnostic Studies  Results Reviewed     Procedure Component Value Units Date/Time    UA w Reflex to Microscopic w Reflex to Culture [[id number]]  (Abnormal) Collected:  07/06/20 0956    Lab Status:  Final result Specimen:  Urine, Clean Catch Updated:  07/06/20 1004     Color, UA Yellow     Clarity, UA Slightly Cloudy     Specific Gravity, UA <=1 005     pH, UA 5 5     Leukocytes, UA Small     Nitrite, UA Negative     Protein, UA Trace mg/dl      Glucose, UA Negative mg/dl      Ketones, UA Negative mg/dl      Urobilinogen, UA 0 2 E U /dl      Bilirubin, UA Negative     Blood, UA Large    Urine Microscopic [[id number]] Collected:  07/06/20 0956    Lab Status:   In process Specimen:  Urine, Clean Catch Updated:  07/06/20 1004    Comprehensive metabolic panel [[id number]] Collected:  07/06/20 0811    Lab Status:  Final result Specimen:  Blood from Arm, Right Updated:  07/06/20 0831     Sodium 138 mmol/L      Potassium 4 4 mmol/L      Chloride 104 mmol/L      CO2 26 mmol/L      ANION GAP 8 mmol/L      BUN 12 mg/dL      Creatinine 1 10 mg/dL      Glucose 112 mg/dL      Calcium 9 0 mg/dL      AST 21 U/L      ALT 26 U/L      Alkaline Phosphatase 88 U/L      Total Protein 7 2 g/dL      Albumin 3 5 g/dL      Total Bilirubin 0 30 mg/dL      eGFR 75 ml/min/1 73sq m     Narrative:       [place] guidelines for Chronic Kidney Disease (CKD):     Stage 1 with normal or high GFR (GFR > 90 mL/min/1 73 square meters)    Stage 2 Mild CKD (GFR = 60-89 mL/min/1 73 square meters)    Stage 3A Moderate CKD (GFR = 45-59 mL/min/1 73 square meters)    Stage 3B Moderate CKD (GFR = 30-44 mL/min/1 73 square meters)    Stage 4 Severe CKD (GFR = 15-29 mL/min/1 73 square meters)    Stage 5 End Stage CKD (GFR <15 mL/min/1 73 square meters)  Note: GFR calculation is accurate only with a steady state creatinine    CBC and differential [[id number]]  (Abnormal) Collected:  07/06/20 0811    Lab Status:  Final result Specimen:  Blood from Arm, Right Updated:  07/06/20 0818     WBC 6 67 Thousand/uL      RBC 3 86 Million/uL      Hemoglobin 12 2 g/dL      Hematocrit 37 1 %      MCV 96 fL      MCH 31 6 pg      MCHC 32 9 g/dL      RDW 14 2 %      MPV 8 7 fL      Platelets 155 Thousands/uL      nRBC 0 /100 WBCs      Neutrophils Relative 63 %      Immat GRANS % 0 %      Lymphocytes Relative 18 %      Monocytes Relative 10 %      Eosinophils Relative 8 %      Basophils Relative 1 %      Neutrophils Absolute 4 22 Thousands/µL      Immature Grans Absolute 0 01 Thousand/uL      Lymphocytes Absolute 1 19 Thousands/µL      Monocytes Absolute 0 69 Thousand/µL      Eosinophils Absolute 0 50 Thousand/µL      Basophils Absolute 0 06 Thousands/µL                  CT abdomen pelvis with contrast   Final Result by Zaki Daugherty MD (07/06 1768)      Interval placement of right percutaneous nephroureterostomy catheter with improved right hydroureteronephrosis  Thickening about the distal right ureter suspicious for possible urothelial neoplasm  There is associated retroperitoneal lymphadenopathy in    keeping with metastatic involvement  In addition, there are enhancing mass lesions within the right psoas muscle also suspicious for metastatic disease versus a primary malignancy  Workstation performed: [id number]               Procedures  Procedures      ED Course  ED Course as of Jul 06 1015   Mon Jul 06, 2020   0818 CBC and differential(!)   1893 Pt reevaluated significant pain relief with morphine, pending official read of CT       5267 CT reviewed and urology contacted via tiger text awaiting response      1011 Spoke to urology no acute interventions at this time, recommending D/C with f/u to urology and palliative/ Heme/Onc as well as increase in pain management regiment   Patient made aware of CT findings       1015 D/C on MSIR will be sent by Dr Celia Berry AUDIT      Most Recent Value   Initial Alcohol Screen: US AUDIT-C 1  How often do you have a drink containing alcohol?  0 Filed at: 07/06/2020 0707   2  How many drinks containing alcohol do you have on a typical day you are drinking? 0 Filed at: 07/06/2020 0707   3a  Male UNDER 65: How often do you have five or more drinks on one occasion? 0 Filed at: 07/06/2020 0707   Audit-C Score  0 Filed at: 07/06/2020 4523                  AUSTYN/DAST-10      Most Recent Value   How many times in the past year have you    Used an illegal drug or used a prescription medication for non-medical reasons?   Never Filed at: 07/06/2020 0707                              MDM  Number of Diagnoses or Management Options  Diagnosis management comments: Ddx: dislodged tube, worsening hydro, UTI, Pyelo,     CT abdomen and pelvis, pain management, routine labs, consider consult to uro pending ct        Amount and/or Complexity of Data Reviewed  Clinical lab tests: ordered  Tests in the radiology section of CPT®: ordered  Tests in the medicine section of CPT®: ordered  Discussion of test results with the performing providers: yes  Decide to obtain previous medical records or to obtain history from someone other than the patient: yes  Obtain history from someone other than the patient: yes  Review and summarize past medical records: yes  Discuss the patient with other providers: yes  Independent visualization of images, tracings, or specimens: yes    Risk of Complications, Morbidity, and/or Mortality  Presenting problems: moderate  Diagnostic procedures: moderate  Management options: moderate    Patient Progress  Patient progress: stable        Disposition  Final diagnoses:   Uncertain tumor of kidney and ureter, right   Right flank pain     Time reflects when diagnosis was documented in both MDM as applicable and the Disposition within this note     Time User Action Codes Description Comment    7/6/2020 10:14 AM Thirza Cooks Add [A39 34,  M04 96] Uncertain tumor of kidney and ureter, right     7/6/2020 10:14 AM Angela Clamp Add [R10 9] Right flank pain     7/6/2020 10:14 AM [place] Clamp Modify [V95 88,  V22 54] Uncertain tumor of kidney and ureter, right     7/6/2020 10:14 AM Angela Clamp Modify [R10 9] Right flank pain       ED Disposition     ED Disposition Condition Date/Time Comment    Discharge Stable Mon Jul 6, 2020 10:12 AM Hardik Cadenaclementinela discharge to home/self care  Follow-up Information     Follow up With Specialties Details Why Contact Info Additional [address] [facility] [place] [facility] In 1 week  [address] [id number] [phone] [facility] [place], [address] [place] [address], [address] [place] [address] Kaylynn Rotmhan PA-C Family Medicine   [address] [place] [address], [address] [phone]             Patient's Medications   Discharge Prescriptions    No medications on file     No discharge procedures on file  PDMP Review     None           ED Provider  Attending physically available and evaluated Frankie AGUILAR managed the patient along with the ED Attending      Electronically Signed by         Radha Torres MD  07/06/20 9597

## 2020-07-08 ENCOUNTER — TELEMEDICINE (OUTPATIENT)
Dept: FAMILY MEDICINE CLINIC | Facility: CLINIC | Age: 55
End: 2020-07-08
Payer: MEDICARE

## 2020-07-08 DIAGNOSIS — D41.21 UNCERTAIN TUMOR OF KIDNEY AND URETER, RIGHT: Primary | ICD-10-CM

## 2020-07-08 DIAGNOSIS — D41.01 UNCERTAIN TUMOR OF KIDNEY AND URETER, RIGHT: Primary | ICD-10-CM

## 2020-07-08 PROCEDURE — 99213 OFFICE O/P EST LOW 20 MIN: CPT | Performed by: FAMILY MEDICINE

## 2020-07-08 PROCEDURE — G2025 DIS SITE TELE SVCS RHC/FQHC: HCPCS | Performed by: FAMILY MEDICINE

## 2020-07-08 NOTE — PROGRESS NOTES
Virtual Regular Visit      Assessment/Plan:    Problem List Items Addressed This Visit     None      Visit Diagnoses     Uncertain tumor of kidney and ureter, right    -  Primary    Relevant Orders    Ambulatory referral to Palliative Care        - Follow up with urology and heme/onc as scheduled  I reviewed CT scan results with the patient and discussed that more information regarding his diagnosis and treatment options will be discussed at these follow up visits  - Will refer to palliative care for further pain management  Morphine filled 7/6 for 23 tablets  - Follow up with psychiatry as scheduled  Reason for visit is   Chief Complaint   Patient presents with    Follow-up     Pt here for follow up, 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, wants a refill on his morphine to help with the pain  Pt is walking with a cane due to the pain  Pt is nauseous now as well, he is very anxious and nervous     Medication Refill     pt wants morphine ER not IR     Virtual Regular Visit        Encounter provider Hong Argueta PA-C    Provider located at [address] [id number]      Recent Visits  No visits were found meeting these conditions  Showing recent visits within past 7 days and meeting all other requirements     Today's Visits  Date Type Provider Dept   07/08/20 [address] [place], [place] [address]D[facility] [address] Manjinder Vasquez today's visits and meeting all other requirements     Future Appointments  No visits were found meeting these conditions  Showing future appointments within next 150 days and meeting all other requirements        The patient was identified by name and date of birth  Rubi Elizabeth was informed that this is a telemedicine visit and that the visit is being conducted through [employer] and patient was informed that this is not a secure, HIPAA-complaint platform   He agrees to proceed     My office door was closed  No one else was in the room  He acknowledged consent and understanding of privacy and security of the video platform  The patient has agreed to participate and understands they can discontinue the visit at any time  Patient is aware this is a billable service  Pedro Sofia is a 63 y o  male  Jodi Garcia is a 63year old male with history of HTN, bipolar disorder, chronic neck and back pain, and subclinical hyperthyroidism, presenting for ED follow up  Patient has had ongoing right flank pain which was initially evaluated on 5/12/20  CT scan done 5/22 showed marked right hydroureteronephrosis with associated parenchymal loss suggesting a chronic process with obstruction at the level the distal 3rd of the right ureter where there is prominent soft tissue in or about the ureter  No calculus noted  Patient had a cystoscopy retrograde pyelogram with right ureteral stent placement 6/15  On 6/25 he underwent placement of percutaneous nephrostomy tube  He then presented to the ED on 7/6 due to worsening pain and hematuria  CT scan done in the ED revealed interval placement of right percutaneous nephroureterostomy catheter with improved right hydroureteronephrosis; thickening about the distal right ureter suspicious for possible urothelial neoplasm and associated retroperitoneal lymphadenopathy in keeping with metastatic involvement  In addition, there are enhancing mass lesions within the right psoas muscle also suspicious for metastatic disease versus a primary malignancy  Patient is scheduled for follow up with urology 7/16 and heme/onc 7/21  Upon discharge from the ED he was prescribed morphine 15 mg q6 hours for pain  Today patient reports that he is still in pain and the morphine is only lasting an hour for him  He is using a cane for ambulation  He expresses concern and anxiety about his pain, diagnosis, and what his treatment will entail         Past Medical History:   Diagnosis Date    Asthma     [facility] [address] [facility]     COPD (chronic obstructive pulmonary disease) ([facility] [address] [facility]     Disease of thyroid gland     Hypertension     Hyperthyroidism        Past Surgical History:   Procedure Laterality Date    DENTAL SURGERY      FL RETROGRADE URETHROCYSTOGRAM  6/15/2020    HERNIA REPAIR Left     inguinal    IR TUBE PLACEMENT  6/25/2020    [place] CYSTOURETHROSCOPY,URETER CATHETER Right 6/15/2020    Procedure: CYSTOSCOPY RETROGRADE PYELOGRAM and ureteroscopy;  Surgeon: Amos Meredith MD;  Location: [facility];  Service: Urology       Current Outpatient Medications   Medication Sig Dispense Refill    albuterol (Ventolin HFA) 90 mcg/act inhaler Inhale 2 puffs every 6 (six) hours as needed for wheezing 18 g 2    clonazePAM (KlonoPIN) 0 5 mg tablet Take 1 tablet (0 5 mg total) by mouth 2 (two) times a day as needed for anxiety 14 tablet 0    escitalopram (LEXAPRO) 10 mg tablet Take 10 mg by mouth daily at bedtime      fluticasone (FLONASE) 50 mcg/act nasal spray SPRAY 2 SPRAYS INTO EACH NOSTRIL EVERY DAY 1 Bottle 5    fluticasone (FLOVENT HFA) 220 mcg/act inhaler Inhale 2 puffs 2 (two) times a day Rinse mouth after use  1 Inhaler 5    lisinopril (ZESTRIL) 10 mg tablet Take 1 tablet (10 mg total) by mouth daily 30 tablet 5    morphine (MSIR) 15 mg tablet Take 1 tablet (15 mg total) by mouth every 6 (six) hours as needed for severe pain (Do not drive or drink alcohol while using  )Max Daily Amount: 60 mg 23 tablet 0    omeprazole (PriLOSEC) 40 MG capsule Take 1 capsule (40 mg total) by mouth daily before breakfast 30 capsule 5    zolpidem (AMBIEN) 10 mg tablet 1 TAB AT BEDTIME AS NEEDED FOR INSOMNIA       No current facility-administered medications for this visit  Allergies   Allergen Reactions    Ketorolac Hives       Review of Systems   Constitutional: Negative for appetite change, chills, diaphoresis, fever and unexpected weight change     Eyes: Negative for visual disturbance  Respiratory: Negative for cough, chest tightness, shortness of breath and wheezing  Cardiovascular: Negative for chest pain, palpitations and leg swelling  Gastrointestinal: Positive for nausea  Negative for abdominal pain, constipation, diarrhea and vomiting  Genitourinary: Positive for flank pain  Neurological: Negative for dizziness, syncope, weakness, light-headedness and headaches  Psychiatric/Behavioral: Negative for self-injury and suicidal ideas  The patient is nervous/anxious  Video Exam    There were no vitals filed for this visit  Physical Exam   Constitutional: He is oriented to person, place, and time  He appears well-developed and well-nourished  No distress  HENT:   Head: Normocephalic and atraumatic  Pulmonary/Chest: Effort normal  No respiratory distress  Neurological: He is alert and oriented to person, place, and time  Psychiatric: He has a normal mood and affect  His behavior is normal         I spent 15 minutes directly with the patient during this visit      [address] [place] [address] Road acknowledges that he has consented to an online visit or consultation  He understands that the online visit is based solely on information provided by him, and that, in the absence of a face-to-face physical evaluation by the physician, the diagnosis he receives is both limited and provisional in terms of accuracy and completeness  This is not intended to replace a full medical face-to-face evaluation by the physician  Hardik Tran understands and accepts these terms

## 2020-07-09 NOTE — TELEPHONE ENCOUNTER
Reached out to [facility] DAMARIS and Dr Marylen Herbert for plan for biopsy  Wash was referred to both Rad Onc and Med Onc due to results of recent CT  He does not have tissue pathology  He is scheduled to see Dr Lima Sosa on 7/16/20

## 2020-07-10 ENCOUNTER — TELEPHONE (OUTPATIENT)
Dept: UROLOGY | Facility: CLINIC | Age: 55
End: 2020-07-10

## 2020-07-10 DIAGNOSIS — R52 PAIN: Primary | ICD-10-CM

## 2020-07-10 DIAGNOSIS — D41.21 UNCERTAIN TUMOR OF KIDNEY AND URETER, RIGHT: ICD-10-CM

## 2020-07-10 DIAGNOSIS — R10.9 RIGHT FLANK PAIN: ICD-10-CM

## 2020-07-10 DIAGNOSIS — D41.01 UNCERTAIN TUMOR OF KIDNEY AND URETER, RIGHT: ICD-10-CM

## 2020-07-10 RX ORDER — MORPHINE SULFATE 15 MG/1
15 TABLET ORAL EVERY 6 HOURS PRN
Qty: 23 TABLET | Refills: 0 | Status: SHIPPED | OUTPATIENT
Start: 2020-07-10 | End: 2020-07-16 | Stop reason: ALTCHOICE

## 2020-07-10 NOTE — TELEPHONE ENCOUNTER
Called and spoke to patient, he is very thankful for the medication and the reach out to palliative care

## 2020-07-10 NOTE — TELEPHONE ENCOUNTER
MSIR refilled  Palliative care has been consulted and will hopefully be reaching out to evaluate him and help manage his pain as well  agitation

## 2020-07-10 NOTE — TELEPHONE ENCOUNTER
Pt called requesting a refill on his 15mg Morphine tablets  Pt would like the medication to be extended release if possible  Pt can be reached at [phone]

## 2020-07-14 ENCOUNTER — PATIENT OUTREACH (OUTPATIENT)
Dept: UROLOGY | Facility: AMBULATORY SURGERY CENTER | Age: 55
End: 2020-07-14

## 2020-07-14 ENCOUNTER — TELEPHONE (OUTPATIENT)
Dept: UROLOGY | Facility: CLINIC | Age: 55
End: 2020-07-14

## 2020-07-14 DIAGNOSIS — G89.3 CANCER RELATED PAIN: ICD-10-CM

## 2020-07-14 DIAGNOSIS — G89.3 CANCER RELATED PAIN: Primary | ICD-10-CM

## 2020-07-14 DIAGNOSIS — R59.1 LYMPHADENOPATHY: Primary | ICD-10-CM

## 2020-07-14 RX ORDER — NALOXONE HYDROCHLORIDE 4 MG/.1ML
1 SPRAY NASAL
Qty: 1 EACH | Refills: 1 | Status: SHIPPED | OUTPATIENT
Start: 2020-07-14 | End: 2020-09-28

## 2020-07-14 RX ORDER — HYDROCODONE BITARTRATE AND ACETAMINOPHEN 5; 325 MG/1; MG/1
1-2 TABLET ORAL EVERY 4 HOURS PRN
Qty: 20 TABLET | Refills: 0 | Status: SHIPPED | OUTPATIENT
Start: 2020-07-14 | End: 2020-07-16 | Stop reason: ALTCHOICE

## 2020-07-14 NOTE — TELEPHONE ENCOUNTER
Contacted and spoke with patient  Informed patient Dr Lianne Porter prescribed Norco and Narcan nasal spray for him  Explained to patient the Narcan nasal spray is used for respiratory depression and instructions are on the bottle  Asked patient to inform his daughter if she notes patient experiencing respiratory distress she needs to administer the Narcan  Patient voiced understanding of the above instructions

## 2020-07-14 NOTE — TELEPHONE ENCOUNTER
Patient called this morning saying he is in severe pain and he cant stand it and his meds are not helping  He would like a call back as soon as possible to see what can be done

## 2020-07-14 NOTE — PROGRESS NOTES
Patient called requesting oxycodone or Norco, because the morphine is not working  He has CT proven metastases with enlargement of his soft tissue mass of his distal ureter  Therefore he is cancer related pain and referral has been made to pain management/palliative care  In the meantime electronically prescribed Norco along with Narcan

## 2020-07-14 NOTE — PROGRESS NOTES
Dr Marylen Herbert spoke to [facility] and ordered IR lymph node biopsy and Palliative care consult  Called Palliative care and emailed IR to make them aware of the referral orders  Meena  Introduced myself and gave him my direct contact information  He does not have a car and he lives with his daughter  She works but has been taking him to his appointment

## 2020-07-14 NOTE — TELEPHONE ENCOUNTER
Called katerina back from rad onc and left a message about her setting up appt for patient , she stated last week she would give him a call

## 2020-07-14 NOTE — TELEPHONE ENCOUNTER
Please let him know that I called Norco into his pharmacy along with Narcan  The Narcan is for him to take as need with nasal spray in case of over sedation or overdose

## 2020-07-14 NOTE — TELEPHONE ENCOUNTER
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, s/p nephrostomy tube placement 06/25/2020  Patient seen in the ED on 07/06/2020 secondary to abdominal pain  CT performed  CT showing suspicion for metastatic disease  Patient discharged home on Morphine  Contacted and spoke with patient  Patient experiencing severe right hip pain traveling down the leg  Pain 9/10  Patient states Morphine in effective  Asking for refill of Norco or Percocet  Asked about his bowel movements  Patient reports his appetite is poor, moving his bowels once a week  Patient has a virtual visit with Dr Kamilah King on 07/16/2020  Has appointment with Hem/Onc 07/21/2020  Will forward message to Dr Mary Garcia for his advise then call patient back

## 2020-07-14 NOTE — PROGRESS NOTES
I spoke with patient on the phone-he is agreeable to interventional Radiology consultation for setting up a lymph node biopsy so we can get a tissue diagnosis  Also will arrange palliative care consultation for cancer related pain

## 2020-07-15 ENCOUNTER — TELEPHONE (OUTPATIENT)
Dept: PALLIATIVE MEDICINE | Facility: CLINIC | Age: 55
End: 2020-07-15

## 2020-07-15 PROBLEM — F32.9 MAJOR DEPRESSION: Chronic | Status: ACTIVE | Noted: 2020-07-15

## 2020-07-15 PROBLEM — G89.3 CANCER RELATED PAIN: Status: ACTIVE | Noted: 2020-07-15

## 2020-07-15 PROBLEM — G89.29 CHRONIC BACK PAIN: Chronic | Status: ACTIVE | Noted: 2020-07-15

## 2020-07-15 PROBLEM — M54.9 CHRONIC BACK PAIN: Chronic | Status: ACTIVE | Noted: 2020-07-15

## 2020-07-15 PROBLEM — F41.0 PANIC DISORDER: Chronic | Status: ACTIVE | Noted: 2020-07-15

## 2020-07-15 PROBLEM — R93.89 ABNORMAL CT SCAN: Status: ACTIVE | Noted: 2020-07-15

## 2020-07-15 NOTE — TELEPHONE ENCOUNTER
----- Message from Gerald Prieto sent at 7/15/2020 12:05 PM EDT -----  Pt has been scheduled  ----- Message -----  From: Stephany Lind RN  Sent: 7/15/2020  10:54 AM EDT  To: Nori Cerrato MA, #    She does not have tissue Biopsy back yet but we can probably assume it is cancer and at least schedule her for a new patient appointment and probably by the time she has appointment she will have a definitive diagnosis  They say she has cancer-related pain    ----- Message -----  From: Nori Cerrato MA  Sent: 7/15/2020  10:19 AM EDT  To: [facility]    Referral from Urology  Please help determine if pt is Palliative Care appropriate    Thank you

## 2020-07-16 ENCOUNTER — TELEMEDICINE (OUTPATIENT)
Dept: UROLOGY | Facility: CLINIC | Age: 55
End: 2020-07-16
Payer: MEDICARE

## 2020-07-16 ENCOUNTER — TELEPHONE (OUTPATIENT)
Dept: INTERVENTIONAL RADIOLOGY/VASCULAR | Facility: HOSPITAL | Age: 55
End: 2020-07-16

## 2020-07-16 ENCOUNTER — PATIENT OUTREACH (OUTPATIENT)
Dept: UROLOGY | Facility: CLINIC | Age: 55
End: 2020-07-16

## 2020-07-16 ENCOUNTER — TELEMEDICINE (OUTPATIENT)
Dept: PALLIATIVE MEDICINE | Facility: CLINIC | Age: 55
End: 2020-07-16
Payer: MEDICARE

## 2020-07-16 DIAGNOSIS — G89.3 CANCER RELATED PAIN: ICD-10-CM

## 2020-07-16 DIAGNOSIS — I10 ESSENTIAL HYPERTENSION: ICD-10-CM

## 2020-07-16 DIAGNOSIS — F41.0 PANIC DISORDER: Chronic | ICD-10-CM

## 2020-07-16 DIAGNOSIS — K21.9 GASTROESOPHAGEAL REFLUX DISEASE, ESOPHAGITIS PRESENCE NOT SPECIFIED: ICD-10-CM

## 2020-07-16 DIAGNOSIS — R59.0 RETROPERITONEAL LYMPHADENOPATHY: ICD-10-CM

## 2020-07-16 DIAGNOSIS — M54.16 CHRONIC LUMBAR RADICULOPATHY: ICD-10-CM

## 2020-07-16 DIAGNOSIS — E05.90 SUBCLINICAL HYPERTHYROIDISM: ICD-10-CM

## 2020-07-16 DIAGNOSIS — F41.9 ANXIETY DISORDER, UNSPECIFIED TYPE: ICD-10-CM

## 2020-07-16 DIAGNOSIS — R93.89 ABNORMAL CT SCAN: Primary | ICD-10-CM

## 2020-07-16 DIAGNOSIS — M54.9 CHRONIC BACK PAIN, UNSPECIFIED BACK LOCATION, UNSPECIFIED BACK PAIN LATERALITY: Chronic | ICD-10-CM

## 2020-07-16 DIAGNOSIS — J43.8 OTHER EMPHYSEMA (HCC): ICD-10-CM

## 2020-07-16 DIAGNOSIS — N39.8 UROTHELIAL LESION: ICD-10-CM

## 2020-07-16 DIAGNOSIS — F33.9 EPISODE OF RECURRENT MAJOR DEPRESSIVE DISORDER, UNSPECIFIED DEPRESSION EPISODE SEVERITY (HCC): Chronic | ICD-10-CM

## 2020-07-16 DIAGNOSIS — F33.9 EPISODE OF RECURRENT MAJOR DEPRESSIVE DISORDER, UNSPECIFIED DEPRESSION EPISODE SEVERITY (HCC): Primary | Chronic | ICD-10-CM

## 2020-07-16 DIAGNOSIS — G89.29 CHRONIC BACK PAIN, UNSPECIFIED BACK LOCATION, UNSPECIFIED BACK PAIN LATERALITY: Chronic | ICD-10-CM

## 2020-07-16 PROCEDURE — 99215 OFFICE O/P EST HI 40 MIN: CPT | Performed by: UROLOGY

## 2020-07-16 PROCEDURE — 3078F DIAST BP <80 MM HG: CPT | Performed by: UROLOGY

## 2020-07-16 PROCEDURE — 3077F SYST BP >= 140 MM HG: CPT | Performed by: UROLOGY

## 2020-07-16 PROCEDURE — 99204 OFFICE O/P NEW MOD 45 MIN: CPT | Performed by: INTERNAL MEDICINE

## 2020-07-16 RX ORDER — OXYCODONE HYDROCHLORIDE 5 MG/1
5-10 TABLET ORAL EVERY 4 HOURS PRN
Qty: 240 TABLET | Refills: 0 | Status: SHIPPED | OUTPATIENT
Start: 2020-07-16 | End: 2020-07-31

## 2020-07-16 NOTE — PATIENT INSTRUCTIONS
It was a pleasure to speak with you today  Thank you for your time  · Will start oxycodone IR  Take 1-2 tabs (5-10mg) every 4 hours as needed for cancer-related pain  As pain improves, we hope that nausea and anxiety may improve as well  · Take Tylenol 1000mg three times per day, every day  · Once the oxycodone is available to you, stop morphine and stop Norco    · Continue your clonazepam and escitalopram   Our office will be happy to refill those for you when the time comes  Similarly if you need more of your antinausea medicines please call us  · If you wish to have assistance from our  team as we discussed please call our office  · If constipation becomes a problem:  · Start taking Miralax daily  · You can use senna, 1 to 2 tabs, once or twice daily as needed for constipation  Use as directed on the box/bottle  · Should that not be enough for your constipation, you can try Dulcolax, and/or Milk of Magnesia  · Should that not be enough, consider an enema  · All of these medications are available over-the-counter  · Our office provide free to copy of the 5 Wishes document please review and complete if you so desire  · Return next week  We can meet in person or via video ([place])  In the future he wished to go to 1 of our offices which is closer to you, we do have an office symptom aqua as we discussed  You would be meeting with my partner Dr Patricia Schmitt  · Call us for refills on medications that we supply, as needed  · If something changes and you need to come in sooner, please call our office! Please protect yourself from the novel Coronavirus (COVID-19)!     Even though we still do not have a vaccine or good antiviral drugs for this infection, the following strategies can help you stay healthy:    · Perform hand hygiene with soap and water or hand  with at least 60% alcohol often, but especially after going to the bathroom, after blowing your nose/coughing/sneezing, before eating, and after coming into contact with a potentially contaminated public surface  · Avoid touching your face! · Avoid close contact with people who are sick  Avoid large gatherings, and don't travel unnecessarily  · Stay home when you are sick, except to get medical care or other essentials  If you can eat and drink and breathe and sleep, please consider calling your doctor's office or scheduling a virtual video visit instead of visiting in person  · Cover your coughs and sneezes with a tissue, or your elbow  · Clean frequently touched surfaces and objects daily (e g , tables, countertops, light switches, doorknobs, and cabinet handles)  Regular household detergent and water are sufficient  The numbers of cases of Coronavirus are spiking in [address]  This is not a more dangerous virus, but a sign that more people in a community are spreading the virus  Please check the local disease reports near you if you consider travelling this summer  We do not advise travel to any community or State with a rising viral caseload  Check out [employer] for [place] data that are updated daily:   [employer]ish[employer]co[employer]uk [employer]ant   Global Epidemics from [facility] (OUTPATIENT CAMP[facility]), will give you exbfct-ij-tkmsbv information on virus cases:   https://globalepidemics  org/

## 2020-07-16 NOTE — Clinical Note
Can we please adjust appt with Aicha until after IR biopsy results complete   Patient should have f/u urology visit after these but if disease is Stage IV urothelial, can certainly avoid transportation issues and see Maddie Robert in [place]

## 2020-07-16 NOTE — PROGRESS NOTES
Virtual Regular Visit    Outpatient Virtual Visit - Consult to Palliative and Supportive Care  Deep Beck 63 y o  male [id number]    Intake and Identification:    Reason(s) for virtual visit: televideo, new patient / consult / referral, symptom management, goals of care and psychosocial support  The patient is unable to visit the clinic safely due to the coronavirus pandemic  Encounter provider Deepak Soto MD, located at:  [address] [place] [id number] [phone]    Recent Visits  Date Type Provider Dept   07/15/20 Telephone Karenjose Torrelay, [address]   Showing recent visits within past 7 days and meeting all other requirements     Today's Visits  Date Type Provider Dept   07/16/20 Telemedicine Deepak Soto MD [address] today's visits and meeting all other requirements     Future Appointments  Date Type Provider Dept   07/16/20 Telemedicine Deepak Soto MD [address]   Showing future appointments within next 150 days and meeting all other requirements        Patient agrees to participate in a virtual check in via telephone or video visit instead of presenting to the office to address urgent/immediate medical needs, or to respect quarantine and public health guidelines  Patient is aware this is a billable service  After connecting through [email or web address], the patient was identified by name and date of birth  Deep Beck was informed that this was a telemedicine visit and that the visit is being conducted through [facility] and patient was informed that this is a secure, HIPAA-compliant platform  He agrees to proceed  My office door was closed  No one else was in the room  He acknowledged consent and understanding of privacy and security of the video platform   I informed the patient that I have reviewed his record in [facility] and presented the opportunity for him to ask any questions regarding the visit today  The patient has agreed to participate and understands they can discontinue the visit at any time  ASSESSMENT & PLAN:    Video assessment of patient: Frail, cachectic adult male, appearing older than chronological age; in mild discomfort; anxious; NCAT; EOMI; normal respiratory effort; alert, oriented, answering questions appropriately; following commands; normal insight, normal judgment    1  Abnormal CT scan    2  Chronic lumbar radiculopathy    3  Episode of recurrent major depressive disorder, unspecified depression episode severity ([facility] )    4  Anxiety disorder, unspecified type    5  Panic disorder    6  Other emphysema ([facility] )    7  Essential hypertension    8  Gastroesophageal reflux disease, esophagitis presence not specified    9  Subclinical hyperthyroidism    10  Cancer related pain    11  Chronic back pain, unspecified back location, unspecified back pain laterality      · Start oxycodone IR 5-10mg q4h  Patient feels anxiety and nausea are worsened by pain  · Stop MSIR, stop [place], once oxyIR is available  · Will evaluate need for long-acting opioids next week  · Take Tylenol 1000mg three times per day, every day  · Continue clonazepam and escitalopram  Our office is willing to refill those when needed   Counseled on bowel regimen   Goals are clear: patient wishes to have any and all life-extending disease-directed treatments that might be offered   Patient had not completed any prior advanced directives  Copy of 5 Wishes provided  Counseled   Emotional support provided  Offered  support; patient considering   Patient would likely qualify for MMJ once an official diagnosis is available; he is considering   Medication safety issues addressed - no driving under the influence of narcotics, watch for adverse effects including AMS and respiratory depression, keep medications stored in a safe/locked environment    [id number] [facility] newly prescribed oxycodone IR for cancer-related pain  He is not opioid-naive and has signed the opioid agreement  The patient has been counseled as to the side effects of opioids, responsibilities associated with their use, and there has been consideration for nalaxone prescription  Urinary drug screening will be performed if there is suspicion of drug abuse, or there is another appropriate clinical reason   Patient takes benzodiazepines along with the opioid medications  Consideration has been given to the concomitant risks and side effects associated with concurrent use  The patient has been instructed to call the physician with side effects, and to present to the ED with signs or symptoms of overdose or withdrawal     Requested Prescriptions     Signed Prescriptions Disp Refills    oxyCODONE (ROXICODONE) 5 mg immediate release tablet 240 tablet 0     Sig: Take 1-2 tablets (5-10 mg total) by mouth every 4 (four) hours as needed (moderate or severe cancer-related pain)Max Daily Amount: 60 mg     Medications Discontinued During This Encounter   Medication Reason    morphine (MSIR) 15 mg tablet Alternate therapy    HYDROcodone-acetaminophen (NORCO) 5-325 mg per tablet Alternate therapy     Representatives have queried the patient's controlled substance dispensing history in the Prescription Drug Monitoring Program in compliance with regulations before I have prescribed any controlled substances  The prescription history is consistent with prescribed therapy and our practice policies  60+ minutes were spent face-to-face with patient in this video telecommunications visit, with greater than 50% of the time spent in counseling or coordination of care including discussions of symptom assessment and management, medication review and adjustment, psychosocial support, chart review, imaging review, lab review, goals of care, advanced directives   All of the patient's questions were answered during this discussion  He is invited to continue to follow with us  If there are questions or concerns, he knows to contact us through our clinic/answering service 24 hours a day, seven days a week  Return in about 5 days (around 7/21/2020) for Virtual Visit / Video  SUBJECTIVE:  Chief Complaint   Patient presents with    Virtual Regular Visit    Cancer    Cancer Pain    Counseling    Anxiety    Anorexia    Nausea    Gait Problem    Goals      HPI    Abhishek Christianson is a 63 y o  male w/ 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 (based on 7/6/20 CT; biopsy scheduled but not yet performed), cancer-related pain, recent urinary obstruction s/p R nephrostomy tube, panic disorder, recurrent major depression, anxiety, COPD, chronic non-malignant neck + back pain, HTN, hypothyroidism  He follows w/ Dr Whit Nova (Urology), [facility]; intake w/ Dr Dalton Lehman (Medical Oncology) is planned for 7/21/20  Patient is new to [facility] clinic; referred by both PCP and Urology for symptom management, goals of care, psychosocial support  The patient is aware that he likely has a metastatic urothelial malignancy, but he was not aware of the full extent of his disease  He is aware that there is a lesion in his psoas muscle, and this is related to his pain  This provider did not go into great detail regarding his recent imaging, but did discuss likely malignancy and goals of care  At this time patient wishes to pursue any offered disease-directed and life-extending care  Patient had not completed any advanced directives  A copy of 5 Wishes was provided, along with appropriate counseling  Patient wishes his youngest daughter Jozef Roblero to be his healthcare representative  Patient is  from the mother of his 1st child  Patient has 4 adult children, from 4 different unions  Patient lives with his youngest daughter Jozef Roblero has multiple sclerosis but is able to participate fully in the patient's care  Patient does not have any advanced directives  Patient complains of severe, debilitating pain in his right hip extending down into his right thigh  He does not have any associated weakness  His pain at times interferes with sleep  He compensates by using a pillow and a foam brace between his legs when sleeping  Most recently he has tried Norco for pain relief, with modest effect (pain improves from 9/10 to 6/10, and relief lasts about 4 hours)  He would like to have better pain control  Pain interferes with his ability to walk  He uses a cane for assistance  Patient feels his pain worsens his anxiousness about his health issues  His increased anxiety subsequently increases nausea and decreases his appetite  He has been on Lexapro for approximately 2 months, and he takes clonazepam BID PRN  On most days he usually takes 1 tab of clonazepam  This provides some relief from anxiety  PRN antiemetics have provided relief from with nausea in the past  Patient has the support of [facility] psychiatric services  Patient declined offer of [facility] social work services at this time  Patient is able to move his bowels regularly  Counseled on bowel regimen in relation to opioid use  Patient denies dyspnea  Patient endorses fatigue  Patient is interested medical marijuana  Should he have an official malignant diagnosis, it would likely qualify  PDMP shows no concerns  The following portions of the medical history were reviewed: past medical history, problem list, medication list, and social history      Current Outpatient Medications:     albuterol (Ventolin HFA) 90 mcg/act inhaler, Inhale 2 puffs every 6 (six) hours as needed for wheezing, Disp: 18 g, Rfl: 2    clonazePAM (KlonoPIN) 0 5 mg tablet, Take 1 tablet (0 5 mg total) by mouth 2 (two) times a day as needed for anxiety, Disp: 14 tablet, Rfl: 0    escitalopram (LEXAPRO) 10 mg tablet, Take 10 mg by mouth daily at bedtime, Disp: , Rfl:     fluticasone (FLONASE) 50 mcg/act nasal spray, SPRAY 2 SPRAYS INTO EACH NOSTRIL EVERY DAY, Disp: 1 Bottle, Rfl: 5    fluticasone (FLOVENT HFA) 220 mcg/act inhaler, Inhale 2 puffs 2 (two) times a day Rinse mouth after use , Disp: 1 Inhaler, Rfl: 5    lisinopril (ZESTRIL) 10 mg tablet, Take 1 tablet (10 mg total) by mouth daily, Disp: 30 tablet, Rfl: 5    naloxone (NARCAN) 4 mg/0 1 mL nasal spray, 0 1 mL (4 mg total) into each nostril every 3 (three) minutes as needed for opioid reversal or respiratory depression Administer 1 spray into a nostril  If breathing does not return to normal or if breathing difficulty resumes after 2-3 minutes, give another dose in the other nostril using a new spray , Disp: 1 each, Rfl: 1    omeprazole (PriLOSEC) 40 MG capsule, Take 1 capsule (40 mg total) by mouth daily before breakfast, Disp: 30 capsule, Rfl: 5    oxyCODONE (ROXICODONE) 5 mg immediate release tablet, Take 1-2 tablets (5-10 mg total) by mouth every 4 (four) hours as needed (moderate or severe cancer-related pain)Max Daily Amount: 60 mg, Disp: 240 tablet, Rfl: 0    zolpidem (AMBIEN) 10 mg tablet, 1 TAB AT BEDTIME AS NEEDED FOR INSOMNIA, Disp: , Rfl:     Review of Systems   Constitutional: Positive for activity change, appetite change, fatigue and unexpected weight change  Gastrointestinal: Positive for nausea  Genitourinary: Positive for flank pain  Musculoskeletal: Positive for arthralgias, gait problem (uses cane) and myalgias  Psychiatric/Behavioral: Positive for dysphoric mood and sleep disturbance  The patient is nervous/anxious  All other systems reviewed and are negative  Kyle Medina MD  [facility]  [phone]    VIRTUAL VISIT DISCLAIMER    [address] Jory Michael acknowledges that he has consented to an online visit or consultation   He understands that the online visit is based solely on information provided by him, and that, in the absence of a face-to-face physical evaluation by the physician, the diagnosis he receives is both limited and provisional in terms of accuracy and completeness  This is not intended to replace a full medical face-to-face evaluation by the physician  Hardik Tran understands and accepts these terms

## 2020-07-16 NOTE — LETTER
July 16, 2020     Albert Tara,   [address] [facility]  [address] [place] [address]    Patient: Josi Calero   YOB: 1965   Date of Visit: 7/16/2020       Dear Dr Jacob Gallego: Thank you for referring Rubina Hernandez to me for evaluation  Below are my notes for this consultation  If you have questions, please do not hesitate to call me  I look forward to following your patient along with you  Sincerely,        Carolyne George MD        CC: MD Dee Dee Carrasco MD Cindy Lev, MD  7/16/2020  9:59 AM  Sign at close encounter  Virtual Regular Visit  Assessment/Plan:    Problem List Items Addressed This Visit     None        Reason for visit is No chief complaint on file  Encounter provider Carolyne George MD    Provider located at [address] [place] [address] [address]  [facility] [address] [id number]      Recent Visits  Date Type Provider Dept   07/14/20 Telephone [facility] For Urology [facility] End   07/10/20 Telephone Syed deedee [facility] For Urology [facility]   Showing recent visits within past 7 days and meeting all other requirements     Future Appointments  No visits were found meeting these conditions  Showing future appointments within next 150 days and meeting all other requirements        The patient was identified by name and date of birth  Josi Calero was informed that this is a telemedicine visit and that the visit is being conducted through [facility] and patient was informed that this is a secure, HIPAA-compliant platform  He agrees to proceed     My office door was closed  No one else was in the room  He acknowledged consent and understanding of privacy and security of the video platform  The patient has agreed to participate and understands they can discontinue the visit at any time  Patient is aware this is a billable service       Subjective  Josi Calero is a 63 y o  male   Seen during a telemedicine visit in May with significant hydronephrosis  Patient was scheduled for cystoscopy with ureteroscopy and biopsy for the soft tissue lesion that was seen on imaging  On the may imaging, there was no concerns for disease outside of the ureter  Patient was taken to the operating room on June 15th by Dr Sherryle Alice  And underwent attempted ureteroscopy  The ureter was completely occluded and biopsies and cytology were unable to be obtained  The patient then subsequently underwent right nephrostomy tube placement in interventional Radiology to drain the right kidney  The patient was referred to me for discussion of surgical therapy with the possibility of a distal ureterectomy  Unfortunately, the patient has had significant issues seeing us in the office due to transportation concerns  In the interim, the patient has continued to have significant flank pain and neuropathy down the right leg  He was seen in the emergency room on July 6th  CT scan was performed that now appears to demonstrate significant diffuse metastatic disease with retroperitoneal lymphadenopathy and potential local invasion into the psoas muscle  Because of pain control issues, patient has seen palliative care  Given the findings of his CT scan, Dr Mary Garcia  Did request interventional radiology biopsy for tissue sampling and referral to Medical Oncology  These appointments are pending  Patient has not had a formal discussion of his CT scan and its findings at this point      Past Medical History:   Diagnosis Date    Asthma     B[facility] disorder [facility] [address] [facility]     COPD (chronic obstructive pulmonary disease) ([facility] [address] [facility]     Disease of thyroid gland     Hypertension     Hyperthyroidism        Past Surgical History:   Procedure Laterality Date    DENTAL SURGERY      FL RETROGRADE URETHROCYSTOGRAM  6/15/2020    HERNIA REPAIR Left     inguinal    IR TUBE PLACEMENT  6/25/2020    [place] CYSTOURETHROSCOPY,URETER CATHETER Right 6/15/2020    Procedure: CYSTOSCOPY RETROGRADE PYELOGRAM and ureteroscopy;  Surgeon: Jarrod Mendez MD;  Location: [facility] MAIN [facility];  Service: Urology       Current Outpatient Medications   Medication Sig Dispense Refill    albuterol (Ventolin HFA) 90 mcg/act inhaler Inhale 2 puffs every 6 (six) hours as needed for wheezing 18 g 2    clonazePAM (KlonoPIN) 0 5 mg tablet Take 1 tablet (0 5 mg total) by mouth 2 (two) times a day as needed for anxiety 14 tablet 0    escitalopram (LEXAPRO) 10 mg tablet Take 10 mg by mouth daily at bedtime      fluticasone (FLONASE) 50 mcg/act nasal spray SPRAY 2 SPRAYS INTO EACH NOSTRIL EVERY DAY 1 Bottle 5    fluticasone (FLOVENT HFA) 220 mcg/act inhaler Inhale 2 puffs 2 (two) times a day Rinse mouth after use  1 Inhaler 5    lisinopril (ZESTRIL) 10 mg tablet Take 1 tablet (10 mg total) by mouth daily 30 tablet 5    naloxone (NARCAN) 4 mg/0 1 mL nasal spray 0 1 mL (4 mg total) into each nostril every 3 (three) minutes as needed for opioid reversal or respiratory depression Administer 1 spray into a nostril  If breathing does not return to normal or if breathing difficulty resumes after 2-3 minutes, give another dose in the other nostril using a new spray  1 each 1    omeprazole (PriLOSEC) 40 MG capsule Take 1 capsule (40 mg total) by mouth daily before breakfast 30 capsule 5    oxyCODONE (ROXICODONE) 5 mg immediate release tablet Take 1-2 tablets (5-10 mg total) by mouth every 4 (four) hours as needed (moderate or severe cancer-related pain)Max Daily Amount: 60 mg 240 tablet 0    zolpidem (AMBIEN) 10 mg tablet 1 TAB AT BEDTIME AS NEEDED FOR INSOMNIA       No current facility-administered medications for this visit  Allergies   Allergen Reactions    Ketorolac Hives       Review of Systems   Constitutional: Negative  Respiratory: Negative  Cardiovascular: Negative  Gastrointestinal: Negative  Genitourinary: Positive for flank pain     Musculoskeletal: Positive for gait problem  Skin: Negative  Neurological: Positive for numbness  Psychiatric/Behavioral: Negative  Video Exam    There were no vitals filed for this visit  Physical Exam   Constitutional: He is oriented to person, place, and time  He appears well-developed and well-nourished  No distress  Thin  male   HENT:   Head: Normocephalic and atraumatic  Eyes: Pupils are equal, round, and reactive to light  Conjunctivae and EOM are normal    Neck: Normal range of motion  Neck supple  No tracheal deviation present  No thyromegaly present  Pulmonary/Chest: Effort normal and breath sounds normal  No respiratory distress  Neurological: He is alert and oriented to person, place, and time  Psychiatric: He has a normal mood and affect  His behavior is normal  Judgment and thought content normal         I spent 30 minutes with patient today in which greater than 50% of the time was spent in counseling/coordination of care regarding  distal ureteral tumor with likely metastatic progression     IMAGIN20  CT ABDOMEN AND PELVIS WITH IV CONTRAST     INDICATION:   Abdominal pain, acute, nonlocalized      COMPARISON:  3/22/2020      TECHNIQUE:  CT examination of the abdomen and pelvis was performed  Axial, sagittal, and coronal 2D reformatted images were created from the source data and submitted for interpretation      Radiation dose length product (DLP) for this visit:  855 01 mGy-cm     This examination, like all CT scans performed in the [facility], was performed utilizing techniques to minimize radiation dose exposure, including the use of iterative   reconstruction and automated exposure control      IV Contrast:  100 mL of iohexol (OMNIPAQUE)  Enteric Contrast:  Enteric contrast was not administered      FINDINGS:     ABDOMEN     LOWER CHEST:  No clinically significant abnormality identified in the visualized lower chest      LIVER/BILIARY TREE: Unremarkable      GALLBLADDER:  No calcified gallstones  No pericholecystic inflammatory change      SPLEEN:  Unremarkable      PANCREAS:  Unremarkable      ADRENAL GLANDS:  Unremarkable      KIDNEYS/URETERS: Right renal atrophy is identified  Interval placement of right percutaneous nephroureterostomy catheter is identified  Thickening of the distal right ureter (series 2 images 65 and 66) is identified suspicious for a urothelial   neoplasm      STOMACH AND BOWEL:  Unremarkable      APPENDIX:  No findings to suggest appendicitis      ABDOMINOPELVIC CAVITY:  Retroperitoneal lymphadenopathy is identified with nodes measuring up to 3 6 x 2 2 cm series 2 image 34  Enhancing mass lesions are identified within the right psoas musculature measuring up to 1 9 x 1 5 cm      VESSELS:  Unremarkable for patient's age      PELVIS     REPRODUCTIVE ORGANS:  Unremarkable for patient's age      URINARY BLADDER:  Unremarkable      ABDOMINAL WALL/INGUINAL REGIONS:  Unremarkable      OSSEOUS STRUCTURES:  No acute fracture or destructive osseous lesion      IMPRESSION:     Interval placement of right percutaneous nephroureterostomy catheter with improved right hydroureteronephrosis  Thickening about the distal right ureter suspicious for possible urothelial neoplasm  There is associated retroperitoneal lymphadenopathy in   keeping with metastatic involvement  In addition, there are enhancing mass lesions within the right psoas muscle also suspicious for metastatic disease versus a primary malignancy  PLAN:  Unfortunately, the patient appears to have had progressive disease since his may imaging in June surgery  There is now diffuse retroperitoneal lymphadenopathy and potential local invasion into the psoas muscle  This is likely driving the patient's pain and neuropathy down the right leg    The patient absolutely needs tissue sampling and so I agree with interventional radiology biopsy of the lymph nodes or tissue mass itself  Most likely diagnosis would be a primary urothelial carcinoma given the patient's tobacco abuse history  At this point time, with the appearance of CT scan, this would be consistent with an advanced cancer and would likely not be a great candidate for upfront nephro ureterectomy  Would recommend consideration of medical oncology evaluation once the tissue sampling is complete  I had a very samreen conversation with the patient about the findings of his July CT scan which he was unaware of to this point  I have discussed with him the next step plan for interventional radiology and medical oncology evaluation which we will help to coordinate with the assistance of our oncology nurse navigator  I have recommended a follow-up appointment with Urology once these to visits have been completed  This will help us to consolidate his care  I would be happy to see the patient back by telemedicine given his transportation issues or alternatively, the patient can see Dr Wilian Zavala in [place]  Patient voices understanding  We will mail him a copy of his recent CT report  VIRTUAL VISIT DISCLAIMER    Hardik Joe acknowledges that he has consented to an online visit or consultation  He understands that the online visit is based solely on information provided by him, and that, in the absence of a face-to-face physical evaluation by the physician, the diagnosis he receives is both limited and provisional in terms of accuracy and completeness  This is not intended to replace a full medical face-to-face evaluation by the physician  Hardik Tran understands and accepts these terms

## 2020-07-16 NOTE — PROGRESS NOTES
Called IR at [facility]  L/M requesting an appointment for IR biopsy  Wash is currently scheduled for consult with Dr Larisa Dumont on 7/21/20  This will need to be rescheduled after pathology results are complete  Rad Onc consult is canceled  He will need follow up with Urology once Med Onc consult is complete per Dr Pratibha Robledo  Made him aware of above and that we will reschedule both Med Onc and Urology follow up once IR biopsy is scheduled  Copy of CT results emailed to him @ [email or web address]  Called Mónica at [facility]  Made her aware consult was canceled

## 2020-07-16 NOTE — PROGRESS NOTES
Virtual Regular Visit  Assessment/Plan:    Problem List Items Addressed This Visit     None        Reason for visit is No chief complaint on file  Encounter provider Joaquín Ladd MD    Provider located at [address] [place] [address]  [address] [id number]      Recent Visits  Date Type Provider Dept   07/14/20 Telephone Patricia Villalpando, [address] [facility] For Urology [facility] End   07/10/20 Telephone Syed Gaston For Urology [facility]   Showing recent visits within past 7 days and meeting all other requirements     Future Appointments  No visits were found meeting these conditions  Showing future appointments within next 150 days and meeting all other requirements        The patient was identified by name and date of birth  Yoel Whiting was informed that this is a telemedicine visit and that the visit is being conducted through [facility] and patient was informed that this is a secure, HIPAA-compliant platform  He agrees to proceed     My office door was closed  No one else was in the room  He acknowledged consent and understanding of privacy and security of the video platform  The patient has agreed to participate and understands they can discontinue the visit at any time  Patient is aware this is a billable service  Alma Pabon is a 63 y o  male   Seen during a telemedicine visit in May with significant hydronephrosis  Patient was scheduled for cystoscopy with ureteroscopy and biopsy for the soft tissue lesion that was seen on imaging  On the may imaging, there was no concerns for disease outside of the ureter  Patient was taken to the operating room on June 15th by Dr Abdoulaye Jones  And underwent attempted ureteroscopy  The ureter was completely occluded and biopsies and cytology were unable to be obtained    The patient then subsequently underwent right nephrostomy tube placement in interventional Radiology to drain the right kidney  The patient was referred to me for discussion of surgical therapy with the possibility of a distal ureterectomy  Unfortunately, the patient has had significant issues seeing us in the office due to transportation concerns  In the interim, the patient has continued to have significant flank pain and neuropathy down the right leg  He was seen in the emergency room on July 6th  CT scan was performed that now appears to demonstrate significant diffuse metastatic disease with retroperitoneal lymphadenopathy and potential local invasion into the psoas muscle  Because of pain control issues, patient has seen palliative care  Given the findings of his CT scan, Dr Frederic Islas  Did request interventional radiology biopsy for tissue sampling and referral to Medical Oncology  These appointments are pending  Patient has not had a formal discussion of his CT scan and its findings at this point      Past Medical History:   Diagnosis Date    Asthma     [facility] [address] [facility]     COPD (chronic obstructive pulmonary disease) ([facility] [address] [facility]     Disease of thyroid gland     Hypertension     Hyperthyroidism        Past Surgical History:   Procedure Laterality Date    DENTAL SURGERY      FL RETROGRADE URETHROCYSTOGRAM  6/15/2020    HERNIA REPAIR Left     inguinal    IR TUBE PLACEMENT  6/25/2020    [place] CYSTOURETHROSCOPY,URETER CATHETER Right 6/15/2020    Procedure: CYSTOSCOPY RETROGRADE PYELOGRAM and ureteroscopy;  Surgeon: Destinee Guillaume MD;  Location: [facility];  Service: Urology       Current Outpatient Medications   Medication Sig Dispense Refill    albuterol (Ventolin HFA) 90 mcg/act inhaler Inhale 2 puffs every 6 (six) hours as needed for wheezing 18 g 2    clonazePAM (KlonoPIN) 0 5 mg tablet Take 1 tablet (0 5 mg total) by mouth 2 (two) times a day as needed for anxiety 14 tablet 0    escitalopram (LEXAPRO) 10 mg tablet Take 10 mg by mouth daily at bedtime      fluticasone (FLONASE) 50 mcg/act nasal spray SPRAY 2 SPRAYS INTO EACH NOSTRIL EVERY DAY 1 Bottle 5    fluticasone (FLOVENT HFA) 220 mcg/act inhaler Inhale 2 puffs 2 (two) times a day Rinse mouth after use  1 Inhaler 5    lisinopril (ZESTRIL) 10 mg tablet Take 1 tablet (10 mg total) by mouth daily 30 tablet 5    naloxone (NARCAN) 4 mg/0 1 mL nasal spray 0 1 mL (4 mg total) into each nostril every 3 (three) minutes as needed for opioid reversal or respiratory depression Administer 1 spray into a nostril  If breathing does not return to normal or if breathing difficulty resumes after 2-3 minutes, give another dose in the other nostril using a new spray  1 each 1    omeprazole (PriLOSEC) 40 MG capsule Take 1 capsule (40 mg total) by mouth daily before breakfast 30 capsule 5    oxyCODONE (ROXICODONE) 5 mg immediate release tablet Take 1-2 tablets (5-10 mg total) by mouth every 4 (four) hours as needed (moderate or severe cancer-related pain)Max Daily Amount: 60 mg 240 tablet 0    zolpidem (AMBIEN) 10 mg tablet 1 TAB AT BEDTIME AS NEEDED FOR INSOMNIA       No current facility-administered medications for this visit  Allergies   Allergen Reactions    Ketorolac Hives       Review of Systems   Constitutional: Negative  Respiratory: Negative  Cardiovascular: Negative  Gastrointestinal: Negative  Genitourinary: Positive for flank pain  Musculoskeletal: Positive for gait problem  Skin: Negative  Neurological: Positive for numbness  Psychiatric/Behavioral: Negative  Video Exam    There were no vitals filed for this visit  Physical Exam   Constitutional: He is oriented to person, place, and time  He appears well-developed and well-nourished  No distress  Thin  male   HENT:   Head: Normocephalic and atraumatic  Eyes: Pupils are equal, round, and reactive to light  Conjunctivae and EOM are normal    Neck: Normal range of motion  Neck supple  No tracheal deviation present  No thyromegaly present     Pulmonary/Chest: Effort normal and breath sounds normal  No respiratory distress  Neurological: He is alert and oriented to person, place, and time  Psychiatric: He has a normal mood and affect  His behavior is normal  Judgment and thought content normal         I spent 40 minutes with patient today in which greater than 50% of the time was spent in counseling/coordination of care regarding  distal ureteral tumor with likely metastatic progression     IMAGIN20  CT ABDOMEN AND PELVIS WITH IV CONTRAST     INDICATION:   Abdominal pain, acute, nonlocalized      COMPARISON:  3/22/2020      TECHNIQUE:  CT examination of the abdomen and pelvis was performed  Axial, sagittal, and coronal 2D reformatted images were created from the source data and submitted for interpretation      Radiation dose length product (DLP) for this visit:  855 01 mGy-cm   This examination, like all CT scans performed in the [facility], was performed utilizing techniques to minimize radiation dose exposure, including the use of iterative   reconstruction and automated exposure control      IV Contrast:  100 mL of iohexol (OMNIPAQUE)  Enteric Contrast:  Enteric contrast was not administered      FINDINGS:     ABDOMEN     LOWER CHEST:  No clinically significant abnormality identified in the visualized lower chest      LIVER/BILIARY TREE:  Unremarkable      GALLBLADDER:  No calcified gallstones  No pericholecystic inflammatory change      SPLEEN:  Unremarkable      PANCREAS:  Unremarkable      ADRENAL GLANDS:  Unremarkable      KIDNEYS/URETERS: Right renal atrophy is identified  Interval placement of right percutaneous nephroureterostomy catheter is identified    Thickening of the distal right ureter (series 2 images 65 and 66) is identified suspicious for a urothelial   neoplasm      STOMACH AND BOWEL:  Unremarkable      APPENDIX:  No findings to suggest appendicitis      ABDOMINOPELVIC CAVITY:  Retroperitoneal lymphadenopathy is identified with nodes measuring up to 3 6 x 2 2 cm series 2 image 34  Enhancing mass lesions are identified within the right psoas musculature measuring up to 1 9 x 1 5 cm      VESSELS:  Unremarkable for patient's age      PELVIS     REPRODUCTIVE ORGANS:  Unremarkable for patient's age      URINARY BLADDER:  Unremarkable      ABDOMINAL WALL/INGUINAL REGIONS:  Unremarkable      OSSEOUS STRUCTURES:  No acute fracture or destructive osseous lesion      IMPRESSION:     Interval placement of right percutaneous nephroureterostomy catheter with improved right hydroureteronephrosis  Thickening about the distal right ureter suspicious for possible urothelial neoplasm  There is associated retroperitoneal lymphadenopathy in   keeping with metastatic involvement  In addition, there are enhancing mass lesions within the right psoas muscle also suspicious for metastatic disease versus a primary malignancy  PLAN:  Unfortunately, the patient appears to have had progressive disease since his may imaging in June surgery  There is now diffuse retroperitoneal lymphadenopathy and potential local invasion into the psoas muscle  This is likely driving the patient's pain and neuropathy down the right leg  The patient absolutely needs tissue sampling and so I agree with interventional radiology biopsy of the lymph nodes or tissue mass itself  Most likely diagnosis would be a primary urothelial carcinoma given the patient's tobacco abuse history  At this point time, with the appearance of CT scan, this would be consistent with an advanced cancer and would likely not be a great candidate for upfront nephro ureterectomy  Would recommend consideration of medical oncology evaluation once the tissue sampling is complete  I had a very samreen conversation with the patient about the findings of his July CT scan which he was unaware of to this point    I have discussed with him the next step plan for interventional radiology and medical oncology evaluation which we will help to coordinate with the assistance of our oncology nurse navigator  I have recommended a follow-up appointment with Urology once these to visits have been completed  This will help us to consolidate his care  I would be happy to see the patient back by telemedicine given his transportation issues or alternatively, the patient can see Dr Kasi Julian in [place]  Patient voices understanding  We will mail him a copy of his recent CT report  VIRTUAL VISIT DISCLAIMER    Hardik Jimenez acknowledges that he has consented to an online visit or consultation  He understands that the online visit is based solely on information provided by him, and that, in the absence of a face-to-face physical evaluation by the physician, the diagnosis he receives is both limited and provisional in terms of accuracy and completeness  This is not intended to replace a full medical face-to-face evaluation by the physician  Hardik Tran understands and accepts these terms

## 2020-07-17 ENCOUNTER — TELEPHONE (OUTPATIENT)
Dept: PALLIATIVE MEDICINE | Facility: CLINIC | Age: 55
End: 2020-07-17

## 2020-07-17 NOTE — TELEPHONE ENCOUNTER
Received PA denial for the oxycodone 5mg tablets for a quantity of 240 tablets for 30 days  She received an initial fill of 180 tablets and needed a quantity exception submitted  This nurse submitted this urgent request over the phone to [facility]  Awaiting determination

## 2020-07-20 PROBLEM — Z51.5 PALLIATIVE CARE PATIENT: Status: ACTIVE | Noted: 2020-07-20

## 2020-07-20 NOTE — TELEPHONE ENCOUNTER
Received prior authorization approval for Oxycodone 5 mg  for quantity 240/30 days  through Until Further Notice     Confirmed with pharmacy  LMOM as promted by [facility] pharmacy  Await callback re instructions on remaining 60 tablets to be filled  New script for 60 tabs or submit a full script when initial 180 tablets completed  Pt will be informed whe verified

## 2020-07-21 ENCOUNTER — TELEMEDICINE (OUTPATIENT)
Dept: PALLIATIVE MEDICINE | Facility: CLINIC | Age: 55
End: 2020-07-21
Payer: MEDICARE

## 2020-07-21 DIAGNOSIS — J43.8 OTHER EMPHYSEMA (HCC): ICD-10-CM

## 2020-07-21 DIAGNOSIS — F41.0 PANIC DISORDER: Chronic | ICD-10-CM

## 2020-07-21 DIAGNOSIS — F41.9 ANXIETY DISORDER, UNSPECIFIED TYPE: ICD-10-CM

## 2020-07-21 DIAGNOSIS — F33.9 EPISODE OF RECURRENT MAJOR DEPRESSIVE DISORDER, UNSPECIFIED DEPRESSION EPISODE SEVERITY (HCC): Chronic | ICD-10-CM

## 2020-07-21 DIAGNOSIS — K21.9 GASTROESOPHAGEAL REFLUX DISEASE, ESOPHAGITIS PRESENCE NOT SPECIFIED: ICD-10-CM

## 2020-07-21 DIAGNOSIS — G89.3 CANCER RELATED PAIN: ICD-10-CM

## 2020-07-21 DIAGNOSIS — Z51.5 PALLIATIVE CARE PATIENT: ICD-10-CM

## 2020-07-21 DIAGNOSIS — M54.16 CHRONIC LUMBAR RADICULOPATHY: ICD-10-CM

## 2020-07-21 DIAGNOSIS — M54.9 CHRONIC BACK PAIN, UNSPECIFIED BACK LOCATION, UNSPECIFIED BACK PAIN LATERALITY: Chronic | ICD-10-CM

## 2020-07-21 DIAGNOSIS — N39.8 UROTHELIAL LESION: Primary | ICD-10-CM

## 2020-07-21 DIAGNOSIS — G89.29 CHRONIC BACK PAIN, UNSPECIFIED BACK LOCATION, UNSPECIFIED BACK PAIN LATERALITY: Chronic | ICD-10-CM

## 2020-07-21 DIAGNOSIS — I10 ESSENTIAL HYPERTENSION: ICD-10-CM

## 2020-07-21 DIAGNOSIS — R59.0 RETROPERITONEAL LYMPHADENOPATHY: ICD-10-CM

## 2020-07-21 PROCEDURE — 3077F SYST BP >= 140 MM HG: CPT | Performed by: INTERNAL MEDICINE

## 2020-07-21 PROCEDURE — 3078F DIAST BP <80 MM HG: CPT | Performed by: INTERNAL MEDICINE

## 2020-07-21 PROCEDURE — 99214 OFFICE O/P EST MOD 30 MIN: CPT | Performed by: INTERNAL MEDICINE

## 2020-07-21 RX ORDER — MORPHINE SULFATE 30 MG/1
30 TABLET, FILM COATED, EXTENDED RELEASE ORAL 3 TIMES DAILY
Qty: 90 TABLET | Refills: 0 | Status: SHIPPED | OUTPATIENT
Start: 2020-07-21 | End: 2020-08-07 | Stop reason: ALTCHOICE

## 2020-07-21 NOTE — PATIENT INSTRUCTIONS
It was a pleasure to speak with you today  Thank you for your time  · Will start morphine sulfate extended release (MS Contin), 30 mg, 3 times per day, every day  This is a long-acting opioid, which should reduce your need for the short-acting oxycodone as we discussed  · Frequently, medications require a 'prior authorization' by your insurance company  When that happens, your medicine will not be covered at all by your insurer  If you are charged an unacceptable amount for a new medication or refill, please do not pay out of pocket  Ask your pharmacy to send the prior authorization to our office (phone [phone], fax [phone])  The prior authorization process may take a few business days  We appreciate your patience during this time  · If your next visit is going to be a video visit, please weigh yourself prior to starting the visit  That way we can track weight changes  · Continue to use a bowel regimen, including senna, as we discussed  · Continue her other medications without change  · Return in about 2-3 weeks  You can either see my partner, Dr mehran soto, in our to my office, or you can see him or me via video  · Call us for refills on medications that we supply, as needed  · If something changes and you need to come in sooner, please call our office! Please protect yourself from the novel Coronavirus (COVID-19)! Even though we still do not have a vaccine or good antiviral drugs for this infection, the following strategies can help you stay healthy:    · Perform hand hygiene with soap and water or hand  with at least 60% alcohol often, but especially after going to the bathroom, after blowing your nose/coughing/sneezing, before eating, and after coming into contact with a potentially contaminated public surface  · Avoid touching your face! · Avoid close contact with people who are sick  Avoid large gatherings, and don't travel unnecessarily    · Stay home when you are sick, except to get medical care or other essentials  If you can eat and drink and breathe and sleep, please consider calling your doctor's office or scheduling a virtual video visit instead of visiting in person  · Cover your coughs and sneezes with a tissue, or your elbow  · Clean frequently touched surfaces and objects daily (e g , tables, countertops, light switches, doorknobs, and cabinet handles)  Regular household detergent and water are sufficient  The numbers of cases of Coronavirus are spiking in [address]  This is not a more dangerous virus, but a sign that more people in a community are spreading the virus  Please check the local disease reports near you if you consider travelling this summer  We do not advise travel to any community or State with a rising viral caseload  Check out APROO[employer]ED for [place] data that are updated daily:   qualifyor brant   Global Epidemics from [facility] (OUTPATIENT CAMP[facility]), will give you [id number] information on virus cases:   https://globalepidemics  org/

## 2020-07-21 NOTE — PROGRESS NOTES
Virtual Regular Visit    Outpatient Virtual Visit - Follow-up with Palliative and Supportive Care  Phoebe Adkins 63 y o  male [id number]    Intake and Identification:    Reason(s) for virtual visit: televideo, follow-up, symptom management and psychosocial support  The patient is unable to visit the clinic safely due to the coronavirus pandemic  Encounter provider Maynor Patterson MD, located at:  [address] [place] [id number] [phone]    Recent Visits  Date Type Provider Dept   07/16/20 Telemedicine Maynor Patterson MD [address]   Showing recent visits within past 7 days and meeting all other requirements     Today's Visits  Date Type Provider Dept   07/21/20 Telemedicine Maynor Patterson MD [address] [address] [facility] today's visits and meeting all other requirements     Future Appointments  Date Type Provider Dept   07/21/20 Telemedicine Maynor Patterson MD [id number] [address]   Showing future appointments within next 150 days and meeting all other requirements        Patient agrees to participate in a virtual check in via telephone or video visit instead of presenting to the office to address urgent/immediate medical needs, or to respect quarantine and public health guidelines  Patient is aware this is a billable service  After connecting through [employer], the patient was identified by name and date of birth  Phoebe Adkins was informed that this was a telemedicine visit and that the visit is being conducted through [employer] and patient was informed that this is a secure, HIPAA-compliant platform  He agrees to proceed  My office door was closed  No one else was in the room  He acknowledged consent and understanding of privacy and security of the video platform   I informed the patient that I have reviewed his record in [facility] and presented the opportunity for him to ask any questions regarding the visit today  The patient has agreed to participate and understands they can discontinue the visit at any time  ASSESSMENT & PLAN:    Video assessment of patient: pale, fail, cachectic adult male; appears older than chronological age; in discomfort; no acute emotional distress; NCAT; EOMI; normal respiratory effort; alert, oriented, answering questions appropriately; following commands; normal insight, normal judgment    1  Urothelial lesion    2  Retroperitoneal lymphadenopathy    3  Anxiety disorder, unspecified type    4  Episode of recurrent major depressive disorder, unspecified depression episode severity ([address] )    5  Chronic lumbar radiculopathy    6  Gastroesophageal reflux disease, esophagitis presence not specified    7  Essential hypertension    8  Other emphysema ([address] )    9  Panic disorder    10  Cancer related pain    11  Chronic back pain, unspecified back location, unspecified back pain laterality    12  Palliative care patient       Starting MSER 30mg TID based on his recent use of 20-30mg oxyIR q4h for control of cancer-related pain  Dose-reduced appropriately for cross-tolerance  Intention is to improve pain control, lessen need for high doses of oxyIR  Patient verbalized understanding + agreement   Counseled on bowel regimen   Patient expressed dissatisfaction with palliative care services  He is willing to either continue with video visits, or instead meet with our palliative position in our [place] office in the future   Emotional support provided   Medication safety issues addressed - no driving under the influence of narcotics, watch for adverse effects including AMS and respiratory depression, keep medications stored in a safe/locked environment  Yoav Davis is newly prescribed MS CONTIN for cancer-related pain  He is not opioid-naive and has signed the opioid agreement   The patient has been counseled as to the side effects of opioids, responsibilities associated with their use, and there has been consideration for nalaxone prescription  Urinary drug screening will be performed if there is suspicion of drug abuse, or there is another appropriate clinical reason  Patient does take benzodiazepines along with the opioid medications  Consideration has been given to the concomitant risks and side effects associated with concurrent use  The patient has been instructed to call the physician with side effects, and to present to the ED with signs or symptoms of overdose or withdrawal      Requested Prescriptions     Signed Prescriptions Disp Refills    morphine (MS Contin) 30 mg 12 hr tablet 90 tablet 0     Sig: Take 1 tablet (30 mg total) by mouth 3 (three) times a dayMax Daily Amount: 90 mg     There are no discontinued medications  Representatives have queried the patient's controlled substance dispensing history in the Prescription Drug Monitoring Program in compliance with regulations before I have prescribed any controlled substances  The prescription history is consistent with prescribed therapy and our practice policies  25+ minutes were spent face-to-face with patient in this video telecommunications visit, with greater than 50% of the time spent in counseling or coordination of care including discussions of symptom assessment and management, medication review and adjustment, psychosocial support, chart review  All of the patient's questions were answered during this discussion  He is invited to continue to follow with us  If there are questions or concerns, he knows to contact us through our clinic/answering service 24 hours a day, seven days a week  Return in about 2 weeks (around 8/4/2020) for in-person follow up in [place] office, or video follow up, in 2-3 weeks  SUBJECTIVE:  Chief Complaint   Patient presents with    Cancer    Cancer Pain    Anxiety    Counseling    Constipation    Follow-up    Virtual Regular Visit      AVNI Hewitt is a 63 y o  male w/ 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 (based on 7/6/20 CT; biopsy scheduled but not yet performed), cancer-related pain, recent urinary obstruction s/p R nephrostomy tube, panic disorder, recurrent major depression, anxiety, COPD, chronic non-malignant neck + back pain, HTN, hypothyroidism  He follows w/ Dr Serena Bacon (Urology), [facility]; intake w/ Dr Dany Kennedy (Medical Oncology) is planned for 7/21/20  Patient is known to [facility] clinic; last seen by me 7/16/20 symptom management, goals of care, psychosocial support  Visit today for symptom management, psychosocial support  Patient has been able to achieve improved pain control using oxycodone IR  He has found that he needs to take 4-6 tablets approximately every 4 hours have adequate pain control  This equates to 20-30 mg of oxycodone approximately 6 times per day  Patient is amenable to starting a long-acting opioid regimen; this is to be the next step of the plan once his analgesic needs were better understood  Explained that there will likely be a prior authorization required  Patient has been able to move his bowels using over-the-counter products such as senna  He has been able to sleep and notes that sometimes he can sleep through the night without pain waking him up  He says his appetite is here and there, increasing and decreasing depending upon the day  He is not weighed himself recently  He denies nausea and vomiting  He endorses fatigue  He endorses anxiety in dysphoric mood, his medications provide some relief  PDMP shows no concerns  The following portions of the medical history were reviewed: past medical history, problem list, medication list, and social history      Current Outpatient Medications:     albuterol (Ventolin HFA) 90 mcg/act inhaler, Inhale 2 puffs every 6 (six) hours as needed for wheezing, Disp: 18 g, Rfl: 2    clonazePAM (KlonoPIN) 0 5 mg tablet, Take 1 tablet (0 5 mg total) by mouth 2 (two) times a day as needed for anxiety, Disp: 14 tablet, Rfl: 0    escitalopram (LEXAPRO) 10 mg tablet, Take 10 mg by mouth daily at bedtime, Disp: , Rfl:     fluticasone (FLONASE) 50 mcg/act nasal spray, SPRAY 2 SPRAYS INTO EACH NOSTRIL EVERY DAY, Disp: 1 Bottle, Rfl: 5    fluticasone (FLOVENT HFA) 220 mcg/act inhaler, Inhale 2 puffs 2 (two) times a day Rinse mouth after use , Disp: 1 Inhaler, Rfl: 5    lisinopril (ZESTRIL) 10 mg tablet, Take 1 tablet (10 mg total) by mouth daily, Disp: 30 tablet, Rfl: 5    morphine (MS Contin) 30 mg 12 hr tablet, Take 1 tablet (30 mg total) by mouth 3 (three) times a dayMax Daily Amount: 90 mg, Disp: 90 tablet, Rfl: 0    naloxone (NARCAN) 4 mg/0 1 mL nasal spray, 0 1 mL (4 mg total) into each nostril every 3 (three) minutes as needed for opioid reversal or respiratory depression Administer 1 spray into a nostril  If breathing does not return to normal or if breathing difficulty resumes after 2-3 minutes, give another dose in the other nostril using a new spray , Disp: 1 each, Rfl: 1    omeprazole (PriLOSEC) 40 MG capsule, Take 1 capsule (40 mg total) by mouth daily before breakfast, Disp: 30 capsule, Rfl: 5    oxyCODONE (ROXICODONE) 5 mg immediate release tablet, Take 1-2 tablets (5-10 mg total) by mouth every 4 (four) hours as needed (moderate or severe cancer-related pain)Max Daily Amount: 60 mg, Disp: 240 tablet, Rfl: 0    zolpidem (AMBIEN) 10 mg tablet, 1 TAB AT BEDTIME AS NEEDED FOR INSOMNIA, Disp: , Rfl:     Review of Systems   Constitutional: Positive for activity change, appetite change, fatigue and unexpected weight change  Genitourinary: Positive for flank pain  Musculoskeletal: Positive for arthralgias, gait problem and myalgias  Psychiatric/Behavioral: Positive for dysphoric mood and sleep disturbance  The patient is nervous/anxious  All other systems reviewed and are negative        Savita Kahn MD  [facility]  [phone]    VIRTUAL VISIT DISCLAIMER    Hardik BLAKE Linda Escobar acknowledges that he has consented to an online visit or consultation  He understands that the online visit is based solely on information provided by him, and that, in the absence of a face-to-face physical evaluation by the physician, the diagnosis he receives is both limited and provisional in terms of accuracy and completeness  This is not intended to replace a full medical face-to-face evaluation by the physician  Hardik Tran understands and accepts these terms

## 2020-07-22 ENCOUNTER — PATIENT OUTREACH (OUTPATIENT)
Dept: UROLOGY | Facility: AMBULATORY SURGERY CENTER | Age: 55
End: 2020-07-22

## 2020-07-22 NOTE — PROGRESS NOTES
Called and spoke with patient  Patient scheduled with Dr Chay Cheung on 08/14/20 at 9:15am  Patient stated that he is touch and go with transportation, patient will call office closer to appointment to let us know if he will have transportation or not

## 2020-07-22 NOTE — PROGRESS NOTES
[facility] ordered Urology follow up after IR Bx and Med Onc consult to consolidate his care   This follow up can be scheduled in [facility] or telemedicine with   [facility] due to transportation issues  He is scheduled for biopsy on 7/24/20 and Dr Russo Orn on 8/7/20

## 2020-07-22 NOTE — PROGRESS NOTES
Called and spoke with patient  Patient scheduled with Dr Rg Turner on 08/14/20 at 9:15am  Patient stated that he is touch and go with transportation, patient will call office closer to appointment to let us know if he will have transportation or not

## 2020-07-24 ENCOUNTER — HOSPITAL ENCOUNTER (OUTPATIENT)
Dept: INTERVENTIONAL RADIOLOGY/VASCULAR | Facility: HOSPITAL | Age: 55
Discharge: HOME | End: 2020-07-24
Attending: UROLOGY
Payer: MEDICARE

## 2020-07-24 DIAGNOSIS — R59.1 LYMPHADENOPATHY: ICD-10-CM

## 2020-07-24 PROCEDURE — 88341 IMHCHEM/IMCYTCHM EA ADD ANTB: CPT | Performed by: PATHOLOGY

## 2020-07-24 PROCEDURE — 88342 IMHCHEM/IMCYTCHM 1ST ANTB: CPT | Performed by: PATHOLOGY

## 2020-07-24 PROCEDURE — 88305 TISSUE EXAM BY PATHOLOGIST: CPT | Performed by: PATHOLOGY

## 2020-07-24 PROCEDURE — 76942 ECHO GUIDE FOR BIOPSY: CPT | Performed by: RADIOLOGY

## 2020-07-24 PROCEDURE — 38505 NEEDLE BIOPSY LYMPH NODES: CPT | Performed by: RADIOLOGY

## 2020-07-24 PROCEDURE — 38505 NEEDLE BIOPSY LYMPH NODES: CPT

## 2020-07-24 PROCEDURE — 76942 ECHO GUIDE FOR BIOPSY: CPT

## 2020-07-24 RX ORDER — LIDOCAINE HYDROCHLORIDE 10 MG/ML
INJECTION, SOLUTION EPIDURAL; INFILTRATION; INTRACAUDAL; PERINEURAL CODE/TRAUMA/SEDATION MEDICATION
Status: COMPLETED | OUTPATIENT
Start: 2020-07-24 | End: 2020-07-24

## 2020-07-24 RX ADMIN — LIDOCAINE HYDROCHLORIDE 10 ML: 10 INJECTION, SOLUTION EPIDURAL; INFILTRATION; INTRACAUDAL; PERINEURAL at 13:08

## 2020-07-24 NOTE — PROCEDURES
Interventional Radiology Procedure Note    PATIENT NAME: Gunnar Weston  : 1965  MRN: [id number]     Pre-op Diagnosis:   1  Lymphadenopathy      2  Primary tumor of the ureter  Retroperitoneal metastatic disease    Post-op Diagnosis:   1  Lymphadenopathy      2  Same    Procedure: Ultrasound Guided Biopsy of the right external iliac lymph node  Surgeon: Carmela Granda MD  Assistants: No qualified resident was available, Resident is only observing  Estimated Blood Loss: <5cc  Findings:  Post biopsy scanning shows nothing to suggest bleeding  Specimens: Cores submitted in formalin and RPMI   Complications:  None  Anesthesia: Local  Prep: 2% Chlorhexidine and alcohol, allowed to dry prior to sterile draping  Timeout: Performed    In the process of informed consent, information was communicated, verbally, to the patient regarding the procedure  The patient was informed of; the name of the procedure, nature of the procedure, nature of the condition, risks, benefits, alternatives, and potential complications, as well as the consequences of not having the procedure  All the patient's questions were answered  Informed consent was signed  Prep was allowed to dry prior to the application of sterile draping  Timeout was performed, with all team members present, and in agreement  Confirmation of patient, procedure, laterally, allergies, and all needed equipment was performed  Ultrasound was used to evaluate the above-named target as a potential access  Static and real-time images of needle entry were obtained, and archived      Antibiotics: None    Carmela Granda MD     Date: 2020  Time: 2:02 PM

## 2020-07-24 NOTE — DISCHARGE INSTRUCTIONS
[address]  Interventional Radiology  Dr Allison[address]: [phone] (M-F 7:30am - 4:00pm)  Off hours or no answer: [phone] (Ask for IR on call)           [address] after your procedure:    1  Limit your activities for 24 hours after your biopsy  3  Return to your normal diet  Small sips of flat soda will help with mild nausea  4  Remove band-aid or dressing 24 hours after procedure  Contact Interventional Radiology at [phone] [place] PATIENTS: Contact Interventional Radiology at [phone]) Agnes Lizarraga PATIENTS: Contact Interventional Radiology at [phone]) if:    1  Difficulty breathing, nausea or vomiting  2  Chills or fever above 101 degrees F      3  Pain at biopsy site not relieved by medication  4  Develop any redness, swelling, heat, unusual drainage, heavy bruising or bleeding from biopsy site

## 2020-07-29 DIAGNOSIS — C79.10 METASTATIC UROTHELIAL CARCINOMA (HCC): Primary | ICD-10-CM

## 2020-07-29 DIAGNOSIS — M54.16 CHRONIC LUMBAR RADICULOPATHY: ICD-10-CM

## 2020-07-29 DIAGNOSIS — M54.9 CHRONIC BACK PAIN, UNSPECIFIED BACK LOCATION, UNSPECIFIED BACK PAIN LATERALITY: Chronic | ICD-10-CM

## 2020-07-29 DIAGNOSIS — R93.89 ABNORMAL CT SCAN: ICD-10-CM

## 2020-07-29 DIAGNOSIS — G89.29 CHRONIC BACK PAIN, UNSPECIFIED BACK LOCATION, UNSPECIFIED BACK PAIN LATERALITY: Chronic | ICD-10-CM

## 2020-07-29 DIAGNOSIS — G89.3 CANCER RELATED PAIN: ICD-10-CM

## 2020-07-29 RX ORDER — OXYCODONE HYDROCHLORIDE 5 MG/1
5-10 TABLET ORAL EVERY 4 HOURS PRN
Qty: 240 TABLET | Refills: 0 | Status: CANCELLED | OUTPATIENT
Start: 2020-07-29

## 2020-07-30 ENCOUNTER — TELEPHONE (OUTPATIENT)
Dept: PALLIATIVE MEDICINE | Facility: CLINIC | Age: 55
End: 2020-07-30

## 2020-07-30 LAB — MISCELLANEOUS LAB TEST RESULT: NORMAL

## 2020-07-31 ENCOUNTER — TELEPHONE (OUTPATIENT)
Dept: OTHER | Facility: OTHER | Age: 55
End: 2020-07-31

## 2020-07-31 ENCOUNTER — TELEPHONE (OUTPATIENT)
Dept: PALLIATIVE MEDICINE | Facility: CLINIC | Age: 55
End: 2020-07-31

## 2020-07-31 ENCOUNTER — TELEMEDICINE (OUTPATIENT)
Dept: PALLIATIVE MEDICINE | Facility: CLINIC | Age: 55
End: 2020-07-31
Payer: MEDICARE

## 2020-07-31 DIAGNOSIS — Z51.5 PALLIATIVE CARE PATIENT: ICD-10-CM

## 2020-07-31 DIAGNOSIS — N13.30 HYDRONEPHROSIS OF RIGHT KIDNEY: ICD-10-CM

## 2020-07-31 DIAGNOSIS — G89.3 CANCER RELATED PAIN: ICD-10-CM

## 2020-07-31 DIAGNOSIS — N39.8 UROTHELIAL LESION: ICD-10-CM

## 2020-07-31 DIAGNOSIS — N39.8 UROTHELIAL LESION: Primary | ICD-10-CM

## 2020-07-31 PROCEDURE — 99443 PR PHYS/QHP TELEPHONE EVALUATION 21-30 MIN: CPT | Performed by: FAMILY MEDICINE

## 2020-07-31 PROCEDURE — 3078F DIAST BP <80 MM HG: CPT | Performed by: FAMILY MEDICINE

## 2020-07-31 PROCEDURE — 3077F SYST BP >= 140 MM HG: CPT | Performed by: FAMILY MEDICINE

## 2020-07-31 RX ORDER — OXYCODONE HYDROCHLORIDE 10 MG/1
10 TABLET ORAL EVERY 4 HOURS PRN
Qty: 42 TABLET | Refills: 0 | Status: CANCELLED | OUTPATIENT
Start: 2020-07-31

## 2020-07-31 RX ORDER — OXYCODONE HYDROCHLORIDE 10 MG/1
10 TABLET ORAL EVERY 4 HOURS PRN
Qty: 42 TABLET | Refills: 0 | Status: SHIPPED | OUTPATIENT
Start: 2020-07-31 | End: 2020-08-07 | Stop reason: SDUPTHER

## 2020-07-31 NOTE — PROGRESS NOTES
Outpatient Virtual Regular Visit - Follow-up with Palliative and [address] [place] [address] 63 y o  male [id number]    Intake and Disclaimer:    Reason for visit is [address]  The patient is unable to visit the clinic safely due to the coronavirus pandemic  Patient agrees to participate in a virtual check in via telephone or video visit instead of presenting to the office to address urgent/immediate medical needs, or to respect quarantine and public health guidelines  Patient was informed this is a billable service  After connecting through telephone, the patient was identified by name and date of birth  Gonzalez Morales was informed that this was a telemedicine visit and that the visit is being conducted through telephone which may not be secure and therefore might not be HIPAA-compliant  My office door was closed  No one else was in the room  He acknowledged consent and understanding of privacy and security of the virtual check-in visit  I informed the patient that I have reviewed his record in Epic and presented the opportunity for him to ask any questions regarding the visit today  The patient initiated communication and agreed to participate  Assessment and Plan  Problem List Items Addressed This Visit        Genitourinary    Hydronephrosis of right kidney    Urothelial lesion - Primary    Relevant Medications    oxyCODONE (ROXICODONE) 10 MG TABS       Other    Cancer related pain    Relevant Medications    oxyCODONE (ROXICODONE) 10 MG TABS    Palliative care patient    Relevant Medications    oxyCODONE (ROXICODONE) 10 MG TABS        1  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 [see IR Procedure Note 07/24/2020]  2   Cancer-related Pain  3  R hydronephrosis s/p R PCN   - currently reports taking 60 mg/day [12 tabs of oxy 5 mg]   - Per PDMP 20-day Rx for oxy 5 mg @ 60 mg/day filled on 07/16/2020    - expected to complete Rx on 08/04/2020    - continue MS-ER 30 mg Q8H [30-day Rx filled on 07/21]    - also note that patient recently filled an Rx for Klonopin 0 5 mg [28 tabs] on 07/23/2020 with Rx written on 04/28/2020   - high-risk behavior per PDMP    - will have patient complete opioid contract    - will write Rx for oxy 10 mg Q4H x 7 days     - equivalent daily dose with decreased daily pill count   - appointment with Dr Asenath Claude for 08/07/2020   - counseled on bowel regimen    Schedule Arti Mcmillan 8 Appointment in 1 week for close follow up for symptoms management    No orders of the defined types were placed in this encounter  Medications Discontinued During This Encounter   Medication Reason    oxyCODONE (ROXICODONE) 5 mg immediate release tablet         Wash HAYDEN Pacheco was assessed today for symptoms and care planning related to above illnesses  I have reviewed the patient's controlled substance dispensing history in the Prescription Drug Monitoring Program in compliance with the [facility] regulations before prescribing any controlled substances  He is invited to continue to follow with us  If there are questions or concerns, please contact us through our clinic/answering service 24 hours a day, seven days a week  As a result of this visit, I have referred the patient for further evaluation  Yes    Jayant Estrada MD  [facility] Palliative and Supportive Care            Subjective  Linda Henson is a 63 y o  male with newly diagnosed ureter CA with retroperitoneal metastatic disease c/b R hydronephrosis s/p R PCN, HTN, chronic lumbar pain with radiculopathy, asthma who presents via telephone for [facility] follow up  Primary Oncology: Dr Slick Chadwick appointment 08/07/2020]    Reports persistent R-sided back pain, worsening over the past 2-3 days, constant, radiating down R thigh, similar radicular pain in the past  Reports recent diagnosis of ureter CA with no current treatment plan determined yet   Reports taking oxy 5 mg [12 tabs/day -> 60 mg] with mild relief  Denies fever/chills  Denies bowel/bladder incontinence  Denies saddle anesthesia  Denies trauma/falls  Denies IVDU  No weight loss  No h/o HIV [recent negative PCR 06/11/2020]  No recent steroid injections [last 5+ years ago]  No unintentional weight loss  No focal weakness  No prior h/o urolithiasis  Denies hematuria  R PCN clamped  Voiding at baseline  Denies nausea, vomiting  Lives with daughter Aaron Vera and girlfriend   without legal divorce  Patient identifies wish that daughter be medical decision proxy  Discussed that per  spouse would default  No current advanced directive or legal will in place  Plans to complete 5-wishes to identify daughter as medical decision proxy  No spiritual needs at this time ["I read the Bible every other day "]  Strong support network  Identifies code status as DNR/DNI at this point and will complete AD once received        Past Medical History:   Diagnosis Date    Asthma     [facility]     COPD (chronic obstructive pulmonary disease) [facility]     Disease of thyroid gland     Hypertension     Hyperthyroidism      Past Surgical History:   Procedure Laterality Date    DENTAL SURGERY      FL RETROGRADE URETHROCYSTOGRAM  6/15/2020    HERNIA REPAIR Left     inguinal    IR LYMPH NODE BIOPSY/ASPIRATION  7/24/2020    IR TUBE PLACEMENT  6/25/2020    RI CYSTOURETHROSCOPY,URETER CATHETER Right 6/15/2020    Procedure: CYSTOSCOPY RETROGRADE PYELOGRAM and ureteroscopy;  Surgeon: Mary Colmenares MD;  Location: [facility];  Service: Urology     Current Outpatient Medications   Medication Sig Dispense Refill    albuterol (Ventolin HFA) 90 mcg/act inhaler Inhale 2 puffs every 6 (six) hours as needed for wheezing 18 g 2    clonazePAM (KlonoPIN) 0 5 mg tablet Take 1 tablet (0 5 mg total) by mouth 2 (two) times a day as needed for anxiety 14 tablet 0    escitalopram (LEXAPRO) 10 mg tablet Take 10 mg by mouth daily at bedtime      fluticasone (FLONASE) 50 mcg/act nasal spray SPRAY 2 SPRAYS INTO EACH NOSTRIL EVERY DAY 1 Bottle 5    fluticasone (FLOVENT HFA) 220 mcg/act inhaler Inhale 2 puffs 2 (two) times a day Rinse mouth after use  1 Inhaler 5    lisinopril (ZESTRIL) 10 mg tablet Take 1 tablet (10 mg total) by mouth daily 30 tablet 5    morphine (MS Contin) 30 mg 12 hr tablet Take 1 tablet (30 mg total) by mouth 3 (three) times a dayMax Daily Amount: 90 mg 90 tablet 0    naloxone (NARCAN) 4 mg/0 1 mL nasal spray 0 1 mL (4 mg total) into each nostril every 3 (three) minutes as needed for opioid reversal or respiratory depression Administer 1 spray into a nostril  If breathing does not return to normal or if breathing difficulty resumes after 2-3 minutes, give another dose in the other nostril using a new spray  1 each 1    omeprazole (PriLOSEC) 40 MG capsule Take 1 capsule (40 mg total) by mouth daily before breakfast 30 capsule 5    oxyCODONE (ROXICODONE) 10 MG TABS Take 1 tablet (10 mg total) by mouth every 4 (four) hours as needed (breakthrough cancer pain) No refills until visit to clinic with Dr Alvarado Keene Daily Amount: 60 mg 42 tablet 0    zolpidem (AMBIEN) 10 mg tablet 1 TAB AT BEDTIME AS NEEDED FOR INSOMNIA       No current facility-administered medications for this visit  Allergies   Allergen Reactions    Ketorolac Hives       Review of Systems   Unable to perform ROS: Other   Telephone Encounter      Video Exam  There were no vitals filed for this visit  Physical Exam  Unable to Perform - Telephone Encounter    I spent 35+ minutes with the patient during this PHONE visit  Cares and counseling included the following: pathogenesis of diagnosis, diagnostic results, impression, and recommendations, patient and family counseling/involvement in care and symptom assessment/mgmt  All of the patient's questions were answered during this discussion    It was my intent to perform this visit via video technology but the patient was not able to do a video connection so the visit was completed via audio telephone only  Encounter provider Dario Li MD, located at:  [address] [place] [id number] [phone]    Recent Visits  No visits were found meeting these conditions     Showing recent visits within past 7 days and meeting all other requirements     Today's Visits  Date Type Provider Dept   07/31/20 Telephone Haris Clancy RN 13 [facility]   07/31/20 [address] [place] [address], [address] [place] [address] today's visits and meeting all other requirements     Future Appointments  Date Type Provider Dept   07/31/20 Telephone Haris Clancy RN 13 [facility]   Showing future appointments within next 150 days and meeting all other requirements

## 2020-07-31 NOTE — TELEPHONE ENCOUNTER
Spoke with the patient via telephone regarding Patient Agreement for Use of Opioid Medications  Read highlighted statements on agreement to patient  Agrees to statements without questions or concerns  Complete agreement to be sent to patient's home address to be signed and sent back to [facility] clinic      Liborio Sullivan MD  Fellow, Palliative Care Medicine

## 2020-07-31 NOTE — TELEPHONE ENCOUNTER
Pt calling this office asking if provider has called in script for Oxycodone 10 mg tablet per recent office visit  States his daughter will be going to work soon and can   Pt instructed that this new dose will likely need a Prior Auth  After new order placed this nurse will call pharmacy and inquire cost with discount card

## 2020-07-31 NOTE — Clinical Note
Ms Reinaldo Weinberg, please note our concerns about pt's possible misuse of opioids  Dr Wong Pi, I think it is appropriate for this pt to transfer off your panel and into fellows' clinic exclusively  Dr Keturah Brittle and I would like to see him weekly   team, please schedule all visits with this patient with Dr Keturah Brittle, in person preferably

## 2020-07-31 NOTE — TELEPHONE ENCOUNTER
[phone]/ [facility] Calling about a medication pre-authorization for PT: Hardik Tran/PHYLICIA: 1965/ Thanks

## 2020-08-01 ENCOUNTER — TELEPHONE (OUTPATIENT)
Dept: OTHER | Facility: OTHER | Age: 55
End: 2020-08-01

## 2020-08-01 ENCOUNTER — TELEPHONE (OUTPATIENT)
Dept: PALLIATIVE MEDICINE | Facility: CLINIC | Age: 55
End: 2020-08-01

## 2020-08-01 NOTE — TELEPHONE ENCOUNTER
Pt  has cancer and is in a lot of pain  Dr Katarina England sent over his Oxycodone 10mg, but the pharmacy needs to speak with the on call

## 2020-08-01 NOTE — TELEPHONE ENCOUNTER
Notified by [facility] pharmacy that oxycodone could not be filled due to recent opioid scripts for MS Contin and oxycodone IR  Reviewed chart in depth  Behaviors of mis-use identified by my colleagues at yesterday's visit  Opioid contract reviewed at length and agreement for bridge script x 7 days provided with no ER opioids provided  Assured pharmacy okay to fill and provided my name as over-riding physician for file  Will ask Danial Bautista and Francisco Kwong to follow up with this patient early in the week

## 2020-08-06 NOTE — PATIENT INSTRUCTIONS
PRESCRIPTION REFILL REMINDER:  All medication refills should be requested prior to [facility] on Friday  Any refill requests after noon on Friday would be addressed the following Monday  Please protect yourself from the novel Coronavirus (COVID-19)! Even though we STILL do not have a vaccine or good antiviral drugs for this infection, the following strategies can help you stay healthy:    = Wash your hands with soap and water, or hand  with at least 60% alcohol, often  = Avoid touching your face!   = Avoid close contact with others, even if they seem healthy  Avoid gatherings of more than 10 people, and don't travel unnecessarily  = Stay home, except to get medical care or other essentials  If you can eat and drink and breathe and sleep, please consider calling your doctor's office instead of visiting in person, even if you are ill   = Cover your cough with a tissue, or your elbow  = Clean frequently touched surfaces and objects daily (e g , tables, countertops, light switches, doorknobs, and cabinet handles)  Regular household detergent and water are sufficient  Numbers of cases of Coronavirus are spiking in many [place] States  This is not a more dangerous virus, but a sign that more people in a community are spreading the virus  Please check the local disease reports near you if you consider travelling this summer  We do NOT advise travel to any community or State with a rising viral caseload  Check out [employer] for [place] data that are updated daily  http://www myseekit/   Global Epidemics  Org, from [facility] (OUTPATIENT C[facility]), will give you Rlstsn-nw-Fuhwfv information on virus cases  Https://globalepidemics  org/

## 2020-08-07 ENCOUNTER — CONSULT (OUTPATIENT)
Dept: HEMATOLOGY ONCOLOGY | Facility: CLINIC | Age: 55
End: 2020-08-07
Payer: MEDICARE

## 2020-08-07 ENCOUNTER — TELEMEDICINE (OUTPATIENT)
Dept: PALLIATIVE MEDICINE | Facility: CLINIC | Age: 55
End: 2020-08-07
Payer: MEDICARE

## 2020-08-07 VITALS
DIASTOLIC BLOOD PRESSURE: 64 MMHG | WEIGHT: 143 LBS | BODY MASS INDEX: 22.98 KG/M2 | HEIGHT: 66 IN | TEMPERATURE: 97.8 F | HEART RATE: 86 BPM | SYSTOLIC BLOOD PRESSURE: 108 MMHG | OXYGEN SATURATION: 98 % | RESPIRATION RATE: 17 BRPM

## 2020-08-07 DIAGNOSIS — C66.1 UROTHELIAL CARCINOMA OF RIGHT DISTAL URETER (HCC): Primary | ICD-10-CM

## 2020-08-07 DIAGNOSIS — G89.3 CANCER RELATED PAIN: ICD-10-CM

## 2020-08-07 DIAGNOSIS — Z51.5 PALLIATIVE CARE PATIENT: ICD-10-CM

## 2020-08-07 DIAGNOSIS — N39.8 UROTHELIAL LESION: ICD-10-CM

## 2020-08-07 DIAGNOSIS — C66.1 UROTHELIAL CARCINOMA OF RIGHT DISTAL URETER (HCC): ICD-10-CM

## 2020-08-07 DIAGNOSIS — T40.2X5A THERAPEUTIC OPIOID INDUCED CONSTIPATION: ICD-10-CM

## 2020-08-07 DIAGNOSIS — K59.03 THERAPEUTIC OPIOID INDUCED CONSTIPATION: ICD-10-CM

## 2020-08-07 DIAGNOSIS — R11.0 CHEMOTHERAPY-INDUCED NAUSEA: Primary | ICD-10-CM

## 2020-08-07 DIAGNOSIS — T45.1X5A CHEMOTHERAPY-INDUCED NAUSEA: Primary | ICD-10-CM

## 2020-08-07 PROCEDURE — 3008F BODY MASS INDEX DOCD: CPT | Performed by: FAMILY MEDICINE

## 2020-08-07 PROCEDURE — 3078F DIAST BP <80 MM HG: CPT | Performed by: FAMILY MEDICINE

## 2020-08-07 PROCEDURE — 99205 OFFICE O/P NEW HI 60 MIN: CPT | Performed by: INTERNAL MEDICINE

## 2020-08-07 PROCEDURE — 99214 OFFICE O/P EST MOD 30 MIN: CPT | Performed by: FAMILY MEDICINE

## 2020-08-07 PROCEDURE — 3074F SYST BP LT 130 MM HG: CPT | Performed by: FAMILY MEDICINE

## 2020-08-07 RX ORDER — CYCLOBENZAPRINE HCL 10 MG
5 TABLET ORAL 3 TIMES DAILY PRN
Qty: 4.5 TABLET | Refills: 0 | Status: SHIPPED | OUTPATIENT
Start: 2020-08-07 | End: 2020-08-07 | Stop reason: SDUPTHER

## 2020-08-07 RX ORDER — CYCLOBENZAPRINE HCL 10 MG
5 TABLET ORAL 3 TIMES DAILY PRN
Qty: 10 TABLET | Refills: 0 | Status: SHIPPED | OUTPATIENT
Start: 2020-08-07 | End: 2020-08-22 | Stop reason: HOSPADM

## 2020-08-07 RX ORDER — SENNOSIDES 8.6 MG
1 TABLET ORAL
Qty: 30 TABLET | Refills: 0 | Status: SHIPPED | OUTPATIENT
Start: 2020-08-07 | End: 2020-10-21

## 2020-08-07 RX ORDER — ONDANSETRON HYDROCHLORIDE 8 MG/1
8 TABLET, FILM COATED ORAL EVERY 8 HOURS PRN
Qty: 30 TABLET | Refills: 3 | Status: SHIPPED | OUTPATIENT
Start: 2020-08-07 | End: 2020-10-21 | Stop reason: SDUPTHER

## 2020-08-07 RX ORDER — OXYCODONE HYDROCHLORIDE 10 MG/1
10 TABLET ORAL EVERY 4 HOURS PRN
Qty: 42 TABLET | Refills: 0 | Status: SHIPPED | OUTPATIENT
Start: 2020-08-07 | End: 2020-08-07 | Stop reason: SDUPTHER

## 2020-08-07 RX ORDER — ONDANSETRON 4 MG/1
4 TABLET, ORALLY DISINTEGRATING ORAL EVERY 6 HOURS PRN
Qty: 20 TABLET | Refills: 0 | Status: SHIPPED | OUTPATIENT
Start: 2020-08-07 | End: 2020-11-18

## 2020-08-07 RX ORDER — OXYCODONE HYDROCHLORIDE 10 MG/1
10 TABLET ORAL EVERY 4 HOURS PRN
Qty: 42 TABLET | Refills: 0 | Status: SHIPPED | OUTPATIENT
Start: 2020-08-07 | End: 2020-08-14 | Stop reason: SDUPTHER

## 2020-08-07 NOTE — PROGRESS NOTES
Outpatient Virtual Regular Visit - Follow-up with Palliative and [address] [place] [address] 63 y o  male [id number]    Intake and Disclaimer:    Reason for visit is symptoms assessment with focus on cancer-related pain  The patient is unable to visit the clinic safely due to the coronavirus pandemic  Patient agrees to participate in a virtual check in via telephone or video visit instead of presenting to the office to address urgent/immediate medical needs, or to respect quarantine and public health guidelines  Patient was informed this is a billable service  After connecting through Screen, the patient was identified by name and date of birth  [place] Ramsey was informed that this was a telemedicine visit and that the visit is being conducted through [employer] and patient was informed that this is a secure, HIPAA-compliant platform  He agrees to proceed  which may not be secure and therefore might not be HIPAA-compliant  My office door was closed  No one else was in the room  He acknowledged consent and understanding of privacy and security of the virtual check-in visit  I informed the patient that I have reviewed his record in Epic and presented the opportunity for him to ask any questions regarding the visit today  The patient initiated communication and agreed to participate          Assessment and Plan  Problem List Items Addressed This Visit        Digestive    Therapeutic opioid induced constipation    Relevant Medications    senna (SENOKOT) 8 6 mg       Genitourinary    Urothelial lesion    Relevant Medications    ondansetron (ZOFRAN-ODT) 4 mg disintegrating tablet    oxyCODONE (ROXICODONE) 10 MG TABS    cyclobenzaprine (FLEXERIL) 10 mg tablet    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)       Other    Cancer related pain    Relevant Medications    oxyCODONE (ROXICODONE) 10 MG TABS    cyclobenzaprine (FLEXERIL) 10 mg tablet    Palliative care patient    Relevant Medications ondansetron (ZOFRAN-ODT) 4 mg disintegrating tablet    oxyCODONE (ROXICODONE) 10 MG TABS    cyclobenzaprine (FLEXERIL) 10 mg tablet      Other Visit Diagnoses     Chemotherapy-induced nausea    -  Primary    Relevant Medications    ondansetron (ZOFRAN-ODT) 4 mg disintegrating tablet         - continue MS-ER 30 mg Q8H [facility]   - last refill 07/21 for 30-days   - continue oxy-IR 10 mg Q4H PRN [60 mg/day]   - 7-day Rx filled today   - appropriately reported medication remaining; high-risk behavior surrounding Rx with multiple calls to clinic regarding Rx filling  Given patient's previously reported use of an opioid contract with a previous provider will continue to monitor high-risk behavior for patient/provider safety    - Rx for flexeril 5 mg TID x 3 days for reported back spasms a/w chronic back pain   - continue bowel regimen   - Rx for senna sent to pharmacy to prevent OIC   - Rx for zofran 4 mg ODT written in anticipation for possible chemo-induced nausea     - patient for in-person visit to complete Opioid Agreement contract; discussed importance of contract for both patient and provided; patient understanding and agreeable  Also report that in-person visit allows for synergistic visit with SW to facilitate completion of 5-Wishes  Follow up with [facility] clinic In-Person in 1 week; patient agreeable to in-person visit and able to secure a safe ride  No orders of the defined types were placed in this encounter  Medications Discontinued During This Encounter   Medication Reason    morphine (MS Contin) 30 mg 12 hr tablet Alternate therapy    oxyCODONE (ROXICODONE) 10 MG TABS Reorder    oxyCODONE (ROXICODONE) 10 MG TABS Reorder    cyclobenzaprine (FLEXERIL) 10 mg tablet Reorder        Wash L Dash Ng was assessed today for symptoms and care planning related to above illnesses    I have reviewed the patient's controlled substance dispensing history in the Prescription Drug Monitoring Program in compliance with the [address] regulations before prescribing any controlled substances  He is invited to continue to follow with us  If there are questions or concerns, please contact us through our clinic/answering service 24 hours a day, seven days a week  As a result of this visit, I have referred the patient for further evaluation  Yes    Bishop Aftab MD  [facility] Palliative and Supportive Care  [phone]          Pedro Ortiz is a 63 y o  male with a PMH of newly diagnosed ureter CA with retroperitoneal metastatic disease c/b R hydronephrosis s/p R PCN tube placement, HTN, chronic lumbar pain with radiculopathy, asthma who presents for [facility] follow up appointment  Primary Oncology: Dr Akash Anton   - plan per patient to initiate chemotherapy infusions next week [08/11/2020]    Patient reports adequately controlled R-sided back pain on current pain regimen [oxy 10 mg Q4H PRN -> patient taking scheduled dosing with 60 mg/day]  Previous Rx for 7-day prescription with patient reporting one day of oxy left; taking appropriately  Opioid use contract mailed to patient last week; reports did not receive documentation  Denies nausea, vomiting, constipation, diarrhea, CP, SOB, HA  Tolerating PO intake without difficulty  Appetite intact  Currently working on 5-Wishes [AD] to identify code status as DNAR/DNI and to identify his daughter Bladimir Monroy as sole medical decision proxy        Past Medical History:   Diagnosis Date    Asthma     [facility] disorder [facility] [address] [facility]     COPD (chronic obstructive pulmonary disease) ([facility] [address] [facility]     Disease of thyroid gland     Hypertension     Hyperthyroidism      Past Surgical History:   Procedure Laterality Date    DENTAL SURGERY      FL RETROGRADE URETHROCYSTOGRAM  6/15/2020    HERNIA REPAIR Left     inguinal    IR LYMPH NODE BIOPSY/ASPIRATION  7/24/2020    IR TUBE PLACEMENT  6/25/2020    TX CYSTOURETHROSCOPY,URETER CATHETER Right 6/15/2020    Procedure: CYSTOSCOPY RETROGRADE PYELOGRAM and ureteroscopy;  Surgeon: Doni Silva MD;  Location: [facility];  Service: Urology     Current Outpatient Medications   Medication Sig Dispense Refill    albuterol (Ventolin HFA) 90 mcg/act inhaler Inhale 2 puffs every 6 (six) hours as needed for wheezing 18 g 2    clonazePAM (KlonoPIN) 0 5 mg tablet Take 1 tablet (0 5 mg total) by mouth 2 (two) times a day as needed for anxiety 14 tablet 0    cyclobenzaprine (FLEXERIL) 10 mg tablet Take 0 5 tablets (5 mg total) by mouth 3 (three) times a day as needed for muscle spasms 10 tablet 0    escitalopram (LEXAPRO) 10 mg tablet Take 10 mg by mouth daily at bedtime      fluticasone (FLONASE) 50 mcg/act nasal spray SPRAY 2 SPRAYS INTO EACH NOSTRIL EVERY DAY 1 Bottle 5    fluticasone (FLOVENT HFA) 220 mcg/act inhaler Inhale 2 puffs 2 (two) times a day Rinse mouth after use  1 Inhaler 5    lisinopril (ZESTRIL) 10 mg tablet Take 1 tablet (10 mg total) by mouth daily 30 tablet 5    naloxone (NARCAN) 4 mg/0 1 mL nasal spray 0 1 mL (4 mg total) into each nostril every 3 (three) minutes as needed for opioid reversal or respiratory depression Administer 1 spray into a nostril  If breathing does not return to normal or if breathing difficulty resumes after 2-3 minutes, give another dose in the other nostril using a new spray   1 each 1    omeprazole (PriLOSEC) 40 MG capsule Take 1 capsule (40 mg total) by mouth daily before breakfast 30 capsule 5    ondansetron (ZOFRAN) 8 mg tablet Take 1 tablet (8 mg total) by mouth every 8 (eight) hours as needed for nausea or vomiting 30 tablet 3    ondansetron (ZOFRAN-ODT) 4 mg disintegrating tablet Take 1 tablet (4 mg total) by mouth every 6 (six) hours as needed for nausea or vomiting 20 tablet 0    oxyCODONE (ROXICODONE) 10 MG TABS Take 1 tablet (10 mg total) by mouth every 4 (four) hours as needed (breakthrough cancer pain)Max Daily Amount: 60 mg 42 tablet 0    senna (SENOKOT) 8 6 mg Take 1 tablet (8 6 mg total) by mouth daily at bedtime 30 tablet 0    zolpidem (AMBIEN) 10 mg tablet 1 TAB AT BEDTIME AS NEEDED FOR INSOMNIA       No current facility-administered medications for this visit  Allergies   Allergen Reactions    Ketorolac Hives       Review of Systems   Constitutional: Negative for appetite change, chills and fever  HENT: Negative for congestion  Respiratory: Negative for shortness of breath  Cardiovascular: Negative for chest pain  Gastrointestinal: Negative for abdominal pain, constipation, diarrhea, nausea and vomiting  Genitourinary: Positive for flank pain  Musculoskeletal: Positive for back pain  Negative for neck pain  Skin: Negative for wound  Neurological: Negative for headaches  Psychiatric/Behavioral: Negative for confusion  Video Exam  There were no vitals filed for this visit  Physical Exam  Unable to perform, Televideo Appointment    I spent 15+ minutes was spent during this virtual visit by Kalpana, face to face with Frankie with greater than 50% of the time spent in counseling or coordination of care including discussions of patient and family counseling/involvement in care, compliance with treatment regimen and symptoms assessment  All of the patient's questions were answered during this discussion        Encounter provider Mina Lyon MD, located at:  [address] [place], [place] [id number] [phone]    Recent Visits  Date Type Provider Dept   08/01/20 Telephone Yanet Jacobs DO [address] [place] [facility]   07/31/20 Telephone Mina Lyon MD 13 [facility]   07/31/20 Telephone Vladimir Burch RN 13 [facility]   07/31/20 Rajni Kauffman MD 13 [facility]   Showing recent visits within past 7 days and meeting all other requirements     Today's Visits  Date Type Provider Dept   08/07/20 Katie Dugan MD [address] today's visits and meeting all other requirements     Future Appointments  No visits were found meeting these conditions     Showing future appointments within next 150 days and meeting all other requirements

## 2020-08-07 NOTE — PROGRESS NOTES
Hardik Tran  1965  [facility] [place]  [address] [facility] [id number] [phone]    Chief Complaint   Patient presents with    Consult         Oncology History    No history exists  History of Present Illness:  May 22, 2020 CT scan of the abdomen pelvis showed marked right hydroureteronephrosis suggesting chronic obstruction  Clarisa 15, 2020 attempted ureteroscopy  Occlusion noted  No biopsy performed  Further workup was planned but limited due to transportation difficulties  Patient presented to the emergency room on July 6, 2020  Repeat CT scan of the abdomen pelvis showed percutaneous nephrostomy tube placed  Retroperitoneal lymphadenopathy measuring up to 3 6 cm with right psoas mass up to 1 9 cm  Right renal atrophy is noted  Thickening of the distal right ureter suspicious for malignancy is noted  Biopsy of a retroperitoneal node on July 24, 2020 showed metastatic carcinoma consistent with urothelial primary, CDX2 positive, GATA3 positive, CK 20, CK 7 positive  Negative TTF1, ER, PSA  July 6, 2020 CMP is normal   CBC and differential normal       Review of Systems   Constitutional: Negative for chills and fever  HENT: Negative for nosebleeds  Eyes: Negative for discharge  Respiratory: Negative for cough and shortness of breath  Cardiovascular: Negative for chest pain  Gastrointestinal: Negative for abdominal pain, constipation and diarrhea  Endocrine: Negative for polydipsia  Genitourinary: Negative for hematuria  Musculoskeletal: Negative for arthralgias  Skin: Negative for color change  Allergic/Immunologic: Negative for immunocompromised state  Neurological: Negative for dizziness and headaches  Hematological: Negative for adenopathy  Psychiatric/Behavioral: Negative for agitation         Patient Active Problem List   Diagnosis    Allergic rhinitis    Anxiety disorder    Bipolar disorder ([place] [address] )    Chronic lumbar radiculopathy    Contact dermatitis    Esophageal reflux    Migraine headache    Mild intermittent asthma without complication    Other emphysema (HCC)    Subclinical hyperthyroidism    Essential hypertension    Hydronephrosis of right kidney    Major depression    Panic disorder    Urothelial lesion    Cancer related pain    Chronic back pain    Retroperitoneal lymphadenopathy    Palliative care patient     Past Medical History:   Diagnosis Date    Asthma     Bipolar disorder ([place] [address] )     COPD (chronic obstructive pulmonary disease) (HCC)     Disease of thyroid gland     Hypertension     Hyperthyroidism      Past Surgical History:   Procedure Laterality Date    DENTAL SURGERY      FL RETROGRADE URETHROCYSTOGRAM  6/15/2020    HERNIA REPAIR Left     inguinal    IR LYMPH NODE BIOPSY/ASPIRATION  7/24/2020    IR TUBE PLACEMENT  6/25/2020    MO CYSTOURETHROSCOPY,URETER CATHETER Right 6/15/2020    Procedure: CYSTOSCOPY RETROGRADE PYELOGRAM and ureteroscopy;  Surgeon: Lebron Rodriguez MD;  Location: [facility];  Service: Urology     Family History   Problem Relation Age of Onset    Heart disease Mother     Hyperthyroidism Mother     Heart disease Father     Hyperthyroidism Father      Social History     Socioeconomic History    Marital status: Single     Spouse name: Not on file    Number of children: Not on file    Years of education: Not on file    Highest education level: Not on file   Occupational History    Not on file   Social Needs    Financial resource strain: Not on file    Food insecurity     Worry: Not on file     Inability: Not on file    Transportation needs     Medical: Not on file     Non-medical: Not on file   Tobacco Use    Smoking status: Current Every Day Smoker     Packs/day: 1 00    Smokeless tobacco: Never Used   Substance and Sexual Activity    Alcohol use: No    Drug use: No    Sexual activity: Yes     Partners: Female   Lifestyle    Physical activity     Days per week: Not on file     Minutes per session: Not on file    Stress: Not on file   Relationships    Social connections     Talks on phone: Not on file     Gets together: Not on file     Attends Anabaptist service: Not on file     Active member of club or organization: Not on file     Attends meetings of clubs or organizations: Not on file     Relationship status: Not on file    Intimate partner violence     Fear of current or ex partner: Not on file     Emotionally abused: Not on file     Physically abused: Not on file     Forced sexual activity: Not on file   Other Topics Concern    Not on file   Social History Narrative    Patient is  from the mother of his 1st child  Patient has 4 adult children, from 4 different unions  Patient lives with his youngest daughter Sharan Valente has multiple sclerosis but is able to participate fully in the patient's care  Patient does not have any advanced directives  Current Outpatient Medications:     albuterol (Ventolin HFA) 90 mcg/act inhaler, Inhale 2 puffs every 6 (six) hours as needed for wheezing, Disp: 18 g, Rfl: 2    clonazePAM (KlonoPIN) 0 5 mg tablet, Take 1 tablet (0 5 mg total) by mouth 2 (two) times a day as needed for anxiety, Disp: 14 tablet, Rfl: 0    escitalopram (LEXAPRO) 10 mg tablet, Take 10 mg by mouth daily at bedtime, Disp: , Rfl:     fluticasone (FLONASE) 50 mcg/act nasal spray, SPRAY 2 SPRAYS INTO EACH NOSTRIL EVERY DAY, Disp: 1 Bottle, Rfl: 5    fluticasone (FLOVENT HFA) 220 mcg/act inhaler, Inhale 2 puffs 2 (two) times a day Rinse mouth after use , Disp: 1 Inhaler, Rfl: 5    lisinopril (ZESTRIL) 10 mg tablet, Take 1 tablet (10 mg total) by mouth daily, Disp: 30 tablet, Rfl: 5    naloxone (NARCAN) 4 mg/0 1 mL nasal spray, 0 1 mL (4 mg total) into each nostril every 3 (three) minutes as needed for opioid reversal or respiratory depression Administer 1 spray into a nostril   If breathing does not return to normal or if breathing difficulty resumes after 2-3 minutes, give another dose in the other nostril using a new spray , Disp: 1 each, Rfl: 1    omeprazole (PriLOSEC) 40 MG capsule, Take 1 capsule (40 mg total) by mouth daily before breakfast, Disp: 30 capsule, Rfl: 5    oxyCODONE (ROXICODONE) 10 MG TABS, Take 1 tablet (10 mg total) by mouth every 4 (four) hours as needed (breakthrough cancer pain) No refills until visit to clinic with Dr Starr Nichole Daily Amount: 60 mg, Disp: 42 tablet, Rfl: 0    zolpidem (AMBIEN) 10 mg tablet, 1 TAB AT BEDTIME AS NEEDED FOR INSOMNIA, Disp: , Rfl:     morphine (MS Contin) 30 mg 12 hr tablet, Take 1 tablet (30 mg total) by mouth 3 (three) times a dayMax Daily Amount: 90 mg (Patient not taking: Reported on 8/7/2020), Disp: 90 tablet, Rfl: 0  Allergies   Allergen Reactions    Ketorolac Hives     Vitals:    08/07/20 1018   BP: 108/64   Pulse: 86   Resp: 17   Temp: 97 8 °F (36 6 °C)   SpO2: 98%       Physical Exam   Constitutional: He is oriented to person, place, and time  He appears well-developed  HENT:   Head: Normocephalic and atraumatic  Eyes: Pupils are equal, round, and reactive to light  Neck: Neck supple  Cardiovascular: Normal rate and regular rhythm  No murmur heard  Pulmonary/Chest: Breath sounds normal  He has no wheezes  He has no rales  Abdominal: Soft  There is no abdominal tenderness  Musculoskeletal: Normal range of motion  General: No tenderness  Lymphadenopathy:     He has no cervical adenopathy  Neurological: He is alert and oriented to person, place, and time  He has normal reflexes  No cranial nerve deficit  Skin: No rash noted  No erythema     Psychiatric: His behavior is normal          Labs:  CBC, Coags, BMP, Mg, Phos     Imaging  Ir Lymph Node Biopsy/aspiration    Result Date: 7/27/2020  Narrative: Examination: Ultrasound-guided biopsy of the right external iliac node HISTORY: Urothelial cancer, probably metastatic TECHNIQUE: The patient was brought to Radiology  Informed consent was obtained  The right groin was prepped and draped in usual sterile fashion  Timeout was performed  Lidocaine was administered as local anesthesia  Using ultrasound guidance a 17-gauge needle was advanced to the targeted lesion  An 18-gauge needle was placed coaxially, and core biopsy was performed  The tract was not closed with D stat  FINDINGS: Postbiopsy scan shows nothing to suggest bleeding     Impression: Technically successful ultrasound-guided biopsy This is then of the clinically relevant portion of this report  Subsequent information is for compliance, documentation, and coding purposes  In the process of informed consent, information was communicated, verbally, to the patient regarding the procedure  The patient was informed of; the name of the procedure, nature of the procedure, nature of the condition, risks, benefits, alternatives, and potential complications, as well as the consequences of not having the procedure  All the patient's questions were answered  Informed consent was signed  Chlorhexidine and alcohol was used for cleansing and sterile preparation of the skin  This was allowed to dry prior to the application of sterile draping  Timeout was performed, with all team members present, and in agreement  Confirmation of patient, procedure, laterally, allergies, and all needed equipment was performed  PROCEDURE: Ultrasound-guided biopsy of the above-named organ or abnormality Preprocedure diagnosis: Please see "history ", above Postprocedure diagnosis: Same Antibiotics: None Estimated blood loss: less than 5 mL Specimen: Core biopsy submitted in formalin and RPMI Anesthesia: Local Workstation performed: [id number]     I reviewed the above laboratory and imaging data      Discussion/Summary:  In summary, this is a 71-year-old male history of recently diagnosed advanced urothelial carcinoma, primary in the right ureter  He has locally advanced disease with significant pelvic and retroperitoneal lymphadenopathy, precluding surgical or radiation therapy interventions  Medical therapy will be the Backbone of his treatment  NCCN supported regimens include dose dense MVAC or gem/cis  The latter is associated with similar response rates, duration of response, etcetera and better tolerability  We reviewed potential toxicities of gem/cis including but not limited to cytopenias, fatigue, nausea, peripheral neuropathy, electrolyte abnormality, renal dysfunction, other  We reviewed prophylactic measures taken routinely as well as monitoring to allow for early intervention as appropriate  Our chemotherapy nurse review the above information as well as providing written information in this regard  We reviewed that his disease is likely not curable and that treatment is aimed at palliative benefit, survival benefit  These were defined for the patient and his daughter in easily understood terminology  The patient and his daughter voiced understanding above discussion wish to proceed as outlined  We made arrangements accordingly

## 2020-08-10 ENCOUNTER — PATIENT OUTREACH (OUTPATIENT)
Dept: UROLOGY | Facility: CLINIC | Age: 55
End: 2020-08-10

## 2020-08-10 ENCOUNTER — TELEPHONE (OUTPATIENT)
Dept: PALLIATIVE MEDICINE | Facility: CLINIC | Age: 55
End: 2020-08-10

## 2020-08-10 DIAGNOSIS — C66.1 UROTHELIAL CARCINOMA OF RIGHT DISTAL URETER (HCC): ICD-10-CM

## 2020-08-10 LAB
ALBUMIN SERPL BCP-MCNC: 3.4 G/DL (ref 3.5–5)
ALP SERPL-CCNC: 107 U/L (ref 46–116)
ALT SERPL W P-5'-P-CCNC: 17 U/L (ref 12–78)
ANION GAP SERPL CALCULATED.3IONS-SCNC: 4 MMOL/L (ref 4–13)
AST SERPL W P-5'-P-CCNC: 22 U/L (ref 5–45)
BASOPHILS # BLD AUTO: 0.07 THOUSANDS/ΜL (ref 0–0.1)
BASOPHILS NFR BLD AUTO: 1 % (ref 0–1)
BILIRUB SERPL-MCNC: 0.38 MG/DL (ref 0.2–1)
BUN SERPL-MCNC: 16 MG/DL (ref 5–25)
CALCIUM SERPL-MCNC: 9.5 MG/DL (ref 8.3–10.1)
CHLORIDE SERPL-SCNC: 104 MMOL/L (ref 100–108)
CO2 SERPL-SCNC: 26 MMOL/L (ref 21–32)
CREAT SERPL-MCNC: 1.13 MG/DL (ref 0.6–1.3)
EOSINOPHIL # BLD AUTO: 0.6 THOUSAND/ΜL (ref 0–0.61)
EOSINOPHIL NFR BLD AUTO: 7 % (ref 0–6)
ERYTHROCYTE [DISTWIDTH] IN BLOOD BY AUTOMATED COUNT: 14.8 % (ref 11.6–15.1)
GFR SERPL CREATININE-BSD FRML MDRD: 72 ML/MIN/1.73SQ M
GLUCOSE P FAST SERPL-MCNC: 100 MG/DL (ref 65–99)
HCT VFR BLD AUTO: 39.7 % (ref 36.5–49.3)
HGB BLD-MCNC: 12.3 G/DL (ref 12–17)
IMM GRANULOCYTES # BLD AUTO: 0.03 THOUSAND/UL (ref 0–0.2)
IMM GRANULOCYTES NFR BLD AUTO: 0 % (ref 0–2)
LYMPHOCYTES # BLD AUTO: 1.57 THOUSANDS/ΜL (ref 0.6–4.47)
LYMPHOCYTES NFR BLD AUTO: 17 % (ref 14–44)
MAGNESIUM SERPL-MCNC: 2 MG/DL (ref 1.6–2.6)
MCH RBC QN AUTO: 30.7 PG (ref 26.8–34.3)
MCHC RBC AUTO-ENTMCNC: 31 G/DL (ref 31.4–37.4)
MCV RBC AUTO: 99 FL (ref 82–98)
MONOCYTES # BLD AUTO: 0.97 THOUSAND/ΜL (ref 0.17–1.22)
MONOCYTES NFR BLD AUTO: 11 % (ref 4–12)
NEUTROPHILS # BLD AUTO: 5.89 THOUSANDS/ΜL (ref 1.85–7.62)
NEUTS SEG NFR BLD AUTO: 64 % (ref 43–75)
NRBC BLD AUTO-RTO: 0 /100 WBCS
PLATELET # BLD AUTO: 374 THOUSANDS/UL (ref 149–390)
PMV BLD AUTO: 9.9 FL (ref 8.9–12.7)
POTASSIUM SERPL-SCNC: 5.3 MMOL/L (ref 3.5–5.3)
PROT SERPL-MCNC: 8 G/DL (ref 6.4–8.2)
RBC # BLD AUTO: 4.01 MILLION/UL (ref 3.88–5.62)
SODIUM SERPL-SCNC: 134 MMOL/L (ref 136–145)
WBC # BLD AUTO: 9.13 THOUSAND/UL (ref 4.31–10.16)

## 2020-08-10 PROCEDURE — 83735 ASSAY OF MAGNESIUM: CPT | Performed by: INTERNAL MEDICINE

## 2020-08-10 PROCEDURE — 80053 COMPREHEN METABOLIC PANEL: CPT | Performed by: INTERNAL MEDICINE

## 2020-08-10 PROCEDURE — 85025 COMPLETE CBC W/AUTO DIFF WBC: CPT | Performed by: INTERNAL MEDICINE

## 2020-08-10 NOTE — PROGRESS NOTES
Per PDMP review patient due for refill of MS-ER 30 mg on 08/19/2020  Last refill of MS-ER 30 mg was on 07/21/2020  Will refill medication when appropriate timing      Zarina Peralta MD  Fellow, Palliative Care Medicine

## 2020-08-10 NOTE — PROGRESS NOTES
Hardik was scheduled for follow up with Dr Filippo Abbott on 8/14/20  He had consult with Dr Sean Grimaldo and will be starting chemotherapy tomorrow  Dr Filippo Abbott ordered to cancel follow up with him on 8/14/20 and reschedule in 3 months  Hardik wants to follow up in [place]  Follow up scheduled with Dr Lianne Porter on 11/30/20  Made Hardik aware that his nephrostomy tube needs to be changed every 3 months and it was placed 6/25/20  Instructed him to call me if he does not hear from IR in the next few weeks to schedule an exchange  He does not have any questions after his consult with Dr Sean Grimaldo  He has my direct extension and was encouraged to call if he has questions or needs assistance

## 2020-08-10 NOTE — TELEPHONE ENCOUNTER
This MA took a message of the machine from this patient requesting Morphine  The message stated " Morphine works well with Oxycodone  I'm still in pain, can Dr Eleni Emmanuel send me a script for Morphine "     Please advise

## 2020-08-11 ENCOUNTER — HOSPITAL ENCOUNTER (OUTPATIENT)
Dept: INFUSION CENTER | Facility: CLINIC | Age: 55
Discharge: HOME | End: 2020-08-11
Payer: MEDICARE

## 2020-08-11 VITALS
HEART RATE: 92 BPM | RESPIRATION RATE: 18 BRPM | BODY MASS INDEX: 21 KG/M2 | TEMPERATURE: 98.5 F | DIASTOLIC BLOOD PRESSURE: 71 MMHG | WEIGHT: 141.76 LBS | HEIGHT: 69 IN | SYSTOLIC BLOOD PRESSURE: 115 MMHG

## 2020-08-11 DIAGNOSIS — C66.1 UROTHELIAL CARCINOMA OF RIGHT DISTAL URETER (HCC): Primary | ICD-10-CM

## 2020-08-11 PROCEDURE — 96413 CHEMO IV INFUSION 1 HR: CPT

## 2020-08-11 PROCEDURE — 96361 HYDRATE IV INFUSION ADD-ON: CPT

## 2020-08-11 PROCEDURE — 96367 TX/PROPH/DG ADDL SEQ IV INF: CPT

## 2020-08-11 PROCEDURE — 96417 CHEMO IV INFUS EACH ADDL SEQ: CPT

## 2020-08-11 RX ORDER — OXYCODONE HYDROCHLORIDE AND ACETAMINOPHEN 5; 325 MG/1; MG/1
2 TABLET ORAL ONCE
Status: COMPLETED | OUTPATIENT
Start: 2020-08-11 | End: 2020-08-11

## 2020-08-11 RX ORDER — SODIUM CHLORIDE 9 MG/ML
20 INJECTION, SOLUTION INTRAVENOUS ONCE
Status: CANCELLED | OUTPATIENT
Start: 2020-08-11

## 2020-08-11 RX ORDER — SODIUM CHLORIDE 9 MG/ML
20 INJECTION, SOLUTION INTRAVENOUS ONCE
Status: CANCELLED | OUTPATIENT
Start: 2020-08-24

## 2020-08-11 RX ORDER — OXYCODONE HYDROCHLORIDE AND ACETAMINOPHEN 5; 325 MG/1; MG/1
2 TABLET ORAL ONCE
Status: CANCELLED
Start: 2020-08-11

## 2020-08-11 RX ORDER — SODIUM CHLORIDE 9 MG/ML
20 INJECTION, SOLUTION INTRAVENOUS ONCE
Status: CANCELLED | OUTPATIENT
Start: 2020-08-31

## 2020-08-11 RX ORDER — SODIUM CHLORIDE 9 MG/ML
20 INJECTION, SOLUTION INTRAVENOUS ONCE
Status: COMPLETED | OUTPATIENT
Start: 2020-08-11 | End: 2020-08-11

## 2020-08-11 RX ADMIN — CISPLATIN 121.1 MG: 1 INJECTION INTRAVENOUS at 12:38

## 2020-08-11 RX ADMIN — GEMCITABINE HYDROCHLORIDE 1600 MG: 1 INJECTION, SOLUTION INTRAVENOUS at 11:58

## 2020-08-11 RX ADMIN — SODIUM CHLORIDE 20 ML/HR: 0.9 INJECTION, SOLUTION INTRAVENOUS at 10:31

## 2020-08-11 RX ADMIN — SODIUM CHLORIDE 1000 ML: 0.9 INJECTION, SOLUTION INTRAVENOUS at 13:43

## 2020-08-11 RX ADMIN — SODIUM CHLORIDE 150 MG: 0.9 INJECTION, SOLUTION INTRAVENOUS at 11:16

## 2020-08-11 RX ADMIN — SODIUM CHLORIDE 1000 ML: 0.9 INJECTION, SOLUTION INTRAVENOUS at 10:32

## 2020-08-11 RX ADMIN — DEXAMETHASONE SODIUM PHOSPHATE: 10 INJECTION, SOLUTION INTRAMUSCULAR; INTRAVENOUS at 10:46

## 2020-08-11 RX ADMIN — OXYCODONE HYDROCHLORIDE AND ACETAMINOPHEN 2 TABLET: 5; 325 TABLET ORAL at 10:30

## 2020-08-11 NOTE — TELEPHONE ENCOUNTER
This nurse called patient and explained he last had the Morphine filled on 7/21/20 and it is 10 days early for refill  He admitted to taking more than were ordered  Reviewed the opioid contract he had signed and that he can only take the medications as prescribed and needs to call our office if taking more frequently  He is aware he will not get a refill for the morphine at this time and will take the oxycodone for breakthrough pain as ordered only  He will call our office when it is near to the 8/21/20 date for refill

## 2020-08-11 NOTE — PROGRESS NOTES
Patient to the unit for Gemzar and Cisplatin infusions  Tolerated treatment without adverse reactions  AVS given, aware of future appointments  Voices no questions or concerns at discharge

## 2020-08-11 NOTE — PLAN OF CARE
Problem: Potential for Falls  Goal: Patient will remain free of falls  Description: INTERVENTIONS:  - Assess patient frequently for physical needs  -  Identify cognitive and physical deficits and behaviors that affect risk of falls  -  [place] fall precautions as indicated by assessment   - Educate patient/family on patient safety including physical limitations  - Instruct patient to call for assistance with activity based on assessment  - Modify environment to reduce risk of injury  - Consider OT/PT consult to assist with strengthening/mobility  Outcome: Progressing     Problem: PAIN - ADULT  Goal: Verbalizes/displays adequate comfort level or baseline comfort level  Description: Interventions:  - Encourage patient to monitor pain and request assistance  - Assess pain using appropriate pain scale  - Administer analgesics based on type and severity of pain and evaluate response  - Implement non-pharmacological measures as appropriate and evaluate response  - Consider cultural and social influences on pain and pain management  - Notify physician/advanced practitioner if interventions unsuccessful or patient reports new pain  Outcome: Progressing     Problem: SAFETY ADULT  Goal: Patient will remain free of falls  Description: INTERVENTIONS:  - Assess patient frequently for physical needs  -  Identify cognitive and physical deficits and behaviors that affect risk of falls    -  [place] fall precautions as indicated by assessment   - Educate patient/family on patient safety including physical limitations  - Instruct patient to call for assistance with activity based on assessment  - Modify environment to reduce risk of injury  - Consider OT/PT consult to assist with strengthening/mobility  Outcome: Progressing     Problem: DISCHARGE PLANNING  Goal: Discharge to home or other facility with appropriate resources  Description: INTERVENTIONS:  - Identify barriers to discharge w/patient and caregiver  - Arrange for needed discharge resources and transportation as appropriate  - Identify discharge learning needs (meds, wound care, etc )  - Arrange for interpretive services to assist at discharge as needed  - Refer to Case Management Department for coordinating discharge planning if the patient needs post-hospital services based on physician/advanced practitioner order or complex needs related to functional status, cognitive ability, or social support system  Outcome: Progressing     Problem: Knowledge Deficit  Goal: Patient/family/caregiver demonstrates understanding of disease process, treatment plan, medications, and discharge instructions  Description: Complete learning assessment and assess knowledge base    Interventions:  - Provide teaching at level of understanding  - Provide teaching via preferred learning methods  Outcome: Progressing

## 2020-08-12 ENCOUNTER — DOCUMENTATION (OUTPATIENT)
Dept: INFUSION CENTER | Facility: CLINIC | Age: 55
End: 2020-08-12

## 2020-08-12 NOTE — SOCIAL WORK
LSW received Dt and problem list via email  PT did not self score on DT but did note concerns with transportation, depression, fear, nervousness, worry and pain  LSW reached out to pt no answer  LSW will attempt to contact pt at a later time as pt had radiation treatment this morning

## 2020-08-13 DIAGNOSIS — Z51.5 PALLIATIVE CARE PATIENT: ICD-10-CM

## 2020-08-13 DIAGNOSIS — N39.8 UROTHELIAL LESION: ICD-10-CM

## 2020-08-13 DIAGNOSIS — G89.3 CANCER RELATED PAIN: ICD-10-CM

## 2020-08-13 RX ORDER — OXYCODONE HYDROCHLORIDE 10 MG/1
10 TABLET ORAL EVERY 4 HOURS PRN
Qty: 42 TABLET | Refills: 0 | OUTPATIENT
Start: 2020-08-13

## 2020-08-13 NOTE — TELEPHONE ENCOUNTER
May need to consider a behavior contract with this person  He no-showed an appt with me in [facility], despite having confirmed transport and availability the day before  He has consistently called early for his medicines, despite having a plan in place each week with Dr Brittany Lind

## 2020-08-13 NOTE — TELEPHONE ENCOUNTER
Patient is scheduled for an appointment tomorrow  Per initial scheduling, the appointment was to be in-person, as the patient needs to complete opioid contract and to show good karyna given high-risk behavior surrounding opioid prescription requests

## 2020-08-13 NOTE — TELEPHONE ENCOUNTER
Pt will only be evaluated for opioids by Dr Hernandez Falcon in conjunction with a Palliative Care attending physician  He may not schedule with other providers in other locations, as he has not demonstrated good karyna efforts to make those appointments

## 2020-08-13 NOTE — PROGRESS NOTES
Post infusion 48 hour call provided to patient  Pt  Denies nausea, vomiting diarrhea constipation, fever chills and pain, but states he does not have an appetite  Pt  States he feels he has a sour stomach  Pt  States he is taking Ondansetron, but not helping  Pt  States he is hydrating well  Pt  Instructed to call physican if unable to eat and drink  Pt  States he did get prescriptions filled, but Sennokot not covered  Instructed sennokot can be gotten OTC  Pt  Verbalized understanding  Pt  Instructed to call  if unable to afford medications  Pt  Does have follow up with physician and was able to tell RN when next infusion appt scheduled  Pt  Will have labs completed on 8/17/20  Pt  Verbalized he was comfortable during his stay at the infusion center and stated his nurse Nikolay Box was very nice     Pt  Denied any further questions at this time

## 2020-08-13 NOTE — PATIENT INSTRUCTIONS
PRESCRIPTION REFILL REMINDER:  All medication refills should be requested prior to [facility] on Friday  Any refill requests after noon on Friday would be addressed the following Monday  Please protect yourself from the novel Coronavirus (COVID-19)! Even though we STILL do not have a vaccine or good antiviral drugs for this infection, the following strategies can help you stay healthy:    = Wash your hands with soap and water, or hand  with at least 60% alcohol, often  = Avoid touching your face!   = Avoid close contact with others, even if they seem healthy  Avoid gatherings of more than 10 people, and don't travel unnecessarily  = Stay home, except to get medical care or other essentials  If you can eat and drink and breathe and sleep, please consider calling your doctor's office instead of visiting in person, even if you are ill   = Cover your cough with a tissue, or your elbow  = Clean frequently touched surfaces and objects daily (e g , tables, countertops, light switches, doorknobs, and cabinet handles)  Regular household detergent and water are sufficient  Numbers of cases of Coronavirus are spiking in many [place] States  This is not a more dangerous virus, but a sign that more people in a community are spreading the virus  Please check the local disease reports near you if you consider travelling this summer  We do NOT advise travel to any community or State with a rising viral caseload  Check out [employer] for [place] data that are updated daily  http://www [employer]/   Global Epidemics  Org, from [facility] will give you [id number] information on virus cases  Https://globalepidemics  org/

## 2020-08-13 NOTE — TELEPHONE ENCOUNTER
PDMP last fill     Oxycodone   08/07    42/7     Pt cancelled OV 08/12  Not yet rescheduled  Pt is asking to please send asap so he can arrange a ride to  the medications today

## 2020-08-14 ENCOUNTER — TELEMEDICINE (OUTPATIENT)
Dept: PALLIATIVE MEDICINE | Facility: CLINIC | Age: 55
End: 2020-08-14
Payer: MEDICARE

## 2020-08-14 DIAGNOSIS — N39.8 UROTHELIAL LESION: ICD-10-CM

## 2020-08-14 DIAGNOSIS — Z51.5 PALLIATIVE CARE PATIENT: ICD-10-CM

## 2020-08-14 DIAGNOSIS — J45.20 MILD INTERMITTENT ASTHMA WITHOUT COMPLICATION: ICD-10-CM

## 2020-08-14 DIAGNOSIS — G89.3 CANCER RELATED PAIN: ICD-10-CM

## 2020-08-14 PROCEDURE — 99422 OL DIG E/M SVC 11-20 MIN: CPT | Performed by: STUDENT IN AN ORGANIZED HEALTH CARE EDUCATION/TRAINING PROGRAM

## 2020-08-14 RX ORDER — OXYCODONE HYDROCHLORIDE 10 MG/1
10 TABLET ORAL EVERY 4 HOURS PRN
Qty: 42 TABLET | Refills: 0 | Status: SHIPPED | OUTPATIENT
Start: 2020-08-14 | End: 2020-08-14 | Stop reason: SDUPTHER

## 2020-08-14 RX ORDER — OXYCODONE HYDROCHLORIDE 10 MG/1
10 TABLET ORAL EVERY 4 HOURS PRN
Qty: 42 TABLET | Refills: 0 | Status: SHIPPED | OUTPATIENT
Start: 2020-08-14 | End: 2020-08-22 | Stop reason: HOSPADM

## 2020-08-14 RX ORDER — OXYCODONE HYDROCHLORIDE 10 MG/1
10 TABLET ORAL EVERY 4 HOURS PRN
Qty: 42 TABLET | Refills: 0 | Status: SHIPPED | OUTPATIENT
Start: 2020-08-14 | End: 2020-08-14

## 2020-08-14 NOTE — PROGRESS NOTES
Outpatient Virtual Regular Visit - Follow-up with Palliative and [address] [place] [address] 63 y o  male [id number]    Intake and Disclaimer:    Reason for visit is scheduled follow up for cancer-related pain management  The patient is unable to visit the clinic safely due to the coronavirus pandemic  Patient agrees to participate in a virtual check in via telephone or video visit instead of presenting to the office to address urgent/immediate medical needs, or to respect quarantine and public health guidelines  Patient was informed this is a billable service  After connecting through [employer], the patient was identified by name and date of birth  [place] Severe was informed that this was a telemedicine visit and that the visit is being conducted through [employer] and patient was informed that this is a secure, HIPAA-compliant platform  He agrees to proceed  which may not be secure and therefore might not be HIPAA-compliant  My office door was closed  No one else was in the room  He acknowledged consent and understanding of privacy and security of the virtual check-in visit  I informed the patient that I have reviewed his record in Epic and presented the opportunity for him to ask any questions regarding the visit today  The patient initiated communication and agreed to participate          Assessment and Plan  Problem List Items Addressed This Visit        Genitourinary    Urothelial lesion    Relevant Medications    oxyCODONE (ROXICODONE) 10 MG TABS       Other    Cancer related pain    Relevant Medications    oxyCODONE (ROXICODONE) 10 MG TABS    Palliative care patient    Relevant Medications    oxyCODONE (ROXICODONE) 10 MG TABS         - ran out of morphine-ER 30 mg Q8H   - last refill 07/21/2020 [next due 08/19/2020]   - completed Rx on 08/10/2020 with multiple phone calls to [facility] clinic requesting refill   - patient reports taking more than prescribed despite stating that current regimen is adequate and that he previously was taking medication as directed   - counseled appropriate administration of extended release medication and no extra efficacy if taking increasing frequency to treat acute pain   - continue oxy-IR 10 mg Q4H PRN [60/mg/day]   - 7-day Rx filled today   - extensive conversation with patient regarding recent request for medications prior to expected fill date, recent missed in-person appointments [08/12 & today's appointment was previously scheduled as in-person], and no receipt of signed opioid agreement   - patient with repeated high-risk behavior surrounding opioid use [early completion with multiple requests for early refill, multiple no shows in person, no completed agreement, multiple phone calls [4-5 times in quick succession for medication refill needs]   - discussed that we will not refill the MS-ER until expected refill date [on 08/19/2020] & that today's 7-day Rx for oxy-IR will be the last Rx filled by virtual visits [discussed with patient that he must follow up in patient prior to refill of any further prescriptions - patient states that he understands and will be at the next appointment in person]   - patient states that he is not diverting medication   - discussed with [facility] clerical staff that patient requires transportation, we will facilitate transportation and schedule times based on transportation to ensure patient's ability to arrive at our clinic   - continue senna to prevent OIC     - Follow up with Artis in 1 week      No orders of the defined types were placed in this encounter  Medications Discontinued During This Encounter   Medication Reason    oxyCODONE (ROXICODONE) 10 MG TABS Reorder    oxyCODONE (ROXICODONE) 10 MG TABS Reorder    oxyCODONE (ROXICODONE) 10 MG TABS         Wash L Linda Escobar was assessed today for symptoms and care planning related to above illnesses    I have reviewed the patient's controlled substance dispensing history in the Prescription Drug Monitoring Program in compliance with the [facility] regulations before prescribing any controlled substances  He is invited to continue to follow with us  If there are questions or concerns, please contact us through our clinic/answering service 24 hours a day, seven days a week  As a result of this visit, I have referred the patient for further evaluation  Yes    Ga Bland MD  [facility]            Subjective  Mary Lou Awad is a 63 y o  male with a PMH of 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 s/p R PCN tube placement, HTN, chronic lumbar pain c/b radiculopathy who presents via televideo for scheduled [facility] follow up for pain management  Primary Oncology: Dr Eric Capellan   - first chemo infusion 08/11   - next chemo infusion 08/18    Patient reports adequately controlled R-sided back pain on current pain regimen [oxy-IR 10 mg Q4H PRN + morphine-ER 30 mg Q12H]  Despite reporting adequate pain control patient routinely completes prescribed opioids earlier than expected  States that he occasionally will take more doses than prescribed but only waits until nearly out to discuss with [facility] team  Initially reported nausea after chemo but well controlled with zofran  Denies vomiting  Denies CP, SOB, diaphoresis, palpitations, HA  Decreased appetite with mild weight loss  Last BM yesterday  Tolerating PO intake without difficulty  States that he was unable to make last Wednesday's in person appointment in the setting of being tired after first chemotherapy infusion  Unable to secure transportation to today's appointment and only able to televideo in  Reports completing opioid agreement and mailing to [facility] clinic [as of now, no contract has been received]  Still working on 5-wishes to identify code status as DNAR/DNI and to identify his daughter Joanna Bailon as sole medical decision proxy        Past Medical History:   Diagnosis Date    Asthma     [facility] disorder [facility] [address] [facility]     COPD (chronic obstructive pulmonary disease) ([facility] [address] [facility]     Disease of thyroid gland     Hypertension     Hyperthyroidism      Past Surgical History:   Procedure Laterality Date    DENTAL SURGERY      FL RETROGRADE URETHROCYSTOGRAM  6/15/2020    HERNIA REPAIR Left     inguinal    IR LYMPH NODE BIOPSY/ASPIRATION  7/24/2020    IR TUBE PLACEMENT  6/25/2020    MO CYSTOURETHROSCOPY,URETER CATHETER Right 6/15/2020    Procedure: CYSTOSCOPY RETROGRADE PYELOGRAM and ureteroscopy;  Surgeon: Taina Murray MD;  Location: [facility] MAIN OR;  Service: Urology     Current Outpatient Medications   Medication Sig Dispense Refill    albuterol (Ventolin HFA) 90 mcg/act inhaler Inhale 2 puffs every 6 (six) hours as needed for wheezing 18 g 2    clonazePAM (KlonoPIN) 0 5 mg tablet Take 1 tablet (0 5 mg total) by mouth 2 (two) times a day as needed for anxiety 14 tablet 0    cyclobenzaprine (FLEXERIL) 10 mg tablet Take 0 5 tablets (5 mg total) by mouth 3 (three) times a day as needed for muscle spasms 10 tablet 0    escitalopram (LEXAPRO) 10 mg tablet Take 10 mg by mouth daily at bedtime      fluticasone (FLONASE) 50 mcg/act nasal spray SPRAY 2 SPRAYS INTO EACH NOSTRIL EVERY DAY 1 Bottle 5    fluticasone (FLOVENT HFA) 220 mcg/act inhaler Inhale 2 puffs 2 (two) times a day Rinse mouth after use  1 Inhaler 5    lisinopril (ZESTRIL) 10 mg tablet Take 1 tablet (10 mg total) by mouth daily 30 tablet 5    naloxone (NARCAN) 4 mg/0 1 mL nasal spray 0 1 mL (4 mg total) into each nostril every 3 (three) minutes as needed for opioid reversal or respiratory depression Administer 1 spray into a nostril  If breathing does not return to normal or if breathing difficulty resumes after 2-3 minutes, give another dose in the other nostril using a new spray   (Patient not taking: Reported on 8/11/2020) 1 each 1    omeprazole (PriLOSEC) 40 MG capsule Take 1 capsule (40 mg total) by mouth daily before breakfast 30 capsule 5    ondansetron (ZOFRAN) 8 mg tablet Take 1 tablet (8 mg total) by mouth every 8 (eight) hours as needed for nausea or vomiting (Patient not taking: Reported on 8/11/2020) 30 tablet 3    ondansetron (ZOFRAN-ODT) 4 mg disintegrating tablet Take 1 tablet (4 mg total) by mouth every 6 (six) hours as needed for nausea or vomiting (Patient not taking: Reported on 8/11/2020) 20 tablet 0    oxyCODONE (ROXICODONE) 10 MG TABS Take 1 tablet (10 mg total) by mouth every 4 (four) hours as needed (breakthrough cancer pain)Max Daily Amount: 60 mg 42 tablet 0    senna (SENOKOT) 8 6 mg Take 1 tablet (8 6 mg total) by mouth daily at bedtime (Patient not taking: Reported on 8/11/2020) 30 tablet 0    zolpidem (AMBIEN) 10 mg tablet 1 TAB AT BEDTIME AS NEEDED FOR INSOMNIA       No current facility-administered medications for this visit  Allergies   Allergen Reactions    Ketorolac Hives       Review of Systems   Constitutional: Positive for appetite change and fatigue  Negative for chills and fever  HENT: Negative for congestion  Respiratory: Negative for shortness of breath  Cardiovascular: Negative for chest pain and leg swelling  Gastrointestinal: Negative for abdominal pain, nausea and vomiting  Genitourinary: Positive for flank pain  Musculoskeletal: Positive for back pain  Negative for neck pain  Skin: Negative for wound  Neurological: Negative for light-headedness and headaches  Psychiatric/Behavioral: Negative for confusion  Video Exam  There were no vitals filed for this visit    Physical Exam  No physical exam performed during today's Televideo visit    I spent 20+ minutes was spent during this virtual visit by Kalpana, face to face with Frankie with greater than 50% of the time spent in counseling or coordination of care including discussions of treatment instructions, patient and family counseling/involvement in care and compliance with treatment regimen  All of the patient's questions were answered during this discussion  Encounter provider Mina Lyon MD, located at:  [address] [address] [phone]    Recent Visits  Date Type Provider Dept   08/10/20 Telephone Vladimir Burch RN [address]   08/07/20 Rajni Kauffman MD [address]   Showing recent visits within past 7 days and meeting all other requirements     Today's Visits  Date Type Provider Dept   08/14/20 Rajni Kauffman MD [address] today's visits and meeting all other requirements     Future Appointments  No visits were found meeting these conditions     Showing future appointments within next 150 days and meeting all other requirements

## 2020-08-16 ENCOUNTER — TELEPHONE (OUTPATIENT)
Dept: OTHER | Facility: OTHER | Age: 55
End: 2020-08-16

## 2020-08-16 NOTE — TELEPHONE ENCOUNTER
Patient just wanted the office to know he was still in pain with his right leg and has not really slept  He still has some Oxycodone left and will take it to help him  Patient offered to speak with on call provider but just wanted the message sent to the office

## 2020-08-17 ENCOUNTER — PATIENT OUTREACH (OUTPATIENT)
Dept: UROLOGY | Facility: AMBULATORY SURGERY CENTER | Age: 55
End: 2020-08-17

## 2020-08-17 DIAGNOSIS — C66.1 UROTHELIAL CARCINOMA OF RIGHT DISTAL URETER (HCC): ICD-10-CM

## 2020-08-17 LAB
ALBUMIN SERPL BCP-MCNC: 3.2 G/DL (ref 3.5–5)
ALP SERPL-CCNC: 152 U/L (ref 46–116)
ALT SERPL W P-5'-P-CCNC: 33 U/L (ref 12–78)
ANION GAP SERPL CALCULATED.3IONS-SCNC: 6 MMOL/L (ref 4–13)
AST SERPL W P-5'-P-CCNC: 41 U/L (ref 5–45)
BASOPHILS # BLD AUTO: 0.03 THOUSANDS/ΜL (ref 0–0.1)
BASOPHILS NFR BLD AUTO: 1 % (ref 0–1)
BILIRUB SERPL-MCNC: 0.46 MG/DL (ref 0.2–1)
BUN SERPL-MCNC: 26 MG/DL (ref 5–25)
CALCIUM SERPL-MCNC: 8.7 MG/DL (ref 8.3–10.1)
CHLORIDE SERPL-SCNC: 106 MMOL/L (ref 100–108)
CO2 SERPL-SCNC: 23 MMOL/L (ref 21–32)
CREAT SERPL-MCNC: 1.43 MG/DL (ref 0.6–1.3)
EOSINOPHIL # BLD AUTO: 0.33 THOUSAND/ΜL (ref 0–0.61)
EOSINOPHIL NFR BLD AUTO: 5 % (ref 0–6)
ERYTHROCYTE [DISTWIDTH] IN BLOOD BY AUTOMATED COUNT: 14.3 % (ref 11.6–15.1)
GFR SERPL CREATININE-BSD FRML MDRD: 54 ML/MIN/1.73SQ M
GLUCOSE P FAST SERPL-MCNC: 83 MG/DL (ref 65–99)
HCT VFR BLD AUTO: 35.6 % (ref 36.5–49.3)
HGB BLD-MCNC: 11.5 G/DL (ref 12–17)
IMM GRANULOCYTES # BLD AUTO: 0.01 THOUSAND/UL (ref 0–0.2)
IMM GRANULOCYTES NFR BLD AUTO: 0 % (ref 0–2)
LYMPHOCYTES # BLD AUTO: 1.32 THOUSANDS/ΜL (ref 0.6–4.47)
LYMPHOCYTES NFR BLD AUTO: 22 % (ref 14–44)
MCH RBC QN AUTO: 30.4 PG (ref 26.8–34.3)
MCHC RBC AUTO-ENTMCNC: 32.3 G/DL (ref 31.4–37.4)
MCV RBC AUTO: 94 FL (ref 82–98)
MONOCYTES # BLD AUTO: 0.2 THOUSAND/ΜL (ref 0.17–1.22)
MONOCYTES NFR BLD AUTO: 3 % (ref 4–12)
NEUTROPHILS # BLD AUTO: 4.24 THOUSANDS/ΜL (ref 1.85–7.62)
NEUTS SEG NFR BLD AUTO: 69 % (ref 43–75)
NRBC BLD AUTO-RTO: 0 /100 WBCS
PLATELET # BLD AUTO: 263 THOUSANDS/UL (ref 149–390)
PMV BLD AUTO: 10.1 FL (ref 8.9–12.7)
POTASSIUM SERPL-SCNC: 5.2 MMOL/L (ref 3.5–5.3)
PROT SERPL-MCNC: 7.3 G/DL (ref 6.4–8.2)
RBC # BLD AUTO: 3.78 MILLION/UL (ref 3.88–5.62)
SODIUM SERPL-SCNC: 135 MMOL/L (ref 136–145)
WBC # BLD AUTO: 6.13 THOUSAND/UL (ref 4.31–10.16)

## 2020-08-17 PROCEDURE — 85025 COMPLETE CBC W/AUTO DIFF WBC: CPT | Performed by: INTERNAL MEDICINE

## 2020-08-17 PROCEDURE — 80053 COMPREHEN METABOLIC PANEL: CPT | Performed by: INTERNAL MEDICINE

## 2020-08-17 NOTE — PROGRESS NOTES
Hardik called because he wanted to make sure he was scheduled with [facility] for transport to his infusion tomorrow  Emailed [facility]  They confirmed transport  Called Hardik back and made him aware

## 2020-08-18 ENCOUNTER — HOSPITAL ENCOUNTER (INPATIENT)
Facility: HOSPITAL | Age: 55
LOS: 4 days | Discharge: HOME | DRG: 683 | End: 2020-08-22
Attending: EMERGENCY MEDICINE | Admitting: INTERNAL MEDICINE
Payer: MEDICARE

## 2020-08-18 ENCOUNTER — HOSPITAL ENCOUNTER (OUTPATIENT)
Dept: INFUSION CENTER | Facility: HOSPITAL | Age: 55
Discharge: HOME | DRG: 683 | End: 2020-08-18
Attending: INTERNAL MEDICINE
Payer: MEDICARE

## 2020-08-18 ENCOUNTER — TELEPHONE (OUTPATIENT)
Dept: HEMATOLOGY ONCOLOGY | Facility: CLINIC | Age: 55
End: 2020-08-18

## 2020-08-18 ENCOUNTER — TELEPHONE (OUTPATIENT)
Dept: OTHER | Facility: OTHER | Age: 55
End: 2020-08-18

## 2020-08-18 ENCOUNTER — APPOINTMENT (EMERGENCY)
Dept: CT IMAGING | Facility: HOSPITAL | Age: 55
DRG: 683 | End: 2020-08-18
Payer: MEDICARE

## 2020-08-18 ENCOUNTER — TELEPHONE (OUTPATIENT)
Dept: PALLIATIVE MEDICINE | Facility: CLINIC | Age: 55
End: 2020-08-18

## 2020-08-18 VITALS
RESPIRATION RATE: 16 BRPM | OXYGEN SATURATION: 96 % | DIASTOLIC BLOOD PRESSURE: 78 MMHG | SYSTOLIC BLOOD PRESSURE: 132 MMHG | HEART RATE: 70 BPM | TEMPERATURE: 96.5 F

## 2020-08-18 DIAGNOSIS — C66.1 UROTHELIAL CARCINOMA OF RIGHT DISTAL URETER (HCC): ICD-10-CM

## 2020-08-18 DIAGNOSIS — N17.9 AKI (ACUTE KIDNEY INJURY) (HCC): ICD-10-CM

## 2020-08-18 DIAGNOSIS — G89.3 CANCER RELATED PAIN: ICD-10-CM

## 2020-08-18 DIAGNOSIS — N39.0 UTI (URINARY TRACT INFECTION): ICD-10-CM

## 2020-08-18 DIAGNOSIS — C66.1 CANCER OF RIGHT URETER (HCC): ICD-10-CM

## 2020-08-18 DIAGNOSIS — N39.8 UROTHELIAL LESION: ICD-10-CM

## 2020-08-18 DIAGNOSIS — M54.50 LOW BACK PAIN: Primary | ICD-10-CM

## 2020-08-18 DIAGNOSIS — C79.9 METASTATIC CANCER (HCC): ICD-10-CM

## 2020-08-18 PROBLEM — E87.1 HYPONATREMIA: Status: ACTIVE | Noted: 2020-08-18

## 2020-08-18 LAB
ALBUMIN SERPL BCP-MCNC: 3.5 G/DL (ref 3.5–5)
ALP SERPL-CCNC: 197 U/L (ref 46–116)
ALT SERPL W P-5'-P-CCNC: 48 U/L (ref 12–78)
ANION GAP SERPL CALCULATED.3IONS-SCNC: 8 MMOL/L (ref 4–13)
APTT PPP: 35 SECONDS (ref 23–37)
AST SERPL W P-5'-P-CCNC: 40 U/L (ref 5–45)
BACTERIA UR QL AUTO: ABNORMAL /HPF
BASOPHILS # BLD AUTO: 0.05 THOUSANDS/ΜL (ref 0–0.1)
BASOPHILS NFR BLD AUTO: 1 % (ref 0–1)
BILIRUB SERPL-MCNC: 0.4 MG/DL (ref 0.2–1)
BILIRUB UR QL STRIP: NEGATIVE
BUN SERPL-MCNC: 26 MG/DL (ref 5–25)
CALCIUM SERPL-MCNC: 9.2 MG/DL (ref 8.3–10.1)
CHLORIDE SERPL-SCNC: 97 MMOL/L (ref 100–108)
CLARITY UR: ABNORMAL
CO2 SERPL-SCNC: 25 MMOL/L (ref 21–32)
COLOR UR: YELLOW
CREAT SERPL-MCNC: 1.8 MG/DL (ref 0.6–1.3)
EOSINOPHIL # BLD AUTO: 0.18 THOUSAND/ΜL (ref 0–0.61)
EOSINOPHIL NFR BLD AUTO: 4 % (ref 0–6)
ERYTHROCYTE [DISTWIDTH] IN BLOOD BY AUTOMATED COUNT: 13.9 % (ref 11.6–15.1)
GFR SERPL CREATININE-BSD FRML MDRD: 41 ML/MIN/1.73SQ M
GLUCOSE SERPL-MCNC: 107 MG/DL (ref 65–140)
GLUCOSE UR STRIP-MCNC: NEGATIVE MG/DL
HCT VFR BLD AUTO: 35.6 % (ref 36.5–49.3)
HGB BLD-MCNC: 11.9 G/DL (ref 12–17)
HGB UR QL STRIP.AUTO: ABNORMAL
IMM GRANULOCYTES # BLD AUTO: 0.01 THOUSAND/UL (ref 0–0.2)
IMM GRANULOCYTES NFR BLD AUTO: 0 % (ref 0–2)
INR PPP: 1.23 (ref 0.84–1.19)
KETONES UR STRIP-MCNC: ABNORMAL MG/DL
LEUKOCYTE ESTERASE UR QL STRIP: ABNORMAL
LIPASE SERPL-CCNC: 94 U/L (ref 73–393)
LYMPHOCYTES # BLD AUTO: 1.15 THOUSANDS/ΜL (ref 0.6–4.47)
LYMPHOCYTES NFR BLD AUTO: 27 % (ref 14–44)
MAGNESIUM SERPL-MCNC: 1.5 MG/DL (ref 1.6–2.6)
MCH RBC QN AUTO: 30.7 PG (ref 26.8–34.3)
MCHC RBC AUTO-ENTMCNC: 33.4 G/DL (ref 31.4–37.4)
MCV RBC AUTO: 92 FL (ref 82–98)
MONOCYTES # BLD AUTO: 0.27 THOUSAND/ΜL (ref 0.17–1.22)
MONOCYTES NFR BLD AUTO: 6 % (ref 4–12)
NEUTROPHILS # BLD AUTO: 2.58 THOUSANDS/ΜL (ref 1.85–7.62)
NEUTS SEG NFR BLD AUTO: 62 % (ref 43–75)
NITRITE UR QL STRIP: POSITIVE
NON-SQ EPI CELLS URNS QL MICRO: ABNORMAL /HPF
NRBC BLD AUTO-RTO: 0 /100 WBCS
PH UR STRIP.AUTO: 6 [PH]
PLATELET # BLD AUTO: 228 THOUSANDS/UL (ref 149–390)
PMV BLD AUTO: 9.1 FL (ref 8.9–12.7)
POTASSIUM SERPL-SCNC: 4.4 MMOL/L (ref 3.5–5.3)
PROT SERPL-MCNC: 8 G/DL (ref 6.4–8.2)
PROT UR STRIP-MCNC: ABNORMAL MG/DL
PROTHROMBIN TIME: 15.3 SECONDS (ref 11.6–14.5)
RBC # BLD AUTO: 3.87 MILLION/UL (ref 3.88–5.62)
RBC #/AREA URNS AUTO: ABNORMAL /HPF
SODIUM SERPL-SCNC: 130 MMOL/L (ref 136–145)
SP GR UR STRIP.AUTO: 1.02 (ref 1–1.03)
TROPONIN I SERPL-MCNC: <0.02 NG/ML
UROBILINOGEN UR QL STRIP.AUTO: 0.2 E.U./DL
WBC # BLD AUTO: 4.24 THOUSAND/UL (ref 4.31–10.16)
WBC #/AREA URNS AUTO: ABNORMAL /HPF

## 2020-08-18 PROCEDURE — 99285 EMERGENCY DEPT VISIT HI MDM: CPT | Performed by: PHYSICIAN ASSISTANT

## 2020-08-18 PROCEDURE — 87040 BLOOD CULTURE FOR BACTERIA: CPT | Performed by: PHYSICIAN ASSISTANT

## 2020-08-18 PROCEDURE — 81001 URINALYSIS AUTO W/SCOPE: CPT | Performed by: PHYSICIAN ASSISTANT

## 2020-08-18 PROCEDURE — 85610 PROTHROMBIN TIME: CPT | Performed by: PHYSICIAN ASSISTANT

## 2020-08-18 PROCEDURE — 96367 TX/PROPH/DG ADDL SEQ IV INF: CPT

## 2020-08-18 PROCEDURE — 83735 ASSAY OF MAGNESIUM: CPT | Performed by: PHYSICIAN ASSISTANT

## 2020-08-18 PROCEDURE — 85730 THROMBOPLASTIN TIME PARTIAL: CPT | Performed by: PHYSICIAN ASSISTANT

## 2020-08-18 PROCEDURE — 80053 COMPREHEN METABOLIC PANEL: CPT | Performed by: PHYSICIAN ASSISTANT

## 2020-08-18 PROCEDURE — 96361 HYDRATE IV INFUSION ADD-ON: CPT

## 2020-08-18 PROCEDURE — 93005 ELECTROCARDIOGRAM TRACING: CPT

## 2020-08-18 PROCEDURE — 94760 N-INVAS EAR/PLS OXIMETRY 1: CPT

## 2020-08-18 PROCEDURE — 96376 TX/PRO/DX INJ SAME DRUG ADON: CPT

## 2020-08-18 PROCEDURE — 96375 TX/PRO/DX INJ NEW DRUG ADDON: CPT

## 2020-08-18 PROCEDURE — 84484 ASSAY OF TROPONIN QUANT: CPT | Performed by: PHYSICIAN ASSISTANT

## 2020-08-18 PROCEDURE — 83690 ASSAY OF LIPASE: CPT | Performed by: PHYSICIAN ASSISTANT

## 2020-08-18 PROCEDURE — 96365 THER/PROPH/DIAG IV INF INIT: CPT

## 2020-08-18 PROCEDURE — G1004 CDSM NDSC: HCPCS

## 2020-08-18 PROCEDURE — 85025 COMPLETE CBC W/AUTO DIFF WBC: CPT | Performed by: PHYSICIAN ASSISTANT

## 2020-08-18 PROCEDURE — 99223 1ST HOSP IP/OBS HIGH 75: CPT | Performed by: PHYSICIAN ASSISTANT

## 2020-08-18 PROCEDURE — 96366 THER/PROPH/DIAG IV INF ADDON: CPT

## 2020-08-18 PROCEDURE — 74177 CT ABD & PELVIS W/CONTRAST: CPT

## 2020-08-18 PROCEDURE — 36415 COLL VENOUS BLD VENIPUNCTURE: CPT | Performed by: PHYSICIAN ASSISTANT

## 2020-08-18 PROCEDURE — 99285 EMERGENCY DEPT VISIT HI MDM: CPT

## 2020-08-18 RX ORDER — CYCLOBENZAPRINE HCL 10 MG
5 TABLET ORAL 3 TIMES DAILY PRN
Status: DISCONTINUED | OUTPATIENT
Start: 2020-08-18 | End: 2020-08-22 | Stop reason: HOSPADM

## 2020-08-18 RX ORDER — MAGNESIUM SULFATE 1 G/100ML
1 INJECTION INTRAVENOUS ONCE
Status: COMPLETED | OUTPATIENT
Start: 2020-08-18 | End: 2020-08-18

## 2020-08-18 RX ORDER — SENNOSIDES 8.6 MG
1 TABLET ORAL
Status: DISCONTINUED | OUTPATIENT
Start: 2020-08-18 | End: 2020-08-22 | Stop reason: HOSPADM

## 2020-08-18 RX ORDER — CLONAZEPAM 0.5 MG/1
0.5 TABLET ORAL 2 TIMES DAILY PRN
Status: DISCONTINUED | OUTPATIENT
Start: 2020-08-18 | End: 2020-08-22 | Stop reason: HOSPADM

## 2020-08-18 RX ORDER — LISINOPRIL 10 MG/1
10 TABLET ORAL DAILY
Status: DISCONTINUED | OUTPATIENT
Start: 2020-08-18 | End: 2020-08-18

## 2020-08-18 RX ORDER — DOCUSATE SODIUM 100 MG/1
100 CAPSULE, LIQUID FILLED ORAL 2 TIMES DAILY
Status: DISCONTINUED | OUTPATIENT
Start: 2020-08-18 | End: 2020-08-22 | Stop reason: HOSPADM

## 2020-08-18 RX ORDER — FENTANYL CITRATE 50 UG/ML
25 INJECTION, SOLUTION INTRAMUSCULAR; INTRAVENOUS ONCE
Status: COMPLETED | OUTPATIENT
Start: 2020-08-18 | End: 2020-08-18

## 2020-08-18 RX ORDER — MORPHINE SULFATE 4 MG/ML
4 INJECTION, SOLUTION INTRAMUSCULAR; INTRAVENOUS EVERY 4 HOURS PRN
Status: DISCONTINUED | OUTPATIENT
Start: 2020-08-18 | End: 2020-08-18

## 2020-08-18 RX ORDER — ONDANSETRON 2 MG/ML
4 INJECTION INTRAMUSCULAR; INTRAVENOUS EVERY 6 HOURS PRN
Status: DISCONTINUED | OUTPATIENT
Start: 2020-08-18 | End: 2020-08-20

## 2020-08-18 RX ORDER — PANTOPRAZOLE SODIUM 40 MG/1
40 TABLET, DELAYED RELEASE ORAL
Status: DISCONTINUED | OUTPATIENT
Start: 2020-08-19 | End: 2020-08-22 | Stop reason: HOSPADM

## 2020-08-18 RX ORDER — ALBUTEROL SULFATE 90 UG/1
2 AEROSOL, METERED RESPIRATORY (INHALATION)
Status: DISCONTINUED | OUTPATIENT
Start: 2020-08-18 | End: 2020-08-22 | Stop reason: HOSPADM

## 2020-08-18 RX ORDER — HYDROMORPHONE HCL/PF 1 MG/ML
1 SYRINGE (ML) INJECTION EVERY 4 HOURS PRN
Status: DISCONTINUED | OUTPATIENT
Start: 2020-08-18 | End: 2020-08-19

## 2020-08-18 RX ORDER — FLUTICASONE PROPIONATE 220 UG/1
2 AEROSOL, METERED RESPIRATORY (INHALATION) 2 TIMES DAILY
Status: DISCONTINUED | OUTPATIENT
Start: 2020-08-18 | End: 2020-08-22 | Stop reason: HOSPADM

## 2020-08-18 RX ORDER — NICOTINE 21 MG/24HR
1 PATCH, TRANSDERMAL 24 HOURS TRANSDERMAL DAILY
Status: DISCONTINUED | OUTPATIENT
Start: 2020-08-18 | End: 2020-08-22 | Stop reason: HOSPADM

## 2020-08-18 RX ORDER — CEFTRIAXONE 1 G/50ML
1000 INJECTION, SOLUTION INTRAVENOUS ONCE
Status: COMPLETED | OUTPATIENT
Start: 2020-08-18 | End: 2020-08-18

## 2020-08-18 RX ORDER — HYDROMORPHONE HCL 110MG/55ML
2 PATIENT CONTROLLED ANALGESIA SYRINGE INTRAVENOUS EVERY 6 HOURS PRN
Status: DISCONTINUED | OUTPATIENT
Start: 2020-08-18 | End: 2020-08-19

## 2020-08-18 RX ORDER — LIDOCAINE 50 MG/G
1 PATCH TOPICAL DAILY
Status: DISCONTINUED | OUTPATIENT
Start: 2020-08-19 | End: 2020-08-22 | Stop reason: HOSPADM

## 2020-08-18 RX ORDER — FLUTICASONE PROPIONATE 50 MCG
2 SPRAY, SUSPENSION (ML) NASAL DAILY
Status: DISCONTINUED | OUTPATIENT
Start: 2020-08-18 | End: 2020-08-22 | Stop reason: HOSPADM

## 2020-08-18 RX ORDER — ESCITALOPRAM OXALATE 10 MG/1
10 TABLET ORAL
Status: DISCONTINUED | OUTPATIENT
Start: 2020-08-18 | End: 2020-08-22 | Stop reason: HOSPADM

## 2020-08-18 RX ORDER — POLYETHYLENE GLYCOL 3350 17 G/17G
17 POWDER, FOR SOLUTION ORAL DAILY PRN
Status: DISCONTINUED | OUTPATIENT
Start: 2020-08-18 | End: 2020-08-22 | Stop reason: HOSPADM

## 2020-08-18 RX ORDER — HEPARIN SODIUM 5000 [USP'U]/ML
5000 INJECTION, SOLUTION INTRAVENOUS; SUBCUTANEOUS EVERY 8 HOURS SCHEDULED
Status: DISCONTINUED | OUTPATIENT
Start: 2020-08-18 | End: 2020-08-22 | Stop reason: HOSPADM

## 2020-08-18 RX ORDER — ZOLPIDEM TARTRATE 5 MG/1
5 TABLET ORAL ONCE
Status: COMPLETED | OUTPATIENT
Start: 2020-08-18 | End: 2020-08-18

## 2020-08-18 RX ORDER — HYDROMORPHONE HCL/PF 1 MG/ML
1 SYRINGE (ML) INJECTION EVERY 4 HOURS PRN
Status: DISCONTINUED | OUTPATIENT
Start: 2020-08-18 | End: 2020-08-18

## 2020-08-18 RX ORDER — HYDROMORPHONE HCL/PF 1 MG/ML
1 SYRINGE (ML) INJECTION ONCE
Status: COMPLETED | OUTPATIENT
Start: 2020-08-18 | End: 2020-08-18

## 2020-08-18 RX ORDER — ACETAMINOPHEN 325 MG/1
650 TABLET ORAL EVERY 6 HOURS PRN
Status: DISCONTINUED | OUTPATIENT
Start: 2020-08-18 | End: 2020-08-22 | Stop reason: HOSPADM

## 2020-08-18 RX ORDER — CEFTRIAXONE 1 G/50ML
1000 INJECTION, SOLUTION INTRAVENOUS EVERY 24 HOURS
Status: DISCONTINUED | OUTPATIENT
Start: 2020-08-19 | End: 2020-08-21

## 2020-08-18 RX ORDER — ONDANSETRON 2 MG/ML
4 INJECTION INTRAMUSCULAR; INTRAVENOUS ONCE
Status: COMPLETED | OUTPATIENT
Start: 2020-08-18 | End: 2020-08-18

## 2020-08-18 RX ORDER — SODIUM CHLORIDE 9 MG/ML
125 INJECTION, SOLUTION INTRAVENOUS CONTINUOUS
Status: DISCONTINUED | OUTPATIENT
Start: 2020-08-18 | End: 2020-08-22 | Stop reason: HOSPADM

## 2020-08-18 RX ADMIN — FLUTICASONE PROPIONATE 2 SPRAY: 50 SPRAY, METERED NASAL at 15:00

## 2020-08-18 RX ADMIN — STANDARDIZED SENNA CONCENTRATE 8.6 MG: 8.6 TABLET ORAL at 21:25

## 2020-08-18 RX ADMIN — SODIUM CHLORIDE 1000 ML: 0.9 INJECTION, SOLUTION INTRAVENOUS at 11:52

## 2020-08-18 RX ADMIN — FENTANYL CITRATE 25 MCG: 50 INJECTION, SOLUTION INTRAMUSCULAR; INTRAVENOUS at 10:49

## 2020-08-18 RX ADMIN — CYCLOBENZAPRINE HYDROCHLORIDE 5 MG: 10 TABLET, FILM COATED ORAL at 15:05

## 2020-08-18 RX ADMIN — HYDROMORPHONE HYDROCHLORIDE 2 MG: 2 INJECTION INTRAMUSCULAR; INTRAVENOUS; SUBCUTANEOUS at 20:40

## 2020-08-18 RX ADMIN — ONDANSETRON 4 MG: 2 INJECTION INTRAMUSCULAR; INTRAVENOUS at 10:48

## 2020-08-18 RX ADMIN — MORPHINE SULFATE 4 MG: 4 INJECTION INTRAVENOUS at 14:02

## 2020-08-18 RX ADMIN — IOHEXOL 100 ML: 350 INJECTION, SOLUTION INTRAVENOUS at 11:38

## 2020-08-18 RX ADMIN — HEPARIN SODIUM 5000 UNITS: 5000 INJECTION INTRAVENOUS; SUBCUTANEOUS at 18:06

## 2020-08-18 RX ADMIN — CLONAZEPAM 0.5 MG: 0.5 TABLET ORAL at 15:04

## 2020-08-18 RX ADMIN — ALBUTEROL SULFATE 2 PUFF: 90 AEROSOL, METERED RESPIRATORY (INHALATION) at 15:13

## 2020-08-18 RX ADMIN — SODIUM CHLORIDE 75 ML/HR: 0.9 INJECTION, SOLUTION INTRAVENOUS at 15:05

## 2020-08-18 RX ADMIN — FLUTICASONE PROPIONATE 2 PUFF: 220 AEROSOL, METERED RESPIRATORY (INHALATION) at 17:58

## 2020-08-18 RX ADMIN — ALBUTEROL SULFATE 2 PUFF: 90 AEROSOL, METERED RESPIRATORY (INHALATION) at 21:26

## 2020-08-18 RX ADMIN — MAGNESIUM SULFATE HEPTAHYDRATE 1 G: 1 INJECTION, SOLUTION INTRAVENOUS at 15:05

## 2020-08-18 RX ADMIN — MORPHINE SULFATE 4 MG: 4 INJECTION INTRAVENOUS at 18:02

## 2020-08-18 RX ADMIN — CEFTRIAXONE 1000 MG: 1 INJECTION, SOLUTION INTRAVENOUS at 12:56

## 2020-08-18 RX ADMIN — DOCUSATE SODIUM 100 MG: 100 CAPSULE, LIQUID FILLED ORAL at 18:01

## 2020-08-18 RX ADMIN — NICOTINE 1 PATCH: 21 PATCH TRANSDERMAL at 18:00

## 2020-08-18 RX ADMIN — SODIUM CHLORIDE 1000 ML: 0.9 INJECTION, SOLUTION INTRAVENOUS at 10:48

## 2020-08-18 RX ADMIN — ESCITALOPRAM OXALATE 10 MG: 10 TABLET ORAL at 21:25

## 2020-08-18 RX ADMIN — HYDROMORPHONE HYDROCHLORIDE 1 MG: 1 INJECTION, SOLUTION INTRAMUSCULAR; INTRAVENOUS; SUBCUTANEOUS at 12:54

## 2020-08-18 RX ADMIN — MAGNESIUM SULFATE HEPTAHYDRATE 1 G: 1 INJECTION, SOLUTION INTRAVENOUS at 11:15

## 2020-08-18 RX ADMIN — FENTANYL CITRATE 25 MCG: 50 INJECTION, SOLUTION INTRAMUSCULAR; INTRAVENOUS at 11:33

## 2020-08-18 RX ADMIN — ZOLPIDEM TARTRATE 5 MG: 5 TABLET, COATED ORAL at 21:25

## 2020-08-18 NOTE — PLAN OF CARE
Problem: PAIN - ADULT  Goal: Verbalizes/displays adequate comfort level or baseline comfort level  Description: Interventions:  - Encourage patient to monitor pain and request assistance  - Assess pain using appropriate pain scale  - Administer analgesics based on type and severity of pain and evaluate response  - Implement non-pharmacological measures as appropriate and evaluate response  - Consider cultural and social influences on pain and pain management  - Notify physician/advanced practitioner if interventions unsuccessful or patient reports new pain  Outcome: Progressing     Problem: INFECTION - ADULT  Goal: Absence or prevention of progression during hospitalization  Description: INTERVENTIONS:  - Assess and monitor for signs and symptoms of infection  - Monitor lab/diagnostic results  - Monitor all insertion sites, i e  indwelling lines, tubes, and drains  - Monitor endotracheal if appropriate and nasal secretions for changes in amount and color  - [place] appropriate cooling/warming therapies per order  - Administer medications as ordered  - Instruct and encourage patient and family to use good hand hygiene technique  - Identify and instruct in appropriate isolation precautions for identified infection/condition  Outcome: Progressing

## 2020-08-18 NOTE — TELEPHONE ENCOUNTER
[phone]/ is [address] [place] [address] Mary Beth Madrid  63/Pt has a tube in his back and he is in so much pain  The pain is shooting down his leg and making it hard to stand  Pt had a decrease in appetite since 1st treatment on   He has a 2nd treatment today  When pt does eat, he vomit bile    Arlyn Siddiqui was paged at [address] caller to call back in 20-30 mins if they did not hear back from the provider

## 2020-08-18 NOTE — RESPIRATORY THERAPY NOTE
RT Protocol Note  Marsha Harris 63 y o  male MRN: [id number]  Unit/Bed#: 418-01 Encounter: [id number]    Assessment    Active Problems:    * No active hospital problems  *      Home Pulmonary Medications:  Flovent   Ventolin       Past Medical History:   Diagnosis Date    Asthma     Bipolar disorder ([address] )     [facility]ch[facility]) [facility]     Disease of thyroid gland     Hypertension     Hyperthyroidism      Social History     Socioeconomic History    Marital status: Single     Spouse name: None    Number of children: None    Years of education: None    Highest education level: None   Occupational History    None   Social Needs    Financial resource strain: None    Food insecurity     Worry: None     Inability: None    Transportation needs     Medical: None     Non-medical: None   Tobacco Use    Smoking status: Current Every Day Smoker     Packs/day: 1 00    Smokeless tobacco: Never Used   Substance and Sexual Activity    Alcohol use: No    Drug use: No    Sexual activity: Yes     Partners: Female   Lifestyle    Physical activity     Days per week: None     Minutes per session: None    Stress: None   Relationships    Social connections     Talks on phone: None     Gets together: None     Attends Christianity service: None     Active member of club or organization: None     Attends meetings of clubs or organizations: None     Relationship status: None    Intimate partner violence     Fear of current or ex partner: None     Emotionally abused: None     Physically abused: None     Forced sexual activity: None   Other Topics Concern    None   Social History Narrative    Patient is  from the mother of his 1st child  Patient has 4 adult children, from 4 different unions  Patient lives with his youngest daughter Mamadou Mcdonald has multiple sclerosis but is able to participate fully in the patient's care  Patient does not have any advanced directives  Subjective         Objective    Physical Exam:   Assessment Type: Assess only  General Appearance: Alert, Awake  Respiratory Pattern: Normal  Chest Assessment: Chest expansion symmetrical  Bilateral Breath Sounds: Clear, Diminished    Vitals:  Blood pressure 118/94, pulse 74, temperature 98 7 °F (37 1 °C), temperature source Oral, resp  rate 18, height 5' 6" (1 676 m), weight 61 6 kg (135 lb 12 9 oz), SpO2 100 %  Imaging and other studies: I have personally reviewed pertinent reports  Plan    Respiratory Plan: No distress/Pulmonary history      Pt does not wear home O2 or use PAP therapy  Pt takes flovent at home 2xs daily and ventolin as needed  Pt's breath sounds are clear, diminished   Ordering pt on PRN ventolin

## 2020-08-18 NOTE — TELEPHONE ENCOUNTER
Agree with ED eval; Ms Tashia Dominga responded appropriately to this patient's concerns  Due to challenges with Mr Leo Julian med adherence, no changes to his meds will be made over the phone; he must see myself or Dr Logan Soto in person; either at [facility] or [facility]

## 2020-08-18 NOTE — ASSESSMENT & PLAN NOTE
· Creatinine of 1 8 on admission  · Patient with baseline creatinine around 0 7  · Likely due to dehydration given patient reports decreased po intake in the last week     · Patient received 2 L NS in the ED  · Will initiate NSS IV fluids  · Avoid nephrotoxins  · Repeat labs in am

## 2020-08-18 NOTE — ASSESSMENT & PLAN NOTE
· Patient currently following Dr Asenath Claude for chemotherapy treatment  · CT abdomen pelvis:  New dilatation of the common hepatic and common bile duct seen to the level of the ampulla  Mildly more prominent pancreatic duct caliber  No discrete obstructing etiology identified  Correlate with clinical findings for stasis versus obstructive   etiology      Interval increase in number and size of retrocrural, retroperitoneal right iliac chain adenopathy  Subsequent worsening mass effect and compression on the IVC  Bolus timing was not optimized to evaluate for true caliber/patency of the IVC  Mild   increasing soft tissue attenuation within the right flank and right lower quadrant suggesting some degree of early peripheral edema      Interval increase in size of mass surrounding the distal right ureter      New metastatic bone lesions in the L5 vertebra and right acetabulum  Cortical breakthrough noted in the medial acetabular roof  · Consult oncology

## 2020-08-18 NOTE — TELEPHONE ENCOUNTER
Pt called office statin he is experiencing pain 8-9/10  Pt called answering service outside of office hours and was advised to visit ER, but is unable due to "transportation issues"  Pt tried Tylenol 500mg without relief  Pt would like to know if there is anything that can be prescribed  Please advise

## 2020-08-18 NOTE — H&P
H&P- Javy Miranda 1965, 63 y o  male MRN: [id number]    Unit/Bed#: 536-03 Encounter: [id number]    Primary Care Provider: Roge Crawford PA-C   Date and time admitted to hospital: 8/18/2020 10:17 AM        * Cancer related pain  Assessment & Plan  · Admit to med/surg floor  · Patient with worsening pain in right low back radiating down right leg  · CT abdomen/pelvis:   New dilatation of the common hepatic and common bile duct seen to the level of the ampulla  Mildly more prominent pancreatic duct caliber  No discrete obstructing etiology identified  Correlate with clinical findings for stasis versus obstructive   etiology       Interval increase in number and size of retrocrural, retroperitoneal right iliac chain adenopathy  Subsequent worsening mass effect and compression on the IVC  Bolus timing was not optimized to evaluate for true caliber/patency of the IVC  Mild   increasing soft tissue attenuation within the right flank and right lower quadrant suggesting some degree of early peripheral edema      Interval increase in size of mass surrounding the distal right ureter      New metastatic bone lesions in the L5 vertebra and right acetabulum  Cortical breakthrough noted in the medial acetabular roof  · PRN pain control  · Consult Oncology  · Labs in am      Acute kidney injury [facility])  Assessment & Plan  · Creatinine of 1 8 on admission  · Patient with baseline creatinine around 0 7  · Likely due to dehydration given patient reports decreased po intake in the last week  · Patient received 2 L NS in the ED  · Will initiate NSS IV fluids  · Avoid nephrotoxins  · Repeat labs in am    Hyponatremia  Assessment & Plan  · Hypovolemia hyponatremia  · Sodium of 130 on admission     · Initiate NSS IV fluids  · Repeat labs in am    Urothelial carcinoma of right distal ureter [facility])  Assessment & Plan  · Patient currently following Dr Heather Garcia for chemotherapy treatment  · CT abdomen pelvis:  New dilatation of the common hepatic and common bile duct seen to the level of the ampulla  Mildly more prominent pancreatic duct caliber  No discrete obstructing etiology identified  Correlate with clinical findings for stasis versus obstructive   etiology      Interval increase in number and size of retrocrural, retroperitoneal right iliac chain adenopathy  Subsequent worsening mass effect and compression on the IVC  Bolus timing was not optimized to evaluate for true caliber/patency of the IVC  Mild   increasing soft tissue attenuation within the right flank and right lower quadrant suggesting some degree of early peripheral edema      Interval increase in size of mass surrounding the distal right ureter      New metastatic bone lesions in the L5 vertebra and right acetabulum  Cortical breakthrough noted in the medial acetabular roof  · Consult oncology  Essential hypertension  Assessment & Plan  · Mildly elevated, may be due to pain  · Hold Lisinopril in the setting WENDY  · Monitor blood pressure trends  Anxiety disorder  Assessment & Plan  · Continue Lexapro and prn klonopin       VTE Prophylaxis: Heparin  / sequential compression device   Code Status: full code  POLST: POLST form is not discussed and not completed at this time  Anticipated Length of Stay:  Patient will be admitted on an Inpatient basis with an anticipated length of stay of  Greater than 2 midnights  Justification for Hospital Stay: pain control  Tx of WENDY/hyponatremia with IV fluids    Total Time for Visit, including Counseling / Coordination of Care: 1 hour  Greater than 50% of this total time spent on direct patient counseling and coordination of care  Chief Complaint:   Worsening pain    History of Present Illness:    Courtney Ortiz is a 63 y o  male who has a PMH of urothelial carcinoma of right distal ureter, HTN, depression, anxiety who presented to the ED due to worsening pain   The patient was scheduled to have chemotherapy today however after arriving at the infusion center the patient stated he wanted to go to the ED due to his severe pain  The patient states he has chronic right low back pain which radiates down his right leg  This has been worse within the last week  He states he woke up at 3 am due to the pain and had difficulty walking due to the pain  He states he was unable to get a ride to the ED at that time so he decided to wait until he needed to come to the hospital for his chemo  The patient also admits to decreased po intake for the last  He denies any fevers/chills  He denies any recent injuries or trauma  Review of Systems:    Review of Systems   Constitutional: Positive for appetite change  HENT: Negative  Eyes: Negative  Respiratory: Negative  Cardiovascular: Negative  Gastrointestinal: Negative  Endocrine: Negative  Genitourinary: Negative  Musculoskeletal: Positive for back pain, gait problem and myalgias  Skin: Negative  Allergic/Immunologic: Negative  Hematological: Negative  Psychiatric/Behavioral: Negative  Past Medical and Surgical History:     Past Medical History:   Diagnosis Date    Asthma     Bipolar disorder [facility] )     COPD [facility]chronic obstructive pulmonary disease) [facility]     Disease of thyroid gland     Hypertension     Hyperthyroidism        Past Surgical History:   Procedure Laterality Date    DENTAL SURGERY      FL RETROGRADE URETHROCYSTOGRAM  6/15/2020    HERNIA REPAIR Left     inguinal    IR LYMPH NODE BIOPSY/ASPIRATION  7/24/2020    IR TUBE PLACEMENT  6/25/2020    LA CYSTOURETHROSCOPY,URETER CATHETER Right 6/15/2020    Procedure: CYSTOSCOPY RETROGRADE PYELOGRAM and ureteroscopy;  Surgeon: Elizabeth Briceño MD;  Location: [facility];  Service: Urology       Meds/Allergies:    Prior to Admission medications    Medication Sig Start Date End Date Taking?  Authorizing Provider   albuterol (Ventolin HFA) 90 mcg/act inhaler Inhale 2 puffs every 6 (six) hours as needed for wheezing 2/19/20  Yes Mp Lombardo PA-C   clonazePAM (KlonoPIN) 0 5 mg tablet Take 1 tablet (0 5 mg total) by mouth 2 (two) times a day as needed for anxiety 4/6/20  Yes Mp Lombardo PA-C   cyclobenzaprine (FLEXERIL) 10 mg tablet Take 0 5 tablets (5 mg total) by mouth 3 (three) times a day as needed for muscle spasms 8/7/20  Yes Kat Reyna MD   escitalopram (LEXAPRO) 10 mg tablet Take 10 mg by mouth daily at bedtime 4/14/20  Yes Faiza Boston MD   fluticasone (FLONASE) 50 mcg/act nasal spray SPRAY 2 SPRAYS INTO EACH NOSTRIL EVERY DAY 5/12/20  Yes Mp Lombardo PA-C   fluticasone (FLOVENT HFA) 220 mcg/act inhaler Inhale 2 puffs 2 (two) times a day Rinse mouth after use  2/19/20  Yes Mp Lombardo PA-C   lisinopril (ZESTRIL) 10 mg tablet Take 1 tablet (10 mg total) by mouth daily 5/12/20  Yes Mp Lombardo PA-C   naloxone [facility]) 4 mg/0 1 mL nasal spray 0 1 mL (4 mg total) into each nostril every 3 (three) minutes as needed for opioid reversal or respiratory depression Administer 1 spray into a nostril  If breathing does not return to normal or if breathing difficulty resumes after 2-3 minutes, give another dose in the other nostril using a new spray   7/14/20  Yes Cecile Pina MD   omeprazole (PriLOSEC) 40 MG capsule Take 1 capsule (40 mg total) by mouth daily before breakfast 5/12/20  Yes Mp Lombardo PA-C   oxyCODONE (ROXICODONE) 10 MG TABS Take 1 tablet (10 mg total) by mouth every 4 (four) hours as needed (breakthrough cancer pain)Max Daily Amount: 60 mg 8/14/20  Yes Jenna Downey MD   senna (SENOKOT) 8 6 mg Take 1 tablet (8 6 mg total) by mouth daily at bedtime 8/7/20 9/6/20 Yes Antonina Deal MD   zolpidem (AMBIEN) 10 mg tablet 1 TAB AT BEDTIME AS NEEDED FOR INSOMNIA 4/14/20  Yes Historical Provider, MD   ondansetron (ZOFRAN) 8 mg tablet Take 1 tablet (8 mg total) by mouth every 8 (eight) hours as needed for nausea or vomiting  Patient not taking: Reported on 8/11/2020 8/7/20   Lilo Islas, DO   ondansetron (ZOFRAN-ODT) 4 mg disintegrating tablet Take 1 tablet (4 mg total) by mouth every 6 (six) hours as needed for nausea or vomiting  Patient not taking: Reported on 8/18/2020 8/7/20   Dino Jimenes MD     I have reviewed home medications using allscripts  Allergies: Allergies   Allergen Reactions    Ketorolac Hives       Social History:     Marital Status: Single   Occupation: unknown  Patient Pre-hospital Living Situation: lives with daughter   Patient Pre-hospital Level of Mobility: independent   Patient Pre-hospital Diet Restrictions: none  Substance Use History:   Social History     Substance and Sexual Activity   Alcohol Use Never    Frequency: Never     Social History     Tobacco Use   Smoking Status Current Every Day Smoker    Packs/day: 1 00   Smokeless Tobacco Never Used     Social History     Substance and Sexual Activity   Drug Use No       Family History:    non-contributory    Physical Exam:     Vitals:   Blood Pressure: 138/68 (08/18/20 1531)  Pulse: 76 (08/18/20 1531)  Temperature: 98 7 °F (37 1 °C) (08/18/20 1346)  Temp Source: Oral (08/18/20 1346)  Respirations: 18 (08/18/20 1531)  Height: 5' 6" (167 6 cm) (08/18/20 1346)  Weight - Scale: 61 6 kg (135 lb 12 9 oz) (08/18/20 1346)  SpO2: 100 % (08/18/20 1531)    Physical Exam  Vitals signs and nursing note reviewed  Constitutional:       General: He is awake  HENT:      Head: Normocephalic and atraumatic  Cardiovascular:      Rate and Rhythm: Normal rate and regular rhythm  Pulmonary:      Effort: Pulmonary effort is normal       Breath sounds: Normal breath sounds  No wheezing, rhonchi or rales  Abdominal:      General: Abdomen is flat  Bowel sounds are normal       Palpations: Abdomen is soft  Tenderness: There is no abdominal tenderness  Musculoskeletal:        Arms:    Neurological:      General: No focal deficit present        Mental Status: He is alert and oriented to person, place, and time  Psychiatric:         Attention and Perception: Attention normal          Mood and Affect: Mood normal          Speech: Speech normal          Behavior: Behavior normal  Behavior is cooperative  Additional Data:     Lab Results: I have personally reviewed pertinent reports  Results from last 7 days   Lab Units 08/18/20  1040   WBC Thousand/uL 4 24*   HEMOGLOBIN g/dL 11 9*   HEMATOCRIT % 35 6*   PLATELETS Thousands/uL 228   NEUTROS PCT % 62   LYMPHS PCT % 27   MONOS PCT % 6   EOS PCT % 4     Results from last 7 days   Lab Units 08/18/20  1040   SODIUM mmol/L 130*   POTASSIUM mmol/L 4 4   CHLORIDE mmol/L 97*   CO2 mmol/L 25   BUN mg/dL 26*   CREATININE mg/dL 1 80*   ANION GAP mmol/L 8   CALCIUM mg/dL 9 2   ALBUMIN g/dL 3 5   TOTAL BILIRUBIN mg/dL 0 40   ALK PHOS U/L 197*   ALT U/L 48   AST U/L 40   GLUCOSE RANDOM mg/dL 107     Results from last 7 days   Lab Units 08/18/20  1040   INR  1 23*                   Imaging: I have personally reviewed pertinent reports  CT abdomen pelvis with contrast   Final Result by Edel Jarquin MD (08/18 1216)      New dilatation of the common hepatic and common bile duct seen to the level of the ampulla  Mildly more prominent pancreatic duct caliber  No discrete obstructing etiology identified  Correlate with clinical findings for stasis versus obstructive    etiology  Interval increase in number and size of retrocrural, retroperitoneal right iliac chain adenopathy  Subsequent worsening mass effect and compression on the IVC  Bolus timing was not optimized to evaluate for true caliber/patency of the IVC  Mild    increasing soft tissue attenuation within the right flank and right lower quadrant suggesting some degree of early peripheral edema  Interval increase in size of mass surrounding the distal right ureter  New metastatic bone lesions in the L5 vertebra and right acetabulum    Cortical breakthrough noted in the medial acetabular roof  The study was marked in EPIC for significant notification  Workstation performed: [id number]             EKG, Pathology, and Other Studies Reviewed on Admission:   · EKG: NSR with HR of 84 bpm    Allscripts / Epic Records Reviewed: Yes     ** Please Note: This note has been constructed using a voice recognition system   **  '

## 2020-08-18 NOTE — ASSESSMENT & PLAN NOTE
· Admit to med/surg floor  · Patient with worsening pain in right low back radiating down right leg  · CT abdomen/pelvis:   New dilatation of the common hepatic and common bile duct seen to the level of the ampulla  Mildly more prominent pancreatic duct caliber  No discrete obstructing etiology identified  Correlate with clinical findings for stasis versus obstructive   etiology       Interval increase in number and size of retrocrural, retroperitoneal right iliac chain adenopathy  Subsequent worsening mass effect and compression on the IVC  Bolus timing was not optimized to evaluate for true caliber/patency of the IVC  Mild   increasing soft tissue attenuation within the right flank and right lower quadrant suggesting some degree of early peripheral edema      Interval increase in size of mass surrounding the distal right ureter      New metastatic bone lesions in the L5 vertebra and right acetabulum  Cortical breakthrough noted in the medial acetabular roof    · PRN pain control  · Consult Oncology  · Labs in am

## 2020-08-18 NOTE — TELEPHONE ENCOUNTER
Pt arrived to [address] with c/o of severe pain in his back  Had taken oxycodone 10 mg at 0300 and 0320  Now continues to be in severe pain  Wants to go to the ED  Dr Marsha Denton aware

## 2020-08-18 NOTE — PROGRESS NOTES
Patient in Infusion Suite today complaining of severe onset of right leg pain radiating down with numbness  Right leg pain is not a new onset however he is stating that it is "worse than ever with pain 10/10"  Patient is unable to stand and/or walk  States his legs are weak and numb  He had treatment at infusion in [facility] recently  Denies any fever or chills but reporting periods of sweating  Patient requesting if he can go to the ER for evaluation  Patient did contact [id number] earlier this am (please see notes regarding this)  He took Oxycodone at 3:30am with no relief  Stating pain is worse  Simone Clarke RN with Dr Shawn Raymond to advise of same  Patient is agreeable to go to the ER today for further evaluation, he states he is okay with holding off on chemo until his pain is better under control and evaluated  Report called in to ER charge nurse to advise of same, patient transported to the ER via [facility] in stable condition

## 2020-08-18 NOTE — ED PROVIDER NOTES
History  Chief Complaint   Patient presents with    Back Pain     Patient woke up with severe right leg pain and back pain at 0300   Leg Pain     63year old male presents ambulatory from home for evaluation of low back and right leg pain  Pt notes this started a few weeks ago and has been worsening  He notes around 3 am today pain worsened and was 10/10  He took his home dose of oxycodone 10 mg without relief  He notes he called nurse line and then took another 10 mg dose without relief  PMH includes cancer of the ureter with metastases  He recently started chemotherapy on 8/11/20  He follows with Dr Tera Watson  He notes he was due for next chemo session today but was sent here secondary to his severe pain  He does note having a tube in his right kidney  He reports pain around this region  He reports pain in low back which radiates into right hip and right thigh  He notes he gets shooting pins and needles into the anterior right thigh  Pain aggravated by movement  No reported alleviating factors and notes he can't get comfortable even at rest   He denies injury or trauma to the area  Denies any recent falls  Denies abdominal pain  Reports nausea and decreased appetite  He admits to constipation secondary to his pain relievers  Denies diarrhea  Denies any urinary complaints  Denies loss of bladder or bowel control  PMH includes recent diagnosis of cancer  He reports overall feeling weak  He notes pain was so severe today that he had difficultly ambulating  History provided by:  Patient and medical records   used: No    Back Pain   Location:  Lumbar spine  Quality:  Aching  Radiates to:  R thigh  Pain severity:  Severe  Timing:  Constant  Progression:  Worsening  Chronicity:  Chronic  Context: not falling, not lifting heavy objects, not recent illness, not recent injury and not twisting    Worsened by:   Movement  Ineffective treatments:  Bed rest and narcotics  Associated symptoms: leg pain, numbness (pt states he's been experiencing "pins and needles" -right anterior thigh ), weakness and weight loss    Associated symptoms: no abdominal pain, no bladder incontinence, no bowel incontinence, no chest pain, no dysuria, no fever, no headaches, no perianal numbness and no tingling    Risk factors: hx of cancer    Risk factors: no recent surgery and no steroid use    Leg Pain   Associated symptoms: back pain and fatigue    Associated symptoms: no fever and no neck pain        Prior to Admission Medications   Prescriptions Last Dose Informant Patient Reported? Taking? albuterol (Ventolin HFA) 90 mcg/act inhaler  Self No Yes   Sig: Inhale 2 puffs every 6 (six) hours as needed for wheezing   clonazePAM (KlonoPIN) 0 5 mg tablet  Self No Yes   Sig: Take 1 tablet (0 5 mg total) by mouth 2 (two) times a day as needed for anxiety   cyclobenzaprine (FLEXERIL) 10 mg tablet   No Yes   Sig: Take 0 5 tablets (5 mg total) by mouth 3 (three) times a day as needed for muscle spasms   escitalopram (LEXAPRO) 10 mg tablet  Self Yes Yes   Sig: Take 10 mg by mouth daily at bedtime   fluticasone (FLONASE) 50 mcg/act nasal spray  Self No Yes   Sig: SPRAY 2 SPRAYS INTO EACH NOSTRIL EVERY DAY   fluticasone (FLOVENT HFA) 220 mcg/act inhaler  Self No Yes   Sig: Inhale 2 puffs 2 (two) times a day Rinse mouth after use  lisinopril (ZESTRIL) 10 mg tablet  Self No Yes   Sig: Take 1 tablet (10 mg total) by mouth daily   naloxone (NARCAN) 4 mg/0 1 mL nasal spray  Self No Yes   Si 1 mL (4 mg total) into each nostril every 3 (three) minutes as needed for opioid reversal or respiratory depression Administer 1 spray into a nostril  If breathing does not return to normal or if breathing difficulty resumes after 2-3 minutes, give another dose in the other nostril using a new spray     omeprazole (PriLOSEC) 40 MG capsule  Self No Yes   Sig: Take 1 capsule (40 mg total) by mouth daily before breakfast   ondansetron (ZOFRAN) 8 mg tablet Not Taking at Unknown time  No No   Sig: Take 1 tablet (8 mg total) by mouth every 8 (eight) hours as needed for nausea or vomiting   Patient not taking: Reported on 2020   ondansetron (ZOFRAN-ODT) 4 mg disintegrating tablet Not Taking at Unknown time  No No   Sig: Take 1 tablet (4 mg total) by mouth every 6 (six) hours as needed for nausea or vomiting   Patient not taking: Reported on 2020   oxyCODONE (ROXICODONE) 10 MG TABS   No Yes   Sig: Take 1 tablet (10 mg total) by mouth every 4 (four) hours as needed (breakthrough cancer pain)Max Daily Amount: 60 mg   senna (SENOKOT) 8 6 mg   No Yes   Sig: Take 1 tablet (8 6 mg total) by mouth daily at bedtime   zolpidem (AMBIEN) 10 mg tablet  Self Yes Yes   Si TAB AT BEDTIME AS NEEDED FOR INSOMNIA      Facility-Administered Medications: None       Past Medical History:   Diagnosis Date    Asthma     [facility]ipolar disorder [facility] )     COPD (chronic obstructive pulmonary disease) ([facility] )     Disease of thyroid gland     Hypertension     Hyperthyroidism        Past Surgical History:   Procedure Laterality Date    DENTAL SURGERY      FL RETROGRADE URETHROCYSTOGRAM  6/15/2020    HERNIA REPAIR Left     inguinal    IR LYMPH NODE BIOPSY/ASPIRATION  2020    IR TUBE PLACEMENT  2020    DC CYSTOURETHROSCOPY,URETER CATHETER Right 6/15/2020    Procedure: CYSTOSCOPY RETROGRADE PYELOGRAM and ureteroscopy;  Surgeon: Diane Guevara MD;  Location: [facility];  Service: Urology       Family History   Problem Relation Age of Onset    Heart disease Mother     Hyperthyroidism Mother     Heart disease Father     Hyperthyroidism Father      I have reviewed and agree with the history as documented      E-Cigarette/Vaping    E-Cigarette Use Never User      E-Cigarette/Vaping Substances    Nicotine No     THC No     CBD No     Flavoring No     Other No     Unknown No      Social History     Tobacco Use    Smoking status: Current Every Day Smoker     Packs/day: 1 00    Smokeless tobacco: Never Used   Substance Use Topics    Alcohol use: Never     Frequency: Never    Drug use: No       Review of Systems   Constitutional: Positive for appetite change, fatigue and weight loss  Negative for chills and fever  HENT: Negative  Negative for congestion, rhinorrhea and sore throat  Eyes: Negative  Negative for visual disturbance  Respiratory: Negative  Negative for cough, shortness of breath and wheezing  Cardiovascular: Negative  Negative for chest pain, palpitations and leg swelling  Gastrointestinal: Positive for constipation and nausea  Negative for abdominal pain, bowel incontinence, diarrhea and vomiting  Genitourinary: Positive for flank pain  Negative for bladder incontinence, dysuria, frequency and hematuria  Musculoskeletal: Positive for arthralgias, back pain and gait problem  Negative for myalgias and neck pain  Skin: Negative  Negative for rash  Neurological: Positive for weakness and numbness (pt states he's been experiencing "pins and needles" -right anterior thigh )  Negative for dizziness, tingling, light-headedness and headaches  Psychiatric/Behavioral: Negative  Negative for confusion  All other systems reviewed and are negative  Physical Exam  Physical Exam  Vitals signs and nursing note reviewed  Constitutional:       General: He is in acute distress  Appearance: Normal appearance  He is cachectic  He is not toxic-appearing or diaphoretic  HENT:      Head: Normocephalic and atraumatic  Right Ear: Hearing, tympanic membrane, ear canal and external ear normal       Left Ear: Hearing, tympanic membrane, ear canal and external ear normal       Nose: Nose normal       Mouth/Throat:      Mouth: Mucous membranes are moist       Pharynx: Oropharynx is clear  Uvula midline  No oropharyngeal exudate  Eyes:      General: Lids are normal  No scleral icterus       Extraocular Movements: Extraocular movements intact  Conjunctiva/sclera: Conjunctivae normal       Pupils: Pupils are equal, round, and reactive to light  Neck:      Musculoskeletal: Normal range of motion and neck supple  Trachea: Trachea and phonation normal  No tracheal deviation  Cardiovascular:      Rate and Rhythm: Normal rate and regular rhythm  Pulses: Normal pulses  Heart sounds: Normal heart sounds  No murmur  Pulmonary:      Effort: Pulmonary effort is normal  No respiratory distress  Breath sounds: Normal breath sounds  No wheezing, rhonchi or rales  Abdominal:      General: Bowel sounds are normal  There is no distension  Palpations: Abdomen is soft  Tenderness: There is no abdominal tenderness  There is right CVA tenderness  There is no left CVA tenderness, guarding or rebound  Hernia: No hernia is present  Comments: + nephrostomy catheter noted right flank  There is some mild surrounding erythema at the base  No discharge/drainage  Musculoskeletal:      Lumbar back: He exhibits decreased range of motion (exhibits painful ROM), tenderness and pain  He exhibits no bony tenderness, no swelling, no deformity and normal pulse  Back:    Skin:     General: Skin is warm and dry  Capillary Refill: Capillary refill takes less than 2 seconds  Findings: No rash  Nails: There is no clubbing  Neurological:      General: No focal deficit present  Mental Status: He is alert and oriented to person, place, and time  GCS: GCS eye subscore is 4  GCS verbal subscore is 5  GCS motor subscore is 6  Cranial Nerves: Cranial nerves are intact  No cranial nerve deficit  Sensory: Sensation is intact  No sensory deficit  Motor: Motor function is intact  No abnormal muscle tone  Gait: Gait normal       Deep Tendon Reflexes: Reflexes are normal and symmetric  Comments: No saddle anesthesia     Psychiatric:         Mood and Affect: Mood is anxious           Speech: Speech normal          Behavior: Behavior normal          Vital Signs  ED Triage Vitals   Temperature Pulse Respirations Blood Pressure SpO2   08/18/20 1019 08/18/20 1019 08/18/20 1019 08/18/20 1019 08/18/20 1019   (!) 97 2 °F (36 2 °C) 95 17 112/93 98 %      Temp Source Heart Rate Source Patient Position - Orthostatic VS BP Location FiO2 (%)   08/18/20 1019 08/18/20 1019 08/18/20 1145 08/18/20 1019 --   Temporal Monitor Sitting Left arm       Pain Score       08/18/20 1019       Worst Possible Pain           Vitals:    08/18/20 1230 08/18/20 1300 08/18/20 1346 08/18/20 1352   BP: 147/69 150/71 118/94    Pulse: 84 84 74 74   Patient Position - Orthostatic VS: Lying Lying Sitting          Visual Acuity  Visual Acuity      Most Recent Value   L Pupil Size (mm)  4   R Pupil Size (mm)  4          ED Medications  Medications   escitalopram (LEXAPRO) tablet 10 mg (has no administration in time range)   fluticasone (FLOVENT HFA) 220 mcg/act inhaler 2 puff (has no administration in time range)   pantoprazole (PROTONIX) EC tablet 40 mg (has no administration in time range)   senna (SENOKOT) tablet 8 6 mg (has no administration in time range)   clonazePAM (KlonoPIN) tablet 0 5 mg (has no administration in time range)   cyclobenzaprine (FLEXERIL) tablet 5 mg (has no administration in time range)   fluticasone (FLONASE) 50 mcg/act nasal spray 2 spray (has no administration in time range)   ondansetron (ZOFRAN) injection 4 mg (has no administration in time range)   nicotine (NICODERM CQ) 21 mg/24 hr TD 24 hr patch 1 patch (has no administration in time range)   heparin (porcine) subcutaneous injection 5,000 Units (has no administration in time range)   acetaminophen (TYLENOL) tablet 650 mg (has no administration in time range)   morphine injection 2 mg ( Intravenous See Alternative 8/18/20 1420)     Or   morphine (PF) 4 mg/mL injection 4 mg (4 mg Intravenous Given 8/18/20 1420) cefTRIAXone (ROCEPHIN) IVPB (premix) 1,000 mg 50 mL (has no administration in time range)   docusate sodium (COLACE) capsule 100 mg (has no administration in time range)   polyethylene glycol (MIRALAX) packet 17 g (has no administration in time range)   sodium chloride 0 9 % infusion (has no administration in time range)   magnesium sulfate IVPB (premix) SOLN 1 g (has no administration in time range)   albuterol (PROVENTIL HFA,VENTOLIN HFA) inhaler 2 puff (has no administration in time range)   sodium chloride 0 9 % bolus 1,000 mL (0 mL Intravenous Stopped 8/18/20 1117)   ondansetron (ZOFRAN) injection 4 mg (4 mg Intravenous Given 8/18/20 1048)   fentanyl citrate (PF) 100 MCG/2ML 25 mcg (25 mcg Intravenous Given 8/18/20 1049)   magnesium sulfate IVPB (premix) SOLN 1 g (0 g Intravenous Stopped 8/18/20 1248)   fentanyl citrate (PF) 100 MCG/2ML 25 mcg (25 mcg Intravenous Given 8/18/20 1133)   cefTRIAXone (ROCEPHIN) IVPB (premix) 1,000 mg 50 mL (0 mg Intravenous Stopped 8/18/20 1326)   sodium chloride 0 9 % bolus 1,000 mL (0 mL Intravenous Stopped 8/18/20 1254)   iohexol (OMNIPAQUE) 350 MG/ML injection (SINGLE-DOSE) 100 mL (100 mL Intravenous Given 8/18/20 1138)   HYDROmorphone (DILAUDID) injection 1 mg (1 mg Intravenous Given 8/18/20 1254)       Diagnostic Studies  Results Reviewed     Procedure Component Value Units Date/Time    Blood culture #1 [[id number]] Collected:  08/18/20 1146    Lab Status: In process Specimen:  Blood from Arm, Left Updated:  08/18/20 1155    Blood culture #2 [[id number]] Collected:  08/18/20 1151    Lab Status:   In process Specimen:  Blood from Arm, Right Updated:  08/18/20 1155    Urine Microscopic [[id number]]  (Abnormal) Collected:  08/18/20 1117    Lab Status:  Final result Specimen:  Urine, Clean Catch Updated:  08/18/20 1143     RBC, UA 4-10 /hpf      WBC, UA Innumerable /hpf      Epithelial Cells None Seen /hpf      Bacteria, UA Innumerable /hpf     UA (URINE) with reflex to Scope [[id number]]  (Abnormal) Collected:  08/18/20 1117    Lab Status:  Final result Specimen:  Urine, Clean Catch Updated:  08/18/20 1130     Color, UA Yellow     Clarity, UA Cloudy     Specific [place], UA 1 020     pH, UA 6 0     Leukocytes, UA Moderate     Nitrite, UA Positive     Protein,  (2+) mg/dl      Glucose, UA Negative mg/dl      Ketones, UA Trace mg/dl      Urobilinogen, UA 0 2 E U /dl      Bilirubin, UA Negative     Blood, UA Moderate    Troponin I [[id number]]  (Normal) Collected:  08/18/20 1040    Lab Status:  Final result Specimen:  Blood from Arm, Right Updated:  08/18/20 1106     Troponin I <0 02 ng/mL     Comprehensive metabolic panel [[id number]]  (Abnormal) Collected:  08/18/20 1040    Lab Status:  Final result Specimen:  Blood from Arm, Right Updated:  08/18/20 1103     Sodium 130 mmol/L      Potassium 4 4 mmol/L      Chloride 97 mmol/L      CO2 25 mmol/L      ANION GAP 8 mmol/L      BUN 26 mg/dL      Creatinine 1 80 mg/dL      Glucose 107 mg/dL      Calcium 9 2 mg/dL      AST 40 U/L      ALT 48 U/L      Alkaline Phosphatase 197 U/L      Total Protein 8 0 g/dL      Albumin 3 5 g/dL      Total Bilirubin 0 40 mg/dL      eGFR 41 ml/min/1 73sq m     Narrative:       [place] guidelines for Chronic Kidney Disease (CKD):     Stage 1 with normal or high GFR (GFR > 90 mL/min/1 73 square meters)    Stage 2 Mild CKD (GFR = 60-89 mL/min/1 73 square meters)    Stage 3A Moderate CKD (GFR = 45-59 mL/min/1 73 square meters)    Stage 3B Moderate CKD (GFR = 30-44 mL/min/1 73 square meters)    Stage 4 Severe CKD (GFR = 15-29 mL/min/1 73 square meters)    Stage 5 End Stage CKD (GFR <15 mL/min/1 73 square meters)  Note: GFR calculation is accurate only with a steady state creatinine    Protime-INR [[id number]]  (Abnormal) Collected:  08/18/20 1040    Lab Status:  Final result Specimen:  Blood from Arm, Right Updated:  08/18/20 1058     Protime 15 3 seconds      INR 1 23    APTT [[id number]]  (Normal) Collected:  08/18/20 1040    Lab Status:  Final result Specimen:  Blood from Arm, Right Updated:  08/18/20 1058     PTT 35 seconds     Lipase [[id number]]  (Normal) Collected:  08/18/20 1040    Lab Status:  Final result Specimen:  Blood from Arm, Right Updated:  08/18/20 1057     Lipase 94 u/L     Magnesium [[id number]]  (Abnormal) Collected:  08/18/20 1040    Lab Status:  Final result Specimen:  Blood from Arm, Right Updated:  08/18/20 1057     Magnesium 1 5 mg/dL     CBC and differential [[id number]]  (Abnormal) Collected:  08/18/20 1040    Lab Status:  Final result Specimen:  Blood from Arm, Right Updated:  08/18/20 1047     WBC 4 24 Thousand/uL      RBC 3 87 Million/uL      Hemoglobin 11 9 g/dL      Hematocrit 35 6 %      MCV 92 fL      MCH 30 7 pg      MCHC 33 4 g/dL      RDW 13 9 %      MPV 9 1 fL      Platelets 458 Thousands/uL      nRBC 0 /100 WBCs      Neutrophils Relative 62 %      Immat GRANS % 0 %      Lymphocytes Relative 27 %      Monocytes Relative 6 %      Eosinophils Relative 4 %      Basophils Relative 1 %      Neutrophils Absolute 2 58 Thousands/µL      Immature Grans Absolute 0 01 Thousand/uL      Lymphocytes Absolute 1 15 Thousands/µL      Monocytes Absolute 0 27 Thousand/µL      Eosinophils Absolute 0 18 Thousand/µL      Basophils Absolute 0 05 Thousands/µL                  CT abdomen pelvis with contrast   Final Result by Melisa Camargo MD (08/18 1216)      New dilatation of the common hepatic and common bile duct seen to the level of the ampulla  Mildly more prominent pancreatic duct caliber  No discrete obstructing etiology identified  Correlate with clinical findings for stasis versus obstructive    etiology  Interval increase in number and size of retrocrural, retroperitoneal right iliac chain adenopathy  Subsequent worsening mass effect and compression on the IVC  Bolus timing was not optimized to evaluate for true caliber/patency of the IVC    Mild increasing soft tissue attenuation within the right flank and right lower quadrant suggesting some degree of early peripheral edema  Interval increase in size of mass surrounding the distal right ureter  New metastatic bone lesions in the L5 vertebra and right acetabulum  Cortical breakthrough noted in the medial acetabular roof  The study was marked in EPIC for significant notification  Workstation performed: [id number]                    Procedures  ECG 12 Lead Documentation Only    Date/Time: 8/18/2020 10:43 AM  Performed by: Milo Kim PA-C  Authorized by: Milo Kim PA-C     Indications / Diagnosis:  Weakness, nausea  ECG reviewed by me, the ED Provider: yes    Patient location:  ED  Previous ECG:     Previous ECG:  Compared to current    Comparison ECG info:  9/21/18    Similarity:  Changes noted    Comparison to cardiac monitor: Yes    Interpretation:     Interpretation: normal    Rate:     ECG rate:  84    ECG rate assessment: normal    Rhythm:     Rhythm: sinus rhythm    Ectopy:     Ectopy: none    QRS:     QRS axis:  Normal    QRS intervals:  Normal  Conduction:     Conduction: normal    ST segments:     ST segments:  Normal  T waves:     T waves: normal    Comments:      , QRS 86, QT/QTc 364/430; no acute ischemic changes  Compared to prior, ventricular rate has decreased and no further PVCs  ED Course  ED Course as of Aug 18 1457   Tue Aug 18, 2020   1038 Previous records reviewed  Has diagnosis of advanced urothelial carcinoma, right ureter  Last chemo infusion 8/11/20; was due for today but came here instead  PDMP was queried, had oxycodone 10 mg filled on 8/14/20 #42, morphine ER 30 mg on 7/21/20 #90       1049 Decreased from 6 13 yesterday  WBC(!): 4 24   1049 Improved from 11 5 yesterday     Hemoglobin(!): 11 9   1049 Platelet Count: 867   1108 Glucose, Random: 107   1108 Increased from 1 43 yesterday   Creatinine(!): 1 80   1108 BUN(!): 26   1108 Sodium(!): 130   1108 Potassium: 4 4   1108 Chloride(!): 97   1108 CO2: 25   1108 Anion Gap: 8   1108 Calcium: 9 2   1108 AST: 40   1108 ALT: 48   1108 Alkaline Phosphatase(!): 197   1108 Total Protein: 8 0   1108 Albumin: 3 5   1108 TOTAL BILIRUBIN: 0 40   1108 eGFR: 41   1109 Lipase: 94   1109 Will replete  Magnesium(!): 1 5   1109 INR(!): 1 23   1109 Protime(!): 15 3   1109 PTT: 35   1109 Troponin I: <0 02   1114 Pt still having severe pain, reports it is 8/10 and decreased from 10/10       1133 Color, UA: Yellow   1133 Clarity, UA: Cloudy   1133 SL AMB SPECIFIC GRAVITY_URINE: 1 020   1133 pH, UA: 6 0   1133 Leukocytes, UA(!): Moderate   1133 Nitrite, UA(!): Positive   1133 POCT URINE PROTEIN(!): 100 (2+)   1133 Glucose, UA: Negative   1133 Ketones, UA(!): Trace   1133 SL AMB POCT UROBILINOGEN: 0 2   1133 Bilirubin, UA: Negative   1133 Blood, UA(!): Moderate   1144 RBC, UA(!): 4-10   1144 WBC, UA(!): Innumerable   1144 Will collect blood cultures and initiate rocephin IV as UA c/w infection  Bacteria, UA(!): Innumerable   1145 CT performed and pending interpretation  1225 IMPRESSION:     New dilatation of the common hepatic and common bile duct seen to the level of the ampulla  Mildly more prominent pancreatic duct caliber  No discrete obstructing etiology identified  Correlate with clinical findings for stasis versus obstructive   etiology       Interval increase in number and size of retrocrural, retroperitoneal right iliac chain adenopathy  Subsequent worsening mass effect and compression on the IVC  Bolus timing was not optimized to evaluate for true caliber/patency of the IVC  Mild   increasing soft tissue attenuation within the right flank and right lower quadrant suggesting some degree of early peripheral edema      Interval increase in size of mass surrounding the distal right ureter      New metastatic bone lesions in the L5 vertebra and right acetabulum    Cortical breakthrough noted in the medial acetabular roof  CT abdomen pelvis with contrast   1238 Tiger Text sent to on call [facility] Dr Martin Conte to discuss admission  1239 Pt updated on results of CT  Continues to have severe pain  46 Dr Martin Conte accepts for full inpatient admission  1305 Pt in agreeance with admission/treatment plan  Will admit for further pain control which appears to be from his worsening malignancy  Pt has required several doses of IV meds here without much relief  Will also admit for further management of UTI and WENDY, with Abx therapy and IV hydration  Pt afebrile, hemodynamically stable  Pt agrees to treatment plan  US AUDIT      Most Recent Value   Initial Alcohol Screen: US AUDIT-C    1  How often do you have a drink containing alcohol?  0 Filed at: 08/18/2020 1026   2  How many drinks containing alcohol do you have on a typical day you are drinking? 0 Filed at: 08/18/2020 1026   3a  Male UNDER 65: How often do you have five or more drinks on one occasion? 0 Filed at: 08/18/2020 1026   Audit-C Score  0 Filed at: 08/18/2020 1026            HEART Risk Score      Most Recent Value   Heart Score Risk Calculator   History  0 Filed at: 08/18/2020 1051   ECG  0 Filed at: 08/18/2020 1051   Age  1 Filed at: 08/18/2020 1051   Risk Factors  1 Filed at: 08/18/2020 1051   Troponin  0 Filed at: 08/18/2020 1051   HEART Score  2 Filed at: 08/18/2020 1051            AUSTYN/DAST-10      Most Recent Value   How many times in the past year have you    Used an illegal drug or used a prescription medication for non-medical reasons?   Never Filed at: 08/18/2020 1026                                MDM  Number of Diagnoses or Management Options  WENDY (acute kidney injury) [facility]): new and requires workup  Cancer of right ureter [facility]): established and worsening  Low back pain: established and worsening  Metastatic cancer ([facility] ): established and worsening  UTI (urinary tract infection): new and requires workup     Amount and/or Complexity of Data Reviewed  Clinical lab tests: ordered and reviewed  Tests in the radiology section of CPT®: reviewed and ordered  Decide to obtain previous medical records or to obtain history from someone other than the patient: yes  Obtain history from someone other than the patient: yes  Review and summarize past medical records: yes  Discuss the patient with other providers: yes (Prasanth, [facility])  Independent visualization of images, tracings, or specimens: yes    Patient Progress  Patient progress: stable        Disposition  Final diagnoses:   Low back pain   UTI (urinary tract infection)   [facility] ([facility]) ([facility] )   Cancer of right ureter ([facility] )   Metastatic cancer ([place] [address] )     Time reflects when diagnosis was documented in both MDM as applicable and the Disposition within this note     Time User Action Codes Description Comment    8/18/2020  1:02 PM Martin Shaggy Add [M54 5] Low back pain     8/18/2020  1:02 PM Martin Shaggy Add [N39 0] UTI (urinary tract infection)     8/18/2020  1:02 PM Martin Shaggy Add [N17 9] [facility] )     8/18/2020  1:04 PM Martin Shaggy Add [C66 1] Cancer of right ureter ([place] [address] )     8/18/2020  1:04 PM Martin Shaggy Add [C79 9] Metastatic cancer [facility])       ED Disposition     ED Disposition Condition Date/Time Comment    Admit Stable Tue Aug 18, 2020  1:02 PM Case was discussed with Dr Will Nicolas and the patient's admission status was agreed to be Admission Status: inpatient status to the service of Dr Will Nicolas  Follow-up Information    None         Current Discharge Medication List      CONTINUE these medications which have NOT CHANGED    Details   albuterol (Ventolin HFA) 90 mcg/act inhaler Inhale 2 puffs every 6 (six) hours as needed for wheezing  Qty: 18 g, Refills: 2    Comments: Substitution to a formulary equivalent within the same pharmaceutical class is authorized    Associated Diagnoses: Mild intermittent asthma without complication      clonazePAM (KlonoPIN) 0 5 mg tablet Take 1 tablet (0 5 mg total) by mouth 2 (two) times a day as needed for anxiety  Qty: 14 tablet, Refills: 0    Associated Diagnoses: Bipolar affective disorder, remission status unspecified (HCC)      cyclobenzaprine (FLEXERIL) 10 mg tablet Take 0 5 tablets (5 mg total) by mouth 3 (three) times a day as needed for muscle spasms  Qty: 10 tablet, Refills: 0    Associated Diagnoses: Urothelial lesion; Cancer related pain; Palliative care patient      escitalopram (LEXAPRO) 10 mg tablet Take 10 mg by mouth daily at bedtime      fluticasone (FLONASE) 50 mcg/act nasal spray SPRAY 2 SPRAYS INTO EACH NOSTRIL EVERY DAY  Qty: 1 Bottle, Refills: 5    Associated Diagnoses: Allergic rhinitis, unspecified seasonality, unspecified trigger      fluticasone (FLOVENT HFA) 220 mcg/act inhaler Inhale 2 puffs 2 (two) times a day Rinse mouth after use  Qty: 1 Inhaler, Refills: 5    Associated Diagnoses: Mild intermittent asthma without complication      lisinopril (ZESTRIL) 10 mg tablet Take 1 tablet (10 mg total) by mouth daily  Qty: 30 tablet, Refills: 5    Associated Diagnoses: Essential hypertension      naloxone (NARCAN) 4 mg/0 1 mL nasal spray 0 1 mL (4 mg total) into each nostril every 3 (three) minutes as needed for opioid reversal or respiratory depression Administer 1 spray into a nostril  If breathing does not return to normal or if breathing difficulty resumes after 2-3 minutes, give another dose in the other nostril using a new spray    Qty: 1 each, Refills: 1    Associated Diagnoses: Cancer related pain      omeprazole (PriLOSEC) 40 MG capsule Take 1 capsule (40 mg total) by mouth daily before breakfast  Qty: 30 capsule, Refills: 5    Associated Diagnoses: Gastroesophageal reflux disease without esophagitis      oxyCODONE (ROXICODONE) 10 MG TABS Take 1 tablet (10 mg total) by mouth every 4 (four) hours as needed (breakthrough cancer pain)Max Daily Amount: 60 mg  Qty: 42 tablet, Refills: 0    Associated Diagnoses: Urothelial lesion; Cancer related pain; Palliative care patient      senna (SENOKOT) 8 6 mg Take 1 tablet (8 6 mg total) by mouth daily at bedtime  Qty: 30 tablet, Refills: 0    Associated Diagnoses: Therapeutic opioid induced constipation      zolpidem (AMBIEN) 10 mg tablet 1 TAB AT BEDTIME AS NEEDED FOR INSOMNIA      ondansetron (ZOFRAN) 8 mg tablet Take 1 tablet (8 mg total) by mouth every 8 (eight) hours as needed for nausea or vomiting  Qty: 30 tablet, Refills: 3    Associated Diagnoses: 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

## 2020-08-18 NOTE — TELEPHONE ENCOUNTER
Patient called on-call service overnight with recommendation for evaluation at ED; care team agrees with recommendation for ED evaluation  Challenging medication management given patient's inability to show good karyna on Rx prescribing practices

## 2020-08-18 NOTE — ASSESSMENT & PLAN NOTE
· Mildly elevated, may be due to pain  · Hold Lisinopril in the setting WENDY  · Monitor blood pressure trends

## 2020-08-18 NOTE — ASSESSMENT & PLAN NOTE
· Hypovolemia hyponatremia  · Sodium of 130 on admission     · Initiate NSS IV fluids  · Repeat labs in am

## 2020-08-18 NOTE — ED NOTES
Patient reports that his pain is getting worse  Génesis Sharpe PA-C made aware        Thomas Kovacs, RN  08/18/20 9276

## 2020-08-18 NOTE — TELEPHONE ENCOUNTER
Patient stated he woke up with acute severe pain in his low back with radiation down his left leg  He reports numbness and inability to walk which he states is new  He states he took Oxycodone 10 mg at 3:00 and another at 3:20  Provided education on onset of oxycodone tablet and advised against taking more than prescribed without talking to [facility] team        He states he has not been able to eat anything since 8/11and today when he did eat he vomited  He states he has been able to keep liquids down and has been drinking water and soda  Given the acute onset, numbness and inability to ambulate, advised patient to be evaluated at emergency department

## 2020-08-18 NOTE — PROGRESS NOTES
1000 Patient assessed by Deni Martin RN and patient has severe pain radiating down right leg with numbness  She reported to me and I called Lyla Lund RN and to have her review notes from this morning from palliative care and to please advise    Hand off report given to Mark Randall and she will update Leila on vital signs and further evaluation

## 2020-08-18 NOTE — TELEPHONE ENCOUNTER
Forwarding request to Dr Krupa Jacques and Dr Katarina England  They had requested this patient come to them  Thank you

## 2020-08-19 PROBLEM — N39.0 UTI (URINARY TRACT INFECTION): Status: ACTIVE | Noted: 2020-08-19

## 2020-08-19 LAB
ALBUMIN SERPL BCP-MCNC: 3.3 G/DL (ref 3.5–5)
ALP SERPL-CCNC: 169 U/L (ref 46–116)
ALT SERPL W P-5'-P-CCNC: 34 U/L (ref 12–78)
ANION GAP SERPL CALCULATED.3IONS-SCNC: 8 MMOL/L (ref 4–13)
AST SERPL W P-5'-P-CCNC: 28 U/L (ref 5–45)
ATRIAL RATE: 84 BPM
BASOPHILS # BLD AUTO: 0.03 THOUSANDS/ΜL (ref 0–0.1)
BASOPHILS NFR BLD AUTO: 1 % (ref 0–1)
BILIRUB SERPL-MCNC: 0.5 MG/DL (ref 0.2–1)
BUN SERPL-MCNC: 21 MG/DL (ref 5–25)
CALCIUM SERPL-MCNC: 9 MG/DL (ref 8.3–10.1)
CHLORIDE SERPL-SCNC: 97 MMOL/L (ref 100–108)
CO2 SERPL-SCNC: 24 MMOL/L (ref 21–32)
CREAT SERPL-MCNC: 1.5 MG/DL (ref 0.6–1.3)
EOSINOPHIL # BLD AUTO: 0.06 THOUSAND/ΜL (ref 0–0.61)
EOSINOPHIL NFR BLD AUTO: 1 % (ref 0–6)
ERYTHROCYTE [DISTWIDTH] IN BLOOD BY AUTOMATED COUNT: 13.6 % (ref 11.6–15.1)
GFR SERPL CREATININE-BSD FRML MDRD: 51 ML/MIN/1.73SQ M
GLUCOSE SERPL-MCNC: 103 MG/DL (ref 65–140)
HCT VFR BLD AUTO: 34.5 % (ref 36.5–49.3)
HGB BLD-MCNC: 11.3 G/DL (ref 12–17)
IMM GRANULOCYTES # BLD AUTO: 0.03 THOUSAND/UL (ref 0–0.2)
IMM GRANULOCYTES NFR BLD AUTO: 1 % (ref 0–2)
LYMPHOCYTES # BLD AUTO: 0.74 THOUSANDS/ΜL (ref 0.6–4.47)
LYMPHOCYTES NFR BLD AUTO: 12 % (ref 14–44)
MAGNESIUM SERPL-MCNC: 1.9 MG/DL (ref 1.6–2.6)
MCH RBC QN AUTO: 30.1 PG (ref 26.8–34.3)
MCHC RBC AUTO-ENTMCNC: 32.8 G/DL (ref 31.4–37.4)
MCV RBC AUTO: 92 FL (ref 82–98)
MONOCYTES # BLD AUTO: 0.46 THOUSAND/ΜL (ref 0.17–1.22)
MONOCYTES NFR BLD AUTO: 8 % (ref 4–12)
NEUTROPHILS # BLD AUTO: 4.68 THOUSANDS/ΜL (ref 1.85–7.62)
NEUTS SEG NFR BLD AUTO: 77 % (ref 43–75)
NRBC BLD AUTO-RTO: 0 /100 WBCS
P AXIS: 61 DEGREES
PHOSPHATE SERPL-MCNC: 3.6 MG/DL (ref 2.7–4.5)
PLATELET # BLD AUTO: 191 THOUSANDS/UL (ref 149–390)
PMV BLD AUTO: 9.6 FL (ref 8.9–12.7)
POTASSIUM SERPL-SCNC: 4.8 MMOL/L (ref 3.5–5.3)
PR INTERVAL: 128 MS
PROT SERPL-MCNC: 7.6 G/DL (ref 6.4–8.2)
QRS AXIS: 32 DEGREES
QRSD INTERVAL: 86 MS
QT INTERVAL: 364 MS
QTC INTERVAL: 430 MS
RBC # BLD AUTO: 3.75 MILLION/UL (ref 3.88–5.62)
SODIUM SERPL-SCNC: 129 MMOL/L (ref 136–145)
T WAVE AXIS: 60 DEGREES
VENTRICULAR RATE: 84 BPM
WBC # BLD AUTO: 6 THOUSAND/UL (ref 4.31–10.16)

## 2020-08-19 PROCEDURE — 87086 URINE CULTURE/COLONY COUNT: CPT | Performed by: PHYSICIAN ASSISTANT

## 2020-08-19 PROCEDURE — 80053 COMPREHEN METABOLIC PANEL: CPT | Performed by: PHYSICIAN ASSISTANT

## 2020-08-19 PROCEDURE — 87186 SC STD MICRODIL/AGAR DIL: CPT | Performed by: PHYSICIAN ASSISTANT

## 2020-08-19 PROCEDURE — 85025 COMPLETE CBC W/AUTO DIFF WBC: CPT | Performed by: PHYSICIAN ASSISTANT

## 2020-08-19 PROCEDURE — 93010 ELECTROCARDIOGRAM REPORT: CPT | Performed by: INTERNAL MEDICINE

## 2020-08-19 PROCEDURE — 83735 ASSAY OF MAGNESIUM: CPT | Performed by: PHYSICIAN ASSISTANT

## 2020-08-19 PROCEDURE — 84100 ASSAY OF PHOSPHORUS: CPT | Performed by: PHYSICIAN ASSISTANT

## 2020-08-19 PROCEDURE — 99232 SBSQ HOSP IP/OBS MODERATE 35: CPT | Performed by: PHYSICIAN ASSISTANT

## 2020-08-19 PROCEDURE — 87147 CULTURE TYPE IMMUNOLOGIC: CPT | Performed by: PHYSICIAN ASSISTANT

## 2020-08-19 RX ORDER — OXYCODONE HYDROCHLORIDE 10 MG/1
10 TABLET ORAL EVERY 4 HOURS PRN
Status: DISCONTINUED | OUTPATIENT
Start: 2020-08-19 | End: 2020-08-20

## 2020-08-19 RX ORDER — MORPHINE SULFATE 30 MG/1
30 TABLET, FILM COATED, EXTENDED RELEASE ORAL 4 TIMES DAILY
Status: DISCONTINUED | OUTPATIENT
Start: 2020-08-19 | End: 2020-08-20

## 2020-08-19 RX ORDER — HYDROMORPHONE HCL/PF 1 MG/ML
1 SYRINGE (ML) INJECTION ONCE
Status: COMPLETED | OUTPATIENT
Start: 2020-08-19 | End: 2020-08-19

## 2020-08-19 RX ORDER — HYDROMORPHONE HCL/PF 1 MG/ML
1 SYRINGE (ML) INJECTION EVERY 4 HOURS PRN
Status: DISCONTINUED | OUTPATIENT
Start: 2020-08-19 | End: 2020-08-20

## 2020-08-19 RX ORDER — MORPHINE SULFATE 15 MG/1
30 TABLET, FILM COATED, EXTENDED RELEASE ORAL 3 TIMES DAILY
COMMUNITY
End: 2020-08-22 | Stop reason: HOSPADM

## 2020-08-19 RX ADMIN — SODIUM CHLORIDE 75 ML/HR: 0.9 INJECTION, SOLUTION INTRAVENOUS at 18:22

## 2020-08-19 RX ADMIN — HEPARIN SODIUM 5000 UNITS: 5000 INJECTION INTRAVENOUS; SUBCUTANEOUS at 21:00

## 2020-08-19 RX ADMIN — HYDROMORPHONE HYDROCHLORIDE 1 MG: 1 INJECTION, SOLUTION INTRAMUSCULAR; INTRAVENOUS; SUBCUTANEOUS at 00:37

## 2020-08-19 RX ADMIN — MORPHINE SULFATE 30 MG: 30 TABLET, EXTENDED RELEASE ORAL at 11:48

## 2020-08-19 RX ADMIN — HYDROMORPHONE HYDROCHLORIDE 2 MG: 2 INJECTION INTRAMUSCULAR; INTRAVENOUS; SUBCUTANEOUS at 08:42

## 2020-08-19 RX ADMIN — CEFTRIAXONE 1000 MG: 1 INJECTION, SOLUTION INTRAVENOUS at 11:50

## 2020-08-19 RX ADMIN — SODIUM CHLORIDE 75 ML/HR: 0.9 INJECTION, SOLUTION INTRAVENOUS at 04:49

## 2020-08-19 RX ADMIN — OXYCODONE HYDROCHLORIDE 10 MG: 10 TABLET ORAL at 23:42

## 2020-08-19 RX ADMIN — MORPHINE SULFATE 30 MG: 30 TABLET, EXTENDED RELEASE ORAL at 21:00

## 2020-08-19 RX ADMIN — PANTOPRAZOLE SODIUM 40 MG: 40 TABLET, DELAYED RELEASE ORAL at 06:10

## 2020-08-19 RX ADMIN — DOCUSATE SODIUM 100 MG: 100 CAPSULE, LIQUID FILLED ORAL at 18:21

## 2020-08-19 RX ADMIN — LIDOCAINE 5% 1 PATCH: 700 PATCH TOPICAL at 08:23

## 2020-08-19 RX ADMIN — HEPARIN SODIUM 5000 UNITS: 5000 INJECTION INTRAVENOUS; SUBCUTANEOUS at 05:24

## 2020-08-19 RX ADMIN — OXYCODONE HYDROCHLORIDE 10 MG: 10 TABLET ORAL at 19:31

## 2020-08-19 RX ADMIN — HYDROMORPHONE HYDROCHLORIDE 1 MG: 1 INJECTION, SOLUTION INTRAMUSCULAR; INTRAVENOUS; SUBCUTANEOUS at 22:47

## 2020-08-19 RX ADMIN — HYDROMORPHONE HYDROCHLORIDE 1 MG: 1 INJECTION, SOLUTION INTRAMUSCULAR; INTRAVENOUS; SUBCUTANEOUS at 16:45

## 2020-08-19 RX ADMIN — NICOTINE 1 PATCH: 21 PATCH TRANSDERMAL at 18:20

## 2020-08-19 RX ADMIN — OXYCODONE HYDROCHLORIDE 10 MG: 10 TABLET ORAL at 14:06

## 2020-08-19 RX ADMIN — HYDROMORPHONE HYDROCHLORIDE 2 MG: 2 INJECTION INTRAMUSCULAR; INTRAVENOUS; SUBCUTANEOUS at 02:47

## 2020-08-19 RX ADMIN — DOCUSATE SODIUM 100 MG: 100 CAPSULE, LIQUID FILLED ORAL at 08:24

## 2020-08-19 RX ADMIN — FLUTICASONE PROPIONATE 2 SPRAY: 50 SPRAY, METERED NASAL at 08:23

## 2020-08-19 RX ADMIN — STANDARDIZED SENNA CONCENTRATE 8.6 MG: 8.6 TABLET ORAL at 21:00

## 2020-08-19 RX ADMIN — HEPARIN SODIUM 5000 UNITS: 5000 INJECTION INTRAVENOUS; SUBCUTANEOUS at 14:06

## 2020-08-19 RX ADMIN — ESCITALOPRAM OXALATE 10 MG: 10 TABLET ORAL at 21:00

## 2020-08-19 RX ADMIN — ALBUTEROL SULFATE 2 PUFF: 90 AEROSOL, METERED RESPIRATORY (INHALATION) at 19:33

## 2020-08-19 RX ADMIN — CLONAZEPAM 0.5 MG: 0.5 TABLET ORAL at 06:10

## 2020-08-19 RX ADMIN — MORPHINE SULFATE 30 MG: 30 TABLET, EXTENDED RELEASE ORAL at 18:21

## 2020-08-19 RX ADMIN — HYDROMORPHONE HYDROCHLORIDE 1 MG: 1 INJECTION, SOLUTION INTRAMUSCULAR; INTRAVENOUS; SUBCUTANEOUS at 05:25

## 2020-08-19 NOTE — QUICK NOTE
I spoke to Norberto Patterson from urology regarding the patient's nephrostomy tube  It does not need to be exchanged, it gets exchanged every 3-4 months  The  nurse navigator was in contact with the patient on 8/17  The patient is scheduled for follow-up with Dr Traci Ferrara in the office on 11/30/20

## 2020-08-19 NOTE — ASSESSMENT & PLAN NOTE
· Patient currently following Dr Maverick Muniz for chemotherapy treatment  · CT abdomen pelvis:  New dilatation of the common hepatic and common bile duct seen to the level of the ampulla  Mildly more prominent pancreatic duct caliber  No discrete obstructing etiology identified  Correlate with clinical findings for stasis versus obstructive   etiology      Interval increase in number and size of retrocrural, retroperitoneal right iliac chain adenopathy  Subsequent worsening mass effect and compression on the IVC  Bolus timing was not optimized to evaluate for true caliber/patency of the IVC  Mild   increasing soft tissue attenuation within the right flank and right lower quadrant suggesting some degree of early peripheral edema      Interval increase in size of mass surrounding the distal right ureter      New metastatic bone lesions in the L5 vertebra and right acetabulum  Cortical breakthrough noted in the medial acetabular roof  · Consult oncology  · Patient with right nephrostomy tube placed by IR on 6/25/20  Patient has not had any follow-up with urology since  Will consult Urology

## 2020-08-19 NOTE — PROGRESS NOTES
Progress Note - Yoav Davis 1965, 63 y o  male MRN: [id number]    Unit/Bed#: 418-01 Encounter: [id number]    Primary Care Provider: Vijaya Persaud PA-C   Date and time admitted to hospital: 8/18/2020 10:17 AM        * Cancer related pain  Assessment & Plan  · Patient with worsening pain in right low back radiating down right leg  · CT abdomen/pelvis:   New dilatation of the common hepatic and common bile duct seen to the level of the ampulla  Mildly more prominent pancreatic duct caliber  No discrete obstructing etiology identified  Correlate with clinical findings for stasis versus obstructive   etiology       Interval increase in number and size of retrocrural, retroperitoneal right iliac chain adenopathy  Subsequent worsening mass effect and compression on the IVC  Bolus timing was not optimized to evaluate for true caliber/patency of the IVC  Mild   increasing soft tissue attenuation within the right flank and right lower quadrant suggesting some degree of early peripheral edema      Interval increase in size of mass surrounding the distal right ureter      New metastatic bone lesions in the L5 vertebra and right acetabulum  Cortical breakthrough noted in the medial acetabular roof  · Will increase the patient's home regimen of MS contin from 30 mg TID to 4 times daily  Continue oxycodone 10 mg po q4h prn  · Consult Oncology  · Labs in am      Acute kidney injury [facility])  Assessment & Plan  · Creatinine of 1 8 on admission  · Patient with baseline creatinine around 0 7  · Likely due to dehydration given patient reports decreased po intake in the last week  · Patient received 2 L NS in the ED  · Improving with NSS IV fluids, will continue   · Avoid nephrotoxins  · Repeat labs in am    Hyponatremia  Assessment & Plan  · Hypovolemia hyponatremia  · Sodium of 130 on admission     · Continue NSS IV fluids  · Repeat labs in am    Urothelial carcinoma of right distal ureter [facility])  Assessment & Plan  · Patient currently [facility] for chemotherapy treatment  · CT abdomen pelvis:  New dilatation of the common hepatic and common bile duct seen to the level of the ampulla  Mildly more prominent pancreatic duct caliber  No discrete obstructing etiology identified  Correlate with clinical findings for stasis versus obstructive   etiology      Interval increase in number and size of retrocrural, retroperitoneal right iliac chain adenopathy  Subsequent worsening mass effect and compression on the IVC  Bolus timing was not optimized to evaluate for true caliber/patency of the IVC  Mild   increasing soft tissue attenuation within the right flank and right lower quadrant suggesting some degree of early peripheral edema      Interval increase in size of mass surrounding the distal right ureter      New metastatic bone lesions in the L5 vertebra and right acetabulum  Cortical breakthrough noted in the medial acetabular roof  · Consult oncology  · Patient with right nephrostomy tube placed by IR on 6/25/20  Patient has not had any follow-up with urology since  Will consult Urology  UTI  · Continue IV rocephin  · Urine culture pending  Essential hypertension  Assessment & Plan  · Mildly elevated on admission, now improved  · Hold Lisinopril in the setting WENDY  · Monitor blood pressure trends  Anxiety disorder  Assessment & Plan  · Continue Lexapro and prn klonopin     VTE Pharmacologic Prophylaxis:   Pharmacologic: Heparin  Mechanical VTE Prophylaxis in Place: Yes    Patient Centered Rounds: I have performed bedside rounds with nursing staff today  Discussions with Specialists or Other Care Team Provider: NENA, attending    Education and Discussions with Family / Patient: patient    Time Spent for Care: 20 minutes  More than 50% of total time spent on counseling and coordination of care as described above      Current Length of Stay: 1 day(s)    Current Patient Status: Inpatient Certification Statement: The patient will continue to require additional inpatient hospital stay due to continued need for IV fluids and adjustment of pain medications  Discharge Plan: TBD    Code Status: Level 1 - Full Code      Subjective: The was seen and examined  The patient states he continues to have severe pain  He acute events overnight  Objective:     Vitals:   Temp (24hrs), Av 9 °F (37 2 °C), Min:98 8 °F (37 1 °C), Max:99 °F (37 2 °C)    Temp:  [98 8 °F (37 1 °C)-99 °F (37 2 °C)] 98 8 °F (37 1 °C)  HR:  [76-94] 79  Resp:  [18] 18  BP: (135-138)/(68-89) 135/75  SpO2:  [99 %-100 %] 99 %  Body mass index is 21 92 kg/m²  Input and Output Summary (last 24 hours): Intake/Output Summary (Last 24 hours) at 2020 1357  Last data filed at 2020 1230  Gross per 24 hour   Intake 1690 ml   Output 900 ml   Net 790 ml       Physical Exam:     Physical Exam  Vitals signs and nursing note reviewed  Constitutional:       General: He is awake  Cardiovascular:      Rate and Rhythm: Normal rate and regular rhythm  Pulmonary:      Effort: Pulmonary effort is normal       Breath sounds: Normal breath sounds  No wheezing, rhonchi or rales  Abdominal:      General: Abdomen is flat  Bowel sounds are normal       Palpations: Abdomen is soft  Tenderness: There is no abdominal tenderness  Musculoskeletal:        Arms:    Skin:     General: Skin is warm and dry  Neurological:      General: No focal deficit present  Mental Status: He is alert and oriented to person, place, and time  Psychiatric:         Behavior: Behavior is cooperative           Additional Data:     Labs:    Results from last 7 days   Lab Units 20  0426   WBC Thousand/uL 6 00   HEMOGLOBIN g/dL 11 3*   HEMATOCRIT % 34 5*   PLATELETS Thousands/uL 191   NEUTROS PCT % 77*   LYMPHS PCT % 12*   MONOS PCT % 8   EOS PCT % 1     Results from last 7 days   Lab Units 20  0426   SODIUM mmol/L 129*   POTASSIUM mmol/L 4 8   CHLORIDE mmol/L 97*   CO2 mmol/L 24   BUN mg/dL 21   CREATININE mg/dL 1 50*   ANION GAP mmol/L 8   CALCIUM mg/dL 9 0   ALBUMIN g/dL 3 3*   TOTAL BILIRUBIN mg/dL 0 50   ALK PHOS U/L 169*   ALT U/L 34   AST U/L 28   GLUCOSE RANDOM mg/dL 103     Results from last 7 days   Lab Units 08/18/20  1040   INR  1 23*                       * I Have Reviewed All Lab Data Listed Above  * Additional Pertinent Lab Tests Reviewed: All Labs Within Last 24 Hours Reviewed    Imaging:    Imaging Reports Reviewed Today Include: none  Imaging Personally Reviewed by Myself Includes:  none    Recent Cultures (last 7 days):     Results from last 7 days   Lab Units 08/18/20  1151 08/18/20  1146   BLOOD CULTURE  Received in Microbiology Lab  Culture in Progress  Received in Microbiology Lab  Culture in Progress         Last 24 Hours Medication List:   Current Facility-Administered Medications   Medication Dose Route Frequency Provider Last Rate    acetaminophen  650 mg Oral Q6H PRN Barbara Anton PA-C      albuterol  2 puff Inhalation TID Ermias Vera MD      cefTRIAXone  1,000 mg Intravenous Q24H Barbara Anton PA-C 1,000 mg (08/19/20 1150)    clonazePAM  0 5 mg Oral BID PRN Barbara Anton PA-C      cyclobenzaprine  5 mg Oral TID PRN Barbara Anton PA-C      docusate sodium  100 mg Oral BID Barbara Anton PA-C      escitalopram  10 mg Oral HS Barbara Anton PA-C      fluticasone  2 spray Each Nare Daily Barbara Anton PA-C      fluticasone  2 puff Inhalation BID Barbara Anton PA-C      heparin (porcine)  5,000 Units Subcutaneous [facility] Barbara Anton PA-C      HYDROmorphone  1 mg Intravenous Q4H PRN Barbara Anton PA-C      lidocaine  1 patch Topical Daily Barbara Anton PA-C      morphine  30 mg Oral 4x Daily Barbara Anton PA-C      nicotine  1 patch Transdermal Daily Barbara Anton PA-C      ondansetron  4 mg Intravenous Q6H PRN Barbara Anton PA-C      oxyCODONE  10 mg Oral Q4H PRN [facility] DAMARIS Mitchell      pantoprazole  40 mg Oral Early Morning Neha Wolff PA-C      polyethylene glycol  17 g Oral Daily PRN Neha Wolff PA-C      senna  1 tablet Oral HS Neha Wolff PA-C      sodium chloride  75 mL/hr Intravenous Continuous Neha Wolff PA-C 75 mL/hr (08/19/20 1780)        Today, Patient Was Seen By: Neha Wolff PA-C    ** Please Note: Dictation voice to text software may have been used in the creation of this document   **

## 2020-08-19 NOTE — ASSESSMENT & PLAN NOTE
· Mildly elevated on admission, now improved  · Hold Lisinopril in the setting WENDY  · Monitor blood pressure trends

## 2020-08-19 NOTE — ASSESSMENT & PLAN NOTE
· Hypovolemia hyponatremia  · Sodium of 130 on admission     · Continue NSS IV fluids  · Repeat labs in am

## 2020-08-19 NOTE — ASSESSMENT & PLAN NOTE
· Patient with worsening pain in right low back radiating down right leg  · CT abdomen/pelvis:   New dilatation of the common hepatic and common bile duct seen to the level of the ampulla  Mildly more prominent pancreatic duct caliber  No discrete obstructing etiology identified  Correlate with clinical findings for stasis versus obstructive   etiology       Interval increase in number and size of retrocrural, retroperitoneal right iliac chain adenopathy  Subsequent worsening mass effect and compression on the IVC  Bolus timing was not optimized to evaluate for true caliber/patency of the IVC  Mild   increasing soft tissue attenuation within the right flank and right lower quadrant suggesting some degree of early peripheral edema      Interval increase in size of mass surrounding the distal right ureter      New metastatic bone lesions in the L5 vertebra and right acetabulum  Cortical breakthrough noted in the medial acetabular roof  · Will increase the patient's home regimen of MS contin from 30 mg TID to 4 times daily  Continue oxycodone 10 mg po q4h prn     · Consult Oncology  · Labs in am

## 2020-08-19 NOTE — ASSESSMENT & PLAN NOTE
· Creatinine of 1 8 on admission  · Patient with baseline creatinine around 0 7  · Likely due to dehydration given patient reports decreased po intake in the last week     · Patient received 2 L NS in the ED  · Improving with NSS IV fluids, will continue   · Avoid nephrotoxins  · Repeat labs in am

## 2020-08-19 NOTE — SOCIAL WORK
Cm met with the patient to evaluate the patients prior function and living situation and any barriers to d/c and form a safe d/c plan  Cm also evaluated the patient for any services in the home or needs for services  Patient states he lives on third floor of house w/his daughter  0 DAVID's and 3 flights of inside steps to get to his room  He does use a straight cane to assist w/ambulation  He is able to do his own ADL's and he or his daughter do the cooking  He is disabled so does not work  His PCP is Leena Spencer  He obtains his medications from [facility] in [place]  He does drive, but his daughter or his ex-wife will pick him up on discharge  CM will follow

## 2020-08-20 LAB
ANION GAP SERPL CALCULATED.3IONS-SCNC: 7 MMOL/L (ref 4–13)
BASOPHILS # BLD AUTO: 0.03 THOUSANDS/ΜL (ref 0–0.1)
BASOPHILS NFR BLD AUTO: 0 % (ref 0–1)
BUN SERPL-MCNC: 16 MG/DL (ref 5–25)
CALCIUM SERPL-MCNC: 8.5 MG/DL (ref 8.3–10.1)
CHLORIDE SERPL-SCNC: 97 MMOL/L (ref 100–108)
CO2 SERPL-SCNC: 25 MMOL/L (ref 21–32)
CREAT SERPL-MCNC: 1.3 MG/DL (ref 0.6–1.3)
EOSINOPHIL # BLD AUTO: 0.1 THOUSAND/ΜL (ref 0–0.61)
EOSINOPHIL NFR BLD AUTO: 2 % (ref 0–6)
ERYTHROCYTE [DISTWIDTH] IN BLOOD BY AUTOMATED COUNT: 13.3 % (ref 11.6–15.1)
GFR SERPL CREATININE-BSD FRML MDRD: 61 ML/MIN/1.73SQ M
GLUCOSE SERPL-MCNC: 92 MG/DL (ref 65–140)
HCT VFR BLD AUTO: 31.4 % (ref 36.5–49.3)
HGB BLD-MCNC: 10.7 G/DL (ref 12–17)
IMM GRANULOCYTES # BLD AUTO: 0.02 THOUSAND/UL (ref 0–0.2)
IMM GRANULOCYTES NFR BLD AUTO: 0 % (ref 0–2)
LYMPHOCYTES # BLD AUTO: 1.2 THOUSANDS/ΜL (ref 0.6–4.47)
LYMPHOCYTES NFR BLD AUTO: 18 % (ref 14–44)
MCH RBC QN AUTO: 30.6 PG (ref 26.8–34.3)
MCHC RBC AUTO-ENTMCNC: 34.1 G/DL (ref 31.4–37.4)
MCV RBC AUTO: 90 FL (ref 82–98)
MONOCYTES # BLD AUTO: 0.67 THOUSAND/ΜL (ref 0.17–1.22)
MONOCYTES NFR BLD AUTO: 10 % (ref 4–12)
NEUTROPHILS # BLD AUTO: 4.71 THOUSANDS/ΜL (ref 1.85–7.62)
NEUTS SEG NFR BLD AUTO: 70 % (ref 43–75)
NRBC BLD AUTO-RTO: 0 /100 WBCS
PLATELET # BLD AUTO: 145 THOUSANDS/UL (ref 149–390)
PMV BLD AUTO: 9.6 FL (ref 8.9–12.7)
POTASSIUM SERPL-SCNC: 4.4 MMOL/L (ref 3.5–5.3)
RBC # BLD AUTO: 3.5 MILLION/UL (ref 3.88–5.62)
SODIUM SERPL-SCNC: 129 MMOL/L (ref 136–145)
WBC # BLD AUTO: 6.73 THOUSAND/UL (ref 4.31–10.16)

## 2020-08-20 PROCEDURE — 85025 COMPLETE CBC W/AUTO DIFF WBC: CPT | Performed by: PHYSICIAN ASSISTANT

## 2020-08-20 PROCEDURE — 99233 SBSQ HOSP IP/OBS HIGH 50: CPT | Performed by: INTERNAL MEDICINE

## 2020-08-20 PROCEDURE — 80048 BASIC METABOLIC PNL TOTAL CA: CPT | Performed by: PHYSICIAN ASSISTANT

## 2020-08-20 RX ORDER — MORPHINE SULFATE 30 MG/1
30 TABLET, FILM COATED, EXTENDED RELEASE ORAL EVERY 8 HOURS SCHEDULED
Status: DISCONTINUED | OUTPATIENT
Start: 2020-08-20 | End: 2020-08-20

## 2020-08-20 RX ORDER — ONDANSETRON 2 MG/ML
4 INJECTION INTRAMUSCULAR; INTRAVENOUS EVERY 4 HOURS PRN
Status: DISCONTINUED | OUTPATIENT
Start: 2020-08-20 | End: 2020-08-22 | Stop reason: HOSPADM

## 2020-08-20 RX ORDER — MORPHINE SULFATE 15 MG/1
15 TABLET, FILM COATED, EXTENDED RELEASE ORAL EVERY 8 HOURS SCHEDULED
Status: DISCONTINUED | OUTPATIENT
Start: 2020-08-20 | End: 2020-08-20

## 2020-08-20 RX ORDER — ZOLPIDEM TARTRATE 5 MG/1
10 TABLET ORAL
Status: DISCONTINUED | OUTPATIENT
Start: 2020-08-20 | End: 2020-08-22 | Stop reason: HOSPADM

## 2020-08-20 RX ORDER — HYDROMORPHONE HYDROCHLORIDE 2 MG/1
2 TABLET ORAL EVERY 4 HOURS PRN
Status: DISCONTINUED | OUTPATIENT
Start: 2020-08-20 | End: 2020-08-22 | Stop reason: HOSPADM

## 2020-08-20 RX ORDER — MORPHINE SULFATE 30 MG/1
30 TABLET, FILM COATED, EXTENDED RELEASE ORAL EVERY 12 HOURS SCHEDULED
Status: DISCONTINUED | OUTPATIENT
Start: 2020-08-20 | End: 2020-08-20

## 2020-08-20 RX ADMIN — CEFTRIAXONE 1000 MG: 1 INJECTION, SOLUTION INTRAVENOUS at 11:39

## 2020-08-20 RX ADMIN — HEPARIN SODIUM 5000 UNITS: 5000 INJECTION INTRAVENOUS; SUBCUTANEOUS at 13:43

## 2020-08-20 RX ADMIN — HYDROMORPHONE HYDROCHLORIDE 2 MG: 2 TABLET ORAL at 15:22

## 2020-08-20 RX ADMIN — NICOTINE 1 PATCH: 21 PATCH TRANSDERMAL at 17:39

## 2020-08-20 RX ADMIN — FLUTICASONE PROPIONATE 2 PUFF: 220 AEROSOL, METERED RESPIRATORY (INHALATION) at 09:05

## 2020-08-20 RX ADMIN — DOCUSATE SODIUM 100 MG: 100 CAPSULE, LIQUID FILLED ORAL at 09:04

## 2020-08-20 RX ADMIN — FLUTICASONE PROPIONATE 2 SPRAY: 50 SPRAY, METERED NASAL at 09:05

## 2020-08-20 RX ADMIN — ESCITALOPRAM OXALATE 10 MG: 10 TABLET ORAL at 21:30

## 2020-08-20 RX ADMIN — HYDROMORPHONE HYDROCHLORIDE 2 MG: 2 TABLET ORAL at 19:29

## 2020-08-20 RX ADMIN — ALBUTEROL SULFATE 2 PUFF: 90 AEROSOL, METERED RESPIRATORY (INHALATION) at 13:45

## 2020-08-20 RX ADMIN — MORPHINE SULFATE 30 MG: 30 TABLET, EXTENDED RELEASE ORAL at 09:04

## 2020-08-20 RX ADMIN — HYDROMORPHONE HYDROCHLORIDE 1 MG: 1 INJECTION, SOLUTION INTRAMUSCULAR; INTRAVENOUS; SUBCUTANEOUS at 02:41

## 2020-08-20 RX ADMIN — ALBUTEROL SULFATE 2 PUFF: 90 AEROSOL, METERED RESPIRATORY (INHALATION) at 09:06

## 2020-08-20 RX ADMIN — MORPHINE SULFATE 45 MG: 30 TABLET, EXTENDED RELEASE ORAL at 21:30

## 2020-08-20 RX ADMIN — OXYCODONE HYDROCHLORIDE 10 MG: 10 TABLET ORAL at 04:53

## 2020-08-20 RX ADMIN — OXYCODONE HYDROCHLORIDE 10 MG: 10 TABLET ORAL at 10:58

## 2020-08-20 RX ADMIN — SODIUM CHLORIDE 125 ML/HR: 0.9 INJECTION, SOLUTION INTRAVENOUS at 19:29

## 2020-08-20 RX ADMIN — ALBUTEROL SULFATE 2 PUFF: 90 AEROSOL, METERED RESPIRATORY (INHALATION) at 21:30

## 2020-08-20 RX ADMIN — HEPARIN SODIUM 5000 UNITS: 5000 INJECTION INTRAVENOUS; SUBCUTANEOUS at 21:30

## 2020-08-20 RX ADMIN — ZOLPIDEM TARTRATE 10 MG: 5 TABLET, COATED ORAL at 22:59

## 2020-08-20 RX ADMIN — LIDOCAINE 5% 1 PATCH: 700 PATCH TOPICAL at 09:05

## 2020-08-20 RX ADMIN — FLUTICASONE PROPIONATE 2 PUFF: 220 AEROSOL, METERED RESPIRATORY (INHALATION) at 17:40

## 2020-08-20 RX ADMIN — MORPHINE SULFATE 30 MG: 30 TABLET, EXTENDED RELEASE ORAL at 13:42

## 2020-08-20 RX ADMIN — DOCUSATE SODIUM 100 MG: 100 CAPSULE, LIQUID FILLED ORAL at 17:39

## 2020-08-20 RX ADMIN — PANTOPRAZOLE SODIUM 40 MG: 40 TABLET, DELAYED RELEASE ORAL at 05:40

## 2020-08-20 RX ADMIN — HYDROMORPHONE HYDROCHLORIDE 1 MG: 1 INJECTION, SOLUTION INTRAMUSCULAR; INTRAVENOUS; SUBCUTANEOUS at 07:40

## 2020-08-20 RX ADMIN — HEPARIN SODIUM 5000 UNITS: 5000 INJECTION INTRAVENOUS; SUBCUTANEOUS at 05:40

## 2020-08-20 RX ADMIN — STANDARDIZED SENNA CONCENTRATE 8.6 MG: 8.6 TABLET ORAL at 21:30

## 2020-08-20 NOTE — PROGRESS NOTES
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  · Decrease MS Contin back to TID dosing, but increase dose to 45 mg as patient is still having significant breakthrough pain  · Discontinue oral oxycodone and IV Dilaudid, and attempt to control breakthrough pain with oral hydromorphone instead  Initiated on 2 mg Q 4 p r n , with aims of increasing as required  · Ultimate goal is to get the patient comfortable with long-acting morphine and reliant less on p r n  use  · Insure bowel regimen as well  Urothelial carcinoma of right distal ureter [facility])  Assessment & Plan  Patient currently following Dr Rafaela Chatterjee for chemotherapy treatment  CT with clear evidence of progression  · Consult oncology  · Patient with right nephrostomy tube placed by IR on 6/25/20  He has not had any follow-up with urology since  Per discussion with Urology yesterday by AP following the patient, no need for nephrostomy tube change at this time  Hyponatremia  Assessment & Plan  Hypovolemia hyponatremia  Sodium worsening  Continue but increased rate of IV fluids, and monitor sodium closely  Anxiety disorder  Assessment & Plan  Continue SSRI and prn benzo therapy  Essential hypertension  Assessment & Plan  Mildly elevated on admission, now improved  Continue to hold ACE-inhibitor in setting of WENDY  Acute kidney injury [facility])  Assessment & Plan  Creatinine of 1 8 on admission, with baseline creatinine 0 7    Currently improved to 1 3 with IV fluids  Continue an increased rate  Continue to avoid nephrotoxins, hold ACE-inhibitor, and renally dose medications as needed  UTI (urinary tract infection)  Assessment & Plan  Continue IV ceftriaxone, and monitor urine cultures that are pending at this time  CT without any evidence of obstruction  Urinalysis with positive nitrites, moderate leuks, and innumerable WBCs on microscopy  VTE Pharmacologic Prophylaxis:   Pharmacologic: Heparin  Mechanical VTE Prophylaxis in Place: Yes    Patient Centered Rounds: I have performed bedside rounds with nursing staff today  Discussions with Specialists or Other Care Team Provider: None    Education and Discussions with Family / Patient: Yes    Time Spent for Care: 30 minutes  More than 50% of total time spent on counseling and coordination of care as described above  Current Length of Stay: 2 day(s)    Current Patient Status: Inpatient   Certification Statement: The patient will continue to require additional inpatient hospital stay due to Need for pain control, treatment of UTI  Discharge Plan:  TBD, with potential discharge   Code Status: Level 1 - Full Code      Subjective:   Patient appears comfortable but continues to complain of significant discomfort, requiring frequent p r n  Medications  No overnight events reported  Remains afebrile  Objective:     Vitals:   Temp (24hrs), Av 2 °F (37 3 °C), Min:98 3 °F (36 8 °C), Max:99 7 °F (37 6 °C)    Temp:  [98 3 °F (36 8 °C)-99 7 °F (37 6 °C)] 99 6 °F (37 6 °C)  HR:  [79-96] 92  Resp:  [18-20] 20  BP: (136-151)/(84-91) 136/91  SpO2:  [97 %-100 %] 99 %  Body mass index is 21 92 kg/m²  Input and Output Summary (last 24 hours):        Intake/Output Summary (Last 24 hours) at 2020 1621  Last data filed at 2020 1241  Gross per 24 hour   Intake 2811 25 ml   Output 1175 ml   Net 1636 25 ml       Physical Exam:     Physical Exam  Constitutional:       General: He is not in acute distress  Appearance: He is ill-appearing  He is not toxic-appearing  Comments: Cachectic appearing  Neck:      Musculoskeletal: Neck supple  Neurological:      Mental Status: He is alert and oriented to person, place, and time  Psychiatric:         Mood and Affect: Mood normal          Behavior: Behavior normal          Thought Content: Thought content normal         Additional Data:     Labs:    Results from last 7 days   Lab Units 08/20/20  0432   WBC Thousand/uL 6 73   HEMOGLOBIN g/dL 10 7*   HEMATOCRIT % 31 4*   PLATELETS Thousands/uL 145*   NEUTROS PCT % 70   LYMPHS PCT % 18   MONOS PCT % 10   EOS PCT % 2     Results from last 7 days   Lab Units 08/20/20  0432 08/19/20  0426   SODIUM mmol/L 129* 129*   POTASSIUM mmol/L 4 4 4 8   CHLORIDE mmol/L 97* 97*   CO2 mmol/L 25 24   BUN mg/dL 16 21   CREATININE mg/dL 1 30 1 50*   ANION GAP mmol/L 7 8   CALCIUM mg/dL 8 5 9 0   ALBUMIN g/dL  --  3 3*   TOTAL BILIRUBIN mg/dL  --  0 50   ALK PHOS U/L  --  169*   ALT U/L  --  34   AST U/L  --  28   GLUCOSE RANDOM mg/dL 92 103     Results from last 7 days   Lab Units 08/18/20  1040   INR  1 23*                       * I Have Reviewed All Lab Data Listed Above  * Additional Pertinent Lab Tests Reviewed: Katerina 65 Admission Reviewed    Imaging:    Imaging Reports Reviewed Today Include: CT a/p     Recent Cultures (last 7 days):     Results from last 7 days   Lab Units 08/18/20  1151 08/18/20  1146   BLOOD CULTURE  No Growth at 48 hrs  No Growth at 48 hrs         Last 24 Hours Medication List:   Current Facility-Administered Medications   Medication Dose Route Frequency Provider Last Rate    acetaminophen  650 mg Oral Q6H PRN Ignacio Joser, PA-C      albuterol  2 puff Inhalation TID Effie Hyde MD      cefTRIAXone  1,000 mg Intravenous Q24H Ignacio Reba, PA-C 1,000 mg (08/20/20 1139)    clonazePAM  0 5 mg Oral BID PRN Ignacio Joser, PA-C      cyclobenzaprine  5 mg Oral TID PRN Anita Distance, PA-C      docusate sodium  100 mg Oral BID Anita Distance, PA-C      escitalopram  10 mg Oral HS Anita Distance, PA-C      fluticasone  2 spray Each Nare Daily Anita Distance, PA-C      fluticasone  2 puff Inhalation BID Anita Distance, PA-C      heparin (porcine)  5,000 Units Subcutaneous [facility] Anita Distance, PA-C      HYDROmorphone  2 mg Oral Q4H PRN mAanda Bassett MD      lidocaine  1 patch Topical Daily Anita Distance, PA-C      morphine  45 mg Oral [facility] Amanda Bassett MD      nicotine  1 patch Transdermal Daily Anita Distance, PA-C      ondansetron  4 mg Intravenous Q4H PRN Amanda Bassett MD      pantoprazole  40 mg Oral Early Morning Anita Distance, PA-C      polyethylene glycol  17 g Oral Daily PRN Anita Distance, PA-C      senna  1 tablet Oral HS Anita Distance, PA-C      sodium chloride  125 mL/hr Intravenous Continuous Amanda Bassett  mL/hr (08/20/20 0912)    zolpidem  10 mg Oral HS PRN Amanda Bassett MD          Today, Patient Was Seen By: Amanda Bassett MD    ** Please Note: Dictation voice to text software may have been used in the creation of this document   **

## 2020-08-20 NOTE — ASSESSMENT & PLAN NOTE
Patient with worsening pain in right low back radiating down RLE  Majority of patient's cancer appears to be on the right, with increase in size of the mass surrounding the distal right ureter, as well as new metastatic bone lesions in the L5 vertebra and right acetabulum  · Decrease MS Contin back to TID dosing, but increase dose to 45 mg as patient is still having significant breakthrough pain  · Discontinue oral oxycodone and IV Dilaudid, and attempt to control breakthrough pain with oral hydromorphone instead  Initiated on 2 mg Q 4 p r n , with aims of increasing as required  · Ultimate goal is to get the patient comfortable with long-acting morphine and reliant less on p r n  use  · Insure bowel regimen as well

## 2020-08-20 NOTE — ASSESSMENT & PLAN NOTE
Creatinine of 1 8 on admission, with baseline creatinine 0 7  Currently improved to 1 3 with IV fluids  Continue an increased rate  Continue to avoid nephrotoxins, hold ACE-inhibitor, and renally dose medications as needed

## 2020-08-20 NOTE — ASSESSMENT & PLAN NOTE
Hypovolemia hyponatremia  Sodium worsening  Continue but increased rate of IV fluids, and monitor sodium closely

## 2020-08-20 NOTE — PLAN OF CARE
Problem: GASTROINTESTINAL - ADULT  Goal: Minimal or absence of nausea and/or vomiting  Description: INTERVENTIONS:  - Administer IV fluids if ordered to ensure adequate hydration  - Maintain NPO status until nausea and vomiting are resolved  - Nasogastric tube if ordered  - Administer ordered antiemetic medications as needed  - Provide nonpharmacologic comfort measures as appropriate  - Advance diet as tolerated, if ordered  - Consider nutrition services referral to assist patient with adequate nutrition and appropriate food choices  Outcome: Progressing     Problem: SKIN/TISSUE INTEGRITY - ADULT  Goal: Skin integrity remains intact  Description: INTERVENTIONS  - Identify patients at risk for skin breakdown  - Assess and monitor skin integrity  - Assess and monitor nutrition and hydration status  - Monitor labs (i e  albumin)  - Assess for incontinence   - Turn and reposition patient  - Assist with mobility/ambulation  - Relieve pressure over bony prominences  - Avoid friction and shearing  - Provide appropriate hygiene as needed including keeping skin clean and dry  - Evaluate need for skin moisturizer/barrier cream  - Collaborate with interdisciplinary team (i e  Nutrition, Rehabilitation, etc )   - Patient/family teaching  Outcome: Progressing     Problem: MUSCULOSKELETAL - ADULT  Goal: Maintain or return mobility to safest level of function  Description: INTERVENTIONS:  - Assess patient's ability to carry out ADLs; assess patient's baseline for ADL function and identify physical deficits which impact ability to perform ADLs (bathing, care of mouth/teeth, toileting, grooming, dressing, etc )  - Assess/evaluate cause of self-care deficits   - Assess range of motion  - Assess patient's mobility  - Assess patient's need for assistive devices and provide as appropriate  - Encourage maximum independence but intervene and supervise when necessary  - Involve family in performance of ADLs  - Assess for home care needs following discharge   - Consider OT consult to assist with ADL evaluation and planning for discharge  - Provide patient education as appropriate  Outcome: Progressing     Problem: PAIN - ADULT  Goal: Verbalizes/displays adequate comfort level or baseline comfort level  Description: Interventions:  - Encourage patient to monitor pain and request assistance  - Assess pain using appropriate pain scale  - Administer analgesics based on type and severity of pain and evaluate response  - Implement non-pharmacological measures as appropriate and evaluate response  - Consider cultural and social influences on pain and pain management  - Notify physician/advanced practitioner if interventions unsuccessful or patient reports new pain  Outcome: Progressing     Problem: INFECTION - ADULT  Goal: Absence or prevention of progression during hospitalization  Description: INTERVENTIONS:  - Assess and monitor for signs and symptoms of infection  - Monitor lab/diagnostic results  - Monitor all insertion sites, i e  indwelling lines, tubes, and drains  - Monitor endotracheal if appropriate and nasal secretions for changes in amount and color  - [place] appropriate cooling/warming therapies per order  - Administer medications as ordered  - Instruct and encourage patient and family to use good hand hygiene technique  - Identify and instruct in appropriate isolation precautions for identified infection/condition  Outcome: Progressing     Problem: SAFETY ADULT  Goal: Patient will remain free of falls  Description: INTERVENTIONS:  - Assess patient frequently for physical needs  -  Identify cognitive and physical deficits and behaviors that affect risk of falls    -  [place] fall precautions as indicated by assessment   - Educate patient/family on patient safety including physical limitations  - Instruct patient to call for assistance with activity based on assessment  - Modify environment to reduce risk of injury  - Consider OT/PT consult to assist with strengthening/mobility  Outcome: Progressing  Goal: Maintain or return to baseline ADL function  Description: INTERVENTIONS:  -  Assess patient's ability to carry out ADLs; assess patient's baseline for ADL function and identify physical deficits which impact ability to perform ADLs (bathing, care of mouth/teeth, toileting, grooming, dressing, etc )  - Assess/evaluate cause of self-care deficits   - Assess range of motion  - Assess patient's mobility; develop plan if impaired  - Assess patient's need for assistive devices and provide as appropriate  - Encourage maximum independence but intervene and supervise when necessary  - Involve family in performance of ADLs  - Assess for home care needs following discharge   - Consider OT consult to assist with ADL evaluation and planning for discharge  - Provide patient education as appropriate  Outcome: Progressing  Goal: Maintain or return mobility status to optimal level  Description: INTERVENTIONS:  - Assess patient's baseline mobility status (ambulation, transfers, stairs, etc )    - Identify cognitive and physical deficits and behaviors that affect mobility  - Identify mobility aids required to assist with transfers and/or ambulation (gait belt, sit-to-stand, lift, walker, cane, etc )  - [place] fall precautions as indicated by assessment  - Record patient progress and toleration of activity level on Mobility SBAR; progress patient to next Phase/Stage  - Instruct patient to call for assistance with activity based on assessment  - Consider rehabilitation consult to assist with strengthening/weightbearing, etc   Outcome: Progressing     Problem: DISCHARGE PLANNING  Goal: Discharge to home or other facility with appropriate resources  Description: INTERVENTIONS:  - Identify barriers to discharge w/patient and caregiver  - Arrange for needed discharge resources and transportation as appropriate  - Identify discharge learning needs (meds, wound care, etc )  - Arrange for interpretive services to assist at discharge as needed  - Refer to Case Management Department for coordinating discharge planning if the patient needs post-hospital services based on physician/advanced practitioner order or complex needs related to functional status, cognitive ability, or social support system  Outcome: Progressing     Problem: Knowledge Deficit  Goal: Patient/family/caregiver demonstrates understanding of disease process, treatment plan, medications, and discharge instructions  Description: Complete learning assessment and assess knowledge base  Interventions:  - Provide teaching at level of understanding  - Provide teaching via preferred learning methods  Outcome: Progressing     Problem: Potential for Falls  Goal: Patient will remain free of falls  Description: INTERVENTIONS:  - Assess patient frequently for physical needs  -  Identify cognitive and physical deficits and behaviors that affect risk of falls    -  [place] fall precautions as indicated by assessment   - Educate patient/family on patient safety including physical limitations  - Instruct patient to call for assistance with activity based on assessment  - Modify environment to reduce risk of injury  - Consider OT/PT consult to assist with strengthening/mobility  Outcome: Progressing

## 2020-08-20 NOTE — ASSESSMENT & PLAN NOTE
Patient currently following Dr Erich Isaac for chemotherapy treatment  CT with clear evidence of progression  · Consult oncology  · Patient with right nephrostomy tube placed by IR on 6/25/20  He has not had any follow-up with urology since  Per discussion with Urology yesterday by AP following the patient, no need for nephrostomy tube change at this time

## 2020-08-21 ENCOUNTER — APPOINTMENT (INPATIENT)
Dept: MRI IMAGING | Facility: HOSPITAL | Age: 55
DRG: 683 | End: 2020-08-21
Payer: MEDICARE

## 2020-08-21 LAB
ANION GAP SERPL CALCULATED.3IONS-SCNC: 9 MMOL/L (ref 4–13)
BASOPHILS # BLD AUTO: 0.03 THOUSANDS/ΜL (ref 0–0.1)
BASOPHILS NFR BLD AUTO: 1 % (ref 0–1)
BUN SERPL-MCNC: 14 MG/DL (ref 5–25)
CALCIUM SERPL-MCNC: 8.6 MG/DL (ref 8.3–10.1)
CHLORIDE SERPL-SCNC: 98 MMOL/L (ref 100–108)
CO2 SERPL-SCNC: 24 MMOL/L (ref 21–32)
CREAT SERPL-MCNC: 1.28 MG/DL (ref 0.6–1.3)
EOSINOPHIL # BLD AUTO: 0.11 THOUSAND/ΜL (ref 0–0.61)
EOSINOPHIL NFR BLD AUTO: 2 % (ref 0–6)
ERYTHROCYTE [DISTWIDTH] IN BLOOD BY AUTOMATED COUNT: 13.3 % (ref 11.6–15.1)
GFR SERPL CREATININE-BSD FRML MDRD: 62 ML/MIN/1.73SQ M
GLUCOSE SERPL-MCNC: 100 MG/DL (ref 65–140)
HCT VFR BLD AUTO: 30 % (ref 36.5–49.3)
HGB BLD-MCNC: 9.9 G/DL (ref 12–17)
IMM GRANULOCYTES # BLD AUTO: 0.01 THOUSAND/UL (ref 0–0.2)
IMM GRANULOCYTES NFR BLD AUTO: 0 % (ref 0–2)
LYMPHOCYTES # BLD AUTO: 0.95 THOUSANDS/ΜL (ref 0.6–4.47)
LYMPHOCYTES NFR BLD AUTO: 17 % (ref 14–44)
MAGNESIUM SERPL-MCNC: 1.5 MG/DL (ref 1.6–2.6)
MCH RBC QN AUTO: 30.1 PG (ref 26.8–34.3)
MCHC RBC AUTO-ENTMCNC: 33 G/DL (ref 31.4–37.4)
MCV RBC AUTO: 91 FL (ref 82–98)
MONOCYTES # BLD AUTO: 0.68 THOUSAND/ΜL (ref 0.17–1.22)
MONOCYTES NFR BLD AUTO: 12 % (ref 4–12)
NEUTROPHILS # BLD AUTO: 3.78 THOUSANDS/ΜL (ref 1.85–7.62)
NEUTS SEG NFR BLD AUTO: 68 % (ref 43–75)
NRBC BLD AUTO-RTO: 0 /100 WBCS
PLATELET # BLD AUTO: 122 THOUSANDS/UL (ref 149–390)
PMV BLD AUTO: 8.9 FL (ref 8.9–12.7)
POTASSIUM SERPL-SCNC: 4.8 MMOL/L (ref 3.5–5.3)
RBC # BLD AUTO: 3.29 MILLION/UL (ref 3.88–5.62)
SODIUM SERPL-SCNC: 131 MMOL/L (ref 136–145)
WBC # BLD AUTO: 5.56 THOUSAND/UL (ref 4.31–10.16)

## 2020-08-21 PROCEDURE — 80048 BASIC METABOLIC PNL TOTAL CA: CPT | Performed by: INTERNAL MEDICINE

## 2020-08-21 PROCEDURE — 99233 SBSQ HOSP IP/OBS HIGH 50: CPT | Performed by: INTERNAL MEDICINE

## 2020-08-21 PROCEDURE — 72148 MRI LUMBAR SPINE W/O DYE: CPT

## 2020-08-21 PROCEDURE — 83735 ASSAY OF MAGNESIUM: CPT | Performed by: INTERNAL MEDICINE

## 2020-08-21 PROCEDURE — G1004 CDSM NDSC: HCPCS

## 2020-08-21 PROCEDURE — 85025 COMPLETE CBC W/AUTO DIFF WBC: CPT | Performed by: INTERNAL MEDICINE

## 2020-08-21 PROCEDURE — 99223 1ST HOSP IP/OBS HIGH 75: CPT | Performed by: NURSE PRACTITIONER

## 2020-08-21 RX ORDER — VANCOMYCIN HYDROCHLORIDE 1 G/200ML
15 INJECTION, SOLUTION INTRAVENOUS EVERY 12 HOURS
Status: DISCONTINUED | OUTPATIENT
Start: 2020-08-21 | End: 2020-08-21

## 2020-08-21 RX ADMIN — CLONAZEPAM 0.5 MG: 0.5 TABLET ORAL at 10:41

## 2020-08-21 RX ADMIN — ALBUTEROL SULFATE 2 PUFF: 90 AEROSOL, METERED RESPIRATORY (INHALATION) at 09:21

## 2020-08-21 RX ADMIN — ALBUTEROL SULFATE 2 PUFF: 90 AEROSOL, METERED RESPIRATORY (INHALATION) at 22:16

## 2020-08-21 RX ADMIN — HYDROMORPHONE HYDROCHLORIDE 2 MG: 2 TABLET ORAL at 09:24

## 2020-08-21 RX ADMIN — MORPHINE SULFATE 45 MG: 30 TABLET, EXTENDED RELEASE ORAL at 22:16

## 2020-08-21 RX ADMIN — STANDARDIZED SENNA CONCENTRATE 8.6 MG: 8.6 TABLET ORAL at 22:16

## 2020-08-21 RX ADMIN — CEFTRIAXONE 1000 MG: 1 INJECTION, SOLUTION INTRAVENOUS at 11:20

## 2020-08-21 RX ADMIN — DOCUSATE SODIUM 100 MG: 100 CAPSULE, LIQUID FILLED ORAL at 09:20

## 2020-08-21 RX ADMIN — DOCUSATE SODIUM 100 MG: 100 CAPSULE, LIQUID FILLED ORAL at 18:29

## 2020-08-21 RX ADMIN — HEPARIN SODIUM 5000 UNITS: 5000 INJECTION INTRAVENOUS; SUBCUTANEOUS at 05:20

## 2020-08-21 RX ADMIN — HYDROMORPHONE HYDROCHLORIDE 2 MG: 2 TABLET ORAL at 15:42

## 2020-08-21 RX ADMIN — ALBUTEROL SULFATE 2 PUFF: 90 AEROSOL, METERED RESPIRATORY (INHALATION) at 13:32

## 2020-08-21 RX ADMIN — MORPHINE SULFATE 45 MG: 30 TABLET, EXTENDED RELEASE ORAL at 13:32

## 2020-08-21 RX ADMIN — FLUTICASONE PROPIONATE 2 SPRAY: 50 SPRAY, METERED NASAL at 09:21

## 2020-08-21 RX ADMIN — NICOTINE 1 PATCH: 21 PATCH TRANSDERMAL at 18:29

## 2020-08-21 RX ADMIN — LIDOCAINE 5% 1 PATCH: 700 PATCH TOPICAL at 09:20

## 2020-08-21 RX ADMIN — FLUTICASONE PROPIONATE 2 PUFF: 220 AEROSOL, METERED RESPIRATORY (INHALATION) at 09:21

## 2020-08-21 RX ADMIN — SODIUM CHLORIDE 125 ML/HR: 0.9 INJECTION, SOLUTION INTRAVENOUS at 02:26

## 2020-08-21 RX ADMIN — HEPARIN SODIUM 5000 UNITS: 5000 INJECTION INTRAVENOUS; SUBCUTANEOUS at 14:32

## 2020-08-21 RX ADMIN — PANTOPRAZOLE SODIUM 40 MG: 40 TABLET, DELAYED RELEASE ORAL at 06:12

## 2020-08-21 RX ADMIN — ZOLPIDEM TARTRATE 10 MG: 5 TABLET, COATED ORAL at 22:16

## 2020-08-21 RX ADMIN — HYDROMORPHONE HYDROCHLORIDE 2 MG: 2 TABLET ORAL at 05:20

## 2020-08-21 RX ADMIN — HYDROMORPHONE HYDROCHLORIDE 2 MG: 2 TABLET ORAL at 18:37

## 2020-08-21 RX ADMIN — VANCOMYCIN HYDROCHLORIDE 750 MG: 750 INJECTION, POWDER, LYOPHILIZED, FOR SOLUTION INTRAVENOUS at 15:01

## 2020-08-21 RX ADMIN — SODIUM CHLORIDE 125 ML/HR: 0.9 INJECTION, SOLUTION INTRAVENOUS at 10:42

## 2020-08-21 RX ADMIN — FLUTICASONE PROPIONATE 2 PUFF: 220 AEROSOL, METERED RESPIRATORY (INHALATION) at 17:47

## 2020-08-21 RX ADMIN — MORPHINE SULFATE 45 MG: 30 TABLET, EXTENDED RELEASE ORAL at 06:12

## 2020-08-21 RX ADMIN — HEPARIN SODIUM 5000 UNITS: 5000 INJECTION INTRAVENOUS; SUBCUTANEOUS at 22:16

## 2020-08-21 RX ADMIN — ESCITALOPRAM OXALATE 10 MG: 10 TABLET ORAL at 22:16

## 2020-08-21 RX ADMIN — HYDROMORPHONE HYDROCHLORIDE 2 MG: 2 TABLET ORAL at 01:19

## 2020-08-21 RX ADMIN — HYDROMORPHONE HYDROCHLORIDE 2 MG: 2 TABLET ORAL at 23:27

## 2020-08-21 NOTE — CONSULTS
Medical Oncology/Hematology Consult Note  Sukumar Hewitt, male, 63 y o , 1965,  /[id number], [id number]     Assessment and Plan  1  Urothelial carcinoma of right distal ureter   Patient with worsening pain in the right lower extremity likely related to the increase in size of the mass surrounding the distal right ureter, as well as the new metastatic bony lesion in L5 and right acetabulum  Internal medicine adjusted pain regimen to include MS Contin 45 mg at a t i d  Dosing, and oral hydromorphone 2 mg q 4 hours p r n  Marquezgabriella Alexis Patient's pain appears to be more controlled  He states he is more comfortable  Patient was seen ambulating in the room without any difficulty  Concern for cord compression however this is unlikely due to the fact Patient does not have any loss of bowel or bladder control or complain of numbness or tingling down his bilateral lower extremities  We will also check MRI of the lumbar spine  We discussed the role of palliative radiation to the lumbar spine and possible acetabullum  I will reach out to Radiation Oncology to have a review of patient's chart and get the recommendations  Patient is scheduled to have his 2nd cycle of Gemzar and cisplatin on 08/24/2020  I will see patient in the outpatient setting on Tuesday 8/25/20 to review the above recommendations and to follow-up his inpatient stay  This was all discussed with patient and Internal Medicine at length  Patient verbalized understanding and is in agreement with the plan  Reason for consultation:  Urothelial carcinoma of right distal ureter    History of present illness:  Patient is a 79-year-old male with a history of urothelial carcinoma the right distal ureter, hypertension, depression, and anxiety  Patient is currently undergoing chemotherapy  He presented to the emergency room with worsening pain in his low back and radiating down his right leg    Patient was scheduled to have chemotherapy on 08/18 however he was unable to have his infusion due to the pain  Patient states this has been worsening over the past week  He had been taking oxycodone 10 mg without pain relief he was admitted to the hospital for pain control  The majority of patient's pain appears to be on the right hip thigh area  Patient had a CT of the chest abdomen pelvis that revealed new dilatation of the common hepatic and common bile duct at the level of the ambulate no discrete obstruction  There was an interval increase in number and size of retroperitoneal right iliac chain adenopathy, an increase in size of the mass surrounding the distal right ureter  New metastatic bone lesions in L5 vertebra and right acetabulum with cortical breakthrough noted in the medial acetabular roof is noted  Review of Systems:   Review of Systems   Constitutional: Negative for activity change, appetite change, fatigue, fever and unexpected weight change  Respiratory: Negative for cough and shortness of breath  Cardiovascular: Negative for chest pain and leg swelling  Gastrointestinal: Negative for abdominal pain, constipation, diarrhea and nausea  Endocrine: Negative for cold intolerance and heat intolerance  Musculoskeletal: Positive for back pain  Negative for arthralgias and myalgias  Right hip and upper leg pain   Skin: Negative  Neurological: Negative for dizziness, weakness and headaches  Hematological: Negative for adenopathy  Does not bruise/bleed easily         Past Medical History:   Diagnosis Date    Asthma     [facility] disorder [facility] [address] [facility]     [facility]     Disease of thyroid gland     Hypertension     Hyperthyroidism        Past Surgical History:   Procedure Laterality Date    DENTAL SURGERY      FL RETROGRADE URETHROCYSTOGRAM  6/15/2020    HERNIA REPAIR Left     inguinal    IR LYMPH NODE BIOPSY/ASPIRATION  7/24/2020    IR TUBE PLACEMENT  6/25/2020    UT CYSTOURETHROSCOPY,URETER CATHETER Right 6/15/2020    Procedure: CYSTOSCOPY RETROGRADE PYELOGRAM and ureteroscopy;  Surgeon: Saundra Ocampo MD;  Location: [facility];  Service: Urology       Family History   Problem Relation Age of Onset    Heart disease Mother     Hyperthyroidism Mother     Heart disease Father     Hyperthyroidism Father        Social History     Socioeconomic History    Marital status: Single     Spouse name: None    Number of children: None    Years of education: None    Highest education level: None   Occupational History    None   Social Needs    Financial resource strain: None    Food insecurity     Worry: None     Inability: None    Transportation needs     Medical: None     Non-medical: None   Tobacco Use    Smoking status: Current Every Day Smoker     Packs/day: 1 00    Smokeless tobacco: Never Used   Substance and Sexual Activity    Alcohol use: Never     Frequency: Never    Drug use: No    Sexual activity: Yes     Partners: Female   Lifestyle    Physical activity     Days per week: None     Minutes per session: None    Stress: None   Relationships    Social connections     Talks on phone: None     Gets together: None     Attends Protestant service: None     Active member of club or organization: None     Attends meetings of clubs or organizations: None     Relationship status: None    Intimate partner violence     Fear of current or ex partner: None     Emotionally abused: None     Physically abused: None     Forced sexual activity: None   Other Topics Concern    None   Social History Narrative    Patient is  from the mother of his 1st child  Patient has 4 adult children, from 4 different unions  Patient lives with his youngest daughter Joanna Bailon has multiple sclerosis but is able to participate fully in the patient's care  Patient does not have any advanced directives           Current Facility-Administered Medications:     acetaminophen (TYLENOL) tablet 650 mg, 650 mg, Oral, Q6H PRN, Farrukh Zambrano, PA-C    albuterol (PROVENTIL HFA,VENTOLIN HFA) inhaler 2 puff, 2 puff, Inhalation, TID, Sravan Grant MD, 2 puff at 08/21/20 1332    clonazePAM (KlonoPIN) tablet 0 5 mg, 0 5 mg, Oral, BID PRN, Farrukh Zambrano, PA-C, 0 5 mg at 08/21/20 1041    cyclobenzaprine (FLEXERIL) tablet 5 mg, 5 mg, Oral, TID PRN, Farrukh Zambrano, PA-C, 5 mg at 08/18/20 1505    docusate sodium (COLACE) capsule 100 mg, 100 mg, Oral, BID, NUNO Corona-C, 100 mg at 08/21/20 0920    escitalopram (LEXAPRO) tablet 10 mg, 10 mg, Oral, HS, Dejuan Mitchell, PA-C, 10 mg at 08/20/20 2130    fluticasone (FLONASE) 50 mcg/act nasal spray 2 spray, 2 spray, Each Nare, Daily, Farrukh Zambrano PA-C, 2 spray at 08/21/20 0921    fluticasone (FLOVENT HFA) 220 mcg/act inhaler 2 puff, 2 puff, Inhalation, BID, Farrukh Zambrano PA-C, 2 puff at 08/21/20 9976    heparin (porcine) subcutaneous injection 5,000 Units, 5,000 Units, Subcutaneous, Q8H [address], 5,000 Units at 08/21/20 1432 **AND** [CANCELED] Platelet count, , , Once, Farrukh Zambrano, PA-C    HYDROmorphone (DILAUDID) tablet 2 mg, 2 mg, Oral, Q4H PRN, Sravan Grant MD, 2 mg at 08/21/20 1542    lidocaine (LIDODERM) 5 % patch 1 patch, 1 patch, Topical, Daily, Farrukh Zambrano PA-C, 1 patch at 08/21/20 0920    morphine (MS CONTIN) ER tablet 45 mg, 45 mg, Oral, Q8H [address], Sravan Grant MD, 45 mg at 08/21/20 1332    nicotine (NICODERM CQ) 21 mg/24 hr TD 24 hr patch 1 patch, 1 patch, Transdermal, Daily, Farrukh Zambrano PA-C, 1 patch at 08/20/20 1739    ondansetron (ZOFRAN) injection 4 mg, 4 mg, Intravenous, Q4H PRN, Sravan Grant MD    pantoprazole (PROTONIX) EC tablet 40 mg, 40 mg, Oral, Early Morning, Dejuan Mitchell PA-C, 40 mg at 08/21/20 0612    polyethylene glycol (MIRALAX) packet 17 g, 17 g, Oral, Daily PRN, Farrukh Zambrano PA-C    senna (SENOKOT) tablet 8 6 mg, 1 tablet, Oral, HS, Dejuan Mitchell PA-C, 8 6 mg at 08/20/20 2602    sodium chloride 0 9 % infusion, 125 mL/hr, Intravenous, Continuous, Cinthia Wilkerson MD, Last Rate: 125 mL/hr at 08/21/20 1042, 125 mL/hr at 08/21/20 1042    vancomycin (VANCOCIN) 750 mg in sodium chloride 0 9 % 250 mL IVPB, 12 5 mg/kg, Intravenous, Q12H, Cinthia Wilkerson MD, Last Rate: 166 7 mL/hr at 08/21/20 1501, 750 mg at 08/21/20 1501    zolpidem (AMBIEN) tablet 10 mg, 10 mg, Oral, HS PRN, Cinthia Wilkerson MD, 10 mg at 08/20/20 5640    Medications Prior to Admission   Medication    albuterol (Ventolin HFA) 90 mcg/act inhaler    clonazePAM (KlonoPIN) 0 5 mg tablet    cyclobenzaprine (FLEXERIL) 10 mg tablet    escitalopram (LEXAPRO) 10 mg tablet    fluticasone (FLONASE) 50 mcg/act nasal spray    fluticasone (FLOVENT HFA) 220 mcg/act inhaler    lisinopril (ZESTRIL) 10 mg tablet    morphine (MS CONTIN) 15 mg 12 hr tablet    naloxone (NARCAN) 4 mg/0 1 mL nasal spray    omeprazole (PriLOSEC) 40 MG capsule    oxyCODONE (ROXICODONE) 10 MG TABS    senna (SENOKOT) 8 6 mg    zolpidem (AMBIEN) 10 mg tablet    ondansetron (ZOFRAN) 8 mg tablet    ondansetron (ZOFRAN-ODT) 4 mg disintegrating tablet       Allergies   Allergen Reactions    Ketorolac Hives         Physical Exam:    /87 (BP Location: Right arm)   Pulse 99   Temp 98 1 °F (36 7 °C) (Oral)   Resp 18   Ht 5' 6" (1 676 m)   Wt 61 6 kg (135 lb 12 9 oz)   SpO2 97%   BMI 21 92 kg/m²     Physical Exam  Constitutional:       Appearance: Normal appearance  He is well-developed  HENT:      Head: Normocephalic and atraumatic  Eyes:      Pupils: Pupils are equal, round, and reactive to light  Neck:      Musculoskeletal: Normal range of motion  Cardiovascular:      Rate and Rhythm: Normal rate and regular rhythm  Heart sounds: Normal heart sounds  Pulmonary:      Effort: Pulmonary effort is normal  No respiratory distress  Breath sounds: Normal breath sounds  Abdominal:      General: Bowel sounds are normal       Palpations: Abdomen is soft     Genitourinary: Comments: Nephrostomy tube to right flank  Musculoskeletal: Normal range of motion  Lymphadenopathy:      Cervical: No cervical adenopathy  Skin:     General: Skin is warm and dry  Capillary Refill: Capillary refill takes less than 2 seconds  Neurological:      General: No focal deficit present  Mental Status: He is alert and oriented to person, place, and time     Psychiatric:         Mood and Affect: Mood normal          Behavior: Behavior normal          Recent Results (from the past 48 hour(s))   CBC and differential    Collection Time: 08/20/20  4:32 AM   Result Value Ref Range    WBC 6 73 4 31 - 10 16 Thousand/uL    RBC 3 50 (L) 3 88 - 5 62 Million/uL    Hemoglobin 10 7 (L) 12 0 - 17 0 g/dL    Hematocrit 31 4 (L) 36 5 - 49 3 %    MCV 90 82 - 98 fL    MCH 30 6 26 8 - 34 3 pg    MCHC 34 1 31 4 - 37 4 g/dL    RDW 13 3 11 6 - 15 1 %    MPV 9 6 8 9 - 12 7 fL    Platelets 147 (L) 221 - 390 Thousands/uL    nRBC 0 /100 WBCs    Neutrophils Relative 70 43 - 75 %    Immat GRANS % 0 0 - 2 %    Lymphocytes Relative 18 14 - 44 %    Monocytes Relative 10 4 - 12 %    Eosinophils Relative 2 0 - 6 %    Basophils Relative 0 0 - 1 %    Neutrophils Absolute 4 71 1 85 - 7 62 Thousands/µL    Immature Grans Absolute 0 02 0 00 - 0 20 Thousand/uL    Lymphocytes Absolute 1 20 0 60 - 4 47 Thousands/µL    Monocytes Absolute 0 67 0 17 - 1 22 Thousand/µL    Eosinophils Absolute 0 10 0 00 - 0 61 Thousand/µL    Basophils Absolute 0 03 0 00 - 0 10 Thousands/µL   Basic metabolic panel    Collection Time: 08/20/20  4:32 AM   Result Value Ref Range    Sodium 129 (L) 136 - 145 mmol/L    Potassium 4 4 3 5 - 5 3 mmol/L    Chloride 97 (L) 100 - 108 mmol/L    CO2 25 21 - 32 mmol/L    ANION GAP 7 4 - 13 mmol/L    BUN 16 5 - 25 mg/dL    Creatinine 1 30 0 60 - 1 30 mg/dL    Glucose 92 65 - 140 mg/dL    Calcium 8 5 8 3 - 10 1 mg/dL    eGFR 61 ml/min/1 73sq m   CBC and differential    Collection Time: 08/21/20  6:15 AM   Result Value Ref Range    WBC 5 56 4 31 - 10 16 Thousand/uL    RBC 3 29 (L) 3 88 - 5 62 Million/uL    Hemoglobin 9 9 (L) 12 0 - 17 0 g/dL    Hematocrit 30 0 (L) 36 5 - 49 3 %    MCV 91 82 - 98 fL    MCH 30 1 26 8 - 34 3 pg    MCHC 33 0 31 4 - 37 4 g/dL    RDW 13 3 11 6 - 15 1 %    MPV 8 9 8 9 - 12 7 fL    Platelets 334 (L) 347 - 390 Thousands/uL    nRBC 0 /100 WBCs    Neutrophils Relative 68 43 - 75 %    Immat GRANS % 0 0 - 2 %    Lymphocytes Relative 17 14 - 44 %    Monocytes Relative 12 4 - 12 %    Eosinophils Relative 2 0 - 6 %    Basophils Relative 1 0 - 1 %    Neutrophils Absolute 3 78 1 85 - 7 62 Thousands/µL    Immature Grans Absolute 0 01 0 00 - 0 20 Thousand/uL    Lymphocytes Absolute 0 95 0 60 - 4 47 Thousands/µL    Monocytes Absolute 0 68 0 17 - 1 22 Thousand/µL    Eosinophils Absolute 0 11 0 00 - 0 61 Thousand/µL    Basophils Absolute 0 03 0 00 - 0 10 Thousands/µL   Basic metabolic panel    Collection Time: 08/21/20  6:15 AM   Result Value Ref Range    Sodium 131 (L) 136 - 145 mmol/L    Potassium 4 8 3 5 - 5 3 mmol/L    Chloride 98 (L) 100 - 108 mmol/L    CO2 24 21 - 32 mmol/L    ANION GAP 9 4 - 13 mmol/L    BUN 14 5 - 25 mg/dL    Creatinine 1 28 0 60 - 1 30 mg/dL    Glucose 100 65 - 140 mg/dL    Calcium 8 6 8 3 - 10 1 mg/dL    eGFR 62 ml/min/1 73sq m   Magnesium    Collection Time: 08/21/20  6:15 AM   Result Value Ref Range    Magnesium 1 5 (L) 1 6 - 2 6 mg/dL       Ct Abdomen Pelvis With Contrast    Result Date: 8/18/2020  Narrative: CT ABDOMEN AND PELVIS WITH IV CONTRAST INDICATION:   Nausea/vomiting nausea, right nephrostomy tube, flank and back pain, h/o cancer  COMPARISON:  July 6, 2020 TECHNIQUE:  CT examination of the abdomen and pelvis was performed  Axial, sagittal, and coronal 2D reformatted images were created from the source data and submitted for interpretation  Radiation dose length product (DLP) for this visit:  441 15 mGy-cm     This examination, like all CT scans performed in the [facility], was performed utilizing techniques to minimize radiation dose exposure, including the use of iterative  reconstruction and automated exposure control  IV Contrast:  100 mL of iohexol (OMNIPAQUE) Enteric Contrast:  Enteric contrast was not administered  FINDINGS: ABDOMEN LOWER CHEST:  No clinically significant abnormality identified in the visualized lower chest  LIVER/BILIARY TREE:  Liver remains within normal limits  There is no intrahepatic biliary ductal dilatation  However, there is new mild dilatation of the common hepatic and common bile duct seen to the level of the ampulla, measuring up to 8 mm in AP diameter  There is no evidence for calcified stones or discrete mass  This is also seen in association with mild prominence of the pancreatic duct which was present previously but is greater on today's study  Correlate with clinical findings for evidence of early distal biliary obstruction  GALLBLADDER:  No calcified gallstones  No pericholecystic inflammatory change  SPLEEN:  Unremarkable  PANCREAS:  Unremarkable  ADRENAL GLANDS:  Unremarkable  KIDNEYS/URETERS:  Left kidney within normal limits  Right kidney again remarkable for moderate generalized atrophy  A percutaneous ureteronephrostomy appears satisfactory in position  No evidence for hydronephrosis  The distal right ureteral mass seen in the pelvis on image 2/64 also is larger, 1 9 x 2 0 cm  STOMACH AND BOWEL:  Unremarkable  APPENDIX:  No findings to suggest appendicitis  ABDOMINOPELVIC CAVITY:  There are numerous pathologically enlarged retroperitoneal lymph nodes as well as retrocrural nodes  These are essentially all increased in size compared to the prior  There is increasing displacement of the IVC anteriorly at the level of the renal veins and more caudally the IVC is increasingly compressed and becomes nonvisualized at the level of the iliac vein IVC confluence, although study not optimized for venous contrast timing    There is also increasing size of the right iliac chain adenopathy  Specific examples 1 4 cm in short axis dimension image 2/60 (prior 1 1 cm)  Image 2/64 measuring 1 5 cm (Prior 0 6 cm)  In addition there are multiple rounded enhancing mass lesions within the right psoas muscle consistent with metastatic urothelial carcinoma by prior biopsy  These 2 has increased in both size and number  Specific example 2 4 x 2 3 cm image 2/44 (prior 1 7 x 1 7 cm)  These masses are nearly confluent and involve most of the psoas muscle  VESSELS:  Unremarkable for patient's age  PELVIS REPRODUCTIVE ORGANS:  Unremarkable for patient's age  URINARY BLADDER:  Unremarkable  ABDOMINAL WALL/INGUINAL REGIONS:  Worsening right inguinal adenopathy  Very mild relative increasing soft tissue stranding within the subcutaneous fatty tissue planes in the right pelvis and flank which may reflect component of developing edema related to compression on the IVC and iliac vein  OSSEOUS STRUCTURES:  Lytic metastatic lesion newly demonstrated in the right acetabular roof with cortical breakthrough along the medial aspect of the roof  Lesion measures 2 7 x 2 2 cm  No metastasis also seen within the anterior right L5 vertebral body  Impression: New dilatation of the common hepatic and common bile duct seen to the level of the ampulla  Mildly more prominent pancreatic duct caliber  No discrete obstructing etiology identified  Correlate with clinical findings for stasis versus obstructive etiology  Interval increase in number and size of retrocrural, retroperitoneal right iliac chain adenopathy  Subsequent worsening mass effect and compression on the IVC  Bolus timing was not optimized to evaluate for true caliber/patency of the IVC  Mild increasing soft tissue attenuation within the right flank and right lower quadrant suggesting some degree of early peripheral edema  Interval increase in size of mass surrounding the distal right ureter   New metastatic bone lesions in the L5 vertebra and right acetabulum  Cortical breakthrough noted in the medial acetabular roof  The study was marked in EPIC for significant notification  Workstation performed: [id number]     Ir Lymph Node Biopsy/aspiration    Result Date: 7/27/2020  Narrative: Examination: Ultrasound-guided biopsy of the right external iliac node HISTORY: Urothelial cancer, probably metastatic TECHNIQUE: The patient was brought to Radiology  Informed consent was obtained  The right groin was prepped and draped in usual sterile fashion  Timeout was performed  Lidocaine was administered as local anesthesia  Using ultrasound guidance a 17-gauge needle was advanced to the targeted lesion  An 18-gauge needle was placed coaxially, and core biopsy was performed  The tract was not closed with D stat  FINDINGS: Postbiopsy scan shows nothing to suggest bleeding     Impression: Technically successful ultrasound-guided biopsy This is then of the clinically relevant portion of this report  Subsequent information is for compliance, documentation, and coding purposes  In the process of informed consent, information was communicated, verbally, to the patient regarding the procedure  The patient was informed of; the name of the procedure, nature of the procedure, nature of the condition, risks, benefits, alternatives, and potential complications, as well as the consequences of not having the procedure  All the patient's questions were answered  Informed consent was signed  Chlorhexidine and alcohol was used for cleansing and sterile preparation of the skin  This was allowed to dry prior to the application of sterile draping  Timeout was performed, with all team members present, and in agreement  Confirmation of patient, procedure, laterally, allergies, and all needed equipment was performed   PROCEDURE: Ultrasound-guided biopsy of the above-named organ or abnormality Preprocedure diagnosis: Please see "history ", above Postprocedure diagnosis: Same Antibiotics: None Estimated blood loss: less than 5 mL Specimen: Core biopsy submitted in formalin and RPMI Anesthesia: Local Workstation performed: [id number]       Labs and pertinent reports reviewed  This note has been generated by voice recognition software system  Therefore, there may be spelling, grammar, and or syntax errors  Please contact if questions arise

## 2020-08-21 NOTE — ASSESSMENT & PLAN NOTE
Patient currently following Dr Sean Grimaldo for chemotherapy treatment  CT with clear evidence of progression  · Consulted oncology  · Patient with right nephrostomy tube placed by IR on 6/25/20  He has not had any follow-up with urology since  Per discussion with Urology yesterday by AP following the patient, no need for nephrostomy tube change at this time

## 2020-08-21 NOTE — ASSESSMENT & PLAN NOTE
Creatinine of 1 8 on admission, with baseline creatinine 0 7  Currently improved to 1  with IV fluids  Continue an increased rate  Continue to avoid nephrotoxins, hold ACE-inhibitor, and renally dose medications as needed

## 2020-08-21 NOTE — ASSESSMENT & PLAN NOTE
Urine culture now growing staph aureus, with sensitivities still pending at this time  CT without any evidence of obstruction  Urinalysis with positive nitrites, moderate leuks, and innumerable WBCs on microscopy  Given growth of Staph aureus discussed case with ID at length - given that patient is immunosuppressed given recent chemotherapy in current metastatic malignancy, recommendation is for treatment with a 5-7 day course of antibiotic therapy  Plan on doxycycline if MRSA is confirmed, cephalexin if MSSA  Blood cultures were also obtained, remain without growth times 48 hours    Suspected source is bacterial translocation, in patient that is likely colonized per discussion with ID

## 2020-08-21 NOTE — ASSESSMENT & PLAN NOTE
Patient with worsening pain in RLE  Majority of patient's cancer appears to be on the right, with increase in size of the mass surrounding the distal right ureter, as well as new metastatic bone lesions in the L5 vertebra and right acetabulum  Current pain control appears to be vastly improved  · Continue MS Contin at TID dosing, increased to 45 mg   · Discontinue oral oxycodone and IV Dilaudid, and attempt to control breakthrough pain with oral hydromorphone instead  Initiated on 2 mg Q 4 p r n , with aims of increasing as required  · Ultimate goal is to get the patient comfortable with long-acting morphine and reliant less on p r n  use  · Bowel regimen ordered - patient reports BM yesterday  Will be attempting to schedule with radiation oncology for palliative treatments early next week following discharge  This was discussed with Oncology at length  Will also check MRI of the L-spine

## 2020-08-21 NOTE — SOCIAL WORK
Pt is a tentative dc home tomorrow and pt's daughter will be able to transport him home  Plans are home on dc with outpatient follow up

## 2020-08-21 NOTE — PROGRESS NOTES
Vancomycin Assessment    Hardik BLAKE Karina Styles is a 63 y o  male who is currently receiving vancomycin 1000mg q12h for urinary tract infection     Relevant clinical data and objective history reviewed:  Creatinine   Date Value Ref Range Status   08/21/2020 1 28 0 60 - 1 30 mg/dL Final     Comment:     Standardized to IDMS reference method   08/20/2020 1 30 0 60 - 1 30 mg/dL Final     Comment:     Standardized to IDMS reference method   08/19/2020 1 50 (H) 0 60 - 1 30 mg/dL Final     Comment:     Standardized to IDMS reference method   11/13/2013 0 75 0 60 - 1 30 mg/dL Final     /87 (BP Location: Right arm)   Pulse 99   Temp 98 1 °F (36 7 °C) (Oral)   Resp 18   Ht 5' 6" (1 676 m)   Wt 61 6 kg (135 lb 12 9 oz)   SpO2 97%   BMI 21 92 kg/m²   I/O last 3 completed shifts: In: 6602 [P O :960; I V :2720]  Out: 1875 [Urine:1875]  Lab Results   Component Value Date/Time    BUN 14 08/21/2020 06:15 AM    BUN 8 11/13/2013 01:33 PM    WBC 5 56 08/21/2020 06:15 AM    WBC 10 54 (H) 11/16/2015 12:37 PM    HGB 9 9 (L) 08/21/2020 06:15 AM    HGB 16 0 11/16/2015 12:37 PM    HCT 30 0 (L) 08/21/2020 06:15 AM    HCT 46 3 11/16/2015 12:37 PM    MCV 91 08/21/2020 06:15 AM    MCV 92 11/16/2015 12:37 PM     (L) 08/21/2020 06:15 AM     11/16/2015 12:37 PM     Temp Readings from Last 3 Encounters:   08/21/20 98 1 °F (36 7 °C) (Oral)   08/18/20 (!) 96 5 °F (35 8 °C) (Tympanic)   08/11/20 98 5 °F (36 9 °C) (Temporal)     Vancomycin Days of Therapy: 1    Assessment/Plan  The patient is currently on vancomycin utilizing scheduled dosing based on actual body weight  Baseline risks associated with therapy include: dehydration  The patient is currently receiving 1000mg q12h and after clinical evaluation will be changed to 750mg q12h  Pharmacy will also follow closely for s/sx of nephrotoxicity, infusion reactions, and appropriateness of therapy  BMP and CBC will be ordered per protocol    Plan for trough as patient approaches steady state, prior to the 5th  dose at approximately 1500 on 8/23/20  Due to infection severity, will target a trough of 15-20 (appropriate for most indications)   Pharmacy will continue to follow the patients culture results and clinical progress daily      Yessenia Roca, Pharmacist

## 2020-08-21 NOTE — PROGRESS NOTES
Progress Note - Gonzalez Morales 1965, 63 y o  male MRN: [id number]    Unit/Bed#: 630-90 Encounter: [id number]    Primary Care Provider: Gayla Timmons PA-C   Date and time admitted to hospital: 8/18/2020 10:17 AM        * Cancer related pain  Assessment & Plan  Patient with worsening pain in RLE  Majority of patient's cancer appears to be on the right, with increase in size of the mass surrounding the distal right ureter, as well as new metastatic bone lesions in the L5 vertebra and right acetabulum  Current pain control appears to be vastly improved  · Continue MS Contin at TID dosing, increased to 45 mg   · Discontinue oral oxycodone and IV Dilaudid, and attempt to control breakthrough pain with oral hydromorphone instead  Initiated on 2 mg Q 4 p r n , with aims of increasing as required  · Ultimate goal is to get the patient comfortable with long-acting morphine and reliant less on p r n  use  · Bowel regimen ordered - patient reports BM yesterday  Will be attempting to schedule with radiation oncology for palliative treatments early next week following discharge  This was discussed with Oncology at length  Will also check MRI of the L-spine  Urothelial carcinoma of right distal ureter [facility])  Assessment & Plan  Patient currently following Dr Sean Grimaldo for chemotherapy treatment  CT with clear evidence of progression  · Consulted oncology  · Patient with right nephrostomy tube placed by IR on 6/25/20  He has not had any follow-up with urology since  Per discussion with Urology yesterday by AP following the patient, no need for nephrostomy tube change at this time  Hyponatremia  Assessment & Plan  Hypovolemia hyponatremia  Sodium worsening  Continue IV fluids  Anxiety disorder  Assessment & Plan  Continue SSRI and prn benzo therapy  Essential hypertension  Assessment & Plan  Mildly elevated on admission, now improved    Continue to hold ACE-inhibitor in setting of WENDY       Acute kidney injury ([facility] [address] )  Assessment & Plan  Creatinine of 1 8 on admission, with baseline creatinine 0 7  Currently improved to 1  with IV fluids  Continue an increased rate  Continue to avoid nephrotoxins, hold ACE-inhibitor, and renally dose medications as needed  UTI (urinary tract infection)  Assessment & Plan  Urine culture now growing staph aureus, with sensitivities still pending at this time  CT without any evidence of obstruction  Urinalysis with positive nitrites, moderate leuks, and innumerable WBCs on microscopy  Given growth of Staph aureus discussed case with ID at length - given that patient is immunosuppressed given recent chemotherapy in current metastatic malignancy, recommendation is for treatment with a 5-7 day course of antibiotic therapy  Plan on doxycycline if MRSA is confirmed, cephalexin if MSSA  Blood cultures were also obtained, remain without growth times 48 hours  Suspected source is bacterial translocation, in patient that is likely colonized per discussion with ID  VTE Pharmacologic Prophylaxis:   Pharmacologic: Heparin  Mechanical VTE Prophylaxis in Place: Yes     Patient Centered Rounds: I have performed bedside rounds with nursing staff today      Discussions with Specialists or Other Care Team Provider: None     Education and Discussions with Family / Patient: Yes     Time Spent for Care: 45 minutes  More than 50% of total time spent on counseling and coordination of care as described above      Current Length of Stay: 3 day(s)     Current Patient Status: Inpatient   Certification Statement: The patient will continue to require additional inpatient hospital stay due to Need for pain control, treatment of UTI, MRI of back      Discharge Plan:  TBD, with potential discharge 08/22      Code Status: Level 1 - Full Code    Subjective:   Appears vastly improved this morning from pain control standpoint with changes in pain medication regimen previously  Urine culture now growing a Staph aureus, sensitivity still pending  No overnight events reported  Remains afebrile  Objective:     Vitals:   Temp (24hrs), Av °F (37 2 °C), Min:98 1 °F (36 7 °C), Max:100 °F (37 8 °C)    Temp:  [98 1 °F (36 7 °C)-100 °F (37 8 °C)] 98 1 °F (36 7 °C)  HR:  [85-99] 99  Resp:  [14-18] 18  BP: (108-157)/(81-90) 147/87  SpO2:  [96 %-98 %] 97 %  Body mass index is 21 92 kg/m²  Input and Output Summary (last 24 hours): Intake/Output Summary (Last 24 hours) at 2020 1552  Last data filed at 2020 1332  Gross per 24 hour   Intake 4042 08 ml   Output 700 ml   Net 3342 08 ml       Physical Exam:   Constitutional:       General: He is not in acute distress  Appearance: He is ill-appearing  He is not toxic-appearing  Comments: Cachectic appearing  Neurological:      Mental Status: He is alert and oriented to person, place, and time  Psychiatric:         Mood and Affect: Mood normal          Behavior: Behavior normal          Thought Content: Thought content normal      Additional Data:     Labs:    Results from last 7 days   Lab Units 20  0615   WBC Thousand/uL 5 56   HEMOGLOBIN g/dL 9 9*   HEMATOCRIT % 30 0*   PLATELETS Thousands/uL 122*   NEUTROS PCT % 68   LYMPHS PCT % 17   MONOS PCT % 12   EOS PCT % 2     Results from last 7 days   Lab Units 20  0615  20  0426   SODIUM mmol/L 131*   < > 129*   POTASSIUM mmol/L 4 8   < > 4 8   CHLORIDE mmol/L 98*   < > 97*   CO2 mmol/L 24   < > 24   BUN mg/dL 14   < > 21   CREATININE mg/dL 1 28   < > 1 50*   ANION GAP mmol/L 9   < > 8   CALCIUM mg/dL 8 6   < > 9 0   ALBUMIN g/dL  --   --  3 3*   TOTAL BILIRUBIN mg/dL  --   --  0 50   ALK PHOS U/L  --   --  169*   ALT U/L  --   --  34   AST U/L  --   --  28   GLUCOSE RANDOM mg/dL 100   < > 103    < > = values in this interval not displayed       Results from last 7 days   Lab Units 20  1040   INR  1 23*                       * I Have Reviewed All Lab Data Listed Above  * Additional Pertinent Lab Tests Reviewed: All Labs Within Last 24 Hours Reviewed    Recent Cultures (last 7 days):     Results from last 7 days   Lab Units 08/19/20  1412 08/18/20  1151 08/18/20  1146   BLOOD CULTURE   --  No Growth at 48 hrs  No Growth at 48 hrs  URINE CULTURE  >100,000 cfu/ml Staphylococcus aureus*  --   --        Last 24 Hours Medication List:   Current Facility-Administered Medications   Medication Dose Route Frequency Provider Last Rate    acetaminophen  650 mg Oral Q6H PRN Verline Mynor, PA-C      albuterol  2 puff Inhalation TID Tati Shook MD      clonazePAM  0 5 mg Oral BID PRN Verline Mynor, PA-C      cyclobenzaprine  5 mg Oral TID PRN Verline Mynor, PA-C      docusate sodium  100 mg Oral BID Verline Mynor, PA-C      escitalopram  10 mg Oral HS Verline Mynor, PA-C      fluticasone  2 spray Each Nare Daily Verline Mynor, PA-C      fluticasone  2 puff Inhalation BID Verline Mynor, PA-C      heparin (porcine)  5,000 Units Subcutaneous [facility] Verline Mynor, PA-C      HYDROmorphone  2 mg Oral Q4H PRN Tati Shook MD      lidocaine  1 patch Topical Daily Verline Mynor, PA-C      morphine  45 mg Oral [facility] Tati Shook MD      nicotine  1 patch Transdermal Daily Verline Mynor, PA-C      ondansetron  4 mg Intravenous Q4H PRN Tati Shook MD      pantoprazole  40 mg Oral Early Morning Verline Mynor, PA-C      polyethylene glycol  17 g Oral Daily PRN Verline Mynor, PA-C      senna  1 tablet Oral HS Verline Mynor, PA-C      sodium chloride  125 mL/hr Intravenous Continuous Tati Shook  mL/hr (08/21/20 1042)    vancomycin  12 5 mg/kg Intravenous Q12H Tati Shook  mg (08/21/20 1501)    zolpidem  10 mg Oral HS PRN Tati Shook MD          Today, Patient Was Seen By: Tati Shook MD    ** Please Note: Dictation voice to text software may have been used in the creation of this document  **

## 2020-08-21 NOTE — QUICK NOTE
Patient reported 10/10 pain after lying for an MRI assessing cancer mets in L5 spine  Given 2mg PO dilaudid early per telephone instruction by Dr Todd Jackson

## 2020-08-22 VITALS
SYSTOLIC BLOOD PRESSURE: 141 MMHG | BODY MASS INDEX: 21.83 KG/M2 | HEART RATE: 87 BPM | DIASTOLIC BLOOD PRESSURE: 70 MMHG | RESPIRATION RATE: 16 BRPM | OXYGEN SATURATION: 97 % | WEIGHT: 135.8 LBS | TEMPERATURE: 98.2 F | HEIGHT: 66 IN

## 2020-08-22 LAB
ANION GAP SERPL CALCULATED.3IONS-SCNC: 6 MMOL/L (ref 4–13)
BACTERIA UR CULT: ABNORMAL
BASOPHILS # BLD AUTO: 0.05 THOUSANDS/ΜL (ref 0–0.1)
BASOPHILS NFR BLD AUTO: 1 % (ref 0–1)
BUN SERPL-MCNC: 14 MG/DL (ref 5–25)
CALCIUM SERPL-MCNC: 8.9 MG/DL (ref 8.3–10.1)
CHLORIDE SERPL-SCNC: 98 MMOL/L (ref 100–108)
CO2 SERPL-SCNC: 26 MMOL/L (ref 21–32)
CREAT SERPL-MCNC: 1.17 MG/DL (ref 0.6–1.3)
EOSINOPHIL # BLD AUTO: 0.13 THOUSAND/ΜL (ref 0–0.61)
EOSINOPHIL NFR BLD AUTO: 2 % (ref 0–6)
ERYTHROCYTE [DISTWIDTH] IN BLOOD BY AUTOMATED COUNT: 13.5 % (ref 11.6–15.1)
GFR SERPL CREATININE-BSD FRML MDRD: 69 ML/MIN/1.73SQ M
GLUCOSE SERPL-MCNC: 102 MG/DL (ref 65–140)
HCT VFR BLD AUTO: 30.1 % (ref 36.5–49.3)
HGB BLD-MCNC: 10 G/DL (ref 12–17)
IMM GRANULOCYTES # BLD AUTO: 0.02 THOUSAND/UL (ref 0–0.2)
IMM GRANULOCYTES NFR BLD AUTO: 0 % (ref 0–2)
LYMPHOCYTES # BLD AUTO: 0.99 THOUSANDS/ΜL (ref 0.6–4.47)
LYMPHOCYTES NFR BLD AUTO: 15 % (ref 14–44)
MAGNESIUM SERPL-MCNC: 1.3 MG/DL (ref 1.6–2.6)
MCH RBC QN AUTO: 30.6 PG (ref 26.8–34.3)
MCHC RBC AUTO-ENTMCNC: 33.2 G/DL (ref 31.4–37.4)
MCV RBC AUTO: 92 FL (ref 82–98)
MONOCYTES # BLD AUTO: 0.75 THOUSAND/ΜL (ref 0.17–1.22)
MONOCYTES NFR BLD AUTO: 12 % (ref 4–12)
NEUTROPHILS # BLD AUTO: 4.54 THOUSANDS/ΜL (ref 1.85–7.62)
NEUTS SEG NFR BLD AUTO: 70 % (ref 43–75)
NRBC BLD AUTO-RTO: 0 /100 WBCS
PLATELET # BLD AUTO: 164 THOUSANDS/UL (ref 149–390)
PMV BLD AUTO: 9 FL (ref 8.9–12.7)
POTASSIUM SERPL-SCNC: 5 MMOL/L (ref 3.5–5.3)
RBC # BLD AUTO: 3.27 MILLION/UL (ref 3.88–5.62)
SODIUM SERPL-SCNC: 130 MMOL/L (ref 136–145)
WBC # BLD AUTO: 6.48 THOUSAND/UL (ref 4.31–10.16)

## 2020-08-22 PROCEDURE — 83735 ASSAY OF MAGNESIUM: CPT | Performed by: INTERNAL MEDICINE

## 2020-08-22 PROCEDURE — 99239 HOSP IP/OBS DSCHRG MGMT >30: CPT | Performed by: INTERNAL MEDICINE

## 2020-08-22 PROCEDURE — 80048 BASIC METABOLIC PNL TOTAL CA: CPT | Performed by: INTERNAL MEDICINE

## 2020-08-22 PROCEDURE — 85025 COMPLETE CBC W/AUTO DIFF WBC: CPT | Performed by: INTERNAL MEDICINE

## 2020-08-22 RX ORDER — MAGNESIUM SULFATE HEPTAHYDRATE 40 MG/ML
2 INJECTION, SOLUTION INTRAVENOUS
Status: COMPLETED | OUTPATIENT
Start: 2020-08-22 | End: 2020-08-22

## 2020-08-22 RX ORDER — LIDOCAINE 50 MG/G
1 PATCH TOPICAL DAILY
Qty: 10 PATCH | Refills: 0 | Status: SHIPPED | OUTPATIENT
Start: 2020-08-23 | End: 2020-09-28

## 2020-08-22 RX ORDER — DOCUSATE SODIUM 100 MG/1
100 CAPSULE, LIQUID FILLED ORAL 2 TIMES DAILY
Qty: 10 CAPSULE | Refills: 0 | Status: SHIPPED | OUTPATIENT
Start: 2020-08-22 | End: 2020-09-28

## 2020-08-22 RX ORDER — HYDROMORPHONE HYDROCHLORIDE 2 MG/1
2 TABLET ORAL EVERY 4 HOURS PRN
Qty: 30 TABLET | Refills: 0 | Status: SHIPPED | OUTPATIENT
Start: 2020-08-22 | End: 2020-08-22

## 2020-08-22 RX ORDER — POLYETHYLENE GLYCOL 3350 17 G/17G
17 POWDER, FOR SOLUTION ORAL DAILY PRN
Qty: 14 EACH | Refills: 0 | Status: SHIPPED | OUTPATIENT
Start: 2020-08-22 | End: 2020-10-21

## 2020-08-22 RX ORDER — CARISOPRODOL 350 MG/1
350 TABLET ORAL 3 TIMES DAILY
Qty: 30 TABLET | Refills: 0 | Status: SHIPPED | OUTPATIENT
Start: 2020-08-22 | End: 2020-10-21 | Stop reason: SDUPTHER

## 2020-08-22 RX ORDER — CEPHALEXIN 500 MG/1
500 CAPSULE ORAL EVERY 6 HOURS SCHEDULED
Qty: 28 CAPSULE | Refills: 0 | Status: SHIPPED | OUTPATIENT
Start: 2020-08-22 | End: 2020-08-29

## 2020-08-22 RX ORDER — MORPHINE SULFATE 15 MG/1
45 TABLET, FILM COATED, EXTENDED RELEASE ORAL EVERY 8 HOURS SCHEDULED
Qty: 90 TABLET | Refills: 0 | Status: SHIPPED | OUTPATIENT
Start: 2020-08-22 | End: 2020-08-28 | Stop reason: ALTCHOICE

## 2020-08-22 RX ORDER — HYDROMORPHONE HYDROCHLORIDE 2 MG/1
2 TABLET ORAL EVERY 4 HOURS PRN
Qty: 30 TABLET | Refills: 0 | Status: SHIPPED | OUTPATIENT
Start: 2020-08-22 | End: 2020-08-28 | Stop reason: ALTCHOICE

## 2020-08-22 RX ADMIN — FLUTICASONE PROPIONATE 2 PUFF: 220 AEROSOL, METERED RESPIRATORY (INHALATION) at 08:41

## 2020-08-22 RX ADMIN — VANCOMYCIN HYDROCHLORIDE 750 MG: 750 INJECTION, POWDER, LYOPHILIZED, FOR SOLUTION INTRAVENOUS at 03:50

## 2020-08-22 RX ADMIN — PANTOPRAZOLE SODIUM 40 MG: 40 TABLET, DELAYED RELEASE ORAL at 05:16

## 2020-08-22 RX ADMIN — HYDROMORPHONE HYDROCHLORIDE 2 MG: 2 TABLET ORAL at 04:05

## 2020-08-22 RX ADMIN — LIDOCAINE 5% 1 PATCH: 700 PATCH TOPICAL at 08:40

## 2020-08-22 RX ADMIN — DOCUSATE SODIUM 100 MG: 100 CAPSULE, LIQUID FILLED ORAL at 08:37

## 2020-08-22 RX ADMIN — MORPHINE SULFATE 45 MG: 30 TABLET, EXTENDED RELEASE ORAL at 05:16

## 2020-08-22 RX ADMIN — MAGNESIUM SULFATE HEPTAHYDRATE 2 G: 40 INJECTION, SOLUTION INTRAVENOUS at 08:37

## 2020-08-22 RX ADMIN — HEPARIN SODIUM 5000 UNITS: 5000 INJECTION INTRAVENOUS; SUBCUTANEOUS at 05:16

## 2020-08-22 RX ADMIN — CLONAZEPAM 0.5 MG: 0.5 TABLET ORAL at 07:37

## 2020-08-22 RX ADMIN — ALBUTEROL SULFATE 2 PUFF: 90 AEROSOL, METERED RESPIRATORY (INHALATION) at 08:41

## 2020-08-22 RX ADMIN — FLUTICASONE PROPIONATE 2 SPRAY: 50 SPRAY, METERED NASAL at 08:41

## 2020-08-22 RX ADMIN — MAGNESIUM SULFATE HEPTAHYDRATE 2 G: 40 INJECTION, SOLUTION INTRAVENOUS at 10:45

## 2020-08-22 RX ADMIN — MORPHINE SULFATE 45 MG: 30 TABLET, EXTENDED RELEASE ORAL at 13:01

## 2020-08-22 RX ADMIN — CYCLOBENZAPRINE HYDROCHLORIDE 5 MG: 10 TABLET, FILM COATED ORAL at 06:26

## 2020-08-22 RX ADMIN — HYDROMORPHONE HYDROCHLORIDE 2 MG: 2 TABLET ORAL at 08:37

## 2020-08-22 NOTE — ASSESSMENT & PLAN NOTE
Creatinine of 1 8 on admission, with baseline creatinine 0 7  Currently improved to 1 1 with IV fluids  Continue to avoid nephrotoxins, hold ACE-inhibitor

## 2020-08-22 NOTE — NURSING NOTE
AVS reviewed with patient  Questions encouraged and answered as needed  Verbalizes understanding of same  Discharged in no acute distress

## 2020-08-22 NOTE — PROGRESS NOTES
Vancomycin IV Pharmacy-to-Dose Consultation    Jef Ramsey is a 63 y o  male who is currently receiving Vancomycin IV with management by the Pharmacy Consult service  Assessment/Plan:  The patient was reviewed  Renal function is stable and no signs or symptoms of nephrotoxicity and/or infusion reactions were documented in the chart  Based on todays assessment, continue current vancomycin (day # 2) dosing of 750mg IV Q12h, with a plan for trough to be drawn at 1500 on 8/23/20  We will continue to follow the patients culture results and clinical progress daily      Gearldine Baumgarten, Pharmacist

## 2020-08-22 NOTE — PLAN OF CARE
Problem: GASTROINTESTINAL - ADULT  Goal: Minimal or absence of nausea and/or vomiting  Description: INTERVENTIONS:  - Administer IV fluids if ordered to ensure adequate hydration  - Maintain NPO status until nausea and vomiting are resolved  - Nasogastric tube if ordered  - Administer ordered antiemetic medications as needed  - Provide nonpharmacologic comfort measures as appropriate  - Advance diet as tolerated, if ordered  - Consider nutrition services referral to assist patient with adequate nutrition and appropriate food choices  Outcome: Adequate for Discharge     Problem: SKIN/TISSUE INTEGRITY - ADULT  Goal: Skin integrity remains intact  Description: INTERVENTIONS  - Identify patients at risk for skin breakdown  - Assess and monitor skin integrity  - Assess and monitor nutrition and hydration status  - Monitor labs (i e  albumin)  - Assess for incontinence   - Turn and reposition patient  - Assist with mobility/ambulation  - Relieve pressure over bony prominences  - Avoid friction and shearing  - Provide appropriate hygiene as needed including keeping skin clean and dry  - Evaluate need for skin moisturizer/barrier cream  - Collaborate with interdisciplinary team (i e  Nutrition, Rehabilitation, etc )   - Patient/family teaching  Outcome: Adequate for Discharge     Problem: MUSCULOSKELETAL - ADULT  Goal: Maintain or return mobility to safest level of function  Description: INTERVENTIONS:  - Assess patient's ability to carry out ADLs; assess patient's baseline for ADL function and identify physical deficits which impact ability to perform ADLs (bathing, care of mouth/teeth, toileting, grooming, dressing, etc )  - Assess/evaluate cause of self-care deficits   - Assess range of motion  - Assess patient's mobility  - Assess patient's need for assistive devices and provide as appropriate  - Encourage maximum independence but intervene and supervise when necessary  - Involve family in performance of ADLs  - Assess for home care needs following discharge   - Consider OT consult to assist with ADL evaluation and planning for discharge  - Provide patient education as appropriate  Outcome: Adequate for Discharge     Problem: PAIN - ADULT  Goal: Verbalizes/displays adequate comfort level or baseline comfort level  Description: Interventions:  - Encourage patient to monitor pain and request assistance  - Assess pain using appropriate pain scale  - Administer analgesics based on type and severity of pain and evaluate response  - Implement non-pharmacological measures as appropriate and evaluate response  - Consider cultural and social influences on pain and pain management  - Notify physician/advanced practitioner if interventions unsuccessful or patient reports new pain  Outcome: Adequate for Discharge     Problem: INFECTION - ADULT  Goal: Absence or prevention of progression during hospitalization  Description: INTERVENTIONS:  - Assess and monitor for signs and symptoms of infection  - Monitor lab/diagnostic results  - Monitor all insertion sites, i e  indwelling lines, tubes, and drains  - Monitor endotracheal if appropriate and nasal secretions for changes in amount and color  - [place] appropriate cooling/warming therapies per order  - Administer medications as ordered  - Instruct and encourage patient and family to use good hand hygiene technique  - Identify and instruct in appropriate isolation precautions for identified infection/condition  Outcome: Adequate for Discharge     Problem: SAFETY ADULT  Goal: Patient will remain free of falls  Description: INTERVENTIONS:  - Assess patient frequently for physical needs  -  Identify cognitive and physical deficits and behaviors that affect risk of falls    -  [place] fall precautions as indicated by assessment   - Educate patient/family on patient safety including physical limitations  - Instruct patient to call for assistance with activity based on assessment  - Modify environment to reduce risk of injury  - Consider OT/PT consult to assist with strengthening/mobility  Outcome: Adequate for Discharge  Goal: Maintain or return to baseline ADL function  Description: INTERVENTIONS:  -  Assess patient's ability to carry out ADLs; assess patient's baseline for ADL function and identify physical deficits which impact ability to perform ADLs (bathing, care of mouth/teeth, toileting, grooming, dressing, etc )  - Assess/evaluate cause of self-care deficits   - Assess range of motion  - Assess patient's mobility; develop plan if impaired  - Assess patient's need for assistive devices and provide as appropriate  - Encourage maximum independence but intervene and supervise when necessary  - Involve family in performance of ADLs  - Assess for home care needs following discharge   - Consider OT consult to assist with ADL evaluation and planning for discharge  - Provide patient education as appropriate  Outcome: Adequate for Discharge  Goal: Maintain or return mobility status to optimal level  Description: INTERVENTIONS:  - Assess patient's baseline mobility status (ambulation, transfers, stairs, etc )    - Identify cognitive and physical deficits and behaviors that affect mobility  - Identify mobility aids required to assist with transfers and/or ambulation (gait belt, sit-to-stand, lift, walker, cane, etc )  - [place] fall precautions as indicated by assessment  - Record patient progress and toleration of activity level on Mobility SBAR; progress patient to next Phase/Stage  - Instruct patient to call for assistance with activity based on assessment  - Consider rehabilitation consult to assist with strengthening/weightbearing, etc   Outcome: Adequate for Discharge     Problem: DISCHARGE PLANNING  Goal: Discharge to home or other facility with appropriate resources  Description: INTERVENTIONS:  - Identify barriers to discharge w/patient and caregiver  - Arrange for needed discharge resources and transportation as appropriate  - Identify discharge learning needs (meds, wound care, etc )  - Arrange for interpretive services to assist at discharge as needed  - Refer to Case Management Department for coordinating discharge planning if the patient needs post-hospital services based on physician/advanced practitioner order or complex needs related to functional status, cognitive ability, or social support system  Outcome: Adequate for Discharge     Problem: Knowledge Deficit  Goal: Patient/family/caregiver demonstrates understanding of disease process, treatment plan, medications, and discharge instructions  Description: Complete learning assessment and assess knowledge base  Interventions:  - Provide teaching at level of understanding  - Provide teaching via preferred learning methods  Outcome: Adequate for Discharge     Problem: Potential for Falls  Goal: Patient will remain free of falls  Description: INTERVENTIONS:  - Assess patient frequently for physical needs  -  Identify cognitive and physical deficits and behaviors that affect risk of falls    -  [place] fall precautions as indicated by assessment   - Educate patient/family on patient safety including physical limitations  - Instruct patient to call for assistance with activity based on assessment  - Modify environment to reduce risk of injury  - Consider OT/PT consult to assist with strengthening/mobility  Outcome: Adequate for Discharge

## 2020-08-22 NOTE — ASSESSMENT & PLAN NOTE
Patient with worsening pain in RLE  Majority of patient's cancer appears to be on the right, with increase in size of the mass surrounding the distal right ureter, as well as new metastatic bone lesions in the L5 vertebra and right acetabulum  Pain appears to center around the lesion involving the right hip, as well as radicular pain in the right thigh involving a moderate right foraminal narrowing seen at L4-L5  · MRI obtained  · Current pain control appears to be vastly improved  · Continue MS Contin at TID dosing, increased to 45 mg  Appears to provide adequate basal pain control  · Discontinued oral oxycodone and IV Dilaudid, and attempt to control breakthrough pain with oral hydromorphone instead  Initiated on 2 mg Q 4 p r n , with aims of increasing as required  Current level appears appropriate  · Bowel regimen ordered in place  · No red flags currently - patient without any associated lower extremity weakness, review bowel or bladder incontinence, or saddle anesthesia  Also without evidence of cord compromise by MRI  Will be attempting to schedule with radiation oncology for palliative treatments early next week following discharge, as well as regular oncologist   This was discussed with Oncology at length

## 2020-08-22 NOTE — DISCHARGE SUMMARY
Discharge- Lois Cuevas 1965, 63 y o  male MRN: [id number]    Unit/Bed#: 909-24 Encounter: [id number]    Primary Care Provider: Helga Rothman PA-C   Date and time admitted to hospital: 8/18/2020 10:17 AM        * Cancer related pain  Assessment & Plan  Patient with worsening pain in RLE  Majority of patient's cancer appears to be on the right, with increase in size of the mass surrounding the distal right ureter, as well as new metastatic bone lesions in the L5 vertebra and right acetabulum  Pain appears to center around the lesion involving the right hip, as well as radicular pain in the right thigh involving a moderate right foraminal narrowing seen at L4-L5  · MRI obtained  · Current pain control appears to be vastly improved  · Continue MS Contin at TID dosing, increased to 45 mg  Appears to provide adequate basal pain control  · Discontinued oral oxycodone and IV Dilaudid, and attempt to control breakthrough pain with oral hydromorphone instead  Initiated on 2 mg Q 4 p r n , with aims of increasing as required  Current level appears appropriate  · Bowel regimen ordered in place  · No red flags currently - patient without any associated lower extremity weakness, review bowel or bladder incontinence, or saddle anesthesia  Also without evidence of cord compromise by MRI  Will be attempting to schedule with radiation oncology for palliative treatments early next week following discharge, as well as regular oncologist   This was discussed with Oncology at length  Urothelial carcinoma of right distal ureter [facility])  Assessment & Plan  Patient currently following Dr Mahi Benjamin for chemotherapy treatment  CT with clear evidence of progression  · Consulted oncology  · Patient with right nephrostomy tube placed by IR on 6/25/20  He has not had any follow-up with urology since    Per discussion with Urology previously by AP following the patient, no need for nephrostomy tube change at this time     Hyponatremia  Assessment & Plan  Persistent, mild, and stable  Anxiety disorder  Assessment & Plan  Continue SSRI and prn benzo therapy  Essential hypertension  Assessment & Plan  Mildly elevated on admission, now improved  Continue to hold ACE-inhibitor work following discharge in setting of WENDY  May benefit from an alternative antihypertensive given  malignancy and propensity for WENDY  Acute kidney injury [facility])  Assessment & Plan  Creatinine of 1 8 on admission, with baseline creatinine 0 7  Currently improved to 1 1 with IV fluids  Continue to avoid nephrotoxins, hold ACE-inhibitor  UTI (urinary tract infection)  Assessment & Plan  Urine culture now growing staph aureus, with sensitivities indicating MSSA  CT without any evidence of obstruction  Urinalysis with positive nitrites, moderate leuks, and innumerable WBCs on microscopy  Given growth of Staph aureus discussed case with ID at length - given that patient is immunosuppressed given recent chemotherapy and current metastatic malignancy, recommendation is for treatment with a 5-7 day course of antibiotic therapy  Will continue with cephalexin at discharge  Blood cultures were also obtained, remain without growth times 72 hours  Suspected source is bacterial translocation, in patient that is likely colonized per discussion with ID        Discharging Physician / Practitioner: Fernanda Tovar MD  PCP: Donna Joe PA-C  Admission Date:   Admission Orders (From admission, onward)     Ordered        08/18/20 1330  Inpatient Admission (expected length of stay for this patient Order details is greater than two midnights)  Once                   Discharge Date: 08/22/20    Resolved Problems  Date Reviewed: 8/22/2020    None          Consultations During Hospital Stay:  · Oncolgy    Procedures Performed:   · None    Significant Findings / Test Results:   Mri Lumbar Spine Wo Contrast    Result Date: 8/21/2020  Impression: Metastatic disease in the lumbar spine involving the L5 vertebra, L3 vertebra  No acute compression collapse vertebra Bony metastatic disease in the posterior aspect of the right iliac bone with the retroperitoneal lymphadenopathy, right psoas lymphadenopathy No impingement of the conus  Degenerative disc disease at multiple levels Moderate right foraminal narrowing at L4-5 with mild abutment of the exiting right L4 nerve root with right foraminal protrusion  Correlate with right L4 radiculopathy There is soft tissue density in the anterior epidural soft tissue at the level of the T12 vertebra with mild central canal narrowing  This is incompletely evaluated  This may be on the basis of discogenic disease however epidural metastatic disease is not entirely excluded  Correlation with the MRI of the thoracic spine with and without contrast may be considered if clinically relevant No evidence of conus impingement The study was marked in EPIC for significant notification  Workstation performed: [id number]     Ct Abdomen Pelvis With Contrast    Result Date: 8/18/2020  Impression: New dilatation of the common hepatic and common bile duct seen to the level of the ampulla  Mildly more prominent pancreatic duct caliber  No discrete obstructing etiology identified  Correlate with clinical findings for stasis versus obstructive etiology  Interval increase in number and size of retrocrural, retroperitoneal right iliac chain adenopathy  Subsequent worsening mass effect and compression on the IVC  Bolus timing was not optimized to evaluate for true caliber/patency of the IVC  Mild increasing soft tissue attenuation within the right flank and right lower quadrant suggesting some degree of early peripheral edema  Interval increase in size of mass surrounding the distal right ureter  New metastatic bone lesions in the L5 vertebra and right acetabulum  Cortical breakthrough noted in the medial acetabular roof   The study was marked in EPIC for significant notification  Workstation performed: [id number]     Incidental Findings:   · As above     Test Results Pending at Discharge (will require follow up): · None     Outpatient Tests Requested:  · None    Complications:  None    Reason for Admission: Pain Control    Hospital Course:     Pleasant 30-year-old unfortunate man with past medical history significant for metastatic urothelial CA, admitted from [facility] ED on 08/18 for the purposes of improved pain control regarding his cancer pain  Mr Tran has past medical history significant for a urothelial CA involving the right ureter, HTN, depression/anxiety, GERD, and noted COPD by review of records  He presented to the ED from the infusion center due to the severity of pain involving his right lower extremity  Patient reports pain in his right hip, along with radicular type symptoms in his right thigh  He noted that the pain was waking him from sleep, and interfering with his ability to walk secondary to the discomfort  Further evaluation also revealed evidence of WENDY, and CT showed progression of his disease  He was referred for admission for further evaluation and treatment of his acute renal insufficiency as well as for better pain control  Following admission Mr Jesusita Stacy was maintained on IV fluids with improvement in his creatinine  His long-acting MS Contin was uptitrated with significant improvement in pain control, and his oxycodone was discontinued in favor of oral hydromorphone, at time of discharge he reports significant improvement in his overall pain control  Also, an MRI of his back was obtained showing bony metastatic disease in the posterior aspect of the right iliac bone, please year the 80s along with lymphadenopathy, as well as metastatic disease in the L-spine involving the L3 and L5 vertebrae    No evidence was noted of acute compression 3, or significant spinal canal stenosis  He was also noted to have a moderate right foraminal narrowing at L4-L5 with mild abutment of the exiting right L4 nerve root with right foraminal protrusion as the potential etiology for his right thigh radicular discomfort  Earlier CT scan had shown metastatic disease to the right acetabulum as likely contributor to this discomfort as well  From malignancy standpoint the CT scan it showed an interval increase in the number and size of the retrocrural, retroperitoneal right iliac chain adenopathy, with subsequent worsening mass effect and compression on the IVC  There is also interval increase in the size of the mass surrounding the right distal ureter  Also noted were new metastatic bone lesions in the L5 vertebra and right acetabulum, with cortical breakthrough noted in the medial acetabular roof  While hospitalized the patient was also noted to have a potential UTI by urinalysis, with growth of greater than 100,000 growth of MSSA, and following discussion with ID decision was made to treat this with cephalexin  At the time of discharge the patient is essentially stable  At the suggestion of Oncology an appointment for radiation oncology will be expedited by case management of, and he will be asked to see his PCP and medical oncologist within a week of discharge  Please see above list of diagnoses and related plan for additional information  Condition at Discharge: stable     Discharge Day Visit / Exam:     * Please refer to separate progress note for these details *    Discharge instructions/Information to patient and family:   See after visit summary for information provided to patient and family  Provisions for Follow-Up Care:  See after visit summary for information related to follow-up care and any pertinent home health orders        Disposition:     Home    For Discharges to [facility] SNF:   · Not Applicable to this Patient - Not Applicable to this Patient    Planned Readmission: No     Discharge Statement:  I spent 40 minutes discharging the patient  This time was spent on the day of discharge  I had direct contact with the patient on the day of discharge  Greater than 50% of the total time was spent examining patient, answering all patient questions, arranging and discussing plan of care with patient as well as directly providing post-discharge instructions  Additional time then spent on discharge activities  Discharge Medications:  See after visit summary for reconciled discharge medications provided to patient and family        ** Please Note: This note has been constructed using a voice recognition system **

## 2020-08-22 NOTE — ASSESSMENT & PLAN NOTE
Mildly elevated on admission, now improved  Continue to hold ACE-inhibitor work following discharge in setting of WENDY  May benefit from an alternative antihypertensive given  malignancy and propensity for WENDY

## 2020-08-22 NOTE — SOCIAL WORK
Discussed with patient preferences on discharge;understanding how to manage health at home; purpose of taking medications; importance of follow up care/appointments; and symptoms to watch out for once discharged home  Pt is being dc'd home on this date and CM in basket messaged pt's PCP office:Robbie Dias as well as Radiation/Oncology at [facility] regarding pt needing appointments

## 2020-08-22 NOTE — ASSESSMENT & PLAN NOTE
Patient currently following Dr Ascencion Starr for chemotherapy treatment  CT with clear evidence of progression  · Consulted oncology  · Patient with right nephrostomy tube placed by IR on 6/25/20  He has not had any follow-up with urology since  Per discussion with Urology previously by AP following the patient, no need for nephrostomy tube change at this time

## 2020-08-22 NOTE — ASSESSMENT & PLAN NOTE
Urine culture now growing staph aureus, with sensitivities indicating MSSA  CT without any evidence of obstruction  Urinalysis with positive nitrites, moderate leuks, and innumerable WBCs on microscopy  Given growth of Staph aureus discussed case with ID at length - given that patient is immunosuppressed given recent chemotherapy and current metastatic malignancy, recommendation is for treatment with a 5-7 day course of antibiotic therapy  Will continue with cephalexin at discharge  Blood cultures were also obtained, remain without growth times 72 hours    Suspected source is bacterial translocation, in patient that is likely colonized per discussion with ID

## 2020-08-23 LAB
BACTERIA BLD CULT: NORMAL
BACTERIA BLD CULT: NORMAL

## 2020-08-24 ENCOUNTER — TELEPHONE (OUTPATIENT)
Dept: HEMATOLOGY ONCOLOGY | Facility: CLINIC | Age: 55
End: 2020-08-24

## 2020-08-24 ENCOUNTER — HOSPITAL ENCOUNTER (OUTPATIENT)
Dept: INFUSION CENTER | Facility: HOSPITAL | Age: 55
Discharge: HOME | End: 2020-08-24
Attending: INTERNAL MEDICINE
Payer: MEDICARE

## 2020-08-24 ENCOUNTER — TELEPHONE (OUTPATIENT)
Dept: OTHER | Facility: HOSPITAL | Age: 55
End: 2020-08-24

## 2020-08-24 VITALS
HEART RATE: 75 BPM | DIASTOLIC BLOOD PRESSURE: 65 MMHG | SYSTOLIC BLOOD PRESSURE: 112 MMHG | TEMPERATURE: 98.5 F | HEIGHT: 66 IN | BODY MASS INDEX: 22.92 KG/M2 | RESPIRATION RATE: 18 BRPM | WEIGHT: 142.64 LBS

## 2020-08-24 DIAGNOSIS — C66.1 UROTHELIAL CARCINOMA OF RIGHT DISTAL URETER (HCC): Primary | ICD-10-CM

## 2020-08-24 PROCEDURE — 96367 TX/PROPH/DG ADDL SEQ IV INF: CPT

## 2020-08-24 PROCEDURE — 96413 CHEMO IV INFUSION 1 HR: CPT

## 2020-08-24 PROCEDURE — 96366 THER/PROPH/DIAG IV INF ADDON: CPT

## 2020-08-24 RX ORDER — MAGNESIUM SULFATE HEPTAHYDRATE 40 MG/ML
2 INJECTION, SOLUTION INTRAVENOUS ONCE
Status: COMPLETED | OUTPATIENT
Start: 2020-08-24 | End: 2020-08-24

## 2020-08-24 RX ORDER — MAGNESIUM SULFATE HEPTAHYDRATE 40 MG/ML
2 INJECTION, SOLUTION INTRAVENOUS ONCE
Status: CANCELLED
Start: 2020-08-30

## 2020-08-24 RX ORDER — MAGNESIUM SULFATE HEPTAHYDRATE 40 MG/ML
2 INJECTION, SOLUTION INTRAVENOUS ONCE
Status: CANCELLED
Start: 2020-08-24

## 2020-08-24 RX ORDER — SODIUM CHLORIDE 9 MG/ML
20 INJECTION, SOLUTION INTRAVENOUS ONCE
Status: COMPLETED | OUTPATIENT
Start: 2020-08-24 | End: 2020-08-24

## 2020-08-24 RX ADMIN — MAGNESIUM SULFATE 2 G: 2 INJECTION INTRAVENOUS at 12:04

## 2020-08-24 RX ADMIN — SODIUM CHLORIDE 20 ML/HR: 0.9 INJECTION, SOLUTION INTRAVENOUS at 11:06

## 2020-08-24 RX ADMIN — GEMCITABINE 1600 MG: 38 INJECTION INTRAVENOUS at 12:48

## 2020-08-24 RX ADMIN — Medication 8 MG: at 11:25

## 2020-08-24 NOTE — TELEPHONE ENCOUNTER
Noted this patient is now D/C from the hospital  Please call to schedule follow up appt, thank you   Jolene Hutchinson

## 2020-08-24 NOTE — PLAN OF CARE
Problem: Potential for Falls  Goal: Patient will remain free of falls  Description: INTERVENTIONS:  - Assess patient frequently for physical needs  -  Identify cognitive and physical deficits and behaviors that affect risk of falls    -  [place] fall precautions as indicated by assessment   - Educate patient/family on patient safety including physical limitations  - Instruct patient to call for assistance with activity based on assessment  - Modify environment to reduce risk of injury  - Consider OT/PT consult to assist with strengthening/mobility  Outcome: Progressing     Problem: INFECTION - ADULT  Goal: Absence of fever/infection during neutropenic period  Description: INTERVENTIONS:  - Monitor WBC    Outcome: Progressing

## 2020-08-24 NOTE — PROGRESS NOTES
Pt stated he feels much better  Has some lightheadedness at times  Okay to proceed with treatment  Phone call made to [facility] RN regarding clarification of cycles due to recent hospitalization  She spoke with [address]  Today is Cycle 1 Day 8  All future appointments rescheduled and given to patient to coincide with cycle change  (Cycle 1 Day 8 is being postponed from last week to today)

## 2020-08-25 ENCOUNTER — TRANSITIONAL CARE MANAGEMENT (OUTPATIENT)
Dept: FAMILY MEDICINE CLINIC | Facility: CLINIC | Age: 55
End: 2020-08-25

## 2020-08-25 ENCOUNTER — OFFICE VISIT (OUTPATIENT)
Dept: HEMATOLOGY ONCOLOGY | Facility: CLINIC | Age: 55
End: 2020-08-25
Payer: MEDICARE

## 2020-08-25 VITALS
HEART RATE: 78 BPM | TEMPERATURE: 98.6 F | HEIGHT: 66 IN | OXYGEN SATURATION: 92 % | SYSTOLIC BLOOD PRESSURE: 122 MMHG | WEIGHT: 143 LBS | BODY MASS INDEX: 22.98 KG/M2 | DIASTOLIC BLOOD PRESSURE: 62 MMHG

## 2020-08-25 DIAGNOSIS — C66.1 UROTHELIAL CARCINOMA OF RIGHT DISTAL URETER (HCC): Primary | ICD-10-CM

## 2020-08-25 DIAGNOSIS — C79.51 BONY METASTASIS (HCC): ICD-10-CM

## 2020-08-25 DIAGNOSIS — G89.3 CANCER RELATED PAIN: ICD-10-CM

## 2020-08-25 PROCEDURE — 99214 OFFICE O/P EST MOD 30 MIN: CPT | Performed by: NURSE PRACTITIONER

## 2020-08-25 PROCEDURE — 1111F DSCHRG MED/CURRENT MED MERGE: CPT | Performed by: NURSE PRACTITIONER

## 2020-08-25 PROCEDURE — 3074F SYST BP LT 130 MM HG: CPT | Performed by: NURSE PRACTITIONER

## 2020-08-25 PROCEDURE — 3078F DIAST BP <80 MM HG: CPT | Performed by: NURSE PRACTITIONER

## 2020-08-25 PROCEDURE — 3008F BODY MASS INDEX DOCD: CPT | Performed by: NURSE PRACTITIONER

## 2020-08-25 NOTE — PROGRESS NOTES
22 [facility] HEMATOLOGY ONCOLOGY [address] [facility]   [address]  [place] [id number] [phone]  Progress Note  Mary Lou Awad, 1965, [id number]  8/25/2020    Assessment/Plan:  1  Urothelial carcinoma of right distal ureter [facility])   Patient is a 79-year-old male with a history of recently diagnosed advanced urothelial carcinoma with significant pelvic and retroperitoneal lymphadenopathy  Patient was started on chemotherapy consisting of Gemzar and cisplatin every 28 days  Patient receives Gemzar day 1 and day 8 of the cycle  Patient completed day 1 of cycle 1 on  08/11/2020  He reported to the infusion center on 08/18/2020 for day 8 of cycle 1 however he was in significant pain and unable to undergo treatment  Patient was sent to the emergency room and subsequently admitted to the hospital   During his hospitalization patient was found to have an increase in the retroperitoneal adenopathy and new metastatic bony lesions in L5 and right acetabulum  Patient was discharged from the hospital on 08/22/2020  Patient had his day 8 of Gemzar on 08/24/2020  Patient is here to discuss palliative radiation referral   Radiation oncology will plan to see patient for consult in the near future  Given that Gemzar is radiosensitive we will confer with Radiation Oncology to determine the timing of palliative radiation treatment and administration of Gemzar  Patient has an appointment scheduled on 09/01/2020 for follow-up  We will see him at that time, after radiation oncology consultation is completed  Patient verbalized understanding and is in agreement to the plan  2  Bony metastasis ([address] )   Patient was found to have new metastatic bony lesion in L5 in the right acetabulum while in the hospital 8/18-8/22/2020  I conferred with radiation oncology to determine if patient was appropriate for a consultation, for which they agreed    We will therefore set up an ambulatory referral to our [facility]  Patient has transportation needs we will therefore set him up with [facility]  Patient verbalized understanding and is in agreement to the plan  - Ambulatory referral to Radiation Oncology; Future    3  Cancer related pain    Patient's pain medication was adjusted while he was in the hospital to establish more control over patient's pain  Regimen was adjusted to include MS Contin 45 mg at a t i d  Dosing and hydromorphone 2 mg p o  Q 4 hours p r n  Patient states his pain is better controlled however he is concerned how to further manage his pain, and who to refer to  Patient was previously scheduled to see palliative care however due to his hospitalization the appointment was canceled  We will set up another appointment to see palliative care  - Ambulatory referral to Palliative Care; Future      The patient is scheduled for follow-up in approximately 1 week with Dr Alexandro Tucker or me  Patient voiced agreement and understanding to the above  Patient knows to call the Hematology/Oncology office with any questions and concerns regarding the above  Goals and Barriers:    Current Goal:   Prolong Survival from Cancer  Barriers: None  Patient's Capacity to Self Care:  Patient is able to self care   -------------------------------------------------------------------------------------------------------    No chief complaint on file        History of present illness/Cancer History:   Oncology History   Urothelial carcinoma of right distal ureter ([facility] )   8/7/2020 Initial Diagnosis    Urothelial carcinoma of right distal ureter ([facility] )     8/11/2020 -  Chemotherapy    CISplatin (PLATINOL) 121 1 mg, mannitol 12 5 g in sodium chloride 0 9 % 500 mL IVPB, 70 mg/m2 = 121 1 mg, Intravenous, Once, 1 of 2 cycles  Administration: 121 1 mg (8/11/2020)  fosaprepitant (EMEND) 150 mg in sodium chloride 0 9 % 250 mL IVPB, 150 mg, Intravenous, Once, 1 of 2 cycles  Administration: 150 mg (2020)  gemcitabine (GEMZAR) 1,600 mg in sodium chloride 0 9 % 250 mL infusion, 1,557 mg (72 % of original dose 1,250 mg/m2), Intravenous, Once, 1 of 2 cycles  Dose modification: 900 mg/m2 (original dose 1,250 mg/m2, Cycle 1, Reason: Other (See Comments))  Administration: 1,600 mg (2020), 1,600 mg (2020)          Cancer Staging  No matching staging information was found for the patient  ECO - Symptomatic but completely ambulatory    Review of Systems   Constitutional: Negative for activity change, appetite change, fatigue, fever and unexpected weight change  Respiratory: Negative for cough and shortness of breath  Cardiovascular: Negative for chest pain and leg swelling  Gastrointestinal: Negative for abdominal pain, constipation, diarrhea and nausea  Endocrine: Negative for cold intolerance and heat intolerance  Musculoskeletal: Positive for back pain  Negative for arthralgias and myalgias  Right hip pain   Skin: Negative  Neurological: Negative for dizziness, weakness and headaches  Hematological: Negative for adenopathy  Does not bruise/bleed easily           Current Outpatient Medications:     albuterol (Ventolin HFA) 90 mcg/act inhaler, Inhale 2 puffs every 6 (six) hours as needed for wheezing, Disp: 18 g, Rfl: 2    carisoprodol (SOMA) 350 mg tablet, Take 1 tablet (350 mg total) by mouth 3 (three) times a day for 10 days, Disp: 30 tablet, Rfl: 0    cephalexin (KEFLEX) 500 mg capsule, Take 1 capsule (500 mg total) by mouth every 6 (six) hours for 7 days, Disp: 28 capsule, Rfl: 0    clonazePAM (KlonoPIN) 0 5 mg tablet, Take 1 tablet (0 5 mg total) by mouth 2 (two) times a day as needed for anxiety, Disp: 14 tablet, Rfl: 0    docusate sodium (COLACE) 100 mg capsule, Take 1 capsule (100 mg total) by mouth 2 (two) times a day, Disp: 10 capsule, Rfl: 0    escitalopram (LEXAPRO) 10 mg tablet, Take 10 mg by mouth daily at bedtime, Disp: , Rfl:     fluticasone (FLONASE) 50 mcg/act nasal spray, SPRAY 2 SPRAYS INTO EACH NOSTRIL EVERY DAY, Disp: 1 Bottle, Rfl: 5    fluticasone (FLOVENT HFA) 220 mcg/act inhaler, Inhale 2 puffs 2 (two) times a day Rinse mouth after use , Disp: 1 Inhaler, Rfl: 5    HYDROmorphone (DILAUDID) 2 mg tablet, Take 1 tablet (2 mg total) by mouth every 4 (four) hours as needed for moderate pain or severe pain for up to 10 daysMax Daily Amount: 12 mg, Disp: 30 tablet, Rfl: 0    lidocaine (LIDODERM) 5 %, Apply 1 patch topically daily Remove & Discard patch within 12 hours or as directed by MD (Patient not taking: Reported on 8/24/2020), Disp: 10 patch, Rfl: 0    morphine (MS CONTIN) 15 mg 12 hr tablet, Take 3 tablets (45 mg total) by mouth every 8 (eight) hours for 10 daysMax Daily Amount: 135 mg, Disp: 90 tablet, Rfl: 0    naloxone (NARCAN) 4 mg/0 1 mL nasal spray, 0 1 mL (4 mg total) into each nostril every 3 (three) minutes as needed for opioid reversal or respiratory depression Administer 1 spray into a nostril  If breathing does not return to normal or if breathing difficulty resumes after 2-3 minutes, give another dose in the other nostril using a new spray   (Patient not taking: Reported on 8/24/2020), Disp: 1 each, Rfl: 1    omeprazole (PriLOSEC) 40 MG capsule, Take 1 capsule (40 mg total) by mouth daily before breakfast, Disp: 30 capsule, Rfl: 5    ondansetron (ZOFRAN) 8 mg tablet, Take 1 tablet (8 mg total) by mouth every 8 (eight) hours as needed for nausea or vomiting, Disp: 30 tablet, Rfl: 3    ondansetron (ZOFRAN-ODT) 4 mg disintegrating tablet, Take 1 tablet (4 mg total) by mouth every 6 (six) hours as needed for nausea or vomiting (Patient not taking: Reported on 8/18/2020), Disp: 20 tablet, Rfl: 0    polyethylene glycol (MIRALAX) 17 g packet, Take 17 g by mouth daily as needed (Constipation), Disp: 14 each, Rfl: 0    senna (SENOKOT) 8 6 mg, Take 1 tablet (8 6 mg total) by mouth daily at bedtime (Patient not taking: Reported on 8/24/2020), Disp: 30 tablet, Rfl: 0    zolpidem (AMBIEN) 10 mg tablet, 1 TAB AT BEDTIME AS NEEDED FOR INSOMNIA, Disp: , Rfl:   No current facility-administered medications for this visit  Allergies   Allergen Reactions    Ketorolac Hives       Advance Directive and Living Will:        Objective:   /62   Pulse 78   Temp 98 6 °F (37 °C)   Ht 5' 6" (1 676 m)   Wt 64 9 kg (143 lb)   SpO2 92%   BMI 23 08 kg/m²   Wt Readings from Last 6 Encounters:   08/25/20 64 9 kg (143 lb)   08/24/20 64 7 kg (142 lb 10 2 oz)   08/18/20 61 6 kg (135 lb 12 9 oz)   08/11/20 64 3 kg (141 lb 12 1 oz)   08/07/20 64 9 kg (143 lb)   07/06/20 66 6 kg (146 lb 13 2 oz)       Physical Exam  Constitutional:       Appearance: Normal appearance  He is well-developed  HENT:      Head: Normocephalic and atraumatic  Eyes:      Pupils: Pupils are equal, round, and reactive to light  Neck:      Musculoskeletal: Normal range of motion  Cardiovascular:      Rate and Rhythm: Normal rate and regular rhythm  Heart sounds: Normal heart sounds  Pulmonary:      Effort: Pulmonary effort is normal  No respiratory distress  Breath sounds: Normal breath sounds  Abdominal:      General: Bowel sounds are normal       Palpations: Abdomen is soft  Genitourinary:     Comments: Right flank nephrostomy tube intact  Musculoskeletal: Normal range of motion  Comments: Walks with cane   Lymphadenopathy:      Cervical: No cervical adenopathy  Skin:     General: Skin is warm and dry  Capillary Refill: Capillary refill takes less than 2 seconds  Neurological:      General: No focal deficit present  Mental Status: He is alert and oriented to person, place, and time     Psychiatric:         Mood and Affect: Mood normal          Behavior: Behavior normal          Pertinent Laboratory Results and Imaging Review:   4 31 - 10 16 Thousand/uL  6 48  5 56  6 73    RBC  3 88 - 5 62 Million/uL  3 27Low    3 29Low    3 50Low Hemoglobin  12 0 - 17 0 g/dL  10 0Low    9 9Low    10 7Low      Hematocrit  36 5 - 49 3 %  30 1Low    30  0Low    31 4Low      MCV  82 - 98 fL  92  91  90    MCH  26 8 - 34 3 pg  30 6  30 1  30 6    MCHC  31 4 - 37 4 g/dL  33 2  33 0  34 1    RDW  11 6 - 15 1 %  13 5  13 3  13 3    MPV  8 9 - 12 7 fL  9 0  8 9  9 6    Platelets  959 - 346 Thousands/uL  164  122Low    145Low      nRBC  /100 WBCs  0  0  0    Neutrophils Relative  43 - 75 %  70  68  70    Immat GRANS %  0 - 2 %  0  0  0    Lymphocytes Relative  14 - 44 %  15  17  18    Monocytes Relative  4 - 12 %  12  12  10    Eosinophils Relative  0 - 6 %  2  2  2    Basophils Relative  0 - 1 %  1  1  0    Neutrophils Absolute  1 85 - 7 62 Thousands/µL  4 54  3 78  4 71    Immature Grans Absolute  0 00 - 0 20 Thousand/uL  0 02  0 01  0 02    Lymphocytes Absolute  0 60 - 4 47 Thousands/µL  0 99  0 95  1 20    Monocytes Absolute  0 17 - 1 22 Thousand/µL  0 75  0 68  0 67    Eosinophils Absolute  0 00 - 0 61 Thousand/µL  0 13  0 11  0 10    Basophils Absolute  0 00 - 0 10 Thousands/µL  0 05  0 03  0 03         Sodium  136 - 145 mmol/L  130Low    131Low    129Low      Potassium  3 5 - 5 3 mmol/L  5 0  4 8  4 4    Chloride  100 - 108 mmol/L  98Low    98Low    97Low      CO2  21 - 32 mmol/L  26  24  25    ANION GAP  4 - 13 mmol/L  6  9  7    BUN  5 - 25 mg/dL  14  14  16    Creatinine  0 60 - 1 30 mg/dL  1 17  1 28 CM   1 30 CM     Comment: Standardized to IDMS reference method    Glucose  65 - 140 mg/dL  102  100 CM   92 CM     Comment: If the patient is fasting, the ADA then defines impaired fasting glucose as > 100 mg/dL and diabetes as > or equal to 123 mg/dL  Specimen collection should occur prior to Sulfasalazine administration due to the potential for falsely depressed results  Specimen collection should occur prior to Sulfapyridine administration due to the potential for falsely elevated results      Calcium  8 3 - 10 1 mg/dL  8 9  8 6  8 5    eGFR ml/min/1 73sq m  69  62  61       Narrative           The following historical data was reviewed      Past Medical History:   Diagnosis Date    Asthma     [facility] disorder ([facility] )     COPD (chronic obstructive pulmonary disease) ([facility] )     Disease of thyroid gland     Hypertension     Hyperthyroidism        Past Surgical History:   Procedure Laterality Date    DENTAL SURGERY      FL RETROGRADE URETHROCYSTOGRAM  6/15/2020    HERNIA REPAIR Left     inguinal    IR LYMPH NODE BIOPSY/ASPIRATION  7/24/2020    IR TUBE PLACEMENT  6/25/2020    MS CYSTOURETHROSCOPY,URETER CATHETER Right 6/15/2020    Procedure: CYSTOSCOPY RETROGRADE PYELOGRAM and ureteroscopy;  Surgeon: Lupis Stark MD;  Location: [facility];  Service: Urology       Social History     Socioeconomic History    Marital status: Single     Spouse name: None    Number of children: None    Years of education: None    Highest education level: None   Occupational History    None   Social Needs    Financial resource strain: None    Food insecurity     Worry: None     Inability: None    Transportation needs     Medical: None     Non-medical: None   Tobacco Use    Smoking status: Current Every Day Smoker     Packs/day: 1 00    Smokeless tobacco: Never Used   Substance and Sexual Activity    Alcohol use: Never     Frequency: Never    Drug use: No    Sexual activity: Yes     Partners: Female   Lifestyle    Physical activity     Days per week: None     Minutes per session: None    Stress: None   Relationships    Social connections     Talks on phone: None     Gets together: None     Attends Judaism service: None     Active member of club or organization: None     Attends meetings of clubs or organizations: None     Relationship status: None    Intimate partner violence     Fear of current or ex partner: None     Emotionally abused: None     Physically abused: None     Forced sexual activity: None   Other Topics Concern    None   Social History Narrative    Patient is  from the mother of his 1st child  Patient has 4 adult children, from 4 different unions  Patient lives with his youngest daughter Ely Louis has multiple sclerosis but is able to participate fully in the patient's care  Patient does not have any advanced directives  Family History   Problem Relation Age of Onset    Heart disease Mother     Hyperthyroidism Mother     Heart disease Father     Hyperthyroidism Father        Please note: This report has been generated by a voice recognition software system  Therefore there may be syntax, spelling, and/or grammatical errors  Please call if you have any questions

## 2020-08-26 NOTE — PATIENT INSTRUCTIONS
PRESCRIPTION REFILL REMINDER:  All medication refills should be requested prior to [facility] on Friday  Any refill requests after noon on Friday would be addressed the following Monday  Please protect yourself from the novel Coronavirus (COVID-19)! Even though we STILL do not have a vaccine or good antiviral drugs for this infection, the following strategies can help you stay healthy:    = Wash your hands with soap and water, or hand  with at least 60% alcohol, often  = Avoid touching your face!   = Avoid close contact with others, even if they seem healthy  Avoid gatherings of more than 10 people, and don't travel unnecessarily  = Stay home, except to get medical care or other essentials  If you can eat and drink and breathe and sleep, please consider calling your doctor's office instead of visiting in person, even if you are ill   = Cover your cough with a tissue, or your elbow  = Clean frequently touched surfaces and objects daily (e g , tables, countertops, light switches, doorknobs, and cabinet handles)  Regular household detergent and water are sufficient  Numbers of cases of Coronavirus are spiking in many [place] States  This is not a more dangerous virus, but a sign that more people in a community are spreading the virus  Please check the local disease reports near you if you consider travelling this summer  We do NOT advise travel to any community or State with a rising viral caseload  Check out [employer] for [place] data that are updated daily  http://www [employer]/   Global Epidemics  Org, from [facility] (OUTPATIENT CAMPUS), will give you [employer]sl[employer] information on virus cases  Https://globalepidemics  org/

## 2020-08-27 DIAGNOSIS — K21.9 GASTROESOPHAGEAL REFLUX DISEASE WITHOUT ESOPHAGITIS: ICD-10-CM

## 2020-08-27 RX ORDER — OMEPRAZOLE 40 MG/1
40 CAPSULE, DELAYED RELEASE ORAL
Qty: 30 CAPSULE | Refills: 5 | Status: SHIPPED | OUTPATIENT
Start: 2020-08-27 | End: 2020-12-07

## 2020-08-28 ENCOUNTER — TELEPHONE (OUTPATIENT)
Dept: PALLIATIVE MEDICINE | Facility: CLINIC | Age: 55
End: 2020-08-28

## 2020-08-28 ENCOUNTER — OFFICE VISIT (OUTPATIENT)
Dept: PALLIATIVE MEDICINE | Facility: CLINIC | Age: 55
End: 2020-08-28
Payer: MEDICARE

## 2020-08-28 VITALS
RESPIRATION RATE: 14 BRPM | TEMPERATURE: 97.6 F | WEIGHT: 138.89 LBS | DIASTOLIC BLOOD PRESSURE: 70 MMHG | BODY MASS INDEX: 22.42 KG/M2 | HEART RATE: 82 BPM | OXYGEN SATURATION: 98 % | SYSTOLIC BLOOD PRESSURE: 130 MMHG

## 2020-08-28 DIAGNOSIS — C79.51 BONY METASTASIS (HCC): ICD-10-CM

## 2020-08-28 DIAGNOSIS — C66.1 UROTHELIAL CARCINOMA OF RIGHT DISTAL URETER (HCC): Primary | ICD-10-CM

## 2020-08-28 DIAGNOSIS — Z51.5 PALLIATIVE CARE PATIENT: ICD-10-CM

## 2020-08-28 DIAGNOSIS — G89.3 CANCER RELATED PAIN: ICD-10-CM

## 2020-08-28 PROCEDURE — 99214 OFFICE O/P EST MOD 30 MIN: CPT | Performed by: FAMILY MEDICINE

## 2020-08-28 PROCEDURE — 1111F DSCHRG MED/CURRENT MED MERGE: CPT | Performed by: FAMILY MEDICINE

## 2020-08-28 PROCEDURE — 3078F DIAST BP <80 MM HG: CPT | Performed by: FAMILY MEDICINE

## 2020-08-28 PROCEDURE — 3075F SYST BP GE 130 - 139MM HG: CPT | Performed by: FAMILY MEDICINE

## 2020-08-28 RX ORDER — OXYCODONE HYDROCHLORIDE 10 MG/1
10 TABLET ORAL EVERY 4 HOURS PRN
Qty: 180 TABLET | Refills: 0 | Status: SHIPPED | OUTPATIENT
Start: 2020-08-28 | End: 2020-08-28 | Stop reason: SDUPTHER

## 2020-08-28 RX ORDER — OXYCODONE HYDROCHLORIDE 10 MG/1
10 TABLET ORAL EVERY 4 HOURS PRN
Qty: 180 TABLET | Refills: 0 | Status: SHIPPED | OUTPATIENT
Start: 2020-08-28 | End: 2020-09-18 | Stop reason: SDUPTHER

## 2020-08-28 RX ORDER — MORPHINE SULFATE 15 MG/1
45 TABLET, FILM COATED, EXTENDED RELEASE ORAL EVERY 8 HOURS SCHEDULED
Qty: 270 TABLET | Refills: 0 | Status: SHIPPED | OUTPATIENT
Start: 2020-08-28 | End: 2020-09-18 | Stop reason: SDUPTHER

## 2020-08-28 NOTE — TELEPHONE ENCOUNTER
Pt called back  Now states he has enough MS Contin until next week when [facility] can order  BUT    He Would like the Oxycodone 10 mg sent to [facility] in [facility]  He does not have any oxycodone       Thank you

## 2020-08-28 NOTE — TELEPHONE ENCOUNTER
[facility] not [facility] enough of either of todays prescribed meds  [facility] in [place] does carry     Oxycodone 10 mg Full  180 count     MS Contin 15 mg  ONLY 220 tablets  Pt would require a fu script for the remaining 50 tablets  No other pharamcy nearby pt chooses to use  [facility] re [facility] with adjust ment to the MS Contin    Thank you

## 2020-08-28 NOTE — TELEPHONE ENCOUNTER
Attempt to call pt with update  Phone not working    LMOM of daughter Corinna Clarke that we are going to resend to [facility]

## 2020-08-28 NOTE — PROGRESS NOTES
Outpatient Follow-Up - Palliative and Supportive Care   Mary Lou Awad 63 y o  male [id number]    Assessment & Plan  1  Urothelial carcinoma of right distal ureter ([facility] [address] )    2  Cancer related pain    3  Bony metastasis ([facility] [address] )    4  Palliative care patient      PDMP reviewed with recent 10-day refills [on 08/22/2020] for hydromorphone 2 mg PO x 30 tabs, MS-ER 45 mg PO x [3x15 Q8H x 10 days -> 90 tabs], carisoprodol 350 mg PO x 30 tabs   - to be completed on 08/31/2020     - recently hospitalized with changes to home regimen   - MS-ER 45 mg Q8H [will continue, Rx sent to pharmacy today for 30-day script]   - dilaudid 2 mg PO Q4H PRN [patient states did not work, felt oxy 10 mg more efficacious]    - discontinued dilaudid PO and changed back to oxy-IR 10 mg Q4H PRN [Rx sent to pharmacy today for 30-day script]   - patient still maintains home narcan as previous prescribed   - patient signed opioid agreement; now scanned into file   - continue home bowel regimen to prevent OIC   - continue home zofran for chemo-induced nausea   - no current sxs and well tolerated infusions   - patient states that he has completed his 5-wishes documentation and will mail into our clerical team for filing       Follow up with [facility] clinic in 2 weeks; OK for virtual visit   - discussed plan to follow up [address] every 2-3 appointment    Medications adjusted this encounter:  Requested Prescriptions     Signed Prescriptions Disp Refills    morphine (MS CONTIN) 15 mg 12 hr tablet 270 tablet 0     Sig: Take 3 tablets (45 mg total) by mouth every 8 (eight) hoursMax Daily Amount: 135 mg    oxyCODONE (ROXICODONE) 10 MG TABS 180 tablet 0     Sig: Take 1 tablet (10 mg total) by mouth every 4 (four) hours as needed for moderate painMax Daily Amount: 60 mg     No orders of the defined types were placed in this encounter      Medications Discontinued During This Encounter   Medication Reason    HYDROmorphone (DILAUDID) 2 mg tablet Alternate therapy    morphine (MS CONTIN) 15 mg 12 hr tablet Alternate therapy        Mr Jesusita Stacy was seen today for symptoms and planning cares related to above illnesses  I have reviewed the patient's controlled substance dispensing history in the Prescription Drug Monitoring Program in compliance with the [facility] regulations before prescribing any controlled substances  He is invited to continue to follow with us  If there are questions or concerns, please contact us through our clinic/answering service 24 hours a day, seven days a week  Dario Li MD  [facility]  [phone]      Visit Information    Accompanied By: No one    Source of History: Self, Medical record    History Limitations: None      Follow up visit for:  symptom management, pain, neoplasm related, advance care planning    History of Present Illness    Doroteo Vega is a 63 y o  male with a PMH of stage IV ureter CA with newly diagnosed vertebral mets with retroperitoneal metastatic disease c/b R hydronephorosis s/p R PCN tube placement, HTN, chronic lumbar pain c/b radiculopathy who presents in person to [facility] for scheduled [facility] follow up for pain management  Primary Oncology: Dr Brooke Kwok   - last chemo infusion 4 days ago  Primary Rad/Onc: Dr Eh Dodd    Patient currently reports persistent R-sided lower back pain radiating towards RLE, constant, with improvement leaning forward while in seated position and managed well with pain regimen [does note that prior PO dilaudid worked well, but did not give the same coverage as oxy-IR]  Discussed opioid contract today and discussed concern for patient's safety in regards to frequent requests to write new Rx earlier than scheduled  Patient states that this is due to increasing pain and understands concern for misuse  Denies diverting medication or misuse  Denies nausea, vomiting, fever/chills, CP, SOB  Last BM yesterday  On daily bowel regimen  Appetite intact      Recent hospitalization [08/18-08/22/2020] with initial presentation for worsening back pain and found with a UTI in the ED  MRI Lumbar [08/21/2020] metastatic disease in the lumbar spine involving the L5 vertebra, L3 vertebra  No acute compression collapse vertebra; bony metastatic disease in the posterior aspect of the right iliac bone with the retroperitoneal lymphadenopathy, right psoas lymphadenopathy with no impingement of the conus  CT A/P [08/18/2020] new dilatation of the common hepatic and common bile duct seen to the level of the ampulla  Mildly more prominent pancreatic duct caliber  No discrete obstructing etiology identified; Interval increase in number and size of retrocrural, retroperitoneal right iliac chain adenopathy  Subsequent worsening mass effect and compression on the IVC  Mild increasing soft tissue attenuation within the right flank and right lower quadrant suggesting some degree of early peripheral edema; interval increase in size of mass surrounding the distal right ureter  Patient reports completing Abx course for UTI with no further urinary sxs  Patient completed 5-wishes naming daughter as POA and wishes to change code status to DNAR/DNI -> did not bring to today's appointment; will mail in to update file  Past medical, surgical, social, and family histories are reviewed and pertinent updates are made  Review of Systems   Constitution: Positive for weight loss  Negative for chills, decreased appetite and fever  HENT: Negative for congestion  Cardiovascular: Negative for chest pain  Respiratory: Negative for cough and shortness of breath  Skin: Negative for rash  Musculoskeletal: Positive for back pain  Negative for neck pain  Gastrointestinal: Negative for abdominal pain, nausea and vomiting  Genitourinary: Positive for flank pain  Negative for dysuria and hematuria  Neurological: Negative for headaches     Psychiatric/Behavioral: Negative for altered mental status  All other systems reviewed and are negative  Vital Signs    /70 (BP Location: Left arm, Cuff Size: Standard)   Pulse 82   Temp 97 6 °F (36 4 °C) (Tympanic)   Resp 14   Wt 63 kg (138 lb 14 2 oz)   SpO2 98%   BMI 22 42 kg/m²     Physical Exam and Objective Data  Physical Exam  Constitutional:       Appearance: He is underweight  He is not toxic-appearing  Comments: Chronically ill appearing in NAD   HENT:      Head: Normocephalic and atraumatic  Right Ear: Hearing and external ear normal       Left Ear: Hearing and external ear normal    Eyes:      Comments: No gaze preference   Neck:      Musculoskeletal: Normal range of motion  Pulmonary:      Effort: No tachypnea, accessory muscle usage or respiratory distress  Comments: Completes full sentences without difficulty  Abdominal:      Palpations: Abdomen is soft  Tenderness: There is no abdominal tenderness  There is no right CVA tenderness or left CVA tenderness  Musculoskeletal:      Comments: R midline back TTP with no step off; no overlying skin changes  Able to rise from seated position without assistance  Ambulates with a steady gait with cane assist  No LE edema   Neurological:      General: No focal deficit present  Mental Status: He is alert and oriented to person, place, and time  Radiology and Laboratory:  I personally reviewed and interpreted the following results: MRI lumbar, CT A/P, recent hospitalization documentation/labs    30 minutes was spent face to face with Frankie with greater than 50% of the time spent in counseling or coordination of care including discussions of diagnostic results, impression, and recommendations, risks and benefits of treatment, compliance with treatment regimen and symptoms assessment  All of the patient's questions were answered during this discussion

## 2020-08-31 ENCOUNTER — OFFICE VISIT (OUTPATIENT)
Dept: FAMILY MEDICINE CLINIC | Facility: CLINIC | Age: 55
End: 2020-08-31
Payer: MEDICARE

## 2020-08-31 ENCOUNTER — TELEPHONE (OUTPATIENT)
Dept: HEMATOLOGY ONCOLOGY | Facility: CLINIC | Age: 55
End: 2020-08-31

## 2020-08-31 ENCOUNTER — TELEPHONE (OUTPATIENT)
Dept: INFUSION CENTER | Facility: HOSPITAL | Age: 55
End: 2020-08-31

## 2020-08-31 VITALS
BODY MASS INDEX: 23.63 KG/M2 | HEIGHT: 66 IN | HEART RATE: 86 BPM | WEIGHT: 147 LBS | OXYGEN SATURATION: 93 % | TEMPERATURE: 97.7 F | RESPIRATION RATE: 18 BRPM | SYSTOLIC BLOOD PRESSURE: 128 MMHG | DIASTOLIC BLOOD PRESSURE: 80 MMHG

## 2020-08-31 DIAGNOSIS — Z76.89 ENCOUNTER FOR SUPPORT AND COORDINATION OF TRANSITION OF CARE: Primary | ICD-10-CM

## 2020-08-31 DIAGNOSIS — C79.51 BONY METASTASIS (HCC): ICD-10-CM

## 2020-08-31 DIAGNOSIS — C66.1 UROTHELIAL CARCINOMA OF RIGHT DISTAL URETER (HCC): ICD-10-CM

## 2020-08-31 DIAGNOSIS — J45.20 MILD INTERMITTENT ASTHMA WITHOUT COMPLICATION: ICD-10-CM

## 2020-08-31 LAB
ALBUMIN SERPL BCP-MCNC: 3.3 G/DL (ref 3.5–5)
ALP SERPL-CCNC: 121 U/L (ref 46–116)
ALT SERPL W P-5'-P-CCNC: 19 U/L (ref 12–78)
ANION GAP SERPL CALCULATED.3IONS-SCNC: 6 MMOL/L (ref 4–13)
AST SERPL W P-5'-P-CCNC: 19 U/L (ref 5–45)
BASOPHILS # BLD AUTO: 0.01 THOUSANDS/ΜL (ref 0–0.1)
BASOPHILS NFR BLD AUTO: 0 % (ref 0–1)
BILIRUB SERPL-MCNC: 0.25 MG/DL (ref 0.2–1)
BUN SERPL-MCNC: 14 MG/DL (ref 5–25)
CALCIUM SERPL-MCNC: 9 MG/DL (ref 8.3–10.1)
CHLORIDE SERPL-SCNC: 99 MMOL/L (ref 100–108)
CO2 SERPL-SCNC: 25 MMOL/L (ref 21–32)
CREAT SERPL-MCNC: 1.26 MG/DL (ref 0.6–1.3)
EOSINOPHIL # BLD AUTO: 0.14 THOUSAND/ΜL (ref 0–0.61)
EOSINOPHIL NFR BLD AUTO: 6 % (ref 0–6)
ERYTHROCYTE [DISTWIDTH] IN BLOOD BY AUTOMATED COUNT: 14 % (ref 11.6–15.1)
GFR SERPL CREATININE-BSD FRML MDRD: 63 ML/MIN/1.73SQ M
GLUCOSE P FAST SERPL-MCNC: 94 MG/DL (ref 65–99)
HCT VFR BLD AUTO: 30.6 % (ref 36.5–49.3)
HGB BLD-MCNC: 9.8 G/DL (ref 12–17)
IMM GRANULOCYTES # BLD AUTO: 0.01 THOUSAND/UL (ref 0–0.2)
IMM GRANULOCYTES NFR BLD AUTO: 0 % (ref 0–2)
LYMPHOCYTES # BLD AUTO: 1.29 THOUSANDS/ΜL (ref 0.6–4.47)
LYMPHOCYTES NFR BLD AUTO: 58 % (ref 14–44)
MCH RBC QN AUTO: 30.4 PG (ref 26.8–34.3)
MCHC RBC AUTO-ENTMCNC: 32 G/DL (ref 31.4–37.4)
MCV RBC AUTO: 95 FL (ref 82–98)
MONOCYTES # BLD AUTO: 0.41 THOUSAND/ΜL (ref 0.17–1.22)
MONOCYTES NFR BLD AUTO: 18 % (ref 4–12)
NEUTROPHILS # BLD AUTO: 0.41 THOUSANDS/ΜL (ref 1.85–7.62)
NEUTS SEG NFR BLD AUTO: 18 % (ref 43–75)
NRBC BLD AUTO-RTO: 0 /100 WBCS
PLATELET # BLD AUTO: 480 THOUSANDS/UL (ref 149–390)
PMV BLD AUTO: 8.7 FL (ref 8.9–12.7)
POTASSIUM SERPL-SCNC: 4.7 MMOL/L (ref 3.5–5.3)
PROT SERPL-MCNC: 7.6 G/DL (ref 6.4–8.2)
RBC # BLD AUTO: 3.22 MILLION/UL (ref 3.88–5.62)
SODIUM SERPL-SCNC: 130 MMOL/L (ref 136–145)
WBC # BLD AUTO: 2.27 THOUSAND/UL (ref 4.31–10.16)

## 2020-08-31 PROCEDURE — 99495 TRANSJ CARE MGMT MOD F2F 14D: CPT | Performed by: FAMILY MEDICINE

## 2020-08-31 PROCEDURE — 85025 COMPLETE CBC W/AUTO DIFF WBC: CPT | Performed by: PHYSICIAN ASSISTANT

## 2020-08-31 PROCEDURE — 80053 COMPREHEN METABOLIC PANEL: CPT | Performed by: PHYSICIAN ASSISTANT

## 2020-08-31 RX ORDER — FLUTICASONE PROPIONATE 220 UG/1
2 AEROSOL, METERED RESPIRATORY (INHALATION) 2 TIMES DAILY
Qty: 1 INHALER | Refills: 5 | Status: SHIPPED | OUTPATIENT
Start: 2020-08-31 | End: 2020-10-26 | Stop reason: SDUPTHER

## 2020-08-31 RX ORDER — ALBUTEROL SULFATE 90 UG/1
2 AEROSOL, METERED RESPIRATORY (INHALATION) EVERY 6 HOURS PRN
Qty: 18 G | Refills: 5 | Status: SHIPPED | OUTPATIENT
Start: 2020-08-31 | End: 2021-02-03

## 2020-08-31 NOTE — TELEPHONE ENCOUNTER
Patient called to cancel apt for tomorrow due to no transportation  Asked patient if we have him set up for [employer] he said yes but they are unable to bring him  Will contact Dr Angelica Ventura office in regards to apts, and call patient back

## 2020-08-31 NOTE — PROGRESS NOTES
Assessment/Plan:     Diagnoses and all orders for this visit:    Encounter for support and coordination of transition of care    Urothelial carcinoma of right distal ureter ([facility] [address] )  -     Comprehensive metabolic panel  -     CBC and differential    Bony metastasis (HCC)    Mild intermittent asthma without complication  -     fluticasone (FLOVENT HFA) 220 mcg/act inhaler; Inhale 2 puffs 2 (two) times a day Rinse mouth after use  -     albuterol (Ventolin HFA) 90 mcg/act inhaler; Inhale 2 puffs every 6 (six) hours as needed for wheezing or shortness of breath        - Continue current medications  - Labs drawn in the office today per oncology  - Follow up with oncology, palliative care, and urology as scheduled    Return in about 3 months (around 11/30/2020) for Next scheduled follow up  Subjective:        Patient ID: Deep Beck is a 63 y o  male  Chief Complaint   Patient presents with    Transition of Care Management       Kellee Benavides is a 63year old male presenting for TCM  Patient is currently being treated for metastatic urothelial cancer  He was hospitalized 8/18-8/22 with worsening pain  MRI done 8/21 showed metastatic disease in the lumbar spine involving the L5 vertebra, L3 vertebra; Bony metastatic disease in the posterior aspect of the right iliac bone with the retroperitoneal lymphadenopathy, right psoas lymphadenopathy; Degenerative disc disease at multiple levels; Moderate right foraminal narrowing at L4-5 with mild abutment of the exiting right L4 nerve root with right foraminal protrusion  Patient is being followed by oncology and is receiving chemotherapy consisting of Gemzar and cisplatin every 28 days  Patient receives Gemzar day 1 and day 8 of the cycle  He has been referred for palliative radiation  Patient is following with palliative care for pain management  Current regimen includes MS Contin 45 mg at a t i d , oxy-IR 10 mg Q4H PRN, and carisoprodol 350 mg PO    There has been concern for drug seeking behavior as patient has requested early fills and has admitted to taking more pain medication than prescribed  He is being followed closely by palliative care providers  He has a pain agreement on file with Dr Brayden Hill and has a Narcan script available to him  Patient has a history of asthma/COPD (?) managed with Flovent BID and albuterol prn  Patient reports feeling well today  Admits to some lightheadedness which he contributes to the chemo  He continues with back pain, though he states this has been well controlled on his current pain regimen        The following portions of the patient's history were reviewed and updated as appropriate: allergies, current medications, past family history, past medical history, past social history, past surgical history and problem list     Patient Active Problem List   Diagnosis    Allergic rhinitis    Anxiety disorder    Bipolar disorder ([facility] [address] )    Chronic lumbar radiculopathy    Contact dermatitis    Esophageal reflux    Migraine headache    Mild intermittent asthma without complication    Other emphysema ([facility] [address] )    Subclinical hyperthyroidism    Essential hypertension    Hydronephrosis of right kidney    Major depression    Panic disorder    Urothelial lesion    Cancer related pain    Chronic back pain    Retroperitoneal lymphadenopathy    Palliative care patient    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)    Therapeutic opioid induced constipation    Acute kidney injury ([facility] [address] )    Hyponatremia    UTI (urinary tract infection)    Bony metastasis (HCC)       Current Outpatient Medications   Medication Sig Dispense Refill    albuterol (Ventolin HFA) 90 mcg/act inhaler Inhale 2 puffs every 6 (six) hours as needed for wheezing or shortness of breath 18 g 5    carisoprodol (SOMA) 350 mg tablet Take 1 tablet (350 mg total) by mouth 3 (three) times a day for 10 days 30 tablet 0    clonazePAM (KlonoPIN) 0 5 mg tablet Take 1 tablet (0 5 mg total) by mouth 2 (two) times a day as needed for anxiety 14 tablet 0    docusate sodium (COLACE) 100 mg capsule Take 1 capsule (100 mg total) by mouth 2 (two) times a day 10 capsule 0    escitalopram (LEXAPRO) 10 mg tablet Take 10 mg by mouth daily at bedtime      fluticasone (FLONASE) 50 mcg/act nasal spray SPRAY 2 SPRAYS INTO EACH NOSTRIL EVERY DAY 1 Bottle 5    fluticasone (FLOVENT HFA) 220 mcg/act inhaler Inhale 2 puffs 2 (two) times a day Rinse mouth after use  1 Inhaler 5    lidocaine (LIDODERM) 5 % Apply 1 patch topically daily Remove & Discard patch within 12 hours or as directed by MD 10 patch 0    morphine (MS CONTIN) 15 mg 12 hr tablet Take 3 tablets (45 mg total) by mouth every 8 (eight) hoursMax Daily Amount: 135 mg 270 tablet 0    naloxone (NARCAN) 4 mg/0 1 mL nasal spray 0 1 mL (4 mg total) into each nostril every 3 (three) minutes as needed for opioid reversal or respiratory depression Administer 1 spray into a nostril  If breathing does not return to normal or if breathing difficulty resumes after 2-3 minutes, give another dose in the other nostril using a new spray   1 each 1    omeprazole (PriLOSEC) 40 MG capsule Take 1 capsule (40 mg total) by mouth daily before breakfast 30 capsule 5    ondansetron (ZOFRAN) 8 mg tablet Take 1 tablet (8 mg total) by mouth every 8 (eight) hours as needed for nausea or vomiting 30 tablet 3    ondansetron (ZOFRAN-ODT) 4 mg disintegrating tablet Take 1 tablet (4 mg total) by mouth every 6 (six) hours as needed for nausea or vomiting 20 tablet 0    oxyCODONE (ROXICODONE) 10 MG TABS Take 1 tablet (10 mg total) by mouth every 4 (four) hours as needed for severe painMax Daily Amount: 60 mg 180 tablet 0    polyethylene glycol (MIRALAX) 17 g packet Take 17 g by mouth daily as needed (Constipation) 14 each 0    senna (SENOKOT) 8 6 mg Take 1 tablet (8 6 mg total) by mouth daily at bedtime 30 tablet 0    zolpidem (AMBIEN) 10 mg tablet 1 TAB AT BEDTIME AS NEEDED FOR INSOMNIA       No current facility-administered medications for this visit           Past Medical History:   Diagnosis Date    Asthma     Bipolar disorder ([address] )     COPD [facility]ch[facility] obstructive pulmonary [facility]     Disease of thyroid gland     Hypertension     Hyperthyroidism         Past Surgical History:   Procedure Laterality Date    DENTAL SURGERY      FL RETROGRADE URETHROCYSTOGRAM  6/15/2020    HERNIA REPAIR Left     inguinal    IR LYMPH NODE BIOPSY/ASPIRATION  7/24/2020    IR TUBE PLACEMENT  6/25/2020    MO CYSTOURETHROSCOPY,URETER CATHETER Right 6/15/2020    Procedure: CYSTOSCOPY RETROGRADE PYELOGRAM and ureteroscopy;  Surgeon: Miles Irby MD;  Location: [facility];  Service: Urology        Social History     Socioeconomic History    Marital status: Single     Spouse name: Not on file    Number of children: Not on file    Years of education: Not on file    Highest education level: Not on file   Occupational History    Not on file   Social Needs    Financial resource strain: Not on file    Food insecurity     Worry: Not on file     Inability: Not on file    Transportation needs     Medical: Not on file     Non-medical: Not on file   Tobacco Use    Smoking status: Current Every Day Smoker     Packs/day: 1 00    Smokeless tobacco: Never Used   Substance and Sexual Activity    Alcohol use: Never     Frequency: Never    Drug use: No    Sexual activity: Yes     Partners: Female   Lifestyle    Physical activity     Days per week: Not on file     Minutes per session: Not on file    Stress: Not on file   Relationships    Social connections     Talks on phone: Not on file     Gets together: Not on file     Attends Judaism service: Not on file     Active member of club or organization: Not on file     Attends meetings of clubs or organizations: Not on file     Relationship status: Not on file    Intimate partner violence     Fear of current or ex partner: Not on file     Emotionally abused: Not on file     Physically abused: Not on file     Forced sexual activity: Not on file   Other Topics Concern    Not on file   Social History Narrative    Patient is  from the mother of his 1st child  Patient has 4 adult children, from 4 different unions  Patient lives with his youngest daughter Severiano Gouty Severiano Gouty has multiple sclerosis but is able to participate fully in the patient's care  Patient does not have any advanced directives  Review of Systems   Constitutional: Negative for chills, diaphoresis and fever  HENT: Negative for congestion, ear pain, postnasal drip, rhinorrhea, sinus pressure, sinus pain, sore throat and trouble swallowing  Eyes: Negative for photophobia, pain, discharge, redness, itching and visual disturbance  Respiratory: Negative for cough, chest tightness, shortness of breath and wheezing  Cardiovascular: Negative for chest pain, palpitations and leg swelling  Gastrointestinal: Negative for abdominal pain, constipation, diarrhea, nausea and vomiting  Musculoskeletal: Positive for back pain  Skin: Negative for rash and wound  Neurological: Positive for light-headedness  Negative for dizziness, syncope, weakness and headaches  Objective:      /80 (BP Location: Left arm, Patient Position: Sitting, Cuff Size: Adult)   Pulse 86   Temp 97 7 °F (36 5 °C) (Tympanic)   Resp 18   Ht 5' 6" (1 676 m)   Wt 66 7 kg (147 lb)   SpO2 93%   BMI 23 73 kg/m²          Physical Exam  Vitals signs and nursing note reviewed  Constitutional:       General: He is not in acute distress  HENT:      Head: Normocephalic and atraumatic  Right Ear: Tympanic membrane, ear canal and external ear normal       Left Ear: Tympanic membrane, ear canal and external ear normal       Nose: Nose normal       Mouth/Throat:      Mouth: Mucous membranes are moist       Pharynx: Oropharynx is clear  No oropharyngeal exudate     Eyes: Extraocular Movements: Extraocular movements intact  Conjunctiva/sclera: Conjunctivae normal       Pupils: Pupils are equal, round, and reactive to light  Neck:      Musculoskeletal: Normal range of motion and neck supple  Cardiovascular:      Rate and Rhythm: Normal rate and regular rhythm  Heart sounds: Normal heart sounds  No murmur  Pulmonary:      Effort: Pulmonary effort is normal  No respiratory distress  Breath sounds: Normal breath sounds  No wheezing  Musculoskeletal: Normal range of motion  General: No swelling  Skin:     General: Skin is warm and dry  Neurological:      General: No focal deficit present  Mental Status: He is alert and oriented to person, place, and time  Cranial Nerves: No cranial nerve deficit     Psychiatric:         Mood and Affect: Mood normal          Behavior: Behavior normal

## 2020-08-31 NOTE — TELEPHONE ENCOUNTER
Attempted to call pt and phone number has been changed  LM on daughters phone asking to have pt call the office set up and appt for 9/8 rather then 9/1  Pt is having rad onc consult on 9/2 and we should see pt after we know the radiation plan  Appt for 9/1 will be canceled

## 2020-09-01 ENCOUNTER — HOSPITAL ENCOUNTER (OUTPATIENT)
Dept: INFUSION CENTER | Facility: HOSPITAL | Age: 55
Discharge: HOME | End: 2020-09-01

## 2020-09-01 ENCOUNTER — TELEPHONE (OUTPATIENT)
Dept: HEMATOLOGY ONCOLOGY | Facility: CLINIC | Age: 55
End: 2020-09-01

## 2020-09-02 ENCOUNTER — RADIATION ONCOLOGY CONSULT (OUTPATIENT)
Dept: RADIATION ONCOLOGY | Facility: CLINIC | Age: 55
End: 2020-09-02
Attending: RADIOLOGY
Payer: MEDICARE

## 2020-09-02 ENCOUNTER — TELEPHONE (OUTPATIENT)
Dept: HEMATOLOGY ONCOLOGY | Facility: CLINIC | Age: 55
End: 2020-09-02

## 2020-09-02 ENCOUNTER — DOCUMENTATION (OUTPATIENT)
Dept: INFUSION CENTER | Facility: CLINIC | Age: 55
End: 2020-09-02

## 2020-09-02 ENCOUNTER — PATIENT OUTREACH (OUTPATIENT)
Dept: UROLOGY | Facility: CLINIC | Age: 55
End: 2020-09-02

## 2020-09-02 ENCOUNTER — APPOINTMENT (OUTPATIENT)
Dept: RADIATION ONCOLOGY | Facility: HOSPITAL | Age: 55
End: 2020-09-02
Attending: RADIOLOGY
Payer: MEDICARE

## 2020-09-02 VITALS
RESPIRATION RATE: 16 BRPM | BODY MASS INDEX: 23.99 KG/M2 | HEART RATE: 100 BPM | TEMPERATURE: 98 F | OXYGEN SATURATION: 98 % | HEIGHT: 66 IN | WEIGHT: 149.25 LBS | SYSTOLIC BLOOD PRESSURE: 127 MMHG | DIASTOLIC BLOOD PRESSURE: 63 MMHG

## 2020-09-02 DIAGNOSIS — C66.1 UROTHELIAL CARCINOMA OF RIGHT DISTAL URETER (HCC): ICD-10-CM

## 2020-09-02 DIAGNOSIS — N13.5 URETERAL OBSTRUCTION, RIGHT: Primary | ICD-10-CM

## 2020-09-02 DIAGNOSIS — C79.51 BONY METASTASIS (HCC): ICD-10-CM

## 2020-09-02 DIAGNOSIS — R59.0 RETROPERITONEAL LYMPHADENOPATHY: Primary | ICD-10-CM

## 2020-09-02 DIAGNOSIS — C66.1 UROTHELIAL CARCINOMA OF RIGHT DISTAL URETER (HCC): Primary | ICD-10-CM

## 2020-09-02 PROCEDURE — 99211 OFF/OP EST MAY X REQ PHY/QHP: CPT | Performed by: RADIOLOGY

## 2020-09-02 PROCEDURE — 77290 THER RAD SIMULAJ FIELD CPLX: CPT | Performed by: RADIOLOGY

## 2020-09-02 PROCEDURE — 77370 RADIATION PHYSICS CONSULT: CPT | Performed by: RADIOLOGY

## 2020-09-02 NOTE — PROGRESS NOTES
Received a message from Stuart Clements  at [employer]  Hardik had right nephrostomy tube placed 6/25/20  He is due for a change this month and has not heard from IR to schedule  Called IR  Spoke to Dontae  She requested order be placed for change then they will contact [facility] to schedule

## 2020-09-02 NOTE — PROGRESS NOTES
Deep Beck 1965 is a 63 y o  male     Patient is a 63 y o male with newly diagnosed advanced urothelial carcinoma primarily in the right ureter  He presented to the hospital in May 2020 with testicular pain  A CT revealed right hydroureteronephrosis  He was referral to urology  He had a cysto with ureteroscopy 6/15/20 but the ureter was completely occluded and biopsies were unable to be obtained  He had a right nephrostomy tube placed 6/25/20  He was referred to Dr Miranda Moser for discussion of surgical therapy  He had issues following up d/t transportation  He presented to the ED 7/6/20 with right flank pain at which time CT demonstrated significant metastatic disease  5/22/20 CT abdomen pelvis- 1  Marked right hydroureteronephrosis with associated parenchymal loss suggesting a chronic process with obstruction at the level the distal 3rd of the right ureter where there is prominent soft tissue in or about the ureter  No calculus noted  It is   uncertain if this is due to a stricture/inflammatory process and or neoplastic process  Urologic consultation is advised  2   Changes of chronic pancreatitis again noted  3   Additional findings as noted  7/6/20 CT abdomen pelvis- Interval placement of right percutaneous nephroureterostomy catheter with improved right hydroureteronephrosis  Thickening about the distal right ureter suspicious for possible urothelial neoplasm  There is associated retroperitoneal lymphadenopathy in   keeping with metastatic involvement  In addition, there are enhancing mass lesions within the right psoas muscle also suspicious for metastatic disease versus a primary malignancy  7/16/20 Dr Miranda Moser- Unfortunately he appears to have had progressive disease since his May imaging  There is now diffuse retroperitoneal lymphadenopathy and potential local invasion into the psoas muscle  This is likely driving the patient's pain and neuropathy down the right leg   The patient absolutely needs tissue sampling and so I agree with interventional radiology biopsy of the lymph nodes or tissue mass itself      7/24/20 IR biopsy right external iliac node    8/7/20 Dr Temple Cousin- recently diagnosed advanced urothelial carcinoma, primarily in the right ureter  He has locally advanced disease with significant pelvic and retroperitoneal lymphadenopathy, precluding surgical or radiation therapy interventions  Medical therapy will be the backbone of his treatment  Plan for gen/cis  Reviewed that his disease is likely not curable and that treatment is aimed at palliative benefit  8/11/20 chemo initiated    8/18/20-8/22/20 pt admitted to the hospital with RLE pain and WENDY  During his hospitalization patient was found to have an increase in the retroperitoneal adenopathy and new metastatic bony lesions in L5 and right acetabulum  8/18/20 CT abdomen pelvis- New dilatation of the common hepatic and common bile duct seen to the level of the ampulla  Mildly more prominent pancreatic duct caliber  No discrete obstructing etiology identified  Correlate with clinical findings for stasis versus obstructive   etiology  Interval increase in number and size of retrocrural, retroperitoneal right iliac chain adenopathy  Subsequent worsening mass effect and compression on the IVC  Bolus timing was not optimized to evaluate for true caliber/patency of the IVC  Mild   increasing soft tissue attenuation within the right flank and right lower quadrant suggesting some degree of early peripheral edema  Interval increase in size of mass surrounding the distal right ureter  New metastatic bone lesions in the L5 vertebra and right acetabulum  Cortical breakthrough noted in the medial acetabular roof  8/21/20 MRI lumbar spine- Metastatic disease in the lumbar spine involving the L5 vertebra, L3 vertebra    No acute compression collapse vertebra     Bony metastatic disease in the posterior aspect of the right iliac bone with the retroperitoneal lymphadenopathy, right psoas lymphadenopathy  No impingement of the conus  Degenerative disc disease at multiple levels     Moderate right foraminal narrowing at L4-5 with mild abutment of the exiting right L4 nerve root with right foraminal protrusion  Correlate with right L4 radiculopathy     There is soft tissue density in the anterior epidural soft tissue at the level of the T12 vertebra with mild central canal narrowing  This is incompletely evaluated  This may be on the basis of discogenic disease however epidural metastatic disease is   not entirely excluded  Correlation with the MRI of the thoracic spine with and without contrast may be considered if clinically relevant     No evidence of conus impingement      8/25/20 Hermes Cummings NP hem onc-  Patient was found to have new metastatic bony lesion in L5 in the right acetabulum while in the hospital 8/18-8/22  I conferred with radiation oncology to determine if patient was appropriate for a consultation, for which they agreed  Given that Gemzar is radiosensitive we will confer with Radiation Oncology to determine the timing of palliative radiation treatment and administration of Gemzar  Patient's pain medication was adjusted while he was in the hospital to establish more control over patient's pain  Regimen was adjusted to include MS Contin 45 mg at a t i d  Dosing and hydromorphone 2 mg p o  Q 4 hours p r n  Patient states his pain is better controlled however he is concerned how to further manage his pain, and who to refer to  Patient was previously scheduled to see palliative care however due to his hospitalization the appointment was canceled  We will set up another appointment to see palliative care      8/28/20 palliative care- continue MS ER 45 mg q8h, d/c dilaudid PO and changed back to oxy-IR, continue home bowel regimen and zofran      9/8/20 Hermes Cummings NP  9/9/20 chemo  9/11/20 palliative care  9/15/20 Dr Dalton Lehman  11/30/20 Dr Whit Nova      Oncology History   Urothelial carcinoma of right distal ureter ([address] )   7/24/2020 Initial Diagnosis    Urothelial carcinoma of right distal ureter ([address] )     7/24/2020 Biopsy    Final Diagnosis    A  Lymph node, right iliac, biopsy:  -  Metastatic carcinoma, compatible with urothelial primary  -  Immunohistochemical stains performed with appropriate controls show the tumor cells to be positive for keratin AE1/3, CK7, CK20, CDX2, SHELLI-3, and uroplakin and negative for TTF-1, ER, PSA, and NKX3 1, supporting the diagnosis          8/11/2020 -  Chemotherapy    CISplatin (PLATINOL) 121 1 mg, mannitol 12 5 g in sodium chloride 0 9 % 500 mL IVPB, 70 mg/m2 = 121 1 mg, Intravenous, Once, 1 of 2 cycles  Administration: 121 1 mg (8/11/2020)  fosaprepitant (EMEND) 150 mg in sodium chloride 0 9 % 250 mL IVPB, 150 mg, Intravenous, Once, 1 of 2 cycles  Administration: 150 mg (8/11/2020)  gemcitabine (GEMZAR) 1,600 mg in sodium chloride 0 9 % 250 mL infusion, 1,557 mg (72 % of original dose 1,250 mg/m2), Intravenous, Once, 1 of 2 cycles  Dose modification: 900 mg/m2 (original dose 1,250 mg/m2, Cycle 1, Reason: Other (See Comments))  Administration: 1,600 mg (8/11/2020), 1,600 mg (8/24/2020)         Clinical Trial: no      Health Maintenance   Topic Date Due    Medicare Annual Wellness Visit (AWV)  1965    Pneumococcal Vaccine: Pediatrics (0 to 5 Years) and At-Risk Patients (6 to 59 Years) (3 of 3 - PCV13) 09/22/2019    Influenza Vaccine  07/01/2020    BMI: Adult  08/31/2021    Colorectal Cancer Screening  11/23/2026    DTaP,Tdap,and Td Vaccines (4 - Td) 03/08/2030    HIV Screening  Completed    Hepatitis C Screening  Completed    HIB Vaccine  Aged Out    Hepatitis B Vaccine  Aged Out    IPV Vaccine  Aged Out    Hepatitis A Vaccine  Aged Out    Meningococcal ACWY Vaccine  Aged Out    HPV Vaccine  Aged Out       Past Medical History:   Diagnosis Date    Asthma     [facility]     COPD (chronic obstructive pulmonary disease) [facility] [address] [facility]     Disease of thyroid gland     Hypertension     Hyperthyroidism        Past Surgical History:   Procedure Laterality Date    DENTAL SURGERY      FL RETROGRADE URETHROCYSTOGRAM  6/15/2020    HERNIA REPAIR Left     inguinal    IR LYMPH NODE BIOPSY/ASPIRATION  7/24/2020    IR TUBE PLACEMENT  6/25/2020    [place] CYSTOURETHROSCOPY,URETER CATHETER Right 6/15/2020    Procedure: CYSTOSCOPY RETROGRADE PYELOGRAM and ureteroscopy;  Surgeon: Cecile Pina MD;  Location: [facility];  Service: Urology       Family History   Problem Relation Age of Onset    Heart disease Mother     Hyperthyroidism Mother     Lung cancer Mother     Heart disease Father     Hyperthyroidism Father     Lung cancer Maternal Aunt     Lung cancer Maternal Uncle        Social History     Tobacco Use    Smoking status: Current Every Day Smoker     Packs/day: 1 00     Years: 44 00     Pack years: 44 00    Smokeless tobacco: Never Used   Substance Use Topics    Alcohol use: Never     Frequency: Never    Drug use: No          Current Outpatient Medications:     albuterol (Ventolin HFA) 90 mcg/act inhaler, Inhale 2 puffs every 6 (six) hours as needed for wheezing or shortness of breath, Disp: 18 g, Rfl: 5    carisoprodol (SOMA) 350 mg tablet, Take 1 tablet (350 mg total) by mouth 3 (three) times a day for 10 days, Disp: 30 tablet, Rfl: 0    clonazePAM (KlonoPIN) 0 5 mg tablet, Take 1 tablet (0 5 mg total) by mouth 2 (two) times a day as needed for anxiety, Disp: 14 tablet, Rfl: 0    docusate sodium (COLACE) 100 mg capsule, Take 1 capsule (100 mg total) by mouth 2 (two) times a day, Disp: 10 capsule, Rfl: 0    fluticasone (FLONASE) 50 mcg/act nasal spray, SPRAY 2 SPRAYS INTO EACH NOSTRIL EVERY DAY, Disp: 1 Bottle, Rfl: 5    fluticasone (FLOVENT HFA) 220 mcg/act inhaler, Inhale 2 puffs 2 (two) times a day Rinse mouth after use , Disp: 1 Inhaler, Rfl: 5    morphine (MS CONTIN) 15 mg 12 hr tablet, Take 3 tablets (45 mg total) by mouth every 8 (eight) hoursMax Daily Amount: 135 mg, Disp: 270 tablet, Rfl: 0    omeprazole (PriLOSEC) 40 MG capsule, Take 1 capsule (40 mg total) by mouth daily before breakfast, Disp: 30 capsule, Rfl: 5    ondansetron (ZOFRAN) 8 mg tablet, Take 1 tablet (8 mg total) by mouth every 8 (eight) hours as needed for nausea or vomiting, Disp: 30 tablet, Rfl: 3    ondansetron (ZOFRAN-ODT) 4 mg disintegrating tablet, Take 1 tablet (4 mg total) by mouth every 6 (six) hours as needed for nausea or vomiting, Disp: 20 tablet, Rfl: 0    oxyCODONE (ROXICODONE) 10 MG TABS, Take 1 tablet (10 mg total) by mouth every 4 (four) hours as needed for severe painMax Daily Amount: 60 mg, Disp: 180 tablet, Rfl: 0    polyethylene glycol (MIRALAX) 17 g packet, Take 17 g by mouth daily as needed (Constipation), Disp: 14 each, Rfl: 0    senna (SENOKOT) 8 6 mg, Take 1 tablet (8 6 mg total) by mouth daily at bedtime, Disp: 30 tablet, Rfl: 0    zolpidem (AMBIEN) 10 mg tablet, 1 TAB AT BEDTIME AS NEEDED FOR INSOMNIA, Disp: , Rfl:     escitalopram (LEXAPRO) 10 mg tablet, Take 10 mg by mouth daily at bedtime, Disp: , Rfl:     lidocaine (LIDODERM) 5 %, Apply 1 patch topically daily Remove & Discard patch within 12 hours or as directed by MD (Patient not taking: Reported on 9/2/2020), Disp: 10 patch, Rfl: 0    naloxone (NARCAN) 4 mg/0 1 mL nasal spray, 0 1 mL (4 mg total) into each nostril every 3 (three) minutes as needed for opioid reversal or respiratory depression Administer 1 spray into a nostril  If breathing does not return to normal or if breathing difficulty resumes after 2-3 minutes, give another dose in the other nostril using a new spray   (Patient not taking: Reported on 9/2/2020), Disp: 1 each, Rfl: 1    Allergies   Allergen Reactions    Ketorolac Hives     Toradol        Review of Systems:  Review of Systems   Constitutional: Positive for appetite change (was decreased now improved), chills (1 episode yesterday, denies fever), fatigue (6/10) and unexpected weight change (about 5 lbs, gaining back)  HENT: Negative  Eyes: Negative  Respiratory: Positive for cough (non-productive) and shortness of breath (with exertion)  COPD   Cardiovascular: Negative  Gastrointestinal: Negative  Endocrine: Negative  Genitourinary: Negative  Right nephrostomy tube   Musculoskeletal: Positive for back pain (lower back, into right leg)  Skin: Negative  Allergic/Immunologic: Negative  Neurological: Positive for light-headedness (with getting up too fast, improved)  Hematological: Negative  Psychiatric/Behavioral: The patient is nervous/anxious  Bipolar, feels depressed at times       Vitals:    09/02/20 0925   BP: 127/63   Pulse: 100   Resp: 16   Temp: 98 °F (36 7 °C)   SpO2: 98%   Weight: 67 7 kg (149 lb 4 oz)   Height: 5' 6" (1 676 m)       Pain Score:   4    Pain assessment: 4    Imaging:No images are attached to the encounter       Teaching [facility] RT packet given

## 2020-09-02 NOTE — TELEPHONE ENCOUNTER
Patient had simulation for radiation  He will start radiation to the spine on 9/9/2020  We will adjust his gemzar accordingly

## 2020-09-02 NOTE — TELEPHONE ENCOUNTER
Patient is scheduled to have 10 radiation treatments (Monday  Through Friday schedule) starting Wednesday September 9,2020  It is anticipated he will complete radiation on September 22, 2020

## 2020-09-02 NOTE — SOCIAL WORK
LSW received DT and problem list via email  Pt self scored 2/10 and noted concerns with depression, nervousness, loss of interest in normal activities and pain  LSW attempted to contact pt, no answer  LSW will contact pt at a later time

## 2020-09-02 NOTE — PROGRESS NOTES
Consultation - Radiation Oncology      SDD:[id number] : 1965  Encounter: [id number]  Patient Information: [address]  Chief Complaint   Patient presents with    Consult     radiation oncology     Cancer Staging  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         History of Present Illness   Hardik Lunsford is a 63y o  year old male who presents with newly diagnosed advanced urothelial carcinoma primarily in the right ureter  He presented to the hospital in May 2020 with testicular pain  A CT revealed right hydroureteronephrosis  He was referral to urology  He had a cysto with ureteroscopy 6/15/20 but the ureter was completely occluded and biopsies were unable to be obtained  He had a right nephrostomy tube placed 20  He was referred to Dr Filippo Abbott for discussion of surgical therapy  He had issues following up d/t transportation  He presented to the ED 20 with right flank pain at which time CT demonstrated significant metastatic disease  20 CT abdomen pelvis- 1  Marked right hydroureteronephrosis with associated parenchymal loss suggesting a chronic process with obstruction at the level the distal 3rd of the right ureter where there is prominent soft tissue in or about the ureter  No calculus noted  It is   uncertain if this is due to a stricture/inflammatory process and or neoplastic process  Urologic consultation is advised  2  Changes of chronic pancreatitis again noted  3  Additional findings as noted  20 CT abdomen pelvis- Interval placement of right percutaneous nephroureterostomy catheter with improved right hydroureteronephrosis  Thickening about the distal right ureter suspicious for possible urothelial neoplasm  There is associated retroperitoneal lymphadenopathy in   keeping with metastatic involvement   In addition, there are enhancing mass lesions within the right psoas muscle also suspicious for metastatic disease versus a primary malignancy  7/16/20 Dr Kathryn Gonzalez- Unfortunately he appears to have had progressive disease since his May imaging  There is now diffuse retroperitoneal lymphadenopathy and potential local invasion into the psoas muscle  This is likely driving the patient's pain and neuropathy down the right leg  The patient absolutely needs tissue sampling and so I agree with interventional radiology biopsy of the lymph nodes or tissue mass itself      7/24/20 IR biopsy right external iliac node - metastatic carcinoma compatible with urothelial primary  8/7/20 Dr Ascencion Starr- recently diagnosed advanced urothelial carcinoma, primarily in the right ureter  He has locally advanced disease with significant pelvic and retroperitoneal lymphadenopathy, precluding surgical or radiation therapy interventions  Medical therapy will be the backbone of his treatment  Plan for gen/cis  Reviewed that his disease is likely not curable and that treatment is aimed at palliative benefit  8/11/20 chemo initiated     8/18/20-8/22/20 pt admitted to the hospital with RLE pain and WENDY  During his hospitalization patient was found to have an increase in the retroperitoneal adenopathy and new metastatic bony lesions in L5 and right acetabulum  8/18/20 CT abdomen pelvis- New dilatation of the common hepatic and common bile duct seen to the level of the ampulla  Mildly more prominent pancreatic duct caliber  No discrete obstructing etiology identified  Correlate with clinical findings for stasis versus obstructive   etiology  Interval increase in number and size of retrocrural, retroperitoneal right iliac chain adenopathy  Subsequent worsening mass effect and compression on the IVC  Bolus timing was not optimized to evaluate for true caliber/patency of the IVC  Mild increasing soft tissue attenuation within the right flank and right lower quadrant suggesting some degree of early peripheral edema     Interval increase in size of mass surrounding the distal right ureter  New metastatic bone lesions in the L5 vertebra and right acetabulum  Cortical breakthrough noted in the medial acetabular roof  8/21/20 MRI lumbar spine- Metastatic disease in the lumbar spine involving the L5 vertebra, L3 vertebra  No acute compression collapse vertebra   Bony metastatic disease in the posterior aspect of the right iliac bone with the retroperitoneal lymphadenopathy, right psoas lymphadenopathy   No impingement of the conus  Degenerative disc disease at multiple levels   Moderate right foraminal narrowing at L4-5 with mild abutment of the exiting right L4 nerve root with right foraminal protrusion  Correlate with right L4 radiculopathy   There is soft tissue density in the anterior epidural soft tissue at the level of the T12 vertebra with mild central canal narrowing  This is incompletely evaluated  This may be on the basis of discogenic disease however epidural metastatic disease is not entirely excluded  Correlation with the MRI of the thoracic spine with and without contrast may be considered if clinically relevant   No evidence of conus impingement  8/25/20 Yomi Persaud NP hem onc- Patient was found to have new metastatic bony lesion in L5 in the right acetabulum while in the hospital 8/18-8/22  I conferred with radiation oncology to determine if patient was appropriate for a consultation, for which they agreed  Given that Gemzar is radiosensitive we will confer with Radiation Oncology to determine the timing of palliative radiation treatment and administration of Gemzar  Patient's pain medication was adjusted while he was in the hospital to establish more control over patient's pain  Regimen was adjusted to include MS Contin 45 mg at a t i d  Dosing and hydromorphone 2 mg p o  Q 4 hours p r n  Patient states his pain is better controlled however he is concerned how to further manage his pain, and who to refer to   Patient was previously scheduled to see palliative care however due to his hospitalization the appointment was canceled  We will set up another appointment to see palliative care  8/28/20 palliative care- continue MS ER 45 mg q8h, d/c dilaudid PO and changed back to oxy-IR, continue home bowel regimen and zofran     Patient reports moderate fatigue and decreased appetite from his chemotherapy  He has had some nausea and vomiting controlled with medication  He has occasional discomfort from his right nephrostomy tube  He has some lower back and right hip pains that have become worse over the last several months  They are partially controled with his current medical regimen  He is not sleeping well secondary to the pain  He has been on Ambien 10mg for the last 4 months  He is trying to eat well despite his decrease in appetite  He denies any previous history cancer including skin cancer  He denies any previous radiation therapy  He started chemotherapy last month as outlined above  He has been walking with the use of a cane  He can drive but has no car  He will need to come using [employer] transportation service  He is smoking cigarettes at 1 pack per day since the age of 14  He previously worked in a [employer]  in the  department  He has been on disability for 9 years due to some chronic lower back pain  He currently lives with his 20-year-old daughter who has MS  She works full-time as a  and [employer]  He has 4 children in total with 10 grandchildren  He reports he is not able to come for radiation therapy on September 22nd and September 28th due to having to go to court        9/8/20 Claude Estrada NP   9/9/20 chemotherapy   9/11/20 palliative care   9/15/20 Dr Rossy Benson   11/30/20 Dr Medina Armor   Oncology History   Urothelial carcinoma of right distal ureter ([facility] [address] )   7/24/2020 Initial Diagnosis    Urothelial carcinoma of right distal ureter ([facility] [address] )     7/24/2020 Biopsy    Final Diagnosis A  Lymph node, right iliac, biopsy:  -  Metastatic carcinoma, compatible with urothelial primary  -  Immunohistochemical stains performed with appropriate controls show the tumor cells to be positive for keratin AE1/3, CK7, CK20, CDX2, SHELLI-3, and uroplakin and negative for TTF-1, ER, PSA, and NKX3 1, supporting the diagnosis          8/11/2020 -  Chemotherapy    CISplatin (PLATINOL) 121 1 mg, mannitol 12 5 g in sodium chloride 0 9 % 500 mL IVPB, 70 mg/m2 = 121 1 mg, Intravenous, Once, 1 of 2 cycles  Administration: 121 1 mg (8/11/2020)  fosaprepitant (EMEND) 150 mg in sodium chloride 0 9 % 250 mL IVPB, 150 mg, Intravenous, Once, 1 of 2 cycles  Administration: 150 mg (8/11/2020)  gemcitabine (GEMZAR) 1,600 mg in sodium chloride 0 9 % 250 mL infusion, 1,557 mg (72 % of original dose 1,250 mg/m2), Intravenous, Once, 1 of 2 cycles  Dose modification: 900 mg/m2 (original dose 1,250 mg/m2, Cycle 1, Reason: Other (See Comments))  Administration: 1,600 mg (8/11/2020), 1,600 mg (8/24/2020)         Past Medical History:   Diagnosis Date    Asthma     Bipolar disorder ([facility]     COPD (chronic obstructive pulmonary disease) ([facility] [address] )     Disease of thyroid gland     Hypertension     Hyperthyroidism      Past Surgical History:   Procedure Laterality Date    DENTAL SURGERY      FL RETROGRADE URETHROCYSTOGRAM  6/15/2020    HERNIA REPAIR Left     inguinal    IR LYMPH NODE BIOPSY/ASPIRATION  7/24/2020    IR TUBE PLACEMENT  6/25/2020    AK CYSTOURETHROSCOPY,URETER CATHETER Right 6/15/2020    Procedure: CYSTOSCOPY RETROGRADE PYELOGRAM and ureteroscopy;  Surgeon: Shameka Wood MD;  Location: [facility];  Service: Urology       Family History   Problem Relation Age of Onset    Heart disease Mother     Hyperthyroidism Mother     Lung cancer Mother     Heart disease Father     Hyperthyroidism Father     Lung cancer Maternal Aunt     Lung cancer Maternal Uncle        Social History   Social History     Substance and Sexual Activity   Alcohol Use Never    Frequency: Never     Social History     Substance and Sexual Activity   Drug Use No     Social History     Tobacco Use   Smoking Status Current Every Day Smoker    Packs/day: 1 00    Years: 44 00    Pack years: 44 00   Smokeless Tobacco Never Used         Meds/Allergies     Current Outpatient Medications:     albuterol (Ventolin HFA) 90 mcg/act inhaler, Inhale 2 puffs every 6 (six) hours as needed for wheezing or shortness of breath, Disp: 18 g, Rfl: 5    carisoprodol (SOMA) 350 mg tablet, Take 1 tablet (350 mg total) by mouth 3 (three) times a day for 10 days, Disp: 30 tablet, Rfl: 0    clonazePAM (KlonoPIN) 0 5 mg tablet, Take 1 tablet (0 5 mg total) by mouth 2 (two) times a day as needed for anxiety, Disp: 14 tablet, Rfl: 0    docusate sodium (COLACE) 100 mg capsule, Take 1 capsule (100 mg total) by mouth 2 (two) times a day, Disp: 10 capsule, Rfl: 0    fluticasone (FLONASE) 50 mcg/act nasal spray, SPRAY 2 SPRAYS INTO EACH NOSTRIL EVERY DAY, Disp: 1 Bottle, Rfl: 5    fluticasone (FLOVENT HFA) 220 mcg/act inhaler, Inhale 2 puffs 2 (two) times a day Rinse mouth after use , Disp: 1 Inhaler, Rfl: 5    morphine (MS CONTIN) 15 mg 12 hr tablet, Take 3 tablets (45 mg total) by mouth every 8 (eight) hoursMax Daily Amount: 135 mg, Disp: 270 tablet, Rfl: 0    omeprazole (PriLOSEC) 40 MG capsule, Take 1 capsule (40 mg total) by mouth daily before breakfast, Disp: 30 capsule, Rfl: 5    ondansetron (ZOFRAN) 8 mg tablet, Take 1 tablet (8 mg total) by mouth every 8 (eight) hours as needed for nausea or vomiting, Disp: 30 tablet, Rfl: 3    ondansetron (ZOFRAN-ODT) 4 mg disintegrating tablet, Take 1 tablet (4 mg total) by mouth every 6 (six) hours as needed for nausea or vomiting, Disp: 20 tablet, Rfl: 0    oxyCODONE (ROXICODONE) 10 MG TABS, Take 1 tablet (10 mg total) by mouth every 4 (four) hours as needed for severe painMax Daily Amount: 60 mg, Disp: 180 tablet, Rfl: 0   polyethylene glycol (MIRALAX) 17 g packet, Take 17 g by mouth daily as needed (Constipation), Disp: 14 each, Rfl: 0    senna (SENOKOT) 8 6 mg, Take 1 tablet (8 6 mg total) by mouth daily at bedtime, Disp: 30 tablet, Rfl: 0    zolpidem (AMBIEN) 10 mg tablet, 1 TAB AT BEDTIME AS NEEDED FOR INSOMNIA, Disp: , Rfl:     escitalopram (LEXAPRO) 10 mg tablet, Take 10 mg by mouth daily at bedtime, Disp: , Rfl:     lidocaine (LIDODERM) 5 %, Apply 1 patch topically daily Remove & Discard patch within 12 hours or as directed by MD (Patient not taking: Reported on 9/2/2020), Disp: 10 patch, Rfl: 0    naloxone (NARCAN) 4 mg/0 1 mL nasal spray, 0 1 mL (4 mg total) into each nostril every 3 (three) minutes as needed for opioid reversal or respiratory depression Administer 1 spray into a nostril  If breathing does not return to normal or if breathing difficulty resumes after 2-3 minutes, give another dose in the other nostril using a new spray  (Patient not taking: Reported on 9/2/2020), Disp: 1 each, Rfl: 1  Allergies   Allergen Reactions    Ketorolac Hives     Toradol     Review of Systems   Constitutional: Positive for appetite change (was decreased now improved), chills (1 episode yesterday, denies fever), fatigue (6/10) and unexpected weight change (about 5 lbs, gaining back)  HENT: Negative  Eyes: Negative  Respiratory: Positive for cough (non-productive) and shortness of breath (with exertion)  COPD   Cardiovascular: Negative  Gastrointestinal: Negative  Endocrine: Negative  Genitourinary: Negative  Right nephrostomy tube   Musculoskeletal: Positive for back pain (lower back, into right leg)  Skin: Negative  Allergic/Immunologic: Negative  Neurological: Positive for light-headedness (with getting up too fast, improved)  Hematological: Negative  Psychiatric/Behavioral: The patient is nervous/anxious           Bipolar, feels depressed at times     OBJECTIVE:   /63   Pulse 100   Temp 98 °F (36 7 °C)   Resp 16   Ht 5' 6" (1 676 m)   Wt 67 7 kg (149 lb 4 oz)   SpO2 98%   BMI 24 09 kg/m²   Pain Assessment:  4  Performance Status: Karnofsky: 70 - Cares for self; unable to carry on normal activity or do normal work     Physical Exam  Vitals signs and nursing note reviewed  Constitutional:       General: He is not in acute distress  Appearance: He is well-developed  He is not diaphoretic  HENT:      Head: Normocephalic and atraumatic  Mouth/Throat:      Pharynx: No oropharyngeal exudate  Eyes:      General: No scleral icterus  Conjunctiva/sclera: Conjunctivae normal       Pupils: Pupils are equal, round, and reactive to light  Neck:      Musculoskeletal: Normal range of motion and neck supple  Thyroid: No thyromegaly  Trachea: No tracheal deviation  Cardiovascular:      Rate and Rhythm: Normal rate and regular rhythm  Heart sounds: Normal heart sounds  Pulmonary:      Effort: Pulmonary effort is normal  No respiratory distress  Breath sounds: Normal breath sounds  No wheezing or rales  Chest:      Chest wall: No tenderness  Abdominal:      General: Bowel sounds are normal  There is no distension  Palpations: Abdomen is soft  There is no mass  Tenderness: There is no abdominal tenderness  There is no rebound  Musculoskeletal: Normal range of motion  General: No tenderness  Comments: There is some mild tenderness upon palpation of the lumbar spine and right hip regions  Lymphadenopathy:      Cervical: No cervical adenopathy  Upper Body:      Right upper body: No supraclavicular adenopathy  Left upper body: No supraclavicular adenopathy  Lower Body: No right inguinal adenopathy  No left inguinal adenopathy  Skin:     General: Skin is warm and dry  Coloration: Skin is not pale  Findings: No erythema or rash     Neurological:      Mental Status: He is alert and oriented to person, place, and time  Cranial Nerves: No cranial nerve deficit  Coordination: Coordination normal    Psychiatric:         Mood and Affect: Mood normal          Behavior: Behavior normal          Thought Content: Thought content normal          Judgment: Judgment normal        RESULTS  Lab Results    Chemistry        Component Value Date/Time     11/13/2013 1333    K 4 7 08/31/2020 0832    K 4 3 11/13/2013 1333    CL 99 (L) 08/31/2020 0832     11/13/2013 1333    CO2 25 08/31/2020 0832    CO2 28 09/21/2018 1011    BUN 14 08/31/2020 0832    BUN 8 11/13/2013 1333    CREATININE 1 26 08/31/2020 0832    CREATININE 0 75 11/13/2013 1333        Component Value Date/Time    CALCIUM 9 0 08/31/2020 0832    CALCIUM 9 3 11/13/2013 1333    ALKPHOS 121 (H) 08/31/2020 0832    ALKPHOS 83 11/13/2013 1333    AST 19 08/31/2020 0832    AST 21 11/13/2013 1333    ALT 19 08/31/2020 0832    ALT 25 11/13/2013 1333    BILITOT 0 6 11/13/2013 1333            Lab Results   Component Value Date    WBC 2 27 (L) 08/31/2020    HGB 9 8 (L) 08/31/2020    HCT 30 6 (L) 08/31/2020    MCV 95 08/31/2020     (H) 08/31/2020     Imaging Studies  Mri Lumbar Spine Wo Contrast    Result Date: 8/21/2020  Narrative: MRI LUMBAR SPINE WITHOUT CONTRAST INDICATION: L5 bony met   COMPARISON:  February 5, 2015, previous CT from August 18, 2020 TECHNIQUE:  Sagittal T1, sagittal T2, sagittal inversion recovery, axial T1 and axial T2, coronal T2   IMAGE QUALITY:  Diagnostic FINDINGS: VERTEBRAL BODIES:  There are 5 lumbar type vertebral bodies  There is retrolisthesis of L 4/5  There is a marrow infiltrative lesion in the L5 vertebra corresponding to the abnormality noted on the recent CT compatible metastatic disease Additional lesion seen in the L3 vertebra  There is a T1 hypointense lesion in the posterior aspect of the right iliac bone  SACRUM:  Normal signal within the sacrum  No evidence of insufficiency or stress fracture   DISTAL CORD AND CONUS: Normal size and signal within the distal cord and conus  PARASPINAL SOFT TISSUES:  There is enlarged right psoas muscle which demonstrates a T2 hyperintensity and enlargement which can be correlated with patient's known metastatic disease A right-sided nephrostomy tube is noted  There is lymphadenopathy in the aortocaval region, retrocaval region LOWER THORACIC DISC SPACES:  Normal disc height and signal   There is soft tissue density epidural region at the level of T12 vertebra, seen in image 1 series 8, this is incompletely evaluated LUMBAR DISC SPACES: L1-L2:  Normal  L2-L3:  Demonstrates no significant central canal narrowing  There is mild disc bulge There is no significant foraminal narrowing L3-L4:  There is degenerative disc disease with disc bulge with a small left foraminal protrusion with no significant central canal narrowing or foraminal narrowing L4-L5:  Demonstrates facet joint disease, ligamentous hypertrophy  There is mild retrolisthesis of L4 over 5 with the redundant disc  There is mild central canal narrowing  There is a moderate right foraminal narrowing and no significant left foraminal narrowing L5-S1:  Facet degenerative changes seen at L4-5  There is disc desiccation with small central protrusion which effaces the epidural fat and abuts the traversing nerve roots     There is mild left foraminal narrowing  There is no significant right foraminal narrowing     Impression: Metastatic disease in the lumbar spine involving the L5 vertebra, L3 vertebra  No acute compression collapse vertebra Bony metastatic disease in the posterior aspect of the right iliac bone with the retroperitoneal lymphadenopathy, right psoas lymphadenopathy No impingement of the conus  Degenerative disc disease at multiple levels Moderate right foraminal narrowing at L4-5 with mild abutment of the exiting right L4 nerve root with right foraminal protrusion    Correlate with right L4 radiculopathy There is soft tissue density in the anterior epidural soft tissue at the level of the T12 vertebra with mild central canal narrowing  This is incompletely evaluated  This may be on the basis of discogenic disease however epidural metastatic disease is not entirely excluded  Correlation with the MRI of the thoracic spine with and without contrast may be considered if clinically relevant No evidence of conus impingement The study was marked in EPIC for significant notification  Workstation performed: [id number]     Ct Abdomen Pelvis With Contrast    Result Date: 8/18/2020  Narrative: CT ABDOMEN AND PELVIS WITH IV CONTRAST INDICATION:   Nausea/vomiting nausea, right nephrostomy tube, flank and back pain, h/o cancer  COMPARISON:  July 6, 2020 TECHNIQUE:  CT examination of the abdomen and pelvis was performed  Axial, sagittal, and coronal 2D reformatted images were created from the source data and submitted for interpretation  Radiation dose length product (DLP) for this visit:  441 15 mGy-cm   This examination, like all CT scans performed in the [facility], was performed utilizing techniques to minimize radiation dose exposure, including the use of iterative  reconstruction and automated exposure control  IV Contrast:  100 mL of iohexol (OMNIPAQUE) Enteric Contrast:  Enteric contrast was not administered  FINDINGS: ABDOMEN LOWER CHEST:  No clinically significant abnormality identified in the visualized lower chest  LIVER/BILIARY TREE:  Liver remains within normal limits  There is no intrahepatic biliary ductal dilatation  However, there is new mild dilatation of the common hepatic and common bile duct seen to the level of the ampulla, measuring up to 8 mm in AP diameter  There is no evidence for calcified stones or discrete mass  This is also seen in association with mild prominence of the pancreatic duct which was present previously but is greater on today's study    Correlate with clinical findings for evidence of early distal biliary obstruction  GALLBLADDER:  No calcified gallstones  No pericholecystic inflammatory change  SPLEEN:  Unremarkable  PANCREAS:  Unremarkable  ADRENAL GLANDS:  Unremarkable  KIDNEYS/URETERS:  Left kidney within normal limits  Right kidney again remarkable for moderate generalized atrophy  A percutaneous ureteronephrostomy appears satisfactory in position  No evidence for hydronephrosis  The distal right ureteral mass seen in the pelvis on image 2/64 also is larger, 1 9 x 2 0 cm  STOMACH AND BOWEL:  Unremarkable  APPENDIX:  No findings to suggest appendicitis  ABDOMINOPELVIC CAVITY:  There are numerous pathologically enlarged retroperitoneal lymph nodes as well as retrocrural nodes  These are essentially all increased in size compared to the prior  There is increasing displacement of the IVC anteriorly at the level of the renal veins and more caudally the IVC is increasingly compressed and becomes nonvisualized at the level of the iliac vein IVC confluence, although study not optimized for venous contrast timing  There is also increasing size of the right iliac chain adenopathy  Specific examples 1 4 cm in short axis dimension image 2/60 (prior 1 1 cm)  Image 2/64 measuring 1 5 cm (Prior 0 6 cm)  In addition there are multiple rounded enhancing mass lesions within the right psoas muscle consistent with metastatic urothelial carcinoma by prior biopsy  These 2 has increased in both size and number  Specific example 2 4 x 2 3 cm image 2/44 (prior 1 7 x 1 7 cm)  These masses are nearly confluent and involve most of the psoas muscle  VESSELS:  Unremarkable for patient's age  PELVIS REPRODUCTIVE ORGANS:  Unremarkable for patient's age  URINARY BLADDER:  Unremarkable  ABDOMINAL WALL/INGUINAL REGIONS:  Worsening right inguinal adenopathy    Very mild relative increasing soft tissue stranding within the subcutaneous fatty tissue planes in the right pelvis and flank which may reflect component of developing edema related to compression on the IVC and iliac vein  OSSEOUS STRUCTURES:  Lytic metastatic lesion newly demonstrated in the right acetabular roof with cortical breakthrough along the medial aspect of the roof  Lesion measures 2 7 x 2 2 cm  No metastasis also seen within the anterior right L5 vertebral body  Impression: New dilatation of the common hepatic and common bile duct seen to the level of the ampulla  Mildly more prominent pancreatic duct caliber  No discrete obstructing etiology identified  Correlate with clinical findings for stasis versus obstructive etiology  Interval increase in number and size of retrocrural, retroperitoneal right iliac chain adenopathy  Subsequent worsening mass effect and compression on the IVC  Bolus timing was not optimized to evaluate for true caliber/patency of the IVC  Mild increasing soft tissue attenuation within the right flank and right lower quadrant suggesting some degree of early peripheral edema  Interval increase in size of mass surrounding the distal right ureter  New metastatic bone lesions in the L5 vertebra and right acetabulum  Cortical breakthrough noted in the medial acetabular roof  The study was marked in EPIC for significant notification  Workstation performed: [id number]     Pathology: See Above    ASSESSMENT  1  Retroperitoneal lymphadenopathy  Radiation Simulation Treatment   2  Bony metastasis [facility])  Ambulatory referral to Radiation Oncology    Radiation Simulation Treatment   3   Urothelial carcinoma of right distal ureter [facility])  Ambulatory referral to Radiation Oncology    Radiation Simulation Treatment       Cancer Staging  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      PLAN/DISCUSSION  Orders Placed This Encounter   Procedures    Radiation Simulation Treatment          Hardik HAYDEN Shin is a 63y o  year old male with an advanced urothelial carcinoma originating from the right ureter with locally advanced disease involving pelvic and retroperitoneal lymphadenopathy in addition to bony metastasis involving L3-L5 as well as the posterior right iliac bone and the acetabular roof  He had a right iliac lymph node biopsy July 24, 2020 confirming metastatic carcinoma compatible with urothelial primary  He saw Dr Ascencion Starr and started chemotherapy August 11, 2020 with cisplatin and Gemzar  He is seen for consultation today for palliative radiation therapy to his symptomatic disease within the lumbar spine and pelvic regions as outlined above  We recommended palliative course of treatment with 3000 cGy over 10 fractions  We discussed rationale for treatment including the acute side effects and the potential chronic complications with the patient and he agrees to proceed with the treatment  He had simulation performed today and will start treatment next week  He is scheduled for his next dose of chemotherapy September 9, 2020 and this will need to be adjusted due to radiation therapy  He states he is not available to come for radiation therapy on September 22nd and September 28 due to having to go to court  He will return to the [address] [place] [address] Way for the start of radiation therapy on September 9, 2020  Gabriel Guerra MD  9/2/2020,10:52 AM      Portions of the record may have been created with voice recognition software  Occasional wrong word or "sound a like" substitutions may have occurred due to the inherent limitations of voice recognition software  Read the chart carefully and recognize, using context, where substitutions have occurred

## 2020-09-08 ENCOUNTER — OFFICE VISIT (OUTPATIENT)
Dept: HEMATOLOGY ONCOLOGY | Facility: CLINIC | Age: 55
End: 2020-09-08
Payer: MEDICARE

## 2020-09-08 ENCOUNTER — APPOINTMENT (OUTPATIENT)
Dept: LAB | Facility: HOSPITAL | Age: 55
End: 2020-09-08
Payer: MEDICARE

## 2020-09-08 VITALS
WEIGHT: 138 LBS | HEART RATE: 95 BPM | OXYGEN SATURATION: 95 % | DIASTOLIC BLOOD PRESSURE: 63 MMHG | SYSTOLIC BLOOD PRESSURE: 106 MMHG | BODY MASS INDEX: 22.18 KG/M2 | TEMPERATURE: 98.6 F | HEIGHT: 66 IN

## 2020-09-08 DIAGNOSIS — C66.1 UROTHELIAL CARCINOMA OF RIGHT DISTAL URETER (HCC): ICD-10-CM

## 2020-09-08 DIAGNOSIS — C66.1 UROTHELIAL CARCINOMA OF RIGHT DISTAL URETER (HCC): Primary | ICD-10-CM

## 2020-09-08 LAB
ALBUMIN SERPL BCP-MCNC: 2.9 G/DL (ref 3.5–5)
ALP SERPL-CCNC: 225 U/L (ref 46–116)
ALT SERPL W P-5'-P-CCNC: 40 U/L (ref 12–78)
ANION GAP SERPL CALCULATED.3IONS-SCNC: 6 MMOL/L (ref 4–13)
AST SERPL W P-5'-P-CCNC: 34 U/L (ref 5–45)
BASOPHILS # BLD AUTO: 0.08 THOUSANDS/ΜL (ref 0–0.1)
BASOPHILS NFR BLD AUTO: 1 % (ref 0–1)
BILIRUB SERPL-MCNC: 0.2 MG/DL (ref 0.2–1)
BUN SERPL-MCNC: 12 MG/DL (ref 5–25)
CALCIUM SERPL-MCNC: 8.4 MG/DL (ref 8.3–10.1)
CHLORIDE SERPL-SCNC: 101 MMOL/L (ref 100–108)
CO2 SERPL-SCNC: 30 MMOL/L (ref 21–32)
CREAT SERPL-MCNC: 1.24 MG/DL (ref 0.6–1.3)
EOSINOPHIL # BLD AUTO: 0.13 THOUSAND/ΜL (ref 0–0.61)
EOSINOPHIL NFR BLD AUTO: 2 % (ref 0–6)
ERYTHROCYTE [DISTWIDTH] IN BLOOD BY AUTOMATED COUNT: 14.6 % (ref 11.6–15.1)
GFR SERPL CREATININE-BSD FRML MDRD: 65 ML/MIN/1.73SQ M
GLUCOSE P FAST SERPL-MCNC: 98 MG/DL (ref 65–99)
HCT VFR BLD AUTO: 33.7 % (ref 36.5–49.3)
HGB BLD-MCNC: 10.9 G/DL (ref 12–17)
IMM GRANULOCYTES # BLD AUTO: 0.03 THOUSAND/UL (ref 0–0.2)
IMM GRANULOCYTES NFR BLD AUTO: 0 % (ref 0–2)
LYMPHOCYTES # BLD AUTO: 1.52 THOUSANDS/ΜL (ref 0.6–4.47)
LYMPHOCYTES NFR BLD AUTO: 21 % (ref 14–44)
MAGNESIUM SERPL-MCNC: 1.7 MG/DL (ref 1.6–2.6)
MCH RBC QN AUTO: 30.7 PG (ref 26.8–34.3)
MCHC RBC AUTO-ENTMCNC: 32.3 G/DL (ref 31.4–37.4)
MCV RBC AUTO: 95 FL (ref 82–98)
MONOCYTES # BLD AUTO: 0.85 THOUSAND/ΜL (ref 0.17–1.22)
MONOCYTES NFR BLD AUTO: 12 % (ref 4–12)
NEUTROPHILS # BLD AUTO: 4.67 THOUSANDS/ΜL (ref 1.85–7.62)
NEUTS SEG NFR BLD AUTO: 64 % (ref 43–75)
NRBC BLD AUTO-RTO: 0 /100 WBCS
PLATELET # BLD AUTO: 380 THOUSANDS/UL (ref 149–390)
PMV BLD AUTO: 8 FL (ref 8.9–12.7)
POTASSIUM SERPL-SCNC: 4.9 MMOL/L (ref 3.5–5.3)
PROT SERPL-MCNC: 7.2 G/DL (ref 6.4–8.2)
RBC # BLD AUTO: 3.55 MILLION/UL (ref 3.88–5.62)
SODIUM SERPL-SCNC: 137 MMOL/L (ref 136–145)
WBC # BLD AUTO: 7.28 THOUSAND/UL (ref 4.31–10.16)

## 2020-09-08 PROCEDURE — 77300 RADIATION THERAPY DOSE PLAN: CPT | Performed by: RADIOLOGY

## 2020-09-08 PROCEDURE — 77295 3-D RADIOTHERAPY PLAN: CPT | Performed by: RADIOLOGY

## 2020-09-08 PROCEDURE — 77334 RADIATION TREATMENT AID(S): CPT | Performed by: RADIOLOGY

## 2020-09-08 PROCEDURE — 85025 COMPLETE CBC W/AUTO DIFF WBC: CPT

## 2020-09-08 PROCEDURE — 80053 COMPREHEN METABOLIC PANEL: CPT

## 2020-09-08 PROCEDURE — 36415 COLL VENOUS BLD VENIPUNCTURE: CPT

## 2020-09-08 PROCEDURE — 99214 OFFICE O/P EST MOD 30 MIN: CPT | Performed by: NURSE PRACTITIONER

## 2020-09-08 PROCEDURE — 83735 ASSAY OF MAGNESIUM: CPT

## 2020-09-08 NOTE — PROGRESS NOTES
[facility] [address] [facility]   [address] [facility] [address] [phone]  Progress Note  Gonzalez Morales, 1965, [id number]  9/8/2020    Assessment/Plan:  1  Urothelial carcinoma of right distal ureter [facility])   Patient is a 69-year-old male with a history of advanced urothelial carcinoma with pelvic and retroperitoneal lymphadenopathy  Patient has seen Radiation Oncology and is scheduled to start palliative radiation on 09/09/2020 for total of 10 treatments  This is anticipated to complete on 09/22/2020  We will therefore hold his Gemzar portion of his chemotherapy due to the radio sensitivity during his radiation  Patient is scheduled to start cycle 2 of chemotherapy on 09/14/2020  I will therefore hold Gemzar on day 1, 09/14/2020,  and day 8, 09/21/2020, of this cycle  I will determine if patient is to get Gemzar on day 15, 09/28/2020, based on the actual completion of his radiation date  I will anticipate seeing patient on 09/25/20 after the completion of his radiation  Patient verbalized understanding and is in agreement with the plan  - CBC and differential; Future  - Comprehensive metabolic panel; Future      The patient is scheduled for follow-up in approximately 2 weeks with Dr Dey or me  Patient voiced agreement and understanding to the above  Patient knows to call the Hematology/Oncology office with any questions and concerns regarding the above  Goals and Barriers:    Current Goal:   Prolong Survival from Cancer  Barriers: None  Patient's Capacity to Self Care:  Patient is able to self care   -------------------------------------------------------------------------------------------------------    No chief complaint on file        History of present illness/Cancer History:   Oncology History   Urothelial carcinoma of right distal ureter ([facility] [address] )   7/24/2020 Initial Diagnosis    Urothelial carcinoma of right distal ureter ([facility] [address] )     2020 Biopsy    Final Diagnosis    A  Lymph node, right iliac, biopsy:  -  Metastatic carcinoma, compatible with urothelial primary  -  Immunohistochemical stains performed with appropriate controls show the tumor cells to be positive for keratin AE1/3, CK7, CK20, CDX2, SHELLI-3, and uroplakin and negative for TTF-1, ER, PSA, and NKX3 1, supporting the diagnosis  2020 -  Chemotherapy    CISplatin (PLATINOL) 121 1 mg, mannitol 12 5 g in sodium chloride 0 9 % 500 mL IVPB, 70 mg/m2 = 121 1 mg, Intravenous, Once, 1 of 2 cycles  Administration: 121 1 mg (2020)  fosaprepitant (EMEND) 150 mg in sodium chloride 0 9 % 250 mL IVPB, 150 mg, Intravenous, Once, 1 of 2 cycles  Administration: 150 mg (2020)  gemcitabine (GEMZAR) 1,600 mg in sodium chloride 0 9 % 250 mL infusion, 1,557 mg (72 % of original dose 1,250 mg/m2), Intravenous, Once, 1 of 2 cycles  Dose modification: 900 mg/m2 (original dose 1,250 mg/m2, Cycle 1, Reason: Other (See Comments))  Administration: 1,600 mg (2020), 1,600 mg (2020)          Cancer Staging  No matching staging information was found for the patient  ECO - Symptomatic but completely ambulatory    Review of Systems   Constitutional: Negative for activity change, appetite change, fatigue, fever and unexpected weight change  Respiratory: Negative for cough and shortness of breath  Cardiovascular: Negative for chest pain and leg swelling  Gastrointestinal: Negative for abdominal pain, constipation, diarrhea and nausea  Endocrine: Negative for cold intolerance and heat intolerance  Musculoskeletal: Negative for arthralgias and myalgias  Hip and leg pain   Skin: Negative  Neurological: Negative for dizziness, weakness and headaches  Hematological: Negative for adenopathy  Does not bruise/bleed easily           Current Outpatient Medications:     albuterol (Ventolin HFA) 90 mcg/act inhaler, Inhale 2 puffs every 6 (six) hours as needed for wheezing or shortness of breath, Disp: 18 g, Rfl: 5    carisoprodol (SOMA) 350 mg tablet, Take 1 tablet (350 mg total) by mouth 3 (three) times a day for 10 days, Disp: 30 tablet, Rfl: 0    clonazePAM (KlonoPIN) 0 5 mg tablet, Take 1 tablet (0 5 mg total) by mouth 2 (two) times a day as needed for anxiety, Disp: 14 tablet, Rfl: 0    docusate sodium (COLACE) 100 mg capsule, Take 1 capsule (100 mg total) by mouth 2 (two) times a day, Disp: 10 capsule, Rfl: 0    escitalopram (LEXAPRO) 10 mg tablet, Take 10 mg by mouth daily at bedtime, Disp: , Rfl:     fluticasone (FLONASE) 50 mcg/act nasal spray, SPRAY 2 SPRAYS INTO EACH NOSTRIL EVERY DAY, Disp: 1 Bottle, Rfl: 5    fluticasone (FLOVENT HFA) 220 mcg/act inhaler, Inhale 2 puffs 2 (two) times a day Rinse mouth after use , Disp: 1 Inhaler, Rfl: 5    lidocaine (LIDODERM) 5 %, Apply 1 patch topically daily Remove & Discard patch within 12 hours or as directed by MD (Patient not taking: Reported on 9/2/2020), Disp: 10 patch, Rfl: 0    morphine (MS CONTIN) 15 mg 12 hr tablet, Take 3 tablets (45 mg total) by mouth every 8 (eight) hoursMax Daily Amount: 135 mg, Disp: 270 tablet, Rfl: 0    naloxone (NARCAN) 4 mg/0 1 mL nasal spray, 0 1 mL (4 mg total) into each nostril every 3 (three) minutes as needed for opioid reversal or respiratory depression Administer 1 spray into a nostril  If breathing does not return to normal or if breathing difficulty resumes after 2-3 minutes, give another dose in the other nostril using a new spray   (Patient not taking: Reported on 9/2/2020), Disp: 1 each, Rfl: 1    omeprazole (PriLOSEC) 40 MG capsule, Take 1 capsule (40 mg total) by mouth daily before breakfast, Disp: 30 capsule, Rfl: 5    ondansetron (ZOFRAN) 8 mg tablet, Take 1 tablet (8 mg total) by mouth every 8 (eight) hours as needed for nausea or vomiting, Disp: 30 tablet, Rfl: 3    ondansetron (ZOFRAN-ODT) 4 mg disintegrating tablet, Take 1 tablet (4 mg total) by mouth every 6 (six) hours as needed for nausea or vomiting, Disp: 20 tablet, Rfl: 0    oxyCODONE (ROXICODONE) 10 MG TABS, Take 1 tablet (10 mg total) by mouth every 4 (four) hours as needed for severe painMax Daily Amount: 60 mg, Disp: 180 tablet, Rfl: 0    polyethylene glycol (MIRALAX) 17 g packet, Take 17 g by mouth daily as needed (Constipation), Disp: 14 each, Rfl: 0    senna (SENOKOT) 8 6 mg, Take 1 tablet (8 6 mg total) by mouth daily at bedtime, Disp: 30 tablet, Rfl: 0    zolpidem (AMBIEN) 10 mg tablet, 1 TAB AT BEDTIME AS NEEDED FOR INSOMNIA, Disp: , Rfl:     Allergies   Allergen Reactions    Ketorolac Hives     Toradol       Advance Directive and Living Will:        Objective:   /63   Pulse 95   Temp 98 6 °F (37 °C)   Ht 5' 6" (1 676 m)   Wt 62 6 kg (138 lb)   SpO2 95%   BMI 22 27 kg/m²   Wt Readings from Last 6 Encounters:   09/08/20 62 6 kg (138 lb)   09/02/20 67 7 kg (149 lb 4 oz)   08/31/20 66 7 kg (147 lb)   08/28/20 63 kg (138 lb 14 2 oz)   08/25/20 64 9 kg (143 lb)   08/24/20 64 7 kg (142 lb 10 2 oz)       Physical Exam  Constitutional:       Appearance: He is well-developed  HENT:      Head: Normocephalic and atraumatic  Eyes:      Pupils: Pupils are equal, round, and reactive to light  Neck:      Musculoskeletal: Normal range of motion  Cardiovascular:      Rate and Rhythm: Normal rate and regular rhythm  Heart sounds: Normal heart sounds  Pulmonary:      Effort: Pulmonary effort is normal  No respiratory distress  Breath sounds: Normal breath sounds  Abdominal:      General: Bowel sounds are normal       Palpations: Abdomen is soft  Musculoskeletal: Normal range of motion  Lymphadenopathy:      Cervical: No cervical adenopathy  Skin:     General: Skin is warm and dry  Capillary Refill: Capillary refill takes less than 2 seconds  Neurological:      Mental Status: He is alert and oriented to person, place, and time     Psychiatric:         Behavior: Behavior normal        The following historical data was reviewed      Past Medical History:   Diagnosis Date    Asthma     Bipolar disorder ([address] )     COPD (chronic obstructive pulmonary disease) (HCC)     Disease of thyroid gland     Hypertension     Hyperthyroidism        Past Surgical History:   Procedure Laterality Date    DENTAL SURGERY      FL RETROGRADE URETHROCYSTOGRAM  6/15/2020    HERNIA REPAIR Left     inguinal    IR LYMPH NODE BIOPSY/ASPIRATION  7/24/2020    IR TUBE PLACEMENT  6/25/2020    AR CYSTOURETHROSCOPY,URETER CATHETER Right 6/15/2020    Procedure: CYSTOSCOPY RETROGRADE PYELOGRAM and ureteroscopy;  Surgeon: Sherryle Alice, MD;  Location: [facility];  Service: Urology       Social History     Socioeconomic History    Marital status: Single     Spouse name: Not on file    Number of children: Not on file    Years of education: Not on file    Highest education level: Not on file   Occupational History    Not on file   Social Needs    Financial resource strain: Not on file    Food insecurity     Worry: Not on file     Inability: Not on file    Transportation needs     Medical: Not on file     Non-medical: Not on file   Tobacco Use    Smoking status: Current Every Day Smoker     Packs/day: 1 00     Years: 44 00     Pack years: 44 00    Smokeless tobacco: Never Used   Substance and Sexual Activity    Alcohol use: Never     Frequency: Never    Drug use: No    Sexual activity: Yes     Partners: Female   Lifestyle    Physical activity     Days per week: Not on file     Minutes per session: Not on file    Stress: Not on file   Relationships    Social connections     Talks on phone: Not on file     Gets together: Not on file     Attends [employer] service: Not on file     Active member of club or organization: Not on file     Attends meetings of clubs or organizations: Not on file     Relationship status: Not on file    Intimate partner violence     Fear of current or ex partner: Not on file     Emotionally abused: Not on file     Physically abused: Not on file     Forced sexual activity: Not on file   Other Topics Concern    Not on file   Social History Narrative    Patient is  from the mother of his 1st child  Patient has 4 adult children, from 4 different unions  Patient lives with his youngest daughter Layne Hopson has multiple sclerosis but is able to participate fully in the patient's care  Patient does not have any advanced directives  Family History   Problem Relation Age of Onset    Heart disease Mother     Hyperthyroidism Mother     Lung cancer Mother     Heart disease Father     Hyperthyroidism Father     Lung cancer Maternal Aunt     Lung cancer Maternal Uncle        Please note: This report has been generated by a voice recognition software system  Therefore there may be syntax, spelling, and/or grammatical errors  Please call if you have any questions

## 2020-09-08 NOTE — LETTER
September 8, 2020     MD [address]  78     Patient: Mary Lou Awad   YOB: 1965   Date of Visit: 9/8/2020       Dear Dr Umana Po: Thank you for referring Josh Nance to me for evaluation  Below are the relevant portions of my assessment and plan of care  If you have questions, please do not hesitate to call me  I look forward to following Wash along with you           Sincerely,        CHACHA Zuniga        CC: No Recipients

## 2020-09-09 ENCOUNTER — APPOINTMENT (OUTPATIENT)
Dept: RADIATION ONCOLOGY | Facility: HOSPITAL | Age: 55
End: 2020-09-09
Attending: RADIOLOGY
Payer: MEDICARE

## 2020-09-09 ENCOUNTER — HOSPITAL ENCOUNTER (OUTPATIENT)
Dept: INFUSION CENTER | Facility: HOSPITAL | Age: 55
Discharge: HOME | End: 2020-09-09
Attending: INTERNAL MEDICINE

## 2020-09-09 PROCEDURE — 77387 GUIDANCE FOR RADJ TX DLVR: CPT | Performed by: RADIOLOGY

## 2020-09-09 PROCEDURE — 77331 SPECIAL RADIATION DOSIMETRY: CPT | Performed by: RADIOLOGY

## 2020-09-09 PROCEDURE — 77412 RADIATION TX DELIVERY LVL 3: CPT | Performed by: RADIOLOGY

## 2020-09-09 PROCEDURE — 77280 THER RAD SIMULAJ FIELD SMPL: CPT | Performed by: RADIOLOGY

## 2020-09-10 ENCOUNTER — APPOINTMENT (OUTPATIENT)
Dept: RADIATION ONCOLOGY | Facility: HOSPITAL | Age: 55
End: 2020-09-10
Attending: RADIOLOGY
Payer: MEDICARE

## 2020-09-10 PROCEDURE — 77387 GUIDANCE FOR RADJ TX DLVR: CPT | Performed by: RADIOLOGY

## 2020-09-10 PROCEDURE — 77412 RADIATION TX DELIVERY LVL 3: CPT | Performed by: RADIOLOGY

## 2020-09-11 ENCOUNTER — TELEMEDICINE (OUTPATIENT)
Dept: PALLIATIVE MEDICINE | Facility: CLINIC | Age: 55
End: 2020-09-11
Payer: MEDICARE

## 2020-09-11 ENCOUNTER — APPOINTMENT (OUTPATIENT)
Dept: RADIATION ONCOLOGY | Facility: HOSPITAL | Age: 55
End: 2020-09-11
Attending: RADIOLOGY
Payer: MEDICARE

## 2020-09-11 ENCOUNTER — TELEPHONE (OUTPATIENT)
Dept: HEMATOLOGY ONCOLOGY | Facility: CLINIC | Age: 55
End: 2020-09-11

## 2020-09-11 DIAGNOSIS — C66.1 UROTHELIAL CARCINOMA OF RIGHT DISTAL URETER (HCC): Primary | ICD-10-CM

## 2020-09-11 DIAGNOSIS — Z51.5 PALLIATIVE CARE PATIENT: ICD-10-CM

## 2020-09-11 DIAGNOSIS — C79.51 BONY METASTASIS (HCC): ICD-10-CM

## 2020-09-11 DIAGNOSIS — G89.3 CANCER RELATED PAIN: ICD-10-CM

## 2020-09-11 PROCEDURE — 99214 OFFICE O/P EST MOD 30 MIN: CPT | Performed by: FAMILY MEDICINE

## 2020-09-11 PROCEDURE — 77387 GUIDANCE FOR RADJ TX DLVR: CPT | Performed by: RADIOLOGY

## 2020-09-11 PROCEDURE — 77412 RADIATION TX DELIVERY LVL 3: CPT | Performed by: RADIOLOGY

## 2020-09-11 NOTE — PROGRESS NOTES
Outpatient Virtual Regular Visit - Follow-up with Palliative and [address] [place] [address] 63 y o  male [id number]    Intake and Disclaimer:    Reason for visit is routine follow up visit  The patient is unable to visit the clinic safely due to the coronavirus pandemic  Patient agrees to participate in a virtual check in via telephone or video visit instead of presenting to the office to address urgent/immediate medical needs, or to respect quarantine and public health guidelines  Patient was informed this is a billable service  After connecting through [employer], the patient was identified by name and date of birth  Sukumar Click was informed that this was a telemedicine visit and that the visit is being conducted through [employer] and patient was informed that this is a secure, HIPAA-compliant platform  He agrees to proceed  which may not be secure and therefore might not be HIPAA-compliant  My office door was closed  No one else was in the room  He acknowledged consent and understanding of privacy and security of the virtual check-in visit  I informed the patient that I have reviewed his record in Epic and presented the opportunity for him to ask any questions regarding the visit today  The patient initiated communication and agreed to participate          Assessment and Plan  Problem List Items Addressed This Visit        Musculoskeletal and Integument    Bony metastasis ([facility] [address] )       Genitourinary    Urothelial carcinoma of right distal ureter ([facility] [address] ) - Primary       Other    Cancer related pain    Palliative care patient        #symptoms management   - PDMP reviewed   - last fill of MS-EF 45 mg Q8H [facility] was on 09/01 for 20-days [180 tabs]    - next fill due on 09/20/2020   - last fill of oxy-IR 10 mg Q4H PRN was on 08/28/2020 for 30-days [180 tabs]    - next fill due on 09/26/2020   - no refills at this time   - continue home bowel regimen to prevent OIC   - continue zofran PRN for chemo-induced nausea   - currently with meds, no refill at this time    Follow up with [facility] in 4-weeks; OK for virtual visit   - plan to follow up Janie [facility] [place] every 2-3 appointments    No orders of the defined types were placed in this encounter  There are no discontinued medications  Hardik Tran was assessed today for symptoms and care planning related to above illnesses  I have reviewed the patient's controlled substance dispensing history in the Prescription Drug Monitoring Program in compliance with the [facility] regulations before prescribing any controlled substances  He is invited to continue to follow with us  If there are questions or concerns, please contact us through our clinic/answering service 24 hours a day, seven days a week  As a result of this visit, I have referred the patient for further evaluation  Yes    José Lomas MD  [facility] Palliative and Supportive Care            Subjective  Wash HAYDEN Tran is a 63 y  o  male with a PMH of stage IV ureter CA with newly diagnosed vertebral mets with retroperitoneal metastatic disease c/b R hydronephorosis s/p R PCN tube placement, HTN, chronic lumbar pain c/b radiculopathy who presents in person to [facility] for scheduled PSC follow up for pain management      Primary Oncology: Dr Romero Po              - last chemo infusion 4 days ago  Primary Rad/Onc: Dr Pablo Rgugiero appointment today given patient en route to radiotherapy appointment  Patient reports persistent R leg and back pain; with intermittent aggravation with increased ambulation; pain adequately controlled with current regimen; at times feels some breakthrough, but responds to oxy 10 mg  Intermittent chemo-induced nausea; which is well controlled with zofran - currently with home dose with no need for refill at this time  Denies vomiting  Waxing/waning appetite  No weight loss  Last BM yesterday, normal  Continues daily bowel regimen        Past Medical History: Diagnosis Date    Asthma     Bipolar disorder ([facility] [address] )     COPD (chronic obstructive pulmonary disease) ([facility] [address] )     Disease of thyroid gland     Hypertension     Hyperthyroidism      Past Surgical History:   Procedure Laterality Date    DENTAL SURGERY      FL RETROGRADE URETHROCYSTOGRAM  6/15/2020    HERNIA REPAIR Left     inguinal    IR LYMPH NODE BIOPSY/ASPIRATION  7/24/2020    IR TUBE PLACEMENT  6/25/2020    CT CYSTOURETHROSCOPY,URETER CATHETER Right 6/15/2020    Procedure: CYSTOSCOPY RETROGRADE PYELOGRAM and ureteroscopy;  Surgeon: Bharathi Welsh MD;  Location: [facility];  Service: Urology     Current Outpatient Medications   Medication Sig Dispense Refill    albuterol (Ventolin HFA) 90 mcg/act inhaler Inhale 2 puffs every 6 (six) hours as needed for wheezing or shortness of breath 18 g 5    carisoprodol (SOMA) 350 mg tablet Take 1 tablet (350 mg total) by mouth 3 (three) times a day for 10 days 30 tablet 0    clonazePAM (KlonoPIN) 0 5 mg tablet Take 1 tablet (0 5 mg total) by mouth 2 (two) times a day as needed for anxiety 14 tablet 0    docusate sodium (COLACE) 100 mg capsule Take 1 capsule (100 mg total) by mouth 2 (two) times a day 10 capsule 0    escitalopram (LEXAPRO) 10 mg tablet Take 10 mg by mouth daily at bedtime      fluticasone (FLONASE) 50 mcg/act nasal spray SPRAY 2 SPRAYS INTO EACH NOSTRIL EVERY DAY 1 Bottle 5    fluticasone (FLOVENT HFA) 220 mcg/act inhaler Inhale 2 puffs 2 (two) times a day Rinse mouth after use   1 Inhaler 5    lidocaine (LIDODERM) 5 % Apply 1 patch topically daily Remove & Discard patch within 12 hours or as directed by MD (Patient not taking: Reported on 9/2/2020) 10 patch 0    morphine (MS CONTIN) 15 mg 12 hr tablet Take 3 tablets (45 mg total) by mouth every 8 (eight) hoursMax Daily Amount: 135 mg 270 tablet 0    naloxone (NARCAN) 4 mg/0 1 mL nasal spray 0 1 mL (4 mg total) into each nostril every 3 (three) minutes as needed for opioid reversal or respiratory depression Administer 1 spray into a nostril  If breathing does not return to normal or if breathing difficulty resumes after 2-3 minutes, give another dose in the other nostril using a new spray  (Patient not taking: Reported on 9/2/2020) 1 each 1    omeprazole (PriLOSEC) 40 MG capsule Take 1 capsule (40 mg total) by mouth daily before breakfast 30 capsule 5    ondansetron (ZOFRAN) 8 mg tablet Take 1 tablet (8 mg total) by mouth every 8 (eight) hours as needed for nausea or vomiting 30 tablet 3    ondansetron (ZOFRAN-ODT) 4 mg disintegrating tablet Take 1 tablet (4 mg total) by mouth every 6 (six) hours as needed for nausea or vomiting 20 tablet 0    oxyCODONE (ROXICODONE) 10 MG TABS Take 1 tablet (10 mg total) by mouth every 4 (four) hours as needed for severe painMax Daily Amount: 60 mg 180 tablet 0    polyethylene glycol (MIRALAX) 17 g packet Take 17 g by mouth daily as needed (Constipation) 14 each 0    senna (SENOKOT) 8 6 mg Take 1 tablet (8 6 mg total) by mouth daily at bedtime 30 tablet 0    zolpidem (AMBIEN) 10 mg tablet 1 TAB AT BEDTIME AS NEEDED FOR INSOMNIA       No current facility-administered medications for this visit  Allergies   Allergen Reactions    Ketorolac Hives     Toradol       Review of Systems   Constitutional: Negative for chills and fever  HENT: Negative for congestion  Respiratory: Negative for shortness of breath  Cardiovascular: Negative for chest pain  Gastrointestinal: Negative for abdominal pain, nausea and vomiting  Genitourinary: Positive for flank pain  Negative for difficulty urinating  Musculoskeletal: Positive for back pain  R leg pain   Skin: Negative for wound  Neurological: Negative for headaches  Psychiatric/Behavioral: Negative for confusion  Video Exam  There were no vitals filed for this visit    Physical Exam  No physical exam performed via televideo today      I spent 10+ minutes was spent during this virtual visit by Kalpana, face to face with Boo Arciniega with greater than 50% of the time spent in counseling or coordination of care including discussions of symptoms assessment  All of the patient's questions were answered during this discussion  Encounter provider John Alicea MD, located at:  [address] [place] [id number] [phone]    Recent Visits  No visits were found meeting these conditions  Showing recent visits within past 7 days and meeting all other requirements     Today's Visits  Date Type Provider Dept   09/11/20 Telemedicine John Alicea, [address] [place] [address] today's visits and meeting all other requirements     Future Appointments  No visits were found meeting these conditions     Showing future appointments within next 150 days and meeting all other requirements

## 2020-09-11 NOTE — TELEPHONE ENCOUNTER
----- Message from Gerald Hunt sent at 9/10/2020  8:54 AM EDT -----  Regarding: FW: Non-Urgent Medical Question  Contact: [phone]    ----- Message -----  From: Phoebe Adkins  Sent: 9/10/2020   7:17 AM EDT  To: Hematology Oncology [facility]  Subject: Non-Urgent Medical Question                      I have a question about MAGNESIUM resulted on 9/8/20 at 9:49 AM  What's the normal range do I have more to  Worry about? Thank you     Spoke with patient regarding his mag  Level  Discussed it was now wnl, but on the low side of normal   Confirmed with Wash, that on 9/15/20, he would be getting [facility] to take him for radiation, and then to [facility], and after infusion, would be getting a ride home from his wife

## 2020-09-11 NOTE — TELEPHONE ENCOUNTER
Call from patient who needs transportation from RT to chemo on 9/15/2020  Patient has RT in [facility] but the [facility] has no room to accommodate patient in [facility]  Arranged for patient to be picked up by Pauline Calderon to be taken to RT on 9/15/2020  Spoke with Almaz Olivia in [facility]  She will ensure that patient receives his RT as soon as he arrives and then arrange Lyft through [employer]  Spoke with Ruperto Mathur at [facility] and made aware of plan  We will speak with Dr Brooke Kwok regarding amending the order to allow for the time delay  Patient verbalizes understanding of plan

## 2020-09-14 ENCOUNTER — TELEPHONE (OUTPATIENT)
Dept: PALLIATIVE MEDICINE | Facility: CLINIC | Age: 55
End: 2020-09-14

## 2020-09-14 ENCOUNTER — APPOINTMENT (OUTPATIENT)
Dept: RADIATION ONCOLOGY | Facility: HOSPITAL | Age: 55
End: 2020-09-14
Attending: RADIOLOGY
Payer: MEDICARE

## 2020-09-14 DIAGNOSIS — C66.1 UROTHELIAL CARCINOMA OF RIGHT DISTAL URETER (HCC): ICD-10-CM

## 2020-09-14 DIAGNOSIS — G89.3 CANCER RELATED PAIN: Primary | ICD-10-CM

## 2020-09-14 DIAGNOSIS — C79.51 BONY METASTASIS (HCC): ICD-10-CM

## 2020-09-14 PROCEDURE — 77412 RADIATION TX DELIVERY LVL 3: CPT | Performed by: RADIOLOGY

## 2020-09-14 PROCEDURE — 77387 GUIDANCE FOR RADJ TX DLVR: CPT | Performed by: RADIOLOGY

## 2020-09-14 RX ORDER — SODIUM CHLORIDE 9 MG/ML
20 INJECTION, SOLUTION INTRAVENOUS ONCE
Status: CANCELLED | OUTPATIENT
Start: 2020-09-24

## 2020-09-14 RX ORDER — SODIUM CHLORIDE 9 MG/ML
20 INJECTION, SOLUTION INTRAVENOUS ONCE
Status: CANCELLED | OUTPATIENT
Start: 2020-09-15

## 2020-09-14 RX ORDER — SODIUM CHLORIDE 9 MG/ML
20 INJECTION, SOLUTION INTRAVENOUS ONCE
Status: CANCELLED | OUTPATIENT
Start: 2020-09-30

## 2020-09-14 NOTE — TELEPHONE ENCOUNTER
Spoke with Dr Rossy Benson today - adjustments will be made to chemo order to shorten pre and post hydration  OK to run pre medications concurrently with pre hydration    2nd IV site may be needed

## 2020-09-14 NOTE — TELEPHONE ENCOUNTER
Patient called and stated during his Telemedicine visit on 9/11/20 he mentioned to Dr Shey Ham when taking his two prescribed pain meds as directed it  was not taking care of the leg/muscle pain  He states he mentioned that during his Telemedicine visit and Dr Shey Ham was going to check into this

## 2020-09-15 ENCOUNTER — HOSPITAL ENCOUNTER (OUTPATIENT)
Dept: INFUSION CENTER | Facility: HOSPITAL | Age: 55
Discharge: HOME | End: 2020-09-15
Attending: INTERNAL MEDICINE
Payer: MEDICARE

## 2020-09-15 ENCOUNTER — APPOINTMENT (OUTPATIENT)
Dept: RADIATION ONCOLOGY | Facility: HOSPITAL | Age: 55
End: 2020-09-15
Attending: RADIOLOGY
Payer: MEDICARE

## 2020-09-15 VITALS
BODY MASS INDEX: 21.61 KG/M2 | HEIGHT: 66 IN | DIASTOLIC BLOOD PRESSURE: 68 MMHG | TEMPERATURE: 97.6 F | OXYGEN SATURATION: 100 % | SYSTOLIC BLOOD PRESSURE: 138 MMHG | RESPIRATION RATE: 18 BRPM | HEART RATE: 70 BPM | WEIGHT: 134.48 LBS

## 2020-09-15 DIAGNOSIS — C66.1 UROTHELIAL CARCINOMA OF RIGHT DISTAL URETER (HCC): Primary | ICD-10-CM

## 2020-09-15 PROCEDURE — 77387 GUIDANCE FOR RADJ TX DLVR: CPT | Performed by: RADIOLOGY

## 2020-09-15 PROCEDURE — 96413 CHEMO IV INFUSION 1 HR: CPT

## 2020-09-15 PROCEDURE — 77412 RADIATION TX DELIVERY LVL 3: CPT | Performed by: RADIOLOGY

## 2020-09-15 PROCEDURE — 96361 HYDRATE IV INFUSION ADD-ON: CPT

## 2020-09-15 PROCEDURE — 77336 RADIATION PHYSICS CONSULT: CPT | Performed by: RADIOLOGY

## 2020-09-15 PROCEDURE — 96367 TX/PROPH/DG ADDL SEQ IV INF: CPT

## 2020-09-15 RX ORDER — SODIUM CHLORIDE 9 MG/ML
20 INJECTION, SOLUTION INTRAVENOUS ONCE
Status: COMPLETED | OUTPATIENT
Start: 2020-09-15 | End: 2020-09-15

## 2020-09-15 RX ADMIN — CISPLATIN 121.1 MG: 1 INJECTION INTRAVENOUS at 13:18

## 2020-09-15 RX ADMIN — SODIUM CHLORIDE 1000 ML: 0.9 INJECTION, SOLUTION INTRAVENOUS at 11:23

## 2020-09-15 RX ADMIN — ONDANSETRON: 2 INJECTION INTRAMUSCULAR; INTRAVENOUS at 11:44

## 2020-09-15 RX ADMIN — SODIUM CHLORIDE 1000 ML: 0.9 INJECTION, SOLUTION INTRAVENOUS at 14:21

## 2020-09-15 RX ADMIN — SODIUM CHLORIDE 20 ML/HR: 0.9 INJECTION, SOLUTION INTRAVENOUS at 11:30

## 2020-09-15 RX ADMIN — SODIUM CHLORIDE 150 MG: 0.9 INJECTION, SOLUTION INTRAVENOUS at 12:28

## 2020-09-15 NOTE — TELEPHONE ENCOUNTER
Infusion nurse called today to ensure that the Patient's pain to leg and  medication call yesterday is being addressed  Please advise

## 2020-09-15 NOTE — PLAN OF CARE
Problem: Potential for Falls  Goal: Patient will remain free of falls  Description: INTERVENTIONS:  - Assess patient frequently for physical needs  -  Identify cognitive and physical deficits and behaviors that affect risk of falls    -  [place]le[place] fall precautions as indicated by assessment   - Educate patient/family on patient safety including physical limitations  - Instruct patient to call for assistance with activity based on assessment  - Modify environment to reduce risk of injury  - Consider OT/PT consult to assist with strengthening/mobility  Outcome: Progressing     Problem: PAIN - ADULT  Goal: Verbalizes/displays adequate comfort level or baseline comfort level  Description: Interventions:  - Encourage patient to monitor pain and request assistance  - Assess pain using appropriate pain scale  - Administer analgesics based on type and severity of pain and evaluate response  - Implement non-pharmacological measures as appropriate and evaluate response  - Consider cultural and social influences on pain and pain management  - Notify physician/advanced practitioner if interventions unsuccessful or patient reports new pain  Outcome: Progressing     Problem: INFECTION - ADULT  Goal: Absence or prevention of progression during hospitalization  Description: INTERVENTIONS:  - Assess and monitor for signs and symptoms of infection  - Monitor lab/diagnostic results  - Monitor all insertion sites, i e  indwelling lines, tubes, and drains  - Monitor endotracheal if appropriate and nasal secretions for changes in amount and color  - Fleming appropriate cooling/warming therapies per order  - Administer medications as ordered  - Instruct and encourage patient and family to use good hand hygiene technique  - Identify and instruct in appropriate isolation precautions for identified infection/condition  Outcome: Progressing     Problem: Knowledge Deficit  Goal: Patient/family/caregiver demonstrates understanding of disease process, treatment plan, medications, and discharge instructions  Description: Complete learning assessment and assess knowledge base    Interventions:  - Provide teaching at level of understanding  - Provide teaching via preferred learning methods  Outcome: Progressing

## 2020-09-15 NOTE — PROGRESS NOTES
[address] at Dr Olivia Taveras office to report patient's complaint of being up all night with leg pain and pain medication as ordered is not working  She will inform Dr Montserrat Emmanuel of same   I instructed patient to contact Dr Olivia Taveras office tomorrow if no call back

## 2020-09-16 ENCOUNTER — APPOINTMENT (OUTPATIENT)
Dept: RADIATION ONCOLOGY | Facility: HOSPITAL | Age: 55
End: 2020-09-16
Attending: RADIOLOGY
Payer: MEDICARE

## 2020-09-16 RX ORDER — DEXAMETHASONE 2 MG/1
2 TABLET ORAL
Qty: 14 TABLET | Refills: 0 | Status: SHIPPED | OUTPATIENT
Start: 2020-09-16 | End: 2020-09-23

## 2020-09-16 RX ORDER — OXYCODONE HYDROCHLORIDE 10 MG/1
10 TABLET ORAL EVERY 4 HOURS PRN
Qty: 8 TABLET | Refills: 0 | Status: SHIPPED | OUTPATIENT
Start: 2020-09-16 | End: 2020-09-18 | Stop reason: SDUPTHER

## 2020-09-16 RX ORDER — SENNOSIDES 8.6 MG
650 CAPSULE ORAL EVERY 8 HOURS
Qty: 30 TABLET | Refills: 0 | Status: SHIPPED | OUTPATIENT
Start: 2020-09-16

## 2020-09-16 NOTE — TELEPHONE ENCOUNTER
Pt called at 1020   LMOM to report he still having severe leg pain  Had chemo yesterday and is sick today  Did not have radiation treatment  States he is waiting on orders from Dr Yury Mcfadden to "call something in "     PDMP last fill dates  MSER 15 mg           09/01   180/20   Oxycodone 10 mg    08/28    180/30    This nurse called pt to assess status and review medications  LMOM asking for call back      Thank you

## 2020-09-16 NOTE — TELEPHONE ENCOUNTER
Corrected MS Contin quantity in previuos note  Ordered 15 mg 3 tabs Q 8 hours 270/30  Received 180/20     Pt taking 3 tabs 2 x day  Called again awaiting response  [facility] unable to tell this nurse if a prior auth was needed       [facility] MARTIN   [phone]  Phone 4

## 2020-09-16 NOTE — TELEPHONE ENCOUNTER
Spoke with patient via telephone  Rates RLE/R flank pain as a 10/10, constant, excruciating, no aggravating/alleviating factors identified  Reports that pharmacy only filled 180 tablets [20-day supply rather than 270 prescribed fill] with a recommendation to take MS-ER 45 mg Q12H to cover 30 days rather than the prescribed Q8H  Patient reports filling Rx on 09/01/2020 [as confirmed by PDMP] and has 80 tablets left [counted while on the phone]  Reports having 7 tablets of oxy-IR 10 mg tablets left and taking Q4H with last dose 2 hours ago  Also taking APAP 500 mg Q8H [currently ran out of pills as well]  Per previous encounter, patient with waxing/waning appetite  Plan:  Rx to pharmacy for 8 tablets of oxy-IR 10 mg to cover up til 09/18 [next appointment]  Rx for dexamethasone 2 mg BID x 7 days for appetite + pain  Rx for APAP 650 mg [facility]    Discussed that current plan is to bridge to our next appointment in three days      Jayant Estrada MD  Fellow, Palliative Medicine

## 2020-09-16 NOTE — TELEPHONE ENCOUNTER
1145 am   Pt returned call   Pt reports his increase in pain started   " about a week and half ago " I told the doctor  Pt has tendency to speak very quickly and over nurse, unsure if he accurately conveyed this at time of visit  Per pt Medications as follows  MSER 15 mg was ordered 3 every 8 hours  Count of 270/30   Pt only received 180/20 09/01/20  Per PDMP pt has several remaining  Pt states he was told by pharmacist that insurance would only cover for 180 tabs or 2 x day  No request for Prior Auth seen in system  This nurse will contact pharmacy for verification  Pt has been taking 2 x day and " occasionally " 3 tablets per day  Oxycodone 10 mg IR   Per PDMP last filled 08/28  180/30   Pt reports he has not followed orders due to the increased pain in leg  He will take 1 tablet and then 1 or 2 in 4 hours and sometimes 3 tablets  Pt has only 10 tablets remaining  Pt has been scheduled for virtual office with with Dr Lui Hutchinson 09/18/20  Pt would still like a call back if there are to be changes before this visit

## 2020-09-17 ENCOUNTER — APPOINTMENT (OUTPATIENT)
Dept: RADIATION ONCOLOGY | Facility: HOSPITAL | Age: 55
End: 2020-09-17
Attending: RADIOLOGY
Payer: MEDICARE

## 2020-09-17 PROCEDURE — 77387 GUIDANCE FOR RADJ TX DLVR: CPT | Performed by: RADIOLOGY

## 2020-09-17 PROCEDURE — 77412 RADIATION TX DELIVERY LVL 3: CPT | Performed by: RADIOLOGY

## 2020-09-17 PROCEDURE — 77417 THER RADIOLOGY PORT IMAGE(S): CPT | Performed by: RADIOLOGY

## 2020-09-18 ENCOUNTER — TELEMEDICINE (OUTPATIENT)
Dept: PALLIATIVE MEDICINE | Facility: CLINIC | Age: 55
End: 2020-09-18
Payer: MEDICARE

## 2020-09-18 ENCOUNTER — APPOINTMENT (OUTPATIENT)
Dept: RADIATION ONCOLOGY | Facility: HOSPITAL | Age: 55
End: 2020-09-18
Attending: RADIOLOGY
Payer: MEDICARE

## 2020-09-18 ENCOUNTER — TELEPHONE (OUTPATIENT)
Dept: INTERVENTIONAL RADIOLOGY/VASCULAR | Facility: HOSPITAL | Age: 55
End: 2020-09-18

## 2020-09-18 DIAGNOSIS — C66.1 UROTHELIAL CARCINOMA OF RIGHT DISTAL URETER (HCC): Primary | ICD-10-CM

## 2020-09-18 DIAGNOSIS — C79.51 BONY METASTASIS (HCC): ICD-10-CM

## 2020-09-18 DIAGNOSIS — T40.2X5A THERAPEUTIC OPIOID INDUCED CONSTIPATION: ICD-10-CM

## 2020-09-18 DIAGNOSIS — G89.3 CANCER RELATED PAIN: ICD-10-CM

## 2020-09-18 DIAGNOSIS — Z51.5 PALLIATIVE CARE PATIENT: ICD-10-CM

## 2020-09-18 DIAGNOSIS — K59.03 THERAPEUTIC OPIOID INDUCED CONSTIPATION: ICD-10-CM

## 2020-09-18 PROCEDURE — 77412 RADIATION TX DELIVERY LVL 3: CPT | Performed by: RADIOLOGY

## 2020-09-18 PROCEDURE — 99214 OFFICE O/P EST MOD 30 MIN: CPT | Performed by: FAMILY MEDICINE

## 2020-09-18 PROCEDURE — 77387 GUIDANCE FOR RADJ TX DLVR: CPT | Performed by: RADIOLOGY

## 2020-09-18 RX ORDER — OXYCODONE HYDROCHLORIDE 10 MG/1
10 TABLET ORAL EVERY 4 HOURS PRN
Qty: 42 TABLET | Refills: 0 | Status: SHIPPED | OUTPATIENT
Start: 2020-09-18 | End: 2020-09-25 | Stop reason: SDUPTHER

## 2020-09-18 RX ORDER — MORPHINE SULFATE 15 MG/1
60 TABLET, FILM COATED, EXTENDED RELEASE ORAL EVERY 8 HOURS SCHEDULED
Qty: 14 TABLET | Refills: 0 | Status: SHIPPED | OUTPATIENT
Start: 2020-09-18 | End: 2020-09-25 | Stop reason: DRUGHIGH

## 2020-09-18 NOTE — Clinical Note
FYI, when you change opioid drugs for a patient, you are extensively considering withdrawal and overdose  This is high medical decision making, and deserves Level 4 reimbursement at minimum  59520 is coded for you

## 2020-09-18 NOTE — PROGRESS NOTES
Outpatient Virtual Regular Visit - Follow-up with Palliative and [address] [place] [address] 63 y o  male [phone]    Intake:    Reason for virtual visit is routine [facility] clinic follow up  The patient is unable to visit the clinic safely due to the coronavirus pandemic  After connecting through [employer], the patient was identified by name and date of birth  Patti Ojeda was informed that this was a telemedicine visit and that the visit is being conducted through "[employer] Leo and patient was informed that this is a secure, HIPAA-compliant platform  He agrees to proceed     My office door was closed  No one else was in the room  He acknowledged consent and understanding of privacy and security of the video platform  The patient has agreed to participate and understands they can discontinue the visit at any time         Assessment and Plan  Problem List Items Addressed This Visit        Digestive    Therapeutic opioid induced constipation       Musculoskeletal and Integument    Bony metastasis (HCC)    Relevant Medications    oxyCODONE (ROXICODONE) 10 MG TABS       Genitourinary    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    Relevant Medications    oxyCODONE (ROXICODONE) 10 MG TABS       Other    Cancer related pain    Relevant Medications    oxyCODONE (ROXICODONE) 10 MG TABS    morphine (MS CONTIN) 15 mg 12 hr tablet    Palliative care patient        #symptoms management   - PDMP reviewed   - last fill of MS-ER 45 mg Q8H was on 09/01/2020 for 20-days [180 tabs] --> per patient, was recommended to change to Q12H dosing and currently has 70 tablets remaining     - given increasing pain needs, will increase to MS ER 60 mg Q8H --> Rx for 14 tablets sent to pharmacy --> patient now has 7 days of tablets remaining at home to cover until our next appointment   - last fill of oxy-IR 10 mg Q4H PRN was on 08/28/2020 for 30-days [180 tabs]    - next fill due on 09/26/2020    - patient reports that he was often taking 3 tablets Q4H PRN given the worsening pain -> in the setting of decreased long acting use [Q12H instead of Q8H]    - patient is currently out of oxy-IR 10 mg tablets; Rx for 7-days written     - calculated recent 24 hour OME requirement [360 OME]   - MS-ER 45 mg Q12H --> 90 OME   - oxy-IR 30 mg Q4H --> 270 OME   - considering dose reduction for cross tolerance of 25% [270 OME]   - new pain regimen:    - MS-ER 60 mg Q8H [180 OME]    - oxy-IR 10 mg Q4H [90 OME]   - benefit of new regimen is decreased pill burden + use of extended release with goal to decrease fluctuant use of short-acting opioids   - narcan at home as filled by previous Rx    Extensive discussion with the patient concerning use of opioids and multiple early requests for refills  Discussed with patient that Rx will be for 7-days for the foreseeable future until adequate pain control and patient behavior are appropriate  Patient agreed to current plan and understands that his weekly Rx is what he gets and that no further requests for early refills will be granted  Patient understands that if he is unable to comply, per signed opioid agreement, the patient will be weaned and no longer receive opioid medications from our clinic  Patient verbally shared understanding and in agreement with discussion      - continue tylenol 650 mg [facility] for pain   - continue home bowel regimen to prevent OIC   - continue dexamethasone 2 mg BID for pain/appetite    Discussed opioid pain regimen with [facility] pharmacy via telephone [at time, pharmacist was with another patient, spoke with pharmacy tech who stated will relay message to pharmacist]  If any further questions/concerns, phone number provided  Follow up with [facility] clinic in 1 week; next visit may be virtual, but following visit IN PERSON    No orders of the defined types were placed in this encounter      Medications Discontinued During This Encounter   Medication Reason    oxyCODONE (Brianne Conde) 10 MG TABS Duplicate order    oxyCODONE (ROXICODONE) 10 MG TABS Reorder    morphine (MS CONTIN) 15 mg 12 hr tablet Reorder        Wash L Yue Calderonw was assessed today for symptoms and care planning related to above illnesses  I have reviewed the patient's controlled substance dispensing history in the Prescription Drug Monitoring Program in compliance with the [facility] regulations before prescribing any controlled substances  He is invited to continue to follow with us  If there are questions or concerns, please contact us through our clinic/answering service 24 hours a day, seven days a week  MD Aretha Whittaker [facility]            [facility] a 63 y  o  male with a PMH of stage IV 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, HTN, chronic lumbar pain c/b radiculopathy who presents in person to [facility] clinic for scheduled [facility] follow up for pain management      Primary Oncology: Dr Ha Garza              - last chemo infusion three days ago  Primary Rad/Onc: Dr Kris Goel   - scheduled today for therapy    Today's televideo appointment occurred while patient was en route to radiation therapy  Patient reports improved pain since restarting MS-ER 45 mg Q8H [see previous telephone note]  Continues to take oxy 30 mg [10 mg tab x 3] Q4H  Pain persists but vastly improved  Pain still localizes to R flank and RLE, constant, aggravated with ambulation  Denies nausea/vomiting  Improved appetite on dexamethasone 2 mg BID; shared gratitude for addition of therapy to regimen ["I never eat breakfast, but started yesterday and it was great!"]  Last BM yesterday  Voiding at baseline  No falls/trauma        Past Medical History:   Diagnosis Date    Asthma     Bipolar disorder ([facility] [address] )     COPD (chronic obstructive pulmonary disease) (HCC)     Disease of thyroid gland     Hypertension     Hyperthyroidism Past Surgical History:   Procedure Laterality Date    DENTAL SURGERY      FL RETROGRADE URETHROCYSTOGRAM  6/15/2020    HERNIA REPAIR Left     inguinal    IR LYMPH NODE BIOPSY/ASPIRATION  7/24/2020    IR TUBE PLACEMENT  6/25/2020    MI CYSTOURETHROSCOPY,URETER CATHETER Right 6/15/2020    Procedure: CYSTOSCOPY RETROGRADE PYELOGRAM and ureteroscopy;  Surgeon: Sylvia Noble MD;  Location: [facility] MAIN OR;  Service: Urology     Current Outpatient Medications   Medication Sig Dispense Refill    acetaminophen (TYLENOL) 650 mg CR tablet Take 1 tablet (650 mg total) by mouth every 8 (eight) hours 30 tablet 0    albuterol (Ventolin HFA) 90 mcg/act inhaler Inhale 2 puffs every 6 (six) hours as needed for wheezing or shortness of breath 18 g 5    carisoprodol (SOMA) 350 mg tablet Take 1 tablet (350 mg total) by mouth 3 (three) times a day for 10 days 30 tablet 0    clonazePAM (KlonoPIN) 0 5 mg tablet Take 1 tablet (0 5 mg total) by mouth 2 (two) times a day as needed for anxiety 14 tablet 0    dexamethasone (DECADRON) 2 mg tablet Take 1 tablet (2 mg total) by mouth 2 (two) times a day before breakfast and lunch for 7 days 14 tablet 0    docusate sodium (COLACE) 100 mg capsule Take 1 capsule (100 mg total) by mouth 2 (two) times a day 10 capsule 0    escitalopram (LEXAPRO) 10 mg tablet Take 10 mg by mouth daily at bedtime      fluticasone (FLONASE) 50 mcg/act nasal spray SPRAY 2 SPRAYS INTO EACH NOSTRIL EVERY DAY 1 Bottle 5    fluticasone (FLOVENT HFA) 220 mcg/act inhaler Inhale 2 puffs 2 (two) times a day Rinse mouth after use   1 Inhaler 5    lidocaine (LIDODERM) 5 % Apply 1 patch topically daily Remove & Discard patch within 12 hours or as directed by MD (Patient not taking: Reported on 9/2/2020) 10 patch 0    morphine (MS CONTIN) 15 mg 12 hr tablet Take 4 tablets (60 mg total) by mouth every 8 (eight) hoursMax Daily Amount: 180 mg 14 tablet 0    naloxone (NARCAN) 4 mg/0 1 mL nasal spray 0 1 mL (4 mg total) into each nostril every 3 (three) minutes as needed for opioid reversal or respiratory depression Administer 1 spray into a nostril  If breathing does not return to normal or if breathing difficulty resumes after 2-3 minutes, give another dose in the other nostril using a new spray  (Patient not taking: Reported on 9/2/2020) 1 each 1    omeprazole (PriLOSEC) 40 MG capsule Take 1 capsule (40 mg total) by mouth daily before breakfast 30 capsule 5    ondansetron (ZOFRAN) 8 mg tablet Take 1 tablet (8 mg total) by mouth every 8 (eight) hours as needed for nausea or vomiting 30 tablet 3    ondansetron (ZOFRAN-ODT) 4 mg disintegrating tablet Take 1 tablet (4 mg total) by mouth every 6 (six) hours as needed for nausea or vomiting 20 tablet 0    oxyCODONE (ROXICODONE) 10 MG TABS Take 1 tablet (10 mg total) by mouth every 4 (four) hours as needed for severe pain for up to 7 daysMax Daily Amount: 60 mg 42 tablet 0    polyethylene glycol (MIRALAX) 17 g packet Take 17 g by mouth daily as needed (Constipation) 14 each 0    senna (SENOKOT) 8 6 mg Take 1 tablet (8 6 mg total) by mouth daily at bedtime 30 tablet 0    zolpidem (AMBIEN) 10 mg tablet 1 TAB AT BEDTIME AS NEEDED FOR INSOMNIA       No current facility-administered medications for this visit  Allergies   Allergen Reactions    Ketorolac Hives     Toradol       Review of Systems   Constitutional: Positive for appetite change, fatigue and unexpected weight change  Negative for chills and fever  HENT: Negative for congestion  Eyes: Negative for visual disturbance  Respiratory: Negative for shortness of breath  Cardiovascular: Negative for chest pain  Gastrointestinal: Negative for abdominal pain, constipation, nausea and vomiting  Genitourinary: Positive for flank pain  Negative for dysuria and hematuria  Musculoskeletal: Negative for neck pain  RLE pain   Skin: Negative for wound  Neurological: Negative for headaches  Psychiatric/Behavioral: Negative for confusion  Video Exam  There were no vitals filed for this visit  Physical Exam  No Physical Exam performed during today's Virtual Visit  I spent 30+ minutes was spent during this virtual visit by Kalpana face to face with Frankie with greater than 50% of the time spent in counseling or coordination of care including discussions of symptoms assessment, discussion of opioid use agreement  All of the patient's questions were answered during this discussion  Encounter provider Alan Beck MD, located at:  [address] [place] [id number]  [phone]    Recent Visits  Date Type Provider Dept   09/11/20 Emanuel Fish MD [address] recent visits within past 7 days and meeting all other requirements     Today's Visits  Date Type Provider Dept   09/18/20 mEanuel Fish MD [address] today's visits and meeting all other requirements     Future Appointments  No visits were found meeting these conditions  Showing future appointments within next 150 days and meeting all other requirements        VIRTUAL VISIT [facility] acknowledges that He has consented to an online visit or consultation  He understands that the online visit is based solely on information provided by He, and that, in the absence of a face-to-face physical evaluation by the physician, the diagnosis He receives is both limited and provisional in terms of accuracy and completeness  This is not intended to replace a full medical face-to-face evaluation by the physician  Hardik Tran understands and accepts these terms  Patient was informed this is a billable service

## 2020-09-19 NOTE — ADDENDUM NOTE
Addended by: Chanda Coy on: 9/18/2020 09:37 PM     Modules accepted: Level of Service
59 y/o F w/ L hallux traumatic nail avulsion  -Pt seen and evaluated in ED  -Traumatic nail avulsion to L hallucal nail, nail avulsed at distal and lateral borders, subungual hematoma not appreciated as nail polish is covering nail, no drainage or active bleeding, no signs of infection  -XR negative for fracture  -Local digital block of 7cc 1% lidocaine plain given around L hallux  -Nail was removed using sterile suture removal kit  -Nail bed was cleansed with saline and assessed for lacerations  -Laceration noted from distal aspect of nail bed extending to the lateral aspect of the proximal nail fold, probes to bone  -Wound was flushed w/ copious amounts of sterile saline and repaired using 3-0 vicryl in simple interrupted suture technique  -Dressed w/ xeroform and dry sterile compressive dressing  -Keep dressing clean, dry, and intact until follow up, WBAT in surgical shoe to L foot  -Recommend PO Augmentin x1 week  -Follow up w/ Dr. Hollis or Dr. Bradley within 1 week. Call [phone] for appointment.  -Discussed w/ attending

## 2020-09-21 ENCOUNTER — APPOINTMENT (OUTPATIENT)
Dept: RADIATION ONCOLOGY | Facility: HOSPITAL | Age: 55
End: 2020-09-21
Attending: RADIOLOGY
Payer: MEDICARE

## 2020-09-21 PROCEDURE — 77412 RADIATION TX DELIVERY LVL 3: CPT | Performed by: RADIOLOGY

## 2020-09-21 PROCEDURE — 77387 GUIDANCE FOR RADJ TX DLVR: CPT | Performed by: RADIOLOGY

## 2020-09-22 ENCOUNTER — APPOINTMENT (OUTPATIENT)
Dept: RADIATION ONCOLOGY | Facility: HOSPITAL | Age: 55
End: 2020-09-22
Attending: RADIOLOGY
Payer: MEDICARE

## 2020-09-23 ENCOUNTER — HOSPITAL ENCOUNTER (OUTPATIENT)
Dept: INFUSION CENTER | Facility: HOSPITAL | Age: 55
Discharge: HOME | End: 2020-09-23

## 2020-09-23 ENCOUNTER — APPOINTMENT (OUTPATIENT)
Dept: RADIATION ONCOLOGY | Facility: HOSPITAL | Age: 55
End: 2020-09-23
Payer: MEDICARE

## 2020-09-23 ENCOUNTER — TELEPHONE (OUTPATIENT)
Dept: HEMATOLOGY ONCOLOGY | Facility: CLINIC | Age: 55
End: 2020-09-23

## 2020-09-23 DIAGNOSIS — C66.1 UROTHELIAL CARCINOMA OF RIGHT DISTAL URETER (HCC): Primary | ICD-10-CM

## 2020-09-23 NOTE — TELEPHONE ENCOUNTER
Spoke to patient to confirm his appt with Nathalie Carter on 9/25 and to screen for COVID  Patient said that he has radiation in [place] at the same time as this appt  I rescheduled him to see [facility] on Tuesday, 9/29  [facility] has been notified that he will need transport on this day

## 2020-09-24 ENCOUNTER — APPOINTMENT (OUTPATIENT)
Dept: RADIATION ONCOLOGY | Facility: HOSPITAL | Age: 55
End: 2020-09-24
Payer: MEDICARE

## 2020-09-24 ENCOUNTER — APPOINTMENT (OUTPATIENT)
Dept: LAB | Facility: HOSPITAL | Age: 55
End: 2020-09-24
Attending: INTERNAL MEDICINE
Payer: MEDICARE

## 2020-09-24 ENCOUNTER — HOSPITAL ENCOUNTER (OUTPATIENT)
Dept: INFUSION CENTER | Facility: HOSPITAL | Age: 55
Discharge: HOME | End: 2020-09-24
Attending: INTERNAL MEDICINE
Payer: MEDICARE

## 2020-09-24 VITALS
HEIGHT: 66 IN | HEART RATE: 82 BPM | RESPIRATION RATE: 16 BRPM | SYSTOLIC BLOOD PRESSURE: 122 MMHG | TEMPERATURE: 97 F | DIASTOLIC BLOOD PRESSURE: 56 MMHG | WEIGHT: 137.79 LBS | BODY MASS INDEX: 22.14 KG/M2 | OXYGEN SATURATION: 100 %

## 2020-09-24 DIAGNOSIS — G89.3 CANCER RELATED PAIN: ICD-10-CM

## 2020-09-24 DIAGNOSIS — C66.1 UROTHELIAL CARCINOMA OF RIGHT DISTAL URETER (HCC): Primary | ICD-10-CM

## 2020-09-24 DIAGNOSIS — C79.51 BONY METASTASIS (HCC): ICD-10-CM

## 2020-09-24 DIAGNOSIS — C66.1 UROTHELIAL CARCINOMA OF RIGHT DISTAL URETER (HCC): ICD-10-CM

## 2020-09-24 LAB
ALBUMIN SERPL BCP-MCNC: 2.9 G/DL (ref 3.5–5)
ALP SERPL-CCNC: 90 U/L (ref 46–116)
ALT SERPL W P-5'-P-CCNC: 27 U/L (ref 12–78)
ANION GAP SERPL CALCULATED.3IONS-SCNC: 8 MMOL/L (ref 4–13)
AST SERPL W P-5'-P-CCNC: 15 U/L (ref 5–45)
BASOPHILS # BLD AUTO: 0.03 THOUSANDS/ΜL (ref 0–0.1)
BASOPHILS NFR BLD AUTO: 0 % (ref 0–1)
BILIRUB SERPL-MCNC: 0.2 MG/DL (ref 0.2–1)
BUN SERPL-MCNC: 29 MG/DL (ref 5–25)
CALCIUM ALBUM COR SERPL-MCNC: 9 MG/DL (ref 8.3–10.1)
CALCIUM SERPL-MCNC: 8.1 MG/DL (ref 8.3–10.1)
CHLORIDE SERPL-SCNC: 102 MMOL/L (ref 100–108)
CO2 SERPL-SCNC: 26 MMOL/L (ref 21–32)
CREAT SERPL-MCNC: 1.35 MG/DL (ref 0.6–1.3)
EOSINOPHIL # BLD AUTO: 0.39 THOUSAND/ΜL (ref 0–0.61)
EOSINOPHIL NFR BLD AUTO: 5 % (ref 0–6)
ERYTHROCYTE [DISTWIDTH] IN BLOOD BY AUTOMATED COUNT: 16.6 % (ref 11.6–15.1)
GFR SERPL CREATININE-BSD FRML MDRD: 58 ML/MIN/1.73SQ M
GLUCOSE P FAST SERPL-MCNC: 95 MG/DL (ref 65–99)
HCT VFR BLD AUTO: 34.5 % (ref 36.5–49.3)
HGB BLD-MCNC: 10.8 G/DL (ref 12–17)
IMM GRANULOCYTES # BLD AUTO: 0.04 THOUSAND/UL (ref 0–0.2)
IMM GRANULOCYTES NFR BLD AUTO: 1 % (ref 0–2)
LYMPHOCYTES # BLD AUTO: 1.06 THOUSANDS/ΜL (ref 0.6–4.47)
LYMPHOCYTES NFR BLD AUTO: 12 % (ref 14–44)
MCH RBC QN AUTO: 29.8 PG (ref 26.8–34.3)
MCHC RBC AUTO-ENTMCNC: 31.3 G/DL (ref 31.4–37.4)
MCV RBC AUTO: 95 FL (ref 82–98)
MONOCYTES # BLD AUTO: 1.15 THOUSAND/ΜL (ref 0.17–1.22)
MONOCYTES NFR BLD AUTO: 13 % (ref 4–12)
NEUTROPHILS # BLD AUTO: 5.96 THOUSANDS/ΜL (ref 1.85–7.62)
NEUTS SEG NFR BLD AUTO: 69 % (ref 43–75)
NRBC BLD AUTO-RTO: 0 /100 WBCS
PLATELET # BLD AUTO: 328 THOUSANDS/UL (ref 149–390)
PMV BLD AUTO: 8.9 FL (ref 8.9–12.7)
POTASSIUM SERPL-SCNC: 3.6 MMOL/L (ref 3.5–5.3)
PROT SERPL-MCNC: 6.6 G/DL (ref 6.4–8.2)
RBC # BLD AUTO: 3.63 MILLION/UL (ref 3.88–5.62)
SODIUM SERPL-SCNC: 136 MMOL/L (ref 136–145)
WBC # BLD AUTO: 8.63 THOUSAND/UL (ref 4.31–10.16)

## 2020-09-24 PROCEDURE — 96360 HYDRATION IV INFUSION INIT: CPT

## 2020-09-24 PROCEDURE — 36415 COLL VENOUS BLD VENIPUNCTURE: CPT

## 2020-09-24 PROCEDURE — 80053 COMPREHEN METABOLIC PANEL: CPT

## 2020-09-24 PROCEDURE — 85025 COMPLETE CBC W/AUTO DIFF WBC: CPT

## 2020-09-24 RX ORDER — SODIUM CHLORIDE 9 MG/ML
1000 INJECTION, SOLUTION INTRAVENOUS CONTINUOUS
Status: CANCELLED
Start: 2020-09-24

## 2020-09-24 RX ORDER — OXYCODONE HYDROCHLORIDE 10 MG/1
10 TABLET ORAL EVERY 4 HOURS PRN
Qty: 42 TABLET | Refills: 0 | Status: CANCELLED | OUTPATIENT
Start: 2020-09-24 | End: 2020-10-01

## 2020-09-24 RX ORDER — SODIUM CHLORIDE 9 MG/ML
1000 INJECTION, SOLUTION INTRAVENOUS CONTINUOUS
Status: DISPENSED | OUTPATIENT
Start: 2020-09-24 | End: 2020-09-24

## 2020-09-24 RX ORDER — MORPHINE SULFATE 15 MG/1
60 TABLET, FILM COATED, EXTENDED RELEASE ORAL EVERY 8 HOURS SCHEDULED
Qty: 14 TABLET | Refills: 0 | Status: CANCELLED | OUTPATIENT
Start: 2020-09-24

## 2020-09-24 RX ADMIN — SODIUM CHLORIDE 1000 ML/HR: 0.9 INJECTION, SOLUTION INTRAVENOUS at 09:58

## 2020-09-24 NOTE — PLAN OF CARE
Problem: Potential for Falls  Goal: Patient will remain free of falls  Description: INTERVENTIONS:  - Assess patient frequently for physical needs  -  Identify cognitive and physical deficits and behaviors that affect risk of falls    -  Ruby fall precautions as indicated by assessment   - Educate patient/family on patient safety including physical limitations  - Instruct patient to call for assistance with activity based on assessment  - Modify environment to reduce risk of injury  - Consider OT/PT consult to assist with strengthening/mobility  Outcome: Progressing

## 2020-09-24 NOTE — PROGRESS NOTES
Patient here today for chemotherapy which is being held; he will received 1 L NS today  Patient will be made aware of same

## 2020-09-24 NOTE — PLAN OF CARE
Problem: Potential for Falls  Goal: Patient will remain free of falls  Description: INTERVENTIONS:  - Assess patient frequently for physical needs  -  Identify cognitive and physical deficits and behaviors that affect risk of falls  -  [place] fall precautions as indicated by assessment   - Educate patient/family on patient safety including physical limitations  - Instruct patient to call for assistance with activity based on assessment  - Modify environment to reduce risk of injury  - Consider OT/PT consult to assist with strengthening/mobility  Outcome: Progressing     Problem: INFECTION - ADULT  Goal: Absence or prevention of progression during hospitalization  Description: INTERVENTIONS:  - Assess and monitor for signs and symptoms of infection  - Monitor lab/diagnostic results  - Monitor all insertion sites, i e  indwelling lines, tubes, and drains  - Monitor endotracheal if appropriate and nasal secretions for changes in amount and color  - [place] appropriate cooling/warming therapies per order  - Administer medications as ordered  - Instruct and encourage patient and family to use good hand hygiene technique  - Identify and instruct in appropriate isolation precautions for identified infection/condition  Outcome: Progressing     Problem: Knowledge Deficit  Goal: Patient/family/caregiver demonstrates understanding of disease process, treatment plan, medications, and discharge instructions  Description: Complete learning assessment and assess knowledge base    Interventions:  - Provide teaching at level of understanding  - Provide teaching via preferred learning methods  Outcome: Progressing

## 2020-09-25 ENCOUNTER — APPOINTMENT (OUTPATIENT)
Dept: RADIATION ONCOLOGY | Facility: HOSPITAL | Age: 55
End: 2020-09-25
Attending: RADIOLOGY
Payer: MEDICARE

## 2020-09-25 DIAGNOSIS — C79.51 BONY METASTASIS (HCC): ICD-10-CM

## 2020-09-25 DIAGNOSIS — G89.3 CANCER RELATED PAIN: ICD-10-CM

## 2020-09-25 DIAGNOSIS — C66.1 UROTHELIAL CARCINOMA OF RIGHT DISTAL URETER (HCC): ICD-10-CM

## 2020-09-25 PROCEDURE — 77412 RADIATION TX DELIVERY LVL 3: CPT | Performed by: RADIOLOGY

## 2020-09-25 PROCEDURE — 77387 GUIDANCE FOR RADJ TX DLVR: CPT | Performed by: RADIOLOGY

## 2020-09-25 RX ORDER — MORPHINE SULFATE 60 MG/1
60 TABLET, FILM COATED, EXTENDED RELEASE ORAL EVERY 8 HOURS
Qty: 21 TABLET | Refills: 0 | Status: SHIPPED | OUTPATIENT
Start: 2020-09-25 | End: 2020-09-28 | Stop reason: SDUPTHER

## 2020-09-25 RX ORDER — OXYCODONE HYDROCHLORIDE 10 MG/1
10 TABLET ORAL EVERY 4 HOURS PRN
Qty: 42 TABLET | Refills: 0 | Status: SHIPPED | OUTPATIENT
Start: 2020-09-25 | End: 2020-09-28 | Stop reason: SDUPTHER

## 2020-09-25 NOTE — PROGRESS NOTES
Rx sent to pharmacy for one week supply of:     - MS-contin 60 mg [facility] [21 tablets]   - oxy-IR 10 mg Q4H PRN [42 tablets]    Total 270 OME/24 hr    Patient to follow up with [facility] next Friday [10/02/2020]  Per patient agreement and understanding; no mid-week refills for any opioid medications      Jessica Falk MD  Fellow, Palliative Care Medicine

## 2020-09-28 ENCOUNTER — APPOINTMENT (OUTPATIENT)
Dept: RADIATION ONCOLOGY | Facility: HOSPITAL | Age: 55
End: 2020-09-28
Payer: MEDICARE

## 2020-09-28 ENCOUNTER — TELEPHONE (OUTPATIENT)
Dept: INFUSION CENTER | Facility: HOSPITAL | Age: 55
End: 2020-09-28

## 2020-09-28 ENCOUNTER — TELEPHONE (OUTPATIENT)
Dept: HEMATOLOGY ONCOLOGY | Facility: CLINIC | Age: 55
End: 2020-09-28

## 2020-09-28 ENCOUNTER — DOCUMENTATION (OUTPATIENT)
Dept: HEMATOLOGY ONCOLOGY | Facility: MEDICAL CENTER | Age: 55
End: 2020-09-28

## 2020-09-28 ENCOUNTER — TELEMEDICINE (OUTPATIENT)
Dept: PALLIATIVE MEDICINE | Facility: CLINIC | Age: 55
End: 2020-09-28
Payer: MEDICARE

## 2020-09-28 DIAGNOSIS — C66.1 UROTHELIAL CARCINOMA OF RIGHT DISTAL URETER (HCC): ICD-10-CM

## 2020-09-28 DIAGNOSIS — G89.3 CANCER RELATED PAIN: ICD-10-CM

## 2020-09-28 DIAGNOSIS — C79.51 BONY METASTASIS (HCC): ICD-10-CM

## 2020-09-28 PROCEDURE — 99214 OFFICE O/P EST MOD 30 MIN: CPT | Performed by: FAMILY MEDICINE

## 2020-09-28 RX ORDER — MORPHINE SULFATE 60 MG/1
60 TABLET, FILM COATED, EXTENDED RELEASE ORAL EVERY 8 HOURS
Qty: 63 TABLET | Refills: 0 | Status: SHIPPED | OUTPATIENT
Start: 2020-09-28 | End: 2020-10-21 | Stop reason: SDUPTHER

## 2020-09-28 RX ORDER — OXYCODONE HYDROCHLORIDE 10 MG/1
10 TABLET ORAL EVERY 4 HOURS PRN
Qty: 126 TABLET | Refills: 0 | Status: SHIPPED | OUTPATIENT
Start: 2020-09-28 | End: 2020-10-21 | Stop reason: SDUPTHER

## 2020-09-28 NOTE — Clinical Note
Can you help this fellow arrange f/up with us on 10/21? He would prefer a morning appt, and I am happy to squeeze him in

## 2020-09-28 NOTE — TELEPHONE ENCOUNTER
Call received from patient asking if he is set up for transporation for Wednesday here at infusion  Per schedule he is on for tomorrow with Chairty and Wednesday here at infusion  [facility] to Start spoke with staff who confirmed both appt dates and times  Patient made aware of same

## 2020-09-28 NOTE — PROGRESS NOTES
Outpatient Virtual Regular Visit - Follow-up with Palliative and [address] [place] [address] 63 y o  male [id number]    Intake:    Reason for virtual visit is f/up cancer pain  The patient is unable to visit the clinic safely due to the coronavirus pandemic  After connecting through [employer], the patient was identified by name and date of birth  Courtney Later was informed that this was a telemedicine visit and that the visit is being conducted through [facility] and patient was informed that this is a secure, HIPAA-compliant platform  He agrees to proceed     My office door was closed  No one else was in the room  He acknowledged consent and understanding of privacy and security of the video platform  The patient has agreed to participate and understands they can discontinue the visit at any time  Assessment and Plan  Problem List Items Addressed This Visit     Bony metastasis (HCC)    Relevant Medications    oxyCODONE (ROXICODONE) 10 MG TABS    morphine (MS CONTIN) 60 mg 12 hr tablet    Cancer related pain    Relevant Medications    oxyCODONE (ROXICODONE) 10 MG TABS    morphine (MS CONTIN) 60 mg 12 hr tablet    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)    Relevant Medications    oxyCODONE (ROXICODONE) 10 MG TABS    morphine (MS CONTIN) 60 mg 12 hr tablet        No orders of the defined types were placed in this encounter  Medications Discontinued During This Encounter   Medication Reason    docusate sodium (COLACE) 100 mg capsule     lidocaine (LIDODERM) 5 %     naloxone (NARCAN) 4 mg/0 1 mL nasal spray     oxyCODONE (ROXICODONE) 10 MG TABS Reorder    morphine (MS CONTIN) 60 mg 12 hr tablet Reorder     Will refill meds until 10/21, when pt cna see me in clinic in [place]  Hardik HAYDEN Tran was assessed today for symptoms and care planning related to above illnesses    I have reviewed the patient's controlled substance dispensing history in the Prescription Drug Monitoring Program in compliance with the [facility] regulations before prescribing any controlled substances  He is invited to continue to follow with us  If there are questions or concerns, please contact us through our clinic/answering service 24 hours a day, seven days a week  Bonifacio Serra MD  [address] [facility]  [phone]          Subjective    Lois Cuevas is a 63 y o  male who follows with us for his urothelial ca or R ureter  Since last visit, he has been quite well  He feels that doseage increase to pain meds has helpful, and he does not wish tomake additional changes at this time  We agreed to fill medication for next month, more or less, and he may f/up with me in [place]  He denies constipation, nausea, resp depression, confusion on current doses  Bowel smoving well            Past Medical History:   Diagnosis Date    Asthma     [facility]     COPD (chronic obstructive pulmonary disease) [facility]     Disease of thyroid gland     Hypertension     Hyperthyroidism      Past Surgical History:   Procedure Laterality Date    DENTAL SURGERY      FL RETROGRADE URETHROCYSTOGRAM  6/15/2020    HERNIA REPAIR Left     inguinal    IR LYMPH NODE BIOPSY/ASPIRATION  7/24/2020    IR TUBE PLACEMENT  6/25/2020    SD CYSTOURETHROSCOPY,URETER CATHETER Right 6/15/2020    Procedure: CYSTOSCOPY RETROGRADE PYELOGRAM and ureteroscopy;  Surgeon: Ashok Banda MD;  Location: [facility];  Service: Urology     Current Outpatient Medications   Medication Sig Dispense Refill    acetaminophen (TYLENOL) 650 mg CR tablet Take 1 tablet (650 mg total) by mouth every 8 (eight) hours 30 tablet 0    albuterol (Ventolin HFA) 90 mcg/act inhaler Inhale 2 puffs every 6 (six) hours as needed for wheezing or shortness of breath 18 g 5    carisoprodol (SOMA) 350 mg tablet Take 1 tablet (350 mg total) by mouth 3 (three) times a day for 10 days 30 tablet 0    clonazePAM (KlonoPIN) 0 5 mg tablet Take 1 tablet (0 5 mg total) by mouth 2 (two) times a day as needed for anxiety 14 tablet 0    escitalopram (LEXAPRO) 10 mg tablet Take 10 mg by mouth daily at bedtime      fluticasone (FLONASE) 50 mcg/act nasal spray SPRAY 2 SPRAYS INTO EACH NOSTRIL EVERY DAY 1 Bottle 5    fluticasone (FLOVENT HFA) 220 mcg/act inhaler Inhale 2 puffs 2 (two) times a day Rinse mouth after use  1 Inhaler 5    morphine (MS CONTIN) 60 mg 12 hr tablet Take 1 tablet (60 mg total) by mouth every 8 (eight) hoursMax Daily Amount: 180 mg 63 tablet 0    omeprazole (PriLOSEC) 40 MG capsule Take 1 capsule (40 mg total) by mouth daily before breakfast 30 capsule 5    ondansetron (ZOFRAN) 8 mg tablet Take 1 tablet (8 mg total) by mouth every 8 (eight) hours as needed for nausea or vomiting 30 tablet 3    ondansetron (ZOFRAN-ODT) 4 mg disintegrating tablet Take 1 tablet (4 mg total) by mouth every 6 (six) hours as needed for nausea or vomiting 20 tablet 0    oxyCODONE (ROXICODONE) 10 MG TABS Take 1 tablet (10 mg total) by mouth every 4 (four) hours as needed for severe painMax Daily Amount: 60 mg 126 tablet 0    polyethylene glycol (MIRALAX) 17 g packet Take 17 g by mouth daily as needed (Constipation) 14 each 0    senna (SENOKOT) 8 6 mg Take 1 tablet (8 6 mg total) by mouth daily at bedtime 30 tablet 0    zolpidem (AMBIEN) 10 mg tablet 1 TAB AT BEDTIME AS NEEDED FOR INSOMNIA       No current facility-administered medications for this visit  Allergies   Allergen Reactions    Ketorolac Hives     Toradol       Review of Systems   Constitutional: Positive for activity change  Negative for appetite change, chills and diaphoresis  HENT: Negative for mouth sores and nosebleeds  Eyes: Negative for redness and visual disturbance  Respiratory: Negative for cough, shortness of breath and wheezing  Cardiovascular: Negative for chest pain and palpitations  Gastrointestinal: Negative for abdominal pain, constipation, diarrhea and nausea  Endocrine: Negative for polydipsia, polyphagia and polyuria  Genitourinary: Negative for hematuria and urgency  Musculoskeletal: Positive for arthralgias, back pain and gait problem  Negative for joint swelling  Skin: Positive for pallor  Negative for wound  Neurological: Positive for weakness  Negative for seizures and syncope  Psychiatric/Behavioral: Negative for agitation and confusion  The patient is nervous/anxious  Video Exam  There were no vitals filed for this visit  Physical Exam  Constitutional:       General: He is not in acute distress  Appearance: He is normal weight  He is ill-appearing  He is not toxic-appearing or diaphoretic  Comments: Frail   HENT:      Head: Normocephalic and atraumatic  Right Ear: External ear normal       Left Ear: External ear normal    Eyes:      General:         Right eye: No discharge  Left eye: No discharge  Conjunctiva/sclera: Conjunctivae normal       Pupils: Pupils are equal, round, and reactive to light  Neck:      Trachea: No tracheal deviation  Cardiovascular:      Rate and Rhythm: Regular rhythm  Tachycardia present  Pulmonary:      Effort: Pulmonary effort is normal  No respiratory distress  Breath sounds: No stridor  Abdominal:      General: There is no distension  Palpations: Abdomen is soft  Comments: scaphoid   Skin:     General: Skin is warm and dry  Coloration: Skin is jaundiced and pale  Findings: No erythema or rash  Neurological:      General: No focal deficit present  Mental Status: He is alert and oriented to person, place, and time  Mental status is at baseline  Cranial Nerves: No cranial nerve deficit  Psychiatric:         Mood and Affect: Mood normal          Behavior: Behavior normal          Thought Content:  Thought content normal          Judgment: Judgment normal            I spent 25+ minutes was spent during this virtual visit by Yolanda [employer], face to face with Frances Rodriguez with greater than 50% of the time spent in counseling or coordination of care including discussions of etiology of diagnosis, diagnostic results, impression, and recommendations, instructions for disease self management and risk factors and risk reduction of disease   All of the patient's questions were answered during this discussion  Encounter provider Luisana Reyna MD, located at:  [address] [place] [id number] [phone]    Recent Visits  No visits were found meeting these conditions  Showing recent visits within past 7 days and meeting all other requirements     Today's Visits  Date Type Provider Dept   09/28/20 Telemedicine Luisana Reyna, [address] today's visits and meeting all other requirements     Future Appointments  No visits were found meeting these conditions  Showing future appointments within next 150 days and meeting all other requirements        VIRTUAL VISIT 211 Ruthann Merida acknowledges that He has consented to an online visit or consultation  He understands that the online visit is based solely on information provided by He, and that, in the absence of a face-to-face physical evaluation by the physician, the diagnosis He receives is both limited and provisional in terms of accuracy and completeness  This is not intended to replace a full medical face-to-face evaluation by the physician  Hardik Tran understands and accepts these terms  Patient was informed this is a billable service

## 2020-09-29 ENCOUNTER — TELEPHONE (OUTPATIENT)
Dept: OTOLARYNGOLOGY | Facility: CLINIC | Age: 55
End: 2020-09-29

## 2020-09-29 ENCOUNTER — TELEPHONE (OUTPATIENT)
Dept: FAMILY MEDICINE CLINIC | Facility: CLINIC | Age: 55
End: 2020-09-29

## 2020-09-29 ENCOUNTER — APPOINTMENT (OUTPATIENT)
Dept: RADIATION ONCOLOGY | Facility: HOSPITAL | Age: 55
End: 2020-09-29
Payer: MEDICARE

## 2020-09-29 ENCOUNTER — TELEPHONE (OUTPATIENT)
Dept: HEMATOLOGY ONCOLOGY | Facility: CLINIC | Age: 55
End: 2020-09-29

## 2020-09-29 ENCOUNTER — OFFICE VISIT (OUTPATIENT)
Dept: HEMATOLOGY ONCOLOGY | Facility: CLINIC | Age: 55
End: 2020-09-29
Payer: MEDICARE

## 2020-09-29 ENCOUNTER — TELEPHONE (OUTPATIENT)
Dept: INFUSION CENTER | Facility: HOSPITAL | Age: 55
End: 2020-09-29

## 2020-09-29 ENCOUNTER — TELEPHONE (OUTPATIENT)
Dept: GASTROENTEROLOGY | Facility: CLINIC | Age: 55
End: 2020-09-29

## 2020-09-29 VITALS
DIASTOLIC BLOOD PRESSURE: 66 MMHG | HEART RATE: 101 BPM | WEIGHT: 145 LBS | SYSTOLIC BLOOD PRESSURE: 124 MMHG | OXYGEN SATURATION: 96 % | TEMPERATURE: 98.7 F | HEIGHT: 66 IN | BODY MASS INDEX: 23.3 KG/M2

## 2020-09-29 DIAGNOSIS — C66.1 UROTHELIAL CARCINOMA OF RIGHT DISTAL URETER (HCC): Primary | ICD-10-CM

## 2020-09-29 DIAGNOSIS — C79.51 BONY METASTASIS (HCC): ICD-10-CM

## 2020-09-29 PROCEDURE — 99214 OFFICE O/P EST MOD 30 MIN: CPT | Performed by: NURSE PRACTITIONER

## 2020-09-29 NOTE — TELEPHONE ENCOUNTER
Appointment Confirmation     Appointment with  Infusion    Appointment date & time  10/09 at 8:30am    Location [facility]   Patient verbilized Understanding  Yes

## 2020-09-29 NOTE — TELEPHONE ENCOUNTER
Office called to cancel patient due to he needs to have radiation tomorrow, so he will not be having his Gemzar treatment  Will call back to reschedule patient after [facility] talks to Dr Rossy Benson

## 2020-09-29 NOTE — TELEPHONE ENCOUNTER
Chemo appointment rescheduled for 10/9/20 @ [facility]  [facility] also scheduled  Patient aware

## 2020-09-29 NOTE — TELEPHONE ENCOUNTER
As per CE/CRNP, chemotherapy in [facility] cancelled  Will review with Dr Marsha Denton regarding rescheduling

## 2020-09-29 NOTE — PROGRESS NOTES
[facility] [place]  [address] [facility]  [place] [id number] [phone]  Progress Note  Gonzalez Morales, 1965, [id number]  9/29/2020    Assessment/Plan  1  Urothelial carcinoma of the right distal ureter  2  Bony metastasis   Patient is a 69-year-old male with a history of advanced urothelial carcinoma  He has been undergoing palliative radiation to his right hip and femur  This was anticipated to be completed on 09/22/2020 however this was delayed therefore patient needs to finish with 1 more radiation treatment  We will continue to hold his Gemzar scheduled for 09/29/2020 due to patient continuing with radiation at this time  Patient states he is not sure when his last radiation will be scheduled however he anticipates this to be within the next day or so  We will therefore hold his Gemzar until radiation is completed  Due to the delay in patient's chemo treatment, specifically Gemzar, we will just start on cycle 3  We will adjust the day and move it up 1 week to 10/08/2020  Overall patient states the radiation has improved his pain and he is able to ambulate and stand for longer periods of time  Previously patient had diarrhea which has resolved but denies any other toxicities from chemotherapy  We will see patient in 3 weeks patient verbalized understanding and is in agreement with the plan  The patient is scheduled for follow-up in approximately 3 weeks with Dr Sean Grimaldo or me  Patient voiced agreement and understanding to the above  Patient knows to call the Hematology/Oncology office with any questions and concerns regarding the above  Goals and Barriers:    Current Goal:   Prolong Survival from Cancer  Barriers: None  Patient's Capacity to Self Care:  Patient is able to self care   -------------------------------------------------------------------------------------------------------    No chief complaint on file        History of present illness/Cancer History:   Oncology History   Urothelial carcinoma of right distal ureter ([facility] )   2020 Initial Diagnosis    Urothelial carcinoma of right distal ureter ([facility] )     2020 Biopsy    Final Diagnosis    A  Lymph node, right iliac, biopsy:  -  Metastatic carcinoma, compatible with urothelial primary  -  Immunohistochemical stains performed with appropriate controls show the tumor cells to be positive for keratin AE1/3, CK7, CK20, CDX2, SHELLI-3, and uroplakin and negative for TTF-1, ER, PSA, and NKX3 1, supporting the diagnosis  2020 -  Chemotherapy    CISplatin (PLATINOL) 121 1 mg, mannitol 12 5 g in sodium chloride 0 9 % 500 mL IVPB, 70 mg/m2 = 121 1 mg, Intravenous, Once, 2 of 6 cycles  Administration: 121 1 mg (2020), 121 1 mg (9/15/2020)  fosaprepitant (EMEND) 150 mg in sodium chloride 0 9 % 250 mL IVPB, 150 mg, Intravenous, Once, 2 of 6 cycles  Administration: 150 mg (2020), 150 mg (9/15/2020)  gemcitabine (GEMZAR) 1,600 mg in sodium chloride 0 9 % 250 mL infusion, 1,557 mg (72 % of original dose 1,250 mg/m2), Intravenous, Once, 2 of 6 cycles  Dose modification: 900 mg/m2 (original dose 1,250 mg/m2, Cycle 1, Reason: Other (See Comments)), 900 mg/m2 (original dose 1,250 mg/m2, Cycle 3, Reason: Other (See Comments))  Administration: 1,600 mg (2020), 1,600 mg (2020)          Cancer Staging  No matching staging information was found for the patient  ECO - Symptomatic but completely ambulatory    Review of Systems   Constitutional: Negative for activity change, appetite change, fatigue, fever and unexpected weight change  Respiratory: Negative for cough and shortness of breath  Cardiovascular: Negative for chest pain and leg swelling  Gastrointestinal: Negative for abdominal pain, constipation, diarrhea and nausea  Endocrine: Negative for cold intolerance and heat intolerance  Musculoskeletal: Negative for arthralgias and myalgias  Right hip pain   Skin: Negative  Neurological: Negative for dizziness, weakness and headaches  Hematological: Negative for adenopathy  Does not bruise/bleed easily         Current Outpatient Medications:     acetaminophen (TYLENOL) 650 mg CR tablet, Take 1 tablet (650 mg total) by mouth every 8 (eight) hours, Disp: 30 tablet, Rfl: 0    albuterol (Ventolin HFA) 90 mcg/act inhaler, Inhale 2 puffs every 6 (six) hours as needed for wheezing or shortness of breath, Disp: 18 g, Rfl: 5    clonazePAM (KlonoPIN) 0 5 mg tablet, Take 1 tablet (0 5 mg total) by mouth 2 (two) times a day as needed for anxiety, Disp: 14 tablet, Rfl: 0    escitalopram (LEXAPRO) 10 mg tablet, Take 10 mg by mouth daily at bedtime, Disp: , Rfl:     fluticasone (FLONASE) 50 mcg/act nasal spray, SPRAY 2 SPRAYS INTO EACH NOSTRIL EVERY DAY, Disp: 1 Bottle, Rfl: 5    fluticasone (FLOVENT HFA) 220 mcg/act inhaler, Inhale 2 puffs 2 (two) times a day Rinse mouth after use , Disp: 1 Inhaler, Rfl: 5    morphine (MS CONTIN) 60 mg 12 hr tablet, Take 1 tablet (60 mg total) by mouth every 8 (eight) hoursMax Daily Amount: 180 mg, Disp: 63 tablet, Rfl: 0    omeprazole (PriLOSEC) 40 MG capsule, Take 1 capsule (40 mg total) by mouth daily before breakfast, Disp: 30 capsule, Rfl: 5    ondansetron (ZOFRAN) 8 mg tablet, Take 1 tablet (8 mg total) by mouth every 8 (eight) hours as needed for nausea or vomiting, Disp: 30 tablet, Rfl: 3    ondansetron (ZOFRAN-ODT) 4 mg disintegrating tablet, Take 1 tablet (4 mg total) by mouth every 6 (six) hours as needed for nausea or vomiting, Disp: 20 tablet, Rfl: 0    oxyCODONE (ROXICODONE) 10 MG TABS, Take 1 tablet (10 mg total) by mouth every 4 (four) hours as needed for severe painMax Daily Amount: 60 mg, Disp: 126 tablet, Rfl: 0    polyethylene glycol (MIRALAX) 17 g packet, Take 17 g by mouth daily as needed (Constipation), Disp: 14 each, Rfl: 0    zolpidem (AMBIEN) 10 mg tablet, 1 TAB AT BEDTIME AS NEEDED FOR INSOMNIA, Disp: , Rfl:     carisoprodol (SOMA) 350 mg tablet, Take 1 tablet (350 mg total) by mouth 3 (three) times a day for 10 days, Disp: 30 tablet, Rfl: 0    senna (SENOKOT) 8 6 mg, Take 1 tablet (8 6 mg total) by mouth daily at bedtime, Disp: 30 tablet, Rfl: 0    Allergies   Allergen Reactions    Ketorolac Hives     Toradol       Objective:   /66   Pulse 101   Temp 98 7 °F (37 1 °C)   Ht 5' 5 98" (1 676 m)   Wt 65 8 kg (145 lb)   SpO2 96%   BMI 23 42 kg/m²   Wt Readings from Last 6 Encounters:   09/29/20 65 8 kg (145 lb)   09/24/20 (S) 62 5 kg (137 lb 12 6 oz)   09/15/20 61 kg (134 lb 7 7 oz)   09/08/20 62 6 kg (138 lb)   09/02/20 67 7 kg (149 lb 4 oz)   08/31/20 66 7 kg (147 lb)       Physical Exam  Constitutional:       Appearance: Normal appearance  He is well-developed  HENT:      Head: Normocephalic and atraumatic  Eyes:      Pupils: Pupils are equal, round, and reactive to light  Neck:      Musculoskeletal: Normal range of motion  Cardiovascular:      Rate and Rhythm: Normal rate and regular rhythm  Heart sounds: Normal heart sounds  Pulmonary:      Effort: Pulmonary effort is normal  No respiratory distress  Breath sounds: Normal breath sounds  Abdominal:      General: Bowel sounds are normal       Palpations: Abdomen is soft  Musculoskeletal: Normal range of motion  Lymphadenopathy:      Cervical: No cervical adenopathy  Skin:     General: Skin is warm and dry  Capillary Refill: Capillary refill takes less than 2 seconds  Neurological:      Mental Status: He is alert and oriented to person, place, and time     Psychiatric:         Mood and Affect: Mood normal          Behavior: Behavior normal          Pertinent Laboratory Results   Appointment on 09/24/2020   Component Date Value Ref Range Status    WBC 09/24/2020 8 63  4 31 - 10 16 Thousand/uL Final    RBC 09/24/2020 3 63* 3 88 - 5 62 Million/uL Final    Hemoglobin 09/24/2020 10 8* 12 0 - 17 0 g/dL Final    Hematocrit 09/24/2020 34 5* 36 5 - 49 3 % Final    MCV 09/24/2020 95  82 - 98 fL Final    MCH 09/24/2020 29 8  26 8 - 34 3 pg Final    MCHC 09/24/2020 31 3* 31 4 - 37 4 g/dL Final    RDW 09/24/2020 16 6* 11 6 - 15 1 % Final    MPV 09/24/2020 8 9  8 9 - 12 7 fL Final    Platelets 64/88/1721 328  149 - 390 Thousands/uL Final    nRBC 09/24/2020 0  /100 WBCs Final    Neutrophils Relative 09/24/2020 69  43 - 75 % Final    Immat GRANS % 09/24/2020 1  0 - 2 % Final    Lymphocytes Relative 09/24/2020 12* 14 - 44 % Final    Monocytes Relative 09/24/2020 13* 4 - 12 % Final    Eosinophils Relative 09/24/2020 5  0 - 6 % Final    Basophils Relative 09/24/2020 0  0 - 1 % Final    Neutrophils Absolute 09/24/2020 5 96  1 85 - 7 62 Thousands/µL Final    Immature Grans Absolute 09/24/2020 0 04  0 00 - 0 20 Thousand/uL Final    Lymphocytes Absolute 09/24/2020 1 06  0 60 - 4 47 Thousands/µL Final    Monocytes Absolute 09/24/2020 1 15  0 17 - 1 22 Thousand/µL Final    Eosinophils Absolute 09/24/2020 0 39  0 00 - 0 61 Thousand/µL Final    Basophils Absolute 09/24/2020 0 03  0 00 - 0 10 Thousands/µL Final    Sodium 09/24/2020 136  136 - 145 mmol/L Final    Potassium 09/24/2020 3 6  3 5 - 5 3 mmol/L Final    Chloride 09/24/2020 102  100 - 108 mmol/L Final    CO2 09/24/2020 26  21 - 32 mmol/L Final    ANION GAP 09/24/2020 8  4 - 13 mmol/L Final    BUN 09/24/2020 29* 5 - 25 mg/dL Final    Creatinine 09/24/2020 1 35* 0 60 - 1 30 mg/dL Final    Standardized to IDMS reference method    Glucose, Fasting 09/24/2020 95  65 - 99 mg/dL Final    Specimen collection should occur prior to Sulfasalazine administration due to the potential for falsely depressed results  Specimen collection should occur prior to Sulfapyridine administration due to the potential for falsely elevated results      Calcium 09/24/2020 8 1* 8 3 - 10 1 mg/dL Final    Corrected Calcium 09/24/2020 9 0  8 3 - 10 1 mg/dL Final    AST 09/24/2020 15  5 - 45 U/L Final    Specimen collection should occur prior to Sulfasalazine administration due to the potential for falsely depressed results   ALT 09/24/2020 27  12 - 78 U/L Final    Specimen collection should occur prior to Sulfasalazine administration due to the potential for falsely depressed results   Alkaline Phosphatase 09/24/2020 90  46 - 116 U/L Final    Total Protein 09/24/2020 6 6  6 4 - 8 2 g/dL Final    Albumin 09/24/2020 2 9* 3 5 - 5 0 g/dL Final    Total Bilirubin 09/24/2020 0 20  0 20 - 1 00 mg/dL Final    Use of this assay is not recommended for patients undergoing treatment with eltrombopag due to the potential for falsely elevated results   eGFR 09/24/2020 58  ml/min/1 73sq m Final         The following historical data was reviewed      Past Medical History:   Diagnosis Date    Asthma     [facility]     COPD [facility]ch[facility] disease) ([facility]     Disease of thyroid gland     Hypertension     Hyperthyroidism        Past Surgical History:   Procedure Laterality Date    DENTAL SURGERY      FL RETROGRADE URETHROCYSTOGRAM  6/15/2020    HERNIA REPAIR Left     inguinal    IR LYMPH NODE BIOPSY/ASPIRATION  7/24/2020    IR TUBE PLACEMENT  6/25/2020    MT CYSTOURETHROSCOPY,URETER CATHETER Right 6/15/2020    Procedure: CYSTOSCOPY RETROGRADE PYELOGRAM and ureteroscopy;  Surgeon: Bharathi Welsh MD;  Location: [facility];  Service: Urology       Social History     Socioeconomic History    Marital status: Single     Spouse name: None    Number of children: None    Years of education: None    Highest education level: None   Occupational History    None   Social Needs    Financial resource strain: None    Food insecurity     Worry: None     Inability: None    Transportation needs     Medical: None     Non-medical: None   Tobacco Use    Smoking status: Current Every Day Smoker     Packs/day: 1 00     Years: 44 00     Pack years: 44 00    Smokeless tobacco: Never Used   Substance and Sexual Activity    Alcohol use: Never     Frequency: Never    Drug use: No    Sexual activity: Yes     Partners: Female   Lifestyle    Physical activity     Days per week: None     Minutes per session: None    Stress: None   Relationships    Social connections     Talks on phone: None     Gets together: None     Attends [employer] service: None     Active member of club or organization: None     Attends meetings of clubs or organizations: None     Relationship status: None    Intimate partner violence     Fear of current or ex partner: None     Emotionally abused: None     Physically abused: None     Forced sexual activity: None   Other Topics Concern    None   Social History Narrative    Patient is  from the mother of his 1st child  Patient has 4 adult children, from 4 different unions  Patient lives with his youngest daughter Layne Hopson has multiple sclerosis but is able to participate fully in the patient's care  Patient does not have any advanced directives  Family History   Problem Relation Age of Onset    Heart disease Mother     Hyperthyroidism Mother     Lung cancer Mother     Heart disease Father     Hyperthyroidism Father     Lung cancer Maternal Aunt     Lung cancer Maternal Uncle        Please note: This report has been generated by a voice recognition software system  Therefore there may be syntax, spelling, and/or grammatical errors  Please call if you have any questions

## 2020-09-29 NOTE — TELEPHONE ENCOUNTER
This was stopped by the hospital due to acute kidney injury  Labs from 9/24 show persistently elevated kidney function, therefore he should not take lisinopril  His BP was well controlled at 124/66 today at his heme/onc appointment so I would not start anything else at this time

## 2020-09-29 NOTE — TELEPHONE ENCOUNTER
Patient called and said when he was in the hospital they stopped Lisinopril and he wants to know if he can gp back on it

## 2020-09-30 ENCOUNTER — HOSPITAL ENCOUNTER (OUTPATIENT)
Dept: INFUSION CENTER | Facility: HOSPITAL | Age: 55
Discharge: HOME | End: 2020-09-30

## 2020-09-30 ENCOUNTER — TELEPHONE (OUTPATIENT)
Dept: HEMATOLOGY ONCOLOGY | Facility: CLINIC | Age: 55
End: 2020-09-30

## 2020-09-30 ENCOUNTER — APPOINTMENT (OUTPATIENT)
Dept: RADIATION ONCOLOGY | Facility: HOSPITAL | Age: 55
End: 2020-09-30
Attending: RADIOLOGY
Payer: MEDICARE

## 2020-09-30 PROCEDURE — 77387 GUIDANCE FOR RADJ TX DLVR: CPT | Performed by: RADIOLOGY

## 2020-09-30 PROCEDURE — 77336 RADIATION PHYSICS CONSULT: CPT | Performed by: RADIOLOGY

## 2020-09-30 PROCEDURE — 77412 RADIATION TX DELIVERY LVL 3: CPT | Performed by: RADIOLOGY

## 2020-09-30 NOTE — TELEPHONE ENCOUNTER
Patient states he has finished his Radiation treatment  Also his right leg seems to be filling up with fluid and causing more pain  Patient was suppose to let [facility] know   Patient can be reached at [phone]

## 2020-09-30 NOTE — TELEPHONE ENCOUNTER
Call from patient  Patient reporting swelling to right inguinal area down right thigh to right knee  Patient denies redness to area  Pain in thigh is throbbing,  pins and needles rated 10/10, kept patient awake    Will send message Dr Shashi Freeman

## 2020-09-30 NOTE — TELEPHONE ENCOUNTER
Returned patient's call regarding pain in his right groin, swelling and completion of radiation treatments  Concern for a DVT in right lower extremity, Instructed patient he should have a Doppler study  Patient states he does not have transportation, however he has an appointment tomorrow in Radiology  He does not have transportation today  He prefers to have someone evaluate his leg  He states he will go to the emergency room tomorrow when he is at [facility]  I instructed him he needs to go to the emergency room should he have new onset shortness of breath, chest pain, fevers or pain worsens  Patient verbalized understanding and is in agreement to the plan

## 2020-10-01 ENCOUNTER — TELEPHONE (OUTPATIENT)
Dept: HEMATOLOGY ONCOLOGY | Facility: CLINIC | Age: 55
End: 2020-10-01

## 2020-10-01 ENCOUNTER — HOSPITAL ENCOUNTER (OUTPATIENT)
Dept: INTERVENTIONAL RADIOLOGY/VASCULAR | Facility: HOSPITAL | Age: 55
Discharge: HOME | End: 2020-10-01
Admitting: RADIOLOGY
Payer: MEDICARE

## 2020-10-01 ENCOUNTER — HOSPITAL ENCOUNTER (OUTPATIENT)
Dept: NON INVASIVE DIAGNOSTICS | Facility: HOSPITAL | Age: 55
Discharge: HOME | End: 2020-10-01
Payer: MEDICARE

## 2020-10-01 DIAGNOSIS — N13.30 HYDRONEPHROSIS OF RIGHT KIDNEY: ICD-10-CM

## 2020-10-01 DIAGNOSIS — C66.1 UROTHELIAL CARCINOMA OF RIGHT DISTAL URETER (HCC): ICD-10-CM

## 2020-10-01 DIAGNOSIS — N39.8 UROTHELIAL LESION: Primary | ICD-10-CM

## 2020-10-01 DIAGNOSIS — M79.89 RIGHT LEG SWELLING: ICD-10-CM

## 2020-10-01 PROCEDURE — 50693 PLMT URETERAL STENT PRQ: CPT

## 2020-10-01 PROCEDURE — 93971 EXTREMITY STUDY: CPT

## 2020-10-01 PROCEDURE — 50693 PLMT URETERAL STENT PRQ: CPT | Performed by: RADIOLOGY

## 2020-10-01 PROCEDURE — C2617 STENT, NON-COR, TEM W/O DEL: HCPCS

## 2020-10-01 PROCEDURE — C1894 INTRO/SHEATH, NON-LASER: HCPCS

## 2020-10-01 PROCEDURE — 93971 EXTREMITY STUDY: CPT | Performed by: SURGERY

## 2020-10-01 RX ORDER — LIDOCAINE HYDROCHLORIDE 10 MG/ML
INJECTION, SOLUTION EPIDURAL; INFILTRATION; INTRACAUDAL; PERINEURAL CODE/TRAUMA/SEDATION MEDICATION
Status: COMPLETED | OUTPATIENT
Start: 2020-10-01 | End: 2020-10-01

## 2020-10-01 RX ADMIN — IOHEXOL 10 ML: 300 INJECTION, SOLUTION INTRAVENOUS at 14:24

## 2020-10-01 RX ADMIN — LIDOCAINE HYDROCHLORIDE 5 ML: 10 INJECTION, SOLUTION EPIDURAL; INFILTRATION; INTRACAUDAL; PERINEURAL at 14:27

## 2020-10-05 ENCOUNTER — TELEPHONE (OUTPATIENT)
Dept: HEMATOLOGY ONCOLOGY | Facility: CLINIC | Age: 55
End: 2020-10-05

## 2020-10-05 DIAGNOSIS — C66.1 UROTHELIAL CARCINOMA OF RIGHT DISTAL URETER (HCC): Primary | ICD-10-CM

## 2020-10-05 PROCEDURE — 83735 ASSAY OF MAGNESIUM: CPT | Performed by: INTERNAL MEDICINE

## 2020-10-05 PROCEDURE — 80053 COMPREHEN METABOLIC PANEL: CPT | Performed by: INTERNAL MEDICINE

## 2020-10-05 PROCEDURE — 85025 COMPLETE CBC W/AUTO DIFF WBC: CPT | Performed by: INTERNAL MEDICINE

## 2020-10-05 RX ORDER — OXYCODONE HYDROCHLORIDE AND ACETAMINOPHEN 5; 325 MG/1; MG/1
2 TABLET ORAL ONCE
Status: CANCELLED | OUTPATIENT
Start: 2020-10-09

## 2020-10-05 RX ORDER — SODIUM CHLORIDE 9 MG/ML
20 INJECTION, SOLUTION INTRAVENOUS ONCE
Status: CANCELLED | OUTPATIENT
Start: 2020-10-16

## 2020-10-05 RX ORDER — SODIUM CHLORIDE 9 MG/ML
20 INJECTION, SOLUTION INTRAVENOUS ONCE
Status: CANCELLED | OUTPATIENT
Start: 2020-10-23

## 2020-10-05 RX ORDER — SODIUM CHLORIDE 9 MG/ML
20 INJECTION, SOLUTION INTRAVENOUS ONCE
Status: CANCELLED | OUTPATIENT
Start: 2020-10-09

## 2020-10-06 LAB
ALBUMIN SERPL BCP-MCNC: 3.1 G/DL (ref 3.5–5)
ALP SERPL-CCNC: 157 U/L (ref 46–116)
ALT SERPL W P-5'-P-CCNC: 28 U/L (ref 12–78)
ANION GAP SERPL CALCULATED.3IONS-SCNC: 4 MMOL/L (ref 4–13)
AST SERPL W P-5'-P-CCNC: 22 U/L (ref 5–45)
BASOPHILS # BLD AUTO: 0.02 THOUSANDS/ΜL (ref 0–0.1)
BASOPHILS NFR BLD AUTO: 1 % (ref 0–1)
BILIRUB SERPL-MCNC: 0.35 MG/DL (ref 0.2–1)
BUN SERPL-MCNC: 18 MG/DL (ref 5–25)
CALCIUM ALBUM COR SERPL-MCNC: 9.8 MG/DL (ref 8.3–10.1)
CALCIUM SERPL-MCNC: 9.1 MG/DL (ref 8.3–10.1)
CHLORIDE SERPL-SCNC: 104 MMOL/L (ref 100–108)
CO2 SERPL-SCNC: 27 MMOL/L (ref 21–32)
CREAT SERPL-MCNC: 1.05 MG/DL (ref 0.6–1.3)
EOSINOPHIL # BLD AUTO: 0.28 THOUSAND/ΜL (ref 0–0.61)
EOSINOPHIL NFR BLD AUTO: 7 % (ref 0–6)
ERYTHROCYTE [DISTWIDTH] IN BLOOD BY AUTOMATED COUNT: 17.4 % (ref 11.6–15.1)
GFR SERPL CREATININE-BSD FRML MDRD: 79 ML/MIN/1.73SQ M
GLUCOSE P FAST SERPL-MCNC: 128 MG/DL (ref 65–99)
HCT VFR BLD AUTO: 32.4 % (ref 36.5–49.3)
HGB BLD-MCNC: 10.7 G/DL (ref 12–17)
IMM GRANULOCYTES # BLD AUTO: 0.01 THOUSAND/UL (ref 0–0.2)
IMM GRANULOCYTES NFR BLD AUTO: 0 % (ref 0–2)
LYMPHOCYTES # BLD AUTO: 0.42 THOUSANDS/ΜL (ref 0.6–4.47)
LYMPHOCYTES NFR BLD AUTO: 11 % (ref 14–44)
MAGNESIUM SERPL-MCNC: 1.8 MG/DL (ref 1.6–2.6)
MCH RBC QN AUTO: 31.2 PG (ref 26.8–34.3)
MCHC RBC AUTO-ENTMCNC: 33 G/DL (ref 31.4–37.4)
MCV RBC AUTO: 95 FL (ref 82–98)
MONOCYTES # BLD AUTO: 0.51 THOUSAND/ΜL (ref 0.17–1.22)
MONOCYTES NFR BLD AUTO: 13 % (ref 4–12)
NEUTROPHILS # BLD AUTO: 2.72 THOUSANDS/ΜL (ref 1.85–7.62)
NEUTS SEG NFR BLD AUTO: 68 % (ref 43–75)
NRBC BLD AUTO-RTO: 0 /100 WBCS
PLATELET # BLD AUTO: 254 THOUSANDS/UL (ref 149–390)
PMV BLD AUTO: 8.9 FL (ref 8.9–12.7)
POTASSIUM SERPL-SCNC: 4.7 MMOL/L (ref 3.5–5.3)
PROT SERPL-MCNC: 7 G/DL (ref 6.4–8.2)
RBC # BLD AUTO: 3.43 MILLION/UL (ref 3.88–5.62)
SODIUM SERPL-SCNC: 135 MMOL/L (ref 136–145)
WBC # BLD AUTO: 3.96 THOUSAND/UL (ref 4.31–10.16)

## 2020-10-09 ENCOUNTER — HOSPITAL ENCOUNTER (OUTPATIENT)
Dept: INFUSION CENTER | Facility: HOSPITAL | Age: 55
Discharge: HOME | End: 2020-10-09
Attending: INTERNAL MEDICINE
Payer: MEDICARE

## 2020-10-09 VITALS
TEMPERATURE: 97.6 F | WEIGHT: 134.7 LBS | RESPIRATION RATE: 18 BRPM | SYSTOLIC BLOOD PRESSURE: 123 MMHG | BODY MASS INDEX: 19.95 KG/M2 | HEART RATE: 63 BPM | HEIGHT: 69 IN | DIASTOLIC BLOOD PRESSURE: 63 MMHG

## 2020-10-09 DIAGNOSIS — C66.1 UROTHELIAL CARCINOMA OF RIGHT DISTAL URETER (HCC): Primary | ICD-10-CM

## 2020-10-09 PROCEDURE — 96367 TX/PROPH/DG ADDL SEQ IV INF: CPT

## 2020-10-09 PROCEDURE — 96413 CHEMO IV INFUSION 1 HR: CPT

## 2020-10-09 PROCEDURE — 96417 CHEMO IV INFUS EACH ADDL SEQ: CPT

## 2020-10-09 PROCEDURE — 96361 HYDRATE IV INFUSION ADD-ON: CPT

## 2020-10-09 RX ORDER — OXYCODONE HYDROCHLORIDE AND ACETAMINOPHEN 5; 325 MG/1; MG/1
2 TABLET ORAL ONCE
Status: DISCONTINUED | OUTPATIENT
Start: 2020-10-09 | End: 2020-10-13 | Stop reason: HOSPADM

## 2020-10-09 RX ORDER — SODIUM CHLORIDE 9 MG/ML
20 INJECTION, SOLUTION INTRAVENOUS ONCE
Status: COMPLETED | OUTPATIENT
Start: 2020-10-09 | End: 2020-10-09

## 2020-10-09 RX ADMIN — SODIUM CHLORIDE 1000 ML: 0.9 INJECTION, SOLUTION INTRAVENOUS at 14:03

## 2020-10-09 RX ADMIN — CISPLATIN 121.1 MG: 1 INJECTION INTRAVENOUS at 12:55

## 2020-10-09 RX ADMIN — GEMCITABINE HYDROCHLORIDE 1600 MG: 200 INJECTION, POWDER, LYOPHILIZED, FOR SOLUTION INTRAVENOUS at 12:19

## 2020-10-09 RX ADMIN — FOSAPREPITANT 150 MG: 150 INJECTION, POWDER, LYOPHILIZED, FOR SOLUTION INTRAVENOUS at 11:39

## 2020-10-09 RX ADMIN — DEXAMETHASONE SODIUM PHOSPHATE: 10 INJECTION, SOLUTION INTRAMUSCULAR; INTRAVENOUS at 11:10

## 2020-10-09 RX ADMIN — SODIUM CHLORIDE 20 ML/HR: 0.9 INJECTION, SOLUTION INTRAVENOUS at 09:37

## 2020-10-09 RX ADMIN — SODIUM CHLORIDE 1000 ML: 0.9 INJECTION, SOLUTION INTRAVENOUS at 09:37

## 2020-10-12 PROCEDURE — 85025 COMPLETE CBC W/AUTO DIFF WBC: CPT | Performed by: INTERNAL MEDICINE

## 2020-10-12 PROCEDURE — 80053 COMPREHEN METABOLIC PANEL: CPT | Performed by: INTERNAL MEDICINE

## 2020-10-14 ENCOUNTER — HOSPITAL ENCOUNTER (OUTPATIENT)
Dept: INFUSION CENTER | Facility: HOSPITAL | Age: 55
Discharge: HOME | End: 2020-10-14

## 2020-10-16 ENCOUNTER — HOSPITAL ENCOUNTER (OUTPATIENT)
Dept: INFUSION CENTER | Facility: HOSPITAL | Age: 55
Discharge: HOME | End: 2020-10-16
Attending: INTERNAL MEDICINE
Payer: MEDICARE

## 2020-10-16 VITALS
SYSTOLIC BLOOD PRESSURE: 138 MMHG | HEART RATE: 80 BPM | RESPIRATION RATE: 16 BRPM | BODY MASS INDEX: 21.22 KG/M2 | HEIGHT: 66 IN | DIASTOLIC BLOOD PRESSURE: 61 MMHG | WEIGHT: 132.06 LBS | TEMPERATURE: 96.7 F | OXYGEN SATURATION: 100 %

## 2020-10-16 DIAGNOSIS — C66.1 UROTHELIAL CARCINOMA OF RIGHT DISTAL URETER (HCC): Primary | ICD-10-CM

## 2020-10-16 PROCEDURE — 96367 TX/PROPH/DG ADDL SEQ IV INF: CPT

## 2020-10-16 PROCEDURE — 96413 CHEMO IV INFUSION 1 HR: CPT

## 2020-10-16 RX ORDER — SODIUM CHLORIDE 9 MG/ML
20 INJECTION, SOLUTION INTRAVENOUS ONCE
Status: COMPLETED | OUTPATIENT
Start: 2020-10-16 | End: 2020-10-16

## 2020-10-16 RX ADMIN — ONDANSETRON 8 MG: 2 INJECTION INTRAMUSCULAR; INTRAVENOUS at 08:35

## 2020-10-16 RX ADMIN — SODIUM CHLORIDE 20 ML/HR: 0.9 INJECTION, SOLUTION INTRAVENOUS at 08:26

## 2020-10-16 RX ADMIN — GEMCITABINE 1600 MG: 38 INJECTION, SOLUTION INTRAVENOUS at 09:15

## 2020-10-18 ENCOUNTER — TELEPHONE (OUTPATIENT)
Dept: OTHER | Facility: OTHER | Age: 55
End: 2020-10-18

## 2020-10-19 ENCOUNTER — TELEPHONE (OUTPATIENT)
Dept: HEMATOLOGY ONCOLOGY | Facility: CLINIC | Age: 55
End: 2020-10-19

## 2020-10-20 DIAGNOSIS — G89.3 CANCER RELATED PAIN: ICD-10-CM

## 2020-10-20 DIAGNOSIS — C79.51 BONY METASTASIS (HCC): ICD-10-CM

## 2020-10-20 DIAGNOSIS — N30.00 ACUTE CYSTITIS WITHOUT HEMATURIA: Primary | ICD-10-CM

## 2020-10-20 DIAGNOSIS — C66.1 UROTHELIAL CARCINOMA OF RIGHT DISTAL URETER (HCC): ICD-10-CM

## 2020-10-20 PROCEDURE — 80053 COMPREHEN METABOLIC PANEL: CPT | Performed by: INTERNAL MEDICINE

## 2020-10-20 PROCEDURE — 85025 COMPLETE CBC W/AUTO DIFF WBC: CPT | Performed by: INTERNAL MEDICINE

## 2020-10-20 RX ORDER — MORPHINE SULFATE 60 MG/1
60 TABLET, FILM COATED, EXTENDED RELEASE ORAL EVERY 8 HOURS
Qty: 63 TABLET | Refills: 0 | Status: CANCELLED | OUTPATIENT
Start: 2020-10-20

## 2020-10-20 RX ORDER — OXYCODONE HYDROCHLORIDE 10 MG/1
10 TABLET ORAL EVERY 4 HOURS PRN
Qty: 126 TABLET | Refills: 0 | Status: CANCELLED | OUTPATIENT
Start: 2020-10-20

## 2020-10-20 RX ORDER — SULFAMETHOXAZOLE AND TRIMETHOPRIM 800; 160 MG/1; MG/1
1 TABLET ORAL EVERY 12 HOURS SCHEDULED
Qty: 10 TABLET | Refills: 0 | Status: SHIPPED | OUTPATIENT
Start: 2020-10-20 | End: 2020-10-20 | Stop reason: SDUPTHER

## 2020-10-21 ENCOUNTER — OFFICE VISIT (OUTPATIENT)
Dept: PALLIATIVE MEDICINE | Facility: CLINIC | Age: 55
End: 2020-10-21
Payer: MEDICARE

## 2020-10-21 VITALS
BODY MASS INDEX: 21.86 KG/M2 | TEMPERATURE: 96.2 F | DIASTOLIC BLOOD PRESSURE: 60 MMHG | WEIGHT: 136 LBS | OXYGEN SATURATION: 91 % | SYSTOLIC BLOOD PRESSURE: 90 MMHG | HEART RATE: 81 BPM | HEIGHT: 66 IN

## 2020-10-21 DIAGNOSIS — C66.1 UROTHELIAL CARCINOMA OF RIGHT DISTAL URETER (HCC): ICD-10-CM

## 2020-10-21 DIAGNOSIS — R11.2 CINV (CHEMOTHERAPY-INDUCED NAUSEA AND VOMITING): Primary | ICD-10-CM

## 2020-10-21 DIAGNOSIS — C79.51 BONY METASTASIS (HCC): ICD-10-CM

## 2020-10-21 DIAGNOSIS — G89.3 CANCER RELATED PAIN: ICD-10-CM

## 2020-10-21 DIAGNOSIS — T45.1X5A CINV (CHEMOTHERAPY-INDUCED NAUSEA AND VOMITING): Primary | ICD-10-CM

## 2020-10-21 PROCEDURE — 99214 OFFICE O/P EST MOD 30 MIN: CPT | Performed by: FAMILY MEDICINE

## 2020-10-21 RX ORDER — ESCITALOPRAM OXALATE 20 MG/1
TABLET ORAL
COMMUNITY
Start: 2020-10-14

## 2020-10-21 RX ORDER — MORPHINE SULFATE 60 MG/1
60 TABLET, FILM COATED, EXTENDED RELEASE ORAL EVERY 8 HOURS
Qty: 90 TABLET | Refills: 0 | Status: SHIPPED | OUTPATIENT
Start: 2020-10-21 | End: 2020-11-18 | Stop reason: SDUPTHER

## 2020-10-21 RX ORDER — OXYCODONE HYDROCHLORIDE 15 MG/1
15 TABLET ORAL EVERY 4 HOURS PRN
Qty: 180 TABLET | Refills: 0 | Status: SHIPPED | OUTPATIENT
Start: 2020-10-21 | End: 2020-11-17 | Stop reason: SDUPTHER

## 2020-10-21 RX ORDER — ONDANSETRON HYDROCHLORIDE 8 MG/1
8 TABLET, FILM COATED ORAL
Qty: 90 TABLET | Refills: 2 | Status: SHIPPED | OUTPATIENT
Start: 2020-10-21 | End: 2021-01-31 | Stop reason: SDUPTHER

## 2020-10-21 RX ORDER — CARISOPRODOL 350 MG/1
350 TABLET ORAL 3 TIMES DAILY PRN
Qty: 60 TABLET | Refills: 0 | Status: SHIPPED | OUTPATIENT
Start: 2020-10-21 | End: 2021-03-05

## 2020-10-22 DIAGNOSIS — C66.1 UROTHELIAL CARCINOMA OF RIGHT DISTAL URETER (HCC): Primary | ICD-10-CM

## 2020-10-23 ENCOUNTER — HOSPITAL ENCOUNTER (OUTPATIENT)
Dept: INFUSION CENTER | Facility: HOSPITAL | Age: 55
Discharge: HOME | End: 2020-10-23
Attending: INTERNAL MEDICINE

## 2020-10-24 DIAGNOSIS — J30.9 ALLERGIC RHINITIS, UNSPECIFIED SEASONALITY, UNSPECIFIED TRIGGER: ICD-10-CM

## 2020-10-26 ENCOUNTER — TELEPHONE (OUTPATIENT)
Dept: HEMATOLOGY ONCOLOGY | Facility: MEDICAL CENTER | Age: 55
End: 2020-10-26

## 2020-10-26 DIAGNOSIS — J45.20 MILD INTERMITTENT ASTHMA WITHOUT COMPLICATION: Primary | ICD-10-CM

## 2020-10-26 DIAGNOSIS — C66.1 UROTHELIAL CARCINOMA OF RIGHT DISTAL URETER (HCC): Primary | ICD-10-CM

## 2020-10-26 RX ORDER — FLUTICASONE PROPIONATE 220 UG/1
2 AEROSOL, METERED RESPIRATORY (INHALATION) 2 TIMES DAILY
Qty: 1 INHALER | Refills: 5 | Status: SHIPPED | OUTPATIENT
Start: 2020-10-26 | End: 2021-06-07 | Stop reason: ALTCHOICE

## 2020-10-27 ENCOUNTER — TELEMEDICINE (OUTPATIENT)
Dept: HEMATOLOGY ONCOLOGY | Facility: CLINIC | Age: 55
End: 2020-10-27
Payer: MEDICARE

## 2020-10-27 DIAGNOSIS — C66.1 UROTHELIAL CARCINOMA OF RIGHT DISTAL URETER (HCC): Primary | ICD-10-CM

## 2020-10-27 DIAGNOSIS — C79.51 BONY METASTASIS (HCC): ICD-10-CM

## 2020-10-27 PROCEDURE — 99442 PR PHYS/QHP TELEPHONE EVALUATION 11-20 MIN: CPT | Performed by: NURSE PRACTITIONER

## 2020-10-27 RX ORDER — SODIUM CHLORIDE 9 MG/ML
20 INJECTION, SOLUTION INTRAVENOUS ONCE
Status: CANCELLED | OUTPATIENT
Start: 2020-11-23

## 2020-10-27 RX ORDER — FLUTICASONE PROPIONATE 50 MCG
1 SPRAY, SUSPENSION (ML) NASAL DAILY
Qty: 1 BOTTLE | Refills: 11 | Status: SHIPPED | OUTPATIENT
Start: 2020-10-27

## 2020-10-27 RX ORDER — OXYCODONE HYDROCHLORIDE AND ACETAMINOPHEN 5; 325 MG/1; MG/1
2 TABLET ORAL ONCE
Status: CANCELLED | OUTPATIENT
Start: 2020-11-12

## 2020-10-27 RX ORDER — SODIUM CHLORIDE 9 MG/ML
20 INJECTION, SOLUTION INTRAVENOUS ONCE
Status: CANCELLED | OUTPATIENT
Start: 2020-11-12

## 2020-10-28 ENCOUNTER — HOSPITAL ENCOUNTER (OUTPATIENT)
Dept: INFUSION CENTER | Facility: HOSPITAL | Age: 55
Discharge: HOME | End: 2020-10-28
Attending: INTERNAL MEDICINE

## 2020-10-30 ENCOUNTER — TELEMEDICINE (OUTPATIENT)
Dept: RADIATION ONCOLOGY | Facility: HOSPITAL | Age: 55
End: 2020-10-30
Attending: RADIOLOGY

## 2020-10-30 DIAGNOSIS — R59.0 RETROPERITONEAL LYMPHADENOPATHY: Primary | ICD-10-CM

## 2020-10-30 DIAGNOSIS — C66.1 UROTHELIAL CARCINOMA OF RIGHT DISTAL URETER (HCC): ICD-10-CM

## 2020-10-30 DIAGNOSIS — C79.51 BONY METASTASIS (HCC): ICD-10-CM

## 2020-10-31 ENCOUNTER — APPOINTMENT (OUTPATIENT)
Dept: LAB | Facility: HOSPITAL | Age: 55
End: 2020-10-31
Attending: INTERNAL MEDICINE
Payer: MEDICARE

## 2020-10-31 DIAGNOSIS — C66.1 UROTHELIAL CARCINOMA OF RIGHT DISTAL URETER (HCC): ICD-10-CM

## 2020-10-31 LAB
ALBUMIN SERPL BCP-MCNC: 3.1 G/DL (ref 3.5–5)
ALP SERPL-CCNC: 109 U/L (ref 46–116)
ALT SERPL W P-5'-P-CCNC: 19 U/L (ref 12–78)
ANION GAP SERPL CALCULATED.3IONS-SCNC: 6 MMOL/L (ref 4–13)
ANISOCYTOSIS BLD QL SMEAR: PRESENT
AST SERPL W P-5'-P-CCNC: 18 U/L (ref 5–45)
BASOPHILS # BLD MANUAL: 0 THOUSAND/UL (ref 0–0.1)
BASOPHILS NFR MAR MANUAL: 0 % (ref 0–1)
BILIRUB SERPL-MCNC: 0.2 MG/DL (ref 0.2–1)
BUN SERPL-MCNC: 12 MG/DL (ref 5–25)
CALCIUM ALBUM COR SERPL-MCNC: 9.3 MG/DL (ref 8.3–10.1)
CALCIUM SERPL-MCNC: 8.6 MG/DL (ref 8.3–10.1)
CHLORIDE SERPL-SCNC: 98 MMOL/L (ref 100–108)
CO2 SERPL-SCNC: 29 MMOL/L (ref 21–32)
CREAT SERPL-MCNC: 0.98 MG/DL (ref 0.6–1.3)
EOSINOPHIL # BLD MANUAL: 0.04 THOUSAND/UL (ref 0–0.4)
EOSINOPHIL NFR BLD MANUAL: 2 % (ref 0–6)
ERYTHROCYTE [DISTWIDTH] IN BLOOD BY AUTOMATED COUNT: 16.3 % (ref 11.6–15.1)
GFR SERPL CREATININE-BSD FRML MDRD: 86 ML/MIN/1.73SQ M
GLUCOSE P FAST SERPL-MCNC: 102 MG/DL (ref 65–99)
HCT VFR BLD AUTO: 25.5 % (ref 36.5–49.3)
HGB BLD-MCNC: 8.5 G/DL (ref 12–17)
HYPERCHROMIA BLD QL SMEAR: PRESENT
LYMPHOCYTES # BLD AUTO: 0.5 THOUSAND/UL (ref 0.6–4.47)
LYMPHOCYTES # BLD AUTO: 28 % (ref 14–44)
MAGNESIUM SERPL-MCNC: 1.2 MG/DL (ref 1.6–2.6)
MCH RBC QN AUTO: 31.5 PG (ref 26.8–34.3)
MCHC RBC AUTO-ENTMCNC: 33.3 G/DL (ref 31.4–37.4)
MCV RBC AUTO: 94 FL (ref 82–98)
METAMYELOCYTES NFR BLD MANUAL: 2 % (ref 0–1)
MONOCYTES # BLD AUTO: 0.7 THOUSAND/UL (ref 0–1.22)
MONOCYTES NFR BLD: 39 % (ref 4–12)
MYELOCYTES NFR BLD MANUAL: 4 % (ref 0–1)
NEUTROPHILS # BLD MANUAL: 0.45 THOUSAND/UL (ref 1.85–7.62)
NEUTS SEG NFR BLD AUTO: 25 % (ref 43–75)
NRBC BLD AUTO-RTO: 0 /100 WBCS
NRBC BLD AUTO-RTO: 1 /100 WBC (ref 0–2)
PLATELET # BLD AUTO: 472 THOUSANDS/UL (ref 149–390)
PLATELET BLD QL SMEAR: ABNORMAL
PMV BLD AUTO: 8.1 FL (ref 8.9–12.7)
POTASSIUM SERPL-SCNC: 4.3 MMOL/L (ref 3.5–5.3)
PROT SERPL-MCNC: 6.9 G/DL (ref 6.4–8.2)
RBC # BLD AUTO: 2.7 MILLION/UL (ref 3.88–5.62)
SODIUM SERPL-SCNC: 133 MMOL/L (ref 136–145)
TOTAL CELLS COUNTED SPEC: 100
WBC # BLD AUTO: 1.8 THOUSAND/UL (ref 4.31–10.16)

## 2020-10-31 PROCEDURE — 80053 COMPREHEN METABOLIC PANEL: CPT

## 2020-10-31 PROCEDURE — 83735 ASSAY OF MAGNESIUM: CPT

## 2020-10-31 PROCEDURE — 85007 BL SMEAR W/DIFF WBC COUNT: CPT

## 2020-10-31 PROCEDURE — 36415 COLL VENOUS BLD VENIPUNCTURE: CPT

## 2020-10-31 PROCEDURE — 85027 COMPLETE CBC AUTOMATED: CPT

## 2020-11-02 ENCOUNTER — HOSPITAL ENCOUNTER (OUTPATIENT)
Dept: INFUSION CENTER | Facility: HOSPITAL | Age: 55
Discharge: HOME | End: 2020-11-02
Attending: INTERNAL MEDICINE
Payer: MEDICARE

## 2020-11-02 VITALS
TEMPERATURE: 98.8 F | OXYGEN SATURATION: 96 % | DIASTOLIC BLOOD PRESSURE: 56 MMHG | SYSTOLIC BLOOD PRESSURE: 115 MMHG | HEART RATE: 82 BPM | RESPIRATION RATE: 16 BRPM

## 2020-11-02 DIAGNOSIS — D70.1 CHEMOTHERAPY INDUCED NEUTROPENIA (HCC): ICD-10-CM

## 2020-11-02 DIAGNOSIS — C66.1 UROTHELIAL CARCINOMA OF RIGHT DISTAL URETER (HCC): Primary | ICD-10-CM

## 2020-11-02 DIAGNOSIS — T45.1X5A CHEMOTHERAPY INDUCED NEUTROPENIA (HCC): ICD-10-CM

## 2020-11-02 PROCEDURE — 96372 THER/PROPH/DIAG INJ SC/IM: CPT

## 2020-11-02 RX ADMIN — TBO-FILGRASTIM 480 MCG: 480 INJECTION, SOLUTION SUBCUTANEOUS at 09:44

## 2020-11-06 ENCOUNTER — HOSPITAL ENCOUNTER (OUTPATIENT)
Dept: INFUSION CENTER | Facility: HOSPITAL | Age: 55
Discharge: HOME | End: 2020-11-06

## 2020-11-09 DIAGNOSIS — C66.1 UROTHELIAL CARCINOMA OF RIGHT DISTAL URETER (HCC): Primary | ICD-10-CM

## 2020-11-10 ENCOUNTER — TELEPHONE (OUTPATIENT)
Dept: HEMATOLOGY ONCOLOGY | Facility: CLINIC | Age: 55
End: 2020-11-10

## 2020-11-10 ENCOUNTER — HOSPITAL ENCOUNTER (OUTPATIENT)
Dept: INFUSION CENTER | Facility: HOSPITAL | Age: 55
Discharge: HOME | End: 2020-11-10

## 2020-11-12 ENCOUNTER — HOSPITAL ENCOUNTER (OUTPATIENT)
Dept: INFUSION CENTER | Facility: HOSPITAL | Age: 55
Discharge: HOME | End: 2020-11-12
Payer: MEDICARE

## 2020-11-12 ENCOUNTER — LAB (OUTPATIENT)
Dept: LAB | Facility: HOSPITAL | Age: 55
End: 2020-11-12
Attending: INTERNAL MEDICINE
Payer: MEDICARE

## 2020-11-12 VITALS
WEIGHT: 137.35 LBS | OXYGEN SATURATION: 100 % | SYSTOLIC BLOOD PRESSURE: 119 MMHG | RESPIRATION RATE: 16 BRPM | DIASTOLIC BLOOD PRESSURE: 55 MMHG | TEMPERATURE: 97 F | HEIGHT: 66 IN | BODY MASS INDEX: 22.07 KG/M2 | HEART RATE: 73 BPM

## 2020-11-12 DIAGNOSIS — D70.1 CHEMOTHERAPY INDUCED NEUTROPENIA (HCC): ICD-10-CM

## 2020-11-12 DIAGNOSIS — C66.1 UROTHELIAL CARCINOMA OF RIGHT DISTAL URETER (HCC): Primary | ICD-10-CM

## 2020-11-12 DIAGNOSIS — T45.1X5A CHEMOTHERAPY INDUCED NEUTROPENIA (HCC): ICD-10-CM

## 2020-11-12 LAB
ALBUMIN SERPL BCP-MCNC: 3.2 G/DL (ref 3.5–5)
ALP SERPL-CCNC: 100 U/L (ref 46–116)
ALT SERPL W P-5'-P-CCNC: 43 U/L (ref 12–78)
ANION GAP SERPL CALCULATED.3IONS-SCNC: 6 MMOL/L (ref 4–13)
AST SERPL W P-5'-P-CCNC: 39 U/L (ref 5–45)
BASOPHILS # BLD AUTO: 0.08 THOUSANDS/ΜL (ref 0–0.1)
BASOPHILS NFR BLD AUTO: 1 % (ref 0–1)
BILIRUB SERPL-MCNC: 0.2 MG/DL (ref 0.2–1)
BUN SERPL-MCNC: 15 MG/DL (ref 5–25)
CALCIUM ALBUM COR SERPL-MCNC: 9.5 MG/DL (ref 8.3–10.1)
CALCIUM SERPL-MCNC: 8.9 MG/DL (ref 8.3–10.1)
CHLORIDE SERPL-SCNC: 98 MMOL/L (ref 100–108)
CO2 SERPL-SCNC: 29 MMOL/L (ref 21–32)
CREAT SERPL-MCNC: 1.01 MG/DL (ref 0.6–1.3)
EOSINOPHIL # BLD AUTO: 0.05 THOUSAND/ΜL (ref 0–0.61)
EOSINOPHIL NFR BLD AUTO: 1 % (ref 0–6)
ERYTHROCYTE [DISTWIDTH] IN BLOOD BY AUTOMATED COUNT: 18.7 % (ref 11.6–15.1)
GFR SERPL CREATININE-BSD FRML MDRD: 83 ML/MIN/1.73SQ M
GLUCOSE SERPL-MCNC: 90 MG/DL (ref 65–140)
HCT VFR BLD AUTO: 27.5 % (ref 36.5–49.3)
HGB BLD-MCNC: 8.8 G/DL (ref 12–17)
IMM GRANULOCYTES # BLD AUTO: 0.1 THOUSAND/UL (ref 0–0.2)
IMM GRANULOCYTES NFR BLD AUTO: 1 % (ref 0–2)
LYMPHOCYTES # BLD AUTO: 0.65 THOUSANDS/ΜL (ref 0.6–4.47)
LYMPHOCYTES NFR BLD AUTO: 7 % (ref 14–44)
MAGNESIUM SERPL-MCNC: 1.6 MG/DL (ref 1.6–2.6)
MCH RBC QN AUTO: 31.8 PG (ref 26.8–34.3)
MCHC RBC AUTO-ENTMCNC: 32 G/DL (ref 31.4–37.4)
MCV RBC AUTO: 99 FL (ref 82–98)
MONOCYTES # BLD AUTO: 1.38 THOUSAND/ΜL (ref 0.17–1.22)
MONOCYTES NFR BLD AUTO: 16 % (ref 4–12)
NEUTROPHILS # BLD AUTO: 6.5 THOUSANDS/ΜL (ref 1.85–7.62)
NEUTS SEG NFR BLD AUTO: 74 % (ref 43–75)
NRBC BLD AUTO-RTO: 0 /100 WBCS
PLATELET # BLD AUTO: 380 THOUSANDS/UL (ref 149–390)
PMV BLD AUTO: 8.2 FL (ref 8.9–12.7)
POTASSIUM SERPL-SCNC: 4.4 MMOL/L (ref 3.5–5.3)
PROT SERPL-MCNC: 7.5 G/DL (ref 6.4–8.2)
RBC # BLD AUTO: 2.77 MILLION/UL (ref 3.88–5.62)
SODIUM SERPL-SCNC: 133 MMOL/L (ref 136–145)
WBC # BLD AUTO: 8.76 THOUSAND/UL (ref 4.31–10.16)

## 2020-11-12 PROCEDURE — 85025 COMPLETE CBC W/AUTO DIFF WBC: CPT

## 2020-11-12 PROCEDURE — 36415 COLL VENOUS BLD VENIPUNCTURE: CPT

## 2020-11-12 PROCEDURE — 96413 CHEMO IV INFUSION 1 HR: CPT

## 2020-11-12 PROCEDURE — 83735 ASSAY OF MAGNESIUM: CPT | Performed by: INTERNAL MEDICINE

## 2020-11-12 PROCEDURE — 96417 CHEMO IV INFUS EACH ADDL SEQ: CPT

## 2020-11-12 PROCEDURE — 80053 COMPREHEN METABOLIC PANEL: CPT | Performed by: INTERNAL MEDICINE

## 2020-11-12 PROCEDURE — 96361 HYDRATE IV INFUSION ADD-ON: CPT

## 2020-11-12 PROCEDURE — 96367 TX/PROPH/DG ADDL SEQ IV INF: CPT

## 2020-11-12 RX ORDER — SODIUM CHLORIDE 9 MG/ML
20 INJECTION, SOLUTION INTRAVENOUS ONCE
Status: COMPLETED | OUTPATIENT
Start: 2020-11-12 | End: 2020-11-12

## 2020-11-12 RX ORDER — OXYCODONE HYDROCHLORIDE AND ACETAMINOPHEN 5; 325 MG/1; MG/1
2 TABLET ORAL ONCE
Status: COMPLETED | OUTPATIENT
Start: 2020-11-12 | End: 2020-11-12

## 2020-11-12 RX ADMIN — FOSAPREPITANT 150 MG: 150 INJECTION, POWDER, LYOPHILIZED, FOR SOLUTION INTRAVENOUS at 12:04

## 2020-11-12 RX ADMIN — CISPLATIN 121.1 MG: 1 INJECTION INTRAVENOUS at 13:20

## 2020-11-12 RX ADMIN — SODIUM CHLORIDE 1000 ML: 0.9 INJECTION, SOLUTION INTRAVENOUS at 14:20

## 2020-11-12 RX ADMIN — DEXAMETHASONE SODIUM PHOSPHATE: 10 INJECTION, SOLUTION INTRAMUSCULAR; INTRAVENOUS at 11:36

## 2020-11-12 RX ADMIN — OXYCODONE HYDROCHLORIDE AND ACETAMINOPHEN 2 TABLET: 5; 325 TABLET ORAL at 11:12

## 2020-11-12 RX ADMIN — SODIUM CHLORIDE 20 ML/HR: 0.9 INJECTION, SOLUTION INTRAVENOUS at 11:35

## 2020-11-12 RX ADMIN — GEMCITABINE 1600 MG: 38 INJECTION, SOLUTION INTRAVENOUS at 12:43

## 2020-11-12 RX ADMIN — SODIUM CHLORIDE 1000 ML: 0.9 INJECTION, SOLUTION INTRAVENOUS at 10:04

## 2020-11-16 ENCOUNTER — DOCUMENTATION (OUTPATIENT)
Dept: HEMATOLOGY ONCOLOGY | Facility: MEDICAL CENTER | Age: 55
End: 2020-11-16

## 2020-11-17 ENCOUNTER — HOSPITAL ENCOUNTER (OUTPATIENT)
Dept: INFUSION CENTER | Facility: HOSPITAL | Age: 55
Discharge: HOME | End: 2020-11-17
Attending: INTERNAL MEDICINE

## 2020-11-17 ENCOUNTER — OFFICE VISIT (OUTPATIENT)
Dept: HEMATOLOGY ONCOLOGY | Facility: CLINIC | Age: 55
End: 2020-11-17
Payer: MEDICARE

## 2020-11-17 VITALS
HEIGHT: 66 IN | HEART RATE: 40 BPM | SYSTOLIC BLOOD PRESSURE: 110 MMHG | TEMPERATURE: 99.3 F | DIASTOLIC BLOOD PRESSURE: 54 MMHG | BODY MASS INDEX: 22.02 KG/M2 | WEIGHT: 137 LBS

## 2020-11-17 DIAGNOSIS — C79.51 BONY METASTASIS (HCC): ICD-10-CM

## 2020-11-17 DIAGNOSIS — C79.51 BONY METASTASIS (HCC): Primary | ICD-10-CM

## 2020-11-17 DIAGNOSIS — C66.1 UROTHELIAL CARCINOMA OF RIGHT DISTAL URETER (HCC): ICD-10-CM

## 2020-11-17 DIAGNOSIS — G89.3 CANCER RELATED PAIN: ICD-10-CM

## 2020-11-17 PROCEDURE — 99214 OFFICE O/P EST MOD 30 MIN: CPT | Performed by: NURSE PRACTITIONER

## 2020-11-17 RX ORDER — OXYCODONE HYDROCHLORIDE 15 MG/1
15 TABLET ORAL EVERY 4 HOURS PRN
Qty: 180 TABLET | Refills: 0 | Status: SHIPPED | OUTPATIENT
Start: 2020-11-17 | End: 2020-12-15 | Stop reason: SDUPTHER

## 2020-11-18 ENCOUNTER — OFFICE VISIT (OUTPATIENT)
Dept: PALLIATIVE MEDICINE | Facility: CLINIC | Age: 55
End: 2020-11-18
Payer: MEDICARE

## 2020-11-18 VITALS
HEART RATE: 99 BPM | TEMPERATURE: 97.5 F | OXYGEN SATURATION: 97 % | SYSTOLIC BLOOD PRESSURE: 100 MMHG | BODY MASS INDEX: 23.32 KG/M2 | WEIGHT: 140 LBS | HEIGHT: 65 IN | DIASTOLIC BLOOD PRESSURE: 60 MMHG

## 2020-11-18 DIAGNOSIS — C79.51 BONY METASTASIS (HCC): ICD-10-CM

## 2020-11-18 DIAGNOSIS — C66.1 UROTHELIAL CARCINOMA OF RIGHT DISTAL URETER (HCC): ICD-10-CM

## 2020-11-18 DIAGNOSIS — G89.3 CANCER RELATED PAIN: Primary | ICD-10-CM

## 2020-11-18 PROCEDURE — 99214 OFFICE O/P EST MOD 30 MIN: CPT | Performed by: FAMILY MEDICINE

## 2020-11-18 RX ORDER — MORPHINE SULFATE 60 MG/1
60 TABLET, FILM COATED, EXTENDED RELEASE ORAL EVERY 8 HOURS
Qty: 21 TABLET | Refills: 0 | Status: SHIPPED | OUTPATIENT
Start: 2020-11-18 | End: 2020-12-01 | Stop reason: SDUPTHER

## 2020-11-18 RX ORDER — OXYCODONE 36 MG/1
1 CAPSULE, EXTENDED RELEASE ORAL 3 TIMES DAILY
Qty: 90 EACH | Refills: 0 | Status: SHIPPED | OUTPATIENT
Start: 2020-11-18 | End: 2020-11-30 | Stop reason: SDUPTHER

## 2020-11-19 ENCOUNTER — TELEPHONE (OUTPATIENT)
Dept: HEMATOLOGY ONCOLOGY | Facility: CLINIC | Age: 55
End: 2020-11-19

## 2020-11-19 ENCOUNTER — HOSPITAL ENCOUNTER (OUTPATIENT)
Dept: INFUSION CENTER | Facility: HOSPITAL | Age: 55
Discharge: HOME | End: 2020-11-19
Attending: INTERNAL MEDICINE

## 2020-11-19 DIAGNOSIS — F41.9 ANXIETY: Primary | ICD-10-CM

## 2020-11-19 RX ORDER — NALOXONE HYDROCHLORIDE 4 MG/.1ML
SPRAY NASAL
Qty: 1 EACH | Refills: 1 | Status: SHIPPED | OUTPATIENT
Start: 2020-11-19 | End: 2021-03-05

## 2020-11-19 RX ORDER — ALPRAZOLAM 0.25 MG/1
0.25 TABLET ORAL 2 TIMES DAILY PRN
Qty: 60 TABLET | Refills: 0 | Status: SHIPPED | OUTPATIENT
Start: 2020-11-19 | End: 2020-12-15 | Stop reason: SDUPTHER

## 2020-11-20 ENCOUNTER — TELEPHONE (OUTPATIENT)
Dept: PALLIATIVE MEDICINE | Facility: CLINIC | Age: 55
End: 2020-11-20

## 2020-11-20 DIAGNOSIS — G89.3 CANCER RELATED PAIN: ICD-10-CM

## 2020-11-20 DIAGNOSIS — C79.51 BONY METASTASIS (HCC): ICD-10-CM

## 2020-11-20 DIAGNOSIS — C66.1 UROTHELIAL CARCINOMA OF RIGHT DISTAL URETER (HCC): ICD-10-CM

## 2020-11-20 RX ORDER — OXYCODONE 36 MG/1
1 CAPSULE, EXTENDED RELEASE ORAL 3 TIMES DAILY
Qty: 90 EACH | Refills: 0 | Status: CANCELLED | OUTPATIENT
Start: 2020-11-20

## 2020-11-20 RX ORDER — MORPHINE SULFATE 60 MG/1
60 TABLET, FILM COATED, EXTENDED RELEASE ORAL EVERY 8 HOURS
Qty: 21 TABLET | Refills: 0 | Status: CANCELLED | OUTPATIENT
Start: 2020-11-20

## 2020-11-23 ENCOUNTER — LAB (OUTPATIENT)
Dept: LAB | Facility: HOSPITAL | Age: 55
End: 2020-11-23
Payer: MEDICARE

## 2020-11-23 ENCOUNTER — HOSPITAL ENCOUNTER (OUTPATIENT)
Dept: INFUSION CENTER | Facility: HOSPITAL | Age: 55
Discharge: HOME | End: 2020-11-23
Attending: INTERNAL MEDICINE
Payer: MEDICARE

## 2020-11-23 VITALS
HEART RATE: 90 BPM | HEIGHT: 66 IN | SYSTOLIC BLOOD PRESSURE: 107 MMHG | WEIGHT: 133.6 LBS | BODY MASS INDEX: 21.47 KG/M2 | DIASTOLIC BLOOD PRESSURE: 51 MMHG | RESPIRATION RATE: 18 BRPM | TEMPERATURE: 97.1 F

## 2020-11-23 DIAGNOSIS — C66.1 UROTHELIAL CARCINOMA OF RIGHT DISTAL URETER (HCC): Primary | ICD-10-CM

## 2020-11-23 DIAGNOSIS — C79.51 BONY METASTASIS (HCC): ICD-10-CM

## 2020-11-23 DIAGNOSIS — C66.1 UROTHELIAL CARCINOMA OF RIGHT DISTAL URETER (HCC): ICD-10-CM

## 2020-11-23 LAB
ALBUMIN SERPL BCP-MCNC: 3 G/DL (ref 3.5–5)
ALP SERPL-CCNC: 98 U/L (ref 46–116)
ALT SERPL W P-5'-P-CCNC: 23 U/L (ref 12–78)
ANION GAP SERPL CALCULATED.3IONS-SCNC: 4 MMOL/L (ref 4–13)
AST SERPL W P-5'-P-CCNC: 24 U/L (ref 5–45)
BASOPHILS # BLD AUTO: 0.06 THOUSANDS/ΜL (ref 0–0.1)
BASOPHILS NFR BLD AUTO: 2 % (ref 0–1)
BILIRUB SERPL-MCNC: 0.3 MG/DL (ref 0.2–1)
BUN SERPL-MCNC: 14 MG/DL (ref 5–25)
CALCIUM ALBUM COR SERPL-MCNC: 9.3 MG/DL (ref 8.3–10.1)
CALCIUM SERPL-MCNC: 8.5 MG/DL (ref 8.3–10.1)
CHLORIDE SERPL-SCNC: 101 MMOL/L (ref 100–108)
CO2 SERPL-SCNC: 28 MMOL/L (ref 21–32)
CREAT SERPL-MCNC: 1.31 MG/DL (ref 0.6–1.3)
EOSINOPHIL # BLD AUTO: 0.17 THOUSAND/ΜL (ref 0–0.61)
EOSINOPHIL NFR BLD AUTO: 4 % (ref 0–6)
ERYTHROCYTE [DISTWIDTH] IN BLOOD BY AUTOMATED COUNT: 16.8 % (ref 11.6–15.1)
FERRITIN SERPL-MCNC: 354 NG/ML (ref 8–388)
GFR SERPL CREATININE-BSD FRML MDRD: 60 ML/MIN/1.73SQ M
GLUCOSE P FAST SERPL-MCNC: 102 MG/DL (ref 65–99)
HCT VFR BLD AUTO: 24.4 % (ref 36.5–49.3)
HGB BLD-MCNC: 7.9 G/DL (ref 12–17)
IMM GRANULOCYTES # BLD AUTO: 0.01 THOUSAND/UL (ref 0–0.2)
IMM GRANULOCYTES NFR BLD AUTO: 0 % (ref 0–2)
IRON SATN MFR SERPL: 11 %
IRON SERPL-MCNC: 28 UG/DL (ref 65–175)
LYMPHOCYTES # BLD AUTO: 0.55 THOUSANDS/ΜL (ref 0.6–4.47)
LYMPHOCYTES NFR BLD AUTO: 14 % (ref 14–44)
MCH RBC QN AUTO: 32.2 PG (ref 26.8–34.3)
MCHC RBC AUTO-ENTMCNC: 32.4 G/DL (ref 31.4–37.4)
MCV RBC AUTO: 100 FL (ref 82–98)
MONOCYTES # BLD AUTO: 0.56 THOUSAND/ΜL (ref 0.17–1.22)
MONOCYTES NFR BLD AUTO: 14 % (ref 4–12)
NEUTROPHILS # BLD AUTO: 2.53 THOUSANDS/ΜL (ref 1.85–7.62)
NEUTS SEG NFR BLD AUTO: 66 % (ref 43–75)
NRBC BLD AUTO-RTO: 0 /100 WBCS
PLATELET # BLD AUTO: 153 THOUSANDS/UL (ref 149–390)
PMV BLD AUTO: 8.7 FL (ref 8.9–12.7)
POTASSIUM SERPL-SCNC: 4.3 MMOL/L (ref 3.5–5.3)
PROT SERPL-MCNC: 6.7 G/DL (ref 6.4–8.2)
RBC # BLD AUTO: 2.45 MILLION/UL (ref 3.88–5.62)
SODIUM SERPL-SCNC: 133 MMOL/L (ref 136–145)
TIBC SERPL-MCNC: 250 UG/DL (ref 250–450)
WBC # BLD AUTO: 3.88 THOUSAND/UL (ref 4.31–10.16)

## 2020-11-23 PROCEDURE — 80053 COMPREHEN METABOLIC PANEL: CPT

## 2020-11-23 PROCEDURE — 85025 COMPLETE CBC W/AUTO DIFF WBC: CPT

## 2020-11-23 PROCEDURE — 96413 CHEMO IV INFUSION 1 HR: CPT

## 2020-11-23 PROCEDURE — 83550 IRON BINDING TEST: CPT | Performed by: INTERNAL MEDICINE

## 2020-11-23 PROCEDURE — 36415 COLL VENOUS BLD VENIPUNCTURE: CPT

## 2020-11-23 PROCEDURE — 82728 ASSAY OF FERRITIN: CPT | Performed by: INTERNAL MEDICINE

## 2020-11-23 PROCEDURE — 83540 ASSAY OF IRON: CPT | Performed by: INTERNAL MEDICINE

## 2020-11-23 PROCEDURE — 96367 TX/PROPH/DG ADDL SEQ IV INF: CPT

## 2020-11-23 RX ORDER — SODIUM CHLORIDE 9 MG/ML
20 INJECTION, SOLUTION INTRAVENOUS ONCE
Status: COMPLETED | OUTPATIENT
Start: 2020-11-23 | End: 2020-11-23

## 2020-11-23 RX ADMIN — GEMCITABINE 1600 MG: 38 INJECTION, SOLUTION INTRAVENOUS at 15:15

## 2020-11-23 RX ADMIN — ONDANSETRON 8 MG: 2 INJECTION INTRAMUSCULAR; INTRAVENOUS at 14:22

## 2020-11-23 RX ADMIN — SODIUM CHLORIDE 20 ML/HR: 0.9 INJECTION, SOLUTION INTRAVENOUS at 14:50

## 2020-11-27 ENCOUNTER — TELEPHONE (OUTPATIENT)
Dept: HEMATOLOGY ONCOLOGY | Facility: CLINIC | Age: 55
End: 2020-11-27

## 2020-11-27 DIAGNOSIS — C66.1 UROTHELIAL CARCINOMA OF RIGHT DISTAL URETER (HCC): Primary | ICD-10-CM

## 2020-11-27 RX ORDER — SODIUM CHLORIDE 9 MG/ML
20 INJECTION, SOLUTION INTRAVENOUS ONCE
Status: CANCELLED | OUTPATIENT
Start: 2020-11-30

## 2020-11-30 ENCOUNTER — TELEPHONE (OUTPATIENT)
Dept: HEMATOLOGY ONCOLOGY | Facility: CLINIC | Age: 55
End: 2020-11-30

## 2020-11-30 DIAGNOSIS — C66.1 UROTHELIAL CARCINOMA OF RIGHT DISTAL URETER (HCC): ICD-10-CM

## 2020-11-30 DIAGNOSIS — G89.3 CANCER RELATED PAIN: ICD-10-CM

## 2020-11-30 RX ORDER — OXYCODONE 36 MG/1
1 CAPSULE, EXTENDED RELEASE ORAL 3 TIMES DAILY
Qty: 90 EACH | Refills: 0 | Status: SHIPPED | OUTPATIENT
Start: 2020-11-30 | End: 2021-01-04 | Stop reason: SDUPTHER

## 2020-12-01 ENCOUNTER — TELEPHONE (OUTPATIENT)
Dept: PALLIATIVE MEDICINE | Facility: CLINIC | Age: 55
End: 2020-12-01

## 2020-12-01 ENCOUNTER — HOSPITAL ENCOUNTER (OUTPATIENT)
Dept: INFUSION CENTER | Facility: HOSPITAL | Age: 55
Discharge: HOME | End: 2020-12-01
Attending: INTERNAL MEDICINE
Payer: MEDICARE

## 2020-12-01 ENCOUNTER — OFFICE VISIT (OUTPATIENT)
Dept: FAMILY MEDICINE CLINIC | Facility: CLINIC | Age: 55
End: 2020-12-01
Payer: MEDICARE

## 2020-12-01 VITALS
DIASTOLIC BLOOD PRESSURE: 72 MMHG | TEMPERATURE: 97.1 F | HEART RATE: 87 BPM | RESPIRATION RATE: 16 BRPM | OXYGEN SATURATION: 100 % | SYSTOLIC BLOOD PRESSURE: 129 MMHG

## 2020-12-01 VITALS
BODY MASS INDEX: 22.56 KG/M2 | OXYGEN SATURATION: 99 % | TEMPERATURE: 98.3 F | WEIGHT: 140.4 LBS | RESPIRATION RATE: 18 BRPM | HEIGHT: 66 IN | DIASTOLIC BLOOD PRESSURE: 82 MMHG | SYSTOLIC BLOOD PRESSURE: 132 MMHG | HEART RATE: 80 BPM

## 2020-12-01 DIAGNOSIS — G89.3 CANCER RELATED PAIN: ICD-10-CM

## 2020-12-01 DIAGNOSIS — C66.1 UROTHELIAL CARCINOMA OF RIGHT DISTAL URETER (HCC): ICD-10-CM

## 2020-12-01 DIAGNOSIS — G89.3 CANCER RELATED PAIN: Primary | ICD-10-CM

## 2020-12-01 DIAGNOSIS — N30.01 ACUTE CYSTITIS WITH HEMATURIA: Primary | ICD-10-CM

## 2020-12-01 DIAGNOSIS — C79.51 BONY METASTASIS (HCC): ICD-10-CM

## 2020-12-01 DIAGNOSIS — C66.1 UROTHELIAL CARCINOMA OF RIGHT DISTAL URETER (HCC): Primary | ICD-10-CM

## 2020-12-01 LAB
BACTERIA UR QL AUTO: ABNORMAL /HPF
BILIRUB UR QL STRIP: NEGATIVE
CLARITY UR: ABNORMAL
COLOR UR: ABNORMAL
GLUCOSE UR STRIP-MCNC: NEGATIVE MG/DL
HGB UR QL STRIP.AUTO: ABNORMAL
HYALINE CASTS #/AREA URNS LPF: ABNORMAL /LPF
KETONES UR STRIP-MCNC: NEGATIVE MG/DL
LEUKOCYTE ESTERASE UR QL STRIP: ABNORMAL
NITRITE UR QL STRIP: NEGATIVE
NON-SQ EPI CELLS URNS QL MICRO: ABNORMAL /HPF
PH UR STRIP.AUTO: 6 [PH]
PROT UR STRIP-MCNC: ABNORMAL MG/DL
RBC #/AREA URNS AUTO: ABNORMAL /HPF
SL AMB  POCT GLUCOSE, UA: NEGATIVE
SL AMB LEUKOCYTE ESTERASE,UA: ABNORMAL
SL AMB POCT BILIRUBIN,UA: ABNORMAL
SL AMB POCT BLOOD,UA: ABNORMAL
SL AMB POCT CLARITY,UA: ABNORMAL
SL AMB POCT COLOR,UA: ABNORMAL
SL AMB POCT KETONES,UA: ABNORMAL
SL AMB POCT NITRITE,UA: NEGATIVE
SL AMB POCT PH,UA: 5
SL AMB POCT SPECIFIC GRAVITY,UA: 1.01
SL AMB POCT URINE PROTEIN: >2000
SL AMB POCT UROBILINOGEN: 1
SP GR UR STRIP.AUTO: 1.02 (ref 1–1.03)
UROBILINOGEN UR QL STRIP.AUTO: 1 E.U./DL
WBC #/AREA URNS AUTO: ABNORMAL /HPF

## 2020-12-01 PROCEDURE — 96365 THER/PROPH/DIAG IV INF INIT: CPT

## 2020-12-01 PROCEDURE — 87186 SC STD MICRODIL/AGAR DIL: CPT | Performed by: PHYSICIAN ASSISTANT

## 2020-12-01 PROCEDURE — 99213 OFFICE O/P EST LOW 20 MIN: CPT | Performed by: FAMILY MEDICINE

## 2020-12-01 PROCEDURE — 81002 URINALYSIS NONAUTO W/O SCOPE: CPT | Performed by: FAMILY MEDICINE

## 2020-12-01 PROCEDURE — 87086 URINE CULTURE/COLONY COUNT: CPT | Performed by: PHYSICIAN ASSISTANT

## 2020-12-01 PROCEDURE — 81001 URINALYSIS AUTO W/SCOPE: CPT | Performed by: PHYSICIAN ASSISTANT

## 2020-12-01 RX ORDER — SODIUM CHLORIDE 9 MG/ML
20 INJECTION, SOLUTION INTRAVENOUS ONCE
Status: COMPLETED | OUTPATIENT
Start: 2020-12-01 | End: 2020-12-01

## 2020-12-01 RX ORDER — MORPHINE SULFATE 60 MG/1
60 TABLET, FILM COATED, EXTENDED RELEASE ORAL EVERY 8 HOURS
Qty: 12 TABLET | Refills: 0 | Status: SHIPPED | OUTPATIENT
Start: 2020-12-01 | End: 2020-12-01 | Stop reason: SDUPTHER

## 2020-12-01 RX ORDER — SODIUM CHLORIDE 9 MG/ML
20 INJECTION, SOLUTION INTRAVENOUS ONCE
Status: CANCELLED | OUTPATIENT
Start: 2020-12-08

## 2020-12-01 RX ORDER — SODIUM CHLORIDE 9 MG/ML
20 INJECTION, SOLUTION INTRAVENOUS ONCE
Status: CANCELLED | OUTPATIENT
Start: 2021-01-20

## 2020-12-01 RX ORDER — SULFAMETHOXAZOLE AND TRIMETHOPRIM 800; 160 MG/1; MG/1
1 TABLET ORAL EVERY 12 HOURS SCHEDULED
Qty: 14 TABLET | Refills: 0 | Status: SHIPPED | OUTPATIENT
Start: 2020-12-01 | End: 2020-12-08

## 2020-12-01 RX ORDER — OXYCODONE HYDROCHLORIDE AND ACETAMINOPHEN 5; 325 MG/1; MG/1
2 TABLET ORAL ONCE
Status: CANCELLED | OUTPATIENT
Start: 2021-01-14

## 2020-12-01 RX ORDER — SODIUM CHLORIDE 9 MG/ML
20 INJECTION, SOLUTION INTRAVENOUS ONCE
Status: CANCELLED | OUTPATIENT
Start: 2021-01-14

## 2020-12-01 RX ORDER — MORPHINE SULFATE 60 MG/1
60 TABLET, FILM COATED, EXTENDED RELEASE ORAL EVERY 8 HOURS
Qty: 12 TABLET | Refills: 0 | Status: SHIPPED | OUTPATIENT
Start: 2020-12-01 | End: 2020-12-05 | Stop reason: SDUPTHER

## 2020-12-01 RX ADMIN — SODIUM CHLORIDE 20 ML/HR: 0.9 INJECTION, SOLUTION INTRAVENOUS at 09:55

## 2020-12-01 RX ADMIN — FERUMOXYTOL 510 MG: 510 INJECTION INTRAVENOUS at 10:15

## 2020-12-03 LAB — BACTERIA UR CULT: ABNORMAL

## 2020-12-05 ENCOUNTER — TELEPHONE (OUTPATIENT)
Dept: OTHER | Facility: OTHER | Age: 55
End: 2020-12-05

## 2020-12-05 DIAGNOSIS — C66.1 UROTHELIAL CARCINOMA OF RIGHT DISTAL URETER (HCC): ICD-10-CM

## 2020-12-05 DIAGNOSIS — G89.3 CANCER RELATED PAIN: ICD-10-CM

## 2020-12-05 DIAGNOSIS — C79.51 BONY METASTASIS (HCC): ICD-10-CM

## 2020-12-05 DIAGNOSIS — K21.9 GASTROESOPHAGEAL REFLUX DISEASE WITHOUT ESOPHAGITIS: ICD-10-CM

## 2020-12-05 RX ORDER — MORPHINE SULFATE 60 MG/1
60 TABLET, FILM COATED, EXTENDED RELEASE ORAL EVERY 8 HOURS
Qty: 15 TABLET | Refills: 0 | Status: SHIPPED | OUTPATIENT
Start: 2020-12-05 | End: 2020-12-10

## 2020-12-07 ENCOUNTER — PATIENT OUTREACH (OUTPATIENT)
Dept: CASE MANAGEMENT | Facility: HOSPITAL | Age: 55
End: 2020-12-07

## 2020-12-07 ENCOUNTER — OFFICE VISIT (OUTPATIENT)
Dept: FAMILY MEDICINE CLINIC | Facility: CLINIC | Age: 55
End: 2020-12-07
Payer: MEDICARE

## 2020-12-07 VITALS
OXYGEN SATURATION: 99 % | BODY MASS INDEX: 23.08 KG/M2 | SYSTOLIC BLOOD PRESSURE: 124 MMHG | HEIGHT: 66 IN | DIASTOLIC BLOOD PRESSURE: 84 MMHG | WEIGHT: 143.6 LBS | RESPIRATION RATE: 18 BRPM | HEART RATE: 74 BPM | TEMPERATURE: 97.6 F

## 2020-12-07 DIAGNOSIS — C66.1 UROTHELIAL CARCINOMA OF RIGHT DISTAL URETER (HCC): ICD-10-CM

## 2020-12-07 DIAGNOSIS — Z00.00 MEDICARE ANNUAL WELLNESS VISIT, SUBSEQUENT: Primary | ICD-10-CM

## 2020-12-07 DIAGNOSIS — F31.9 BIPOLAR AFFECTIVE DISORDER, REMISSION STATUS UNSPECIFIED (HCC): ICD-10-CM

## 2020-12-07 LAB
ALBUMIN SERPL BCP-MCNC: 3.3 G/DL (ref 3.5–5)
ALP SERPL-CCNC: 95 U/L (ref 46–116)
ALT SERPL W P-5'-P-CCNC: 16 U/L (ref 12–78)
ANION GAP SERPL CALCULATED.3IONS-SCNC: 7 MMOL/L (ref 4–13)
AST SERPL W P-5'-P-CCNC: 17 U/L (ref 5–45)
BASOPHILS # BLD AUTO: 0.06 THOUSANDS/ΜL (ref 0–0.1)
BASOPHILS NFR BLD AUTO: 1 % (ref 0–1)
BILIRUB SERPL-MCNC: 0.15 MG/DL (ref 0.2–1)
BUN SERPL-MCNC: 16 MG/DL (ref 5–25)
CALCIUM ALBUM COR SERPL-MCNC: 9.3 MG/DL (ref 8.3–10.1)
CALCIUM SERPL-MCNC: 8.7 MG/DL (ref 8.3–10.1)
CHLORIDE SERPL-SCNC: 106 MMOL/L (ref 100–108)
CO2 SERPL-SCNC: 23 MMOL/L (ref 21–32)
CREAT SERPL-MCNC: 1.35 MG/DL (ref 0.6–1.3)
EOSINOPHIL # BLD AUTO: 0.3 THOUSAND/ΜL (ref 0–0.61)
EOSINOPHIL NFR BLD AUTO: 5 % (ref 0–6)
ERYTHROCYTE [DISTWIDTH] IN BLOOD BY AUTOMATED COUNT: 17.2 % (ref 11.6–15.1)
GFR SERPL CREATININE-BSD FRML MDRD: 58 ML/MIN/1.73SQ M
GLUCOSE P FAST SERPL-MCNC: 98 MG/DL (ref 65–99)
HCT VFR BLD AUTO: 29.4 % (ref 36.5–49.3)
HGB BLD-MCNC: 9 G/DL (ref 12–17)
IMM GRANULOCYTES # BLD AUTO: 0.01 THOUSAND/UL (ref 0–0.2)
IMM GRANULOCYTES NFR BLD AUTO: 0 % (ref 0–2)
LYMPHOCYTES # BLD AUTO: 0.66 THOUSANDS/ΜL (ref 0.6–4.47)
LYMPHOCYTES NFR BLD AUTO: 12 % (ref 14–44)
MAGNESIUM SERPL-MCNC: 1.6 MG/DL (ref 1.6–2.6)
MCH RBC QN AUTO: 31.9 PG (ref 26.8–34.3)
MCHC RBC AUTO-ENTMCNC: 30.6 G/DL (ref 31.4–37.4)
MCV RBC AUTO: 104 FL (ref 82–98)
MONOCYTES # BLD AUTO: 0.98 THOUSAND/ΜL (ref 0.17–1.22)
MONOCYTES NFR BLD AUTO: 18 % (ref 4–12)
NEUTROPHILS # BLD AUTO: 3.52 THOUSANDS/ΜL (ref 1.85–7.62)
NEUTS SEG NFR BLD AUTO: 64 % (ref 43–75)
NRBC BLD AUTO-RTO: 0 /100 WBCS
PLATELET # BLD AUTO: 290 THOUSANDS/UL (ref 149–390)
PMV BLD AUTO: 9.3 FL (ref 8.9–12.7)
POTASSIUM SERPL-SCNC: 5.2 MMOL/L (ref 3.5–5.3)
PROT SERPL-MCNC: 7 G/DL (ref 6.4–8.2)
RBC # BLD AUTO: 2.82 MILLION/UL (ref 3.88–5.62)
SODIUM SERPL-SCNC: 136 MMOL/L (ref 136–145)
WBC # BLD AUTO: 5.53 THOUSAND/UL (ref 4.31–10.16)

## 2020-12-07 PROCEDURE — G0439 PPPS, SUBSEQ VISIT: HCPCS | Performed by: FAMILY MEDICINE

## 2020-12-07 PROCEDURE — 80053 COMPREHEN METABOLIC PANEL: CPT | Performed by: PHYSICIAN ASSISTANT

## 2020-12-07 PROCEDURE — 83735 ASSAY OF MAGNESIUM: CPT | Performed by: PHYSICIAN ASSISTANT

## 2020-12-07 PROCEDURE — 85025 COMPLETE CBC W/AUTO DIFF WBC: CPT | Performed by: PHYSICIAN ASSISTANT

## 2020-12-07 RX ORDER — CLONAZEPAM 0.5 MG/1
0.5 TABLET ORAL 2 TIMES DAILY PRN
Qty: 14 TABLET | Refills: 0 | Status: CANCELLED | OUTPATIENT
Start: 2020-12-07

## 2020-12-07 RX ORDER — OMEPRAZOLE 40 MG/1
CAPSULE, DELAYED RELEASE ORAL
Qty: 90 CAPSULE | Refills: 1 | Status: SHIPPED | OUTPATIENT
Start: 2020-12-07

## 2020-12-08 ENCOUNTER — HOSPITAL ENCOUNTER (OUTPATIENT)
Dept: CT IMAGING | Facility: HOSPITAL | Age: 55
Discharge: HOME | End: 2020-12-08
Payer: MEDICARE

## 2020-12-08 DIAGNOSIS — C66.1 UROTHELIAL CARCINOMA OF RIGHT DISTAL URETER (HCC): ICD-10-CM

## 2020-12-08 DIAGNOSIS — C79.51 BONY METASTASIS (HCC): ICD-10-CM

## 2020-12-08 PROCEDURE — 74177 CT ABD & PELVIS W/CONTRAST: CPT

## 2020-12-08 PROCEDURE — 71260 CT THORAX DX C+: CPT

## 2020-12-08 PROCEDURE — G1004 CDSM NDSC: HCPCS

## 2020-12-08 RX ORDER — MORPHINE SULFATE 60 MG/1
60 TABLET, FILM COATED, EXTENDED RELEASE ORAL 3 TIMES DAILY
Qty: 45 TABLET | Refills: 0 | Status: SHIPPED | OUTPATIENT
Start: 2020-12-08 | End: 2020-12-15

## 2020-12-08 RX ADMIN — IOHEXOL 100 ML: 350 INJECTION, SOLUTION INTRAVENOUS at 09:57

## 2020-12-09 ENCOUNTER — HOSPITAL ENCOUNTER (OUTPATIENT)
Dept: INFUSION CENTER | Facility: HOSPITAL | Age: 55
Discharge: HOME | End: 2020-12-09
Attending: INTERNAL MEDICINE
Payer: MEDICARE

## 2020-12-09 VITALS
RESPIRATION RATE: 16 BRPM | DIASTOLIC BLOOD PRESSURE: 60 MMHG | SYSTOLIC BLOOD PRESSURE: 124 MMHG | TEMPERATURE: 98 F | HEART RATE: 70 BPM

## 2020-12-09 DIAGNOSIS — C66.1 UROTHELIAL CARCINOMA OF RIGHT DISTAL URETER (HCC): Primary | ICD-10-CM

## 2020-12-09 PROCEDURE — 96365 THER/PROPH/DIAG IV INF INIT: CPT

## 2020-12-09 RX ORDER — SODIUM CHLORIDE 9 MG/ML
20 INJECTION, SOLUTION INTRAVENOUS ONCE
Status: COMPLETED | OUTPATIENT
Start: 2020-12-09 | End: 2020-12-09

## 2020-12-09 RX ORDER — SODIUM CHLORIDE 9 MG/ML
20 INJECTION, SOLUTION INTRAVENOUS ONCE
Status: CANCELLED | OUTPATIENT
Start: 2020-12-15

## 2020-12-09 RX ADMIN — FERUMOXYTOL 510 MG: 510 INJECTION INTRAVENOUS at 11:08

## 2020-12-09 RX ADMIN — SODIUM CHLORIDE 20 ML/HR: 0.9 INJECTION, SOLUTION INTRAVENOUS at 10:51

## 2020-12-10 ENCOUNTER — HOSPITAL ENCOUNTER (OUTPATIENT)
Dept: INFUSION CENTER | Facility: HOSPITAL | Age: 55
Discharge: HOME | End: 2020-12-10
Attending: INTERNAL MEDICINE | Admitting: INTERNAL MEDICINE

## 2020-12-11 ENCOUNTER — HOSPITAL ENCOUNTER (OUTPATIENT)
Dept: INFUSION CENTER | Facility: HOSPITAL | Age: 55
Discharge: HOME | End: 2020-12-11
Attending: INTERNAL MEDICINE

## 2020-12-14 ENCOUNTER — TELEPHONE (OUTPATIENT)
Dept: PALLIATIVE MEDICINE | Facility: CLINIC | Age: 55
End: 2020-12-14

## 2020-12-14 ENCOUNTER — CLINICAL SUPPORT (OUTPATIENT)
Dept: FAMILY MEDICINE CLINIC | Facility: CLINIC | Age: 55
End: 2020-12-14
Payer: MEDICARE

## 2020-12-14 ENCOUNTER — TELEPHONE (OUTPATIENT)
Dept: HEMATOLOGY ONCOLOGY | Facility: CLINIC | Age: 55
End: 2020-12-14

## 2020-12-14 DIAGNOSIS — G89.3 CANCER RELATED PAIN: ICD-10-CM

## 2020-12-14 DIAGNOSIS — C66.1 UROTHELIAL CARCINOMA OF RIGHT DISTAL URETER (HCC): ICD-10-CM

## 2020-12-14 DIAGNOSIS — C79.51 BONY METASTASIS (HCC): ICD-10-CM

## 2020-12-14 DIAGNOSIS — R30.9 PAINFUL URINATION: Primary | ICD-10-CM

## 2020-12-14 DIAGNOSIS — N30.01 ACUTE CYSTITIS WITH HEMATURIA: Primary | ICD-10-CM

## 2020-12-14 LAB
ALBUMIN SERPL BCP-MCNC: 3.8 G/DL (ref 3.5–5)
ALP SERPL-CCNC: 103 U/L (ref 46–116)
ALT SERPL W P-5'-P-CCNC: 45 U/L (ref 12–78)
ANION GAP SERPL CALCULATED.3IONS-SCNC: 8 MMOL/L (ref 4–13)
AST SERPL W P-5'-P-CCNC: 46 U/L (ref 5–45)
BASOPHILS # BLD AUTO: 0.09 THOUSANDS/ΜL (ref 0–0.1)
BASOPHILS NFR BLD AUTO: 2 % (ref 0–1)
BILIRUB SERPL-MCNC: 0.39 MG/DL (ref 0.2–1)
BUN SERPL-MCNC: 16 MG/DL (ref 5–25)
CALCIUM SERPL-MCNC: 9.4 MG/DL (ref 8.3–10.1)
CHLORIDE SERPL-SCNC: 105 MMOL/L (ref 100–108)
CO2 SERPL-SCNC: 22 MMOL/L (ref 21–32)
CREAT SERPL-MCNC: 1.29 MG/DL (ref 0.6–1.3)
EOSINOPHIL # BLD AUTO: 0.24 THOUSAND/ΜL (ref 0–0.61)
EOSINOPHIL NFR BLD AUTO: 4 % (ref 0–6)
ERYTHROCYTE [DISTWIDTH] IN BLOOD BY AUTOMATED COUNT: 16.8 % (ref 11.6–15.1)
GFR SERPL CREATININE-BSD FRML MDRD: 62 ML/MIN/1.73SQ M
GLUCOSE P FAST SERPL-MCNC: 89 MG/DL (ref 65–99)
HCT VFR BLD AUTO: 34.2 % (ref 36.5–49.3)
HGB BLD-MCNC: 10.9 G/DL (ref 12–17)
IMM GRANULOCYTES # BLD AUTO: 0.02 THOUSAND/UL (ref 0–0.2)
IMM GRANULOCYTES NFR BLD AUTO: 0 % (ref 0–2)
LYMPHOCYTES # BLD AUTO: 0.6 THOUSANDS/ΜL (ref 0.6–4.47)
LYMPHOCYTES NFR BLD AUTO: 10 % (ref 14–44)
MCH RBC QN AUTO: 32.9 PG (ref 26.8–34.3)
MCHC RBC AUTO-ENTMCNC: 31.9 G/DL (ref 31.4–37.4)
MCV RBC AUTO: 103 FL (ref 82–98)
MONOCYTES # BLD AUTO: 0.82 THOUSAND/ΜL (ref 0.17–1.22)
MONOCYTES NFR BLD AUTO: 14 % (ref 4–12)
NEUTROPHILS # BLD AUTO: 4.31 THOUSANDS/ΜL (ref 1.85–7.62)
NEUTS SEG NFR BLD AUTO: 70 % (ref 43–75)
NRBC BLD AUTO-RTO: 0 /100 WBCS
PLATELET # BLD AUTO: 363 THOUSANDS/UL (ref 149–390)
PMV BLD AUTO: 9 FL (ref 8.9–12.7)
POTASSIUM SERPL-SCNC: 5.3 MMOL/L (ref 3.5–5.3)
PROT SERPL-MCNC: 7.5 G/DL (ref 6.4–8.2)
RBC # BLD AUTO: 3.31 MILLION/UL (ref 3.88–5.62)
SL AMB  POCT GLUCOSE, UA: NEGATIVE
SL AMB LEUKOCYTE ESTERASE,UA: ABNORMAL
SL AMB POCT BILIRUBIN,UA: ABNORMAL
SL AMB POCT BLOOD,UA: ABNORMAL
SL AMB POCT CLARITY,UA: ABNORMAL
SL AMB POCT COLOR,UA: ABNORMAL
SL AMB POCT KETONES,UA: ABNORMAL
SL AMB POCT NITRITE,UA: NEGATIVE
SL AMB POCT PH,UA: 5
SL AMB POCT SPECIFIC GRAVITY,UA: 1.02
SL AMB POCT URINE PROTEIN: 300
SL AMB POCT UROBILINOGEN: 0.2
SODIUM SERPL-SCNC: 135 MMOL/L (ref 136–145)
WBC # BLD AUTO: 6.08 THOUSAND/UL (ref 4.31–10.16)

## 2020-12-14 PROCEDURE — 81001 URINALYSIS AUTO W/SCOPE: CPT

## 2020-12-14 PROCEDURE — 87186 SC STD MICRODIL/AGAR DIL: CPT

## 2020-12-14 PROCEDURE — 87086 URINE CULTURE/COLONY COUNT: CPT

## 2020-12-14 PROCEDURE — 85025 COMPLETE CBC W/AUTO DIFF WBC: CPT

## 2020-12-14 PROCEDURE — 80053 COMPREHEN METABOLIC PANEL: CPT

## 2020-12-14 RX ORDER — NITROFURANTOIN 25; 75 MG/1; MG/1
100 CAPSULE ORAL 2 TIMES DAILY
Qty: 14 CAPSULE | Refills: 0 | Status: SHIPPED | OUTPATIENT
Start: 2020-12-14 | End: 2020-12-21

## 2020-12-15 ENCOUNTER — TELEMEDICINE (OUTPATIENT)
Dept: HEMATOLOGY ONCOLOGY | Facility: CLINIC | Age: 55
End: 2020-12-15
Payer: MEDICARE

## 2020-12-15 DIAGNOSIS — D50.0 IRON DEFICIENCY ANEMIA DUE TO CHRONIC BLOOD LOSS: ICD-10-CM

## 2020-12-15 DIAGNOSIS — C66.1 UROTHELIAL CARCINOMA OF RIGHT DISTAL URETER (HCC): Primary | ICD-10-CM

## 2020-12-15 DIAGNOSIS — F41.9 ANXIETY DISORDER, UNSPECIFIED TYPE: ICD-10-CM

## 2020-12-15 DIAGNOSIS — R59.0 RETROPERITONEAL LYMPHADENOPATHY: ICD-10-CM

## 2020-12-15 DIAGNOSIS — C79.51 BONY METASTASIS (HCC): ICD-10-CM

## 2020-12-15 DIAGNOSIS — F41.9 ANXIETY: ICD-10-CM

## 2020-12-15 LAB
BACTERIA UR QL AUTO: ABNORMAL /HPF
BILIRUB UR QL STRIP: NEGATIVE
CLARITY UR: ABNORMAL
COLOR UR: ABNORMAL
GLUCOSE UR STRIP-MCNC: NEGATIVE MG/DL
HGB UR QL STRIP.AUTO: ABNORMAL
HYALINE CASTS #/AREA URNS LPF: ABNORMAL /LPF
KETONES UR STRIP-MCNC: NEGATIVE MG/DL
LEUKOCYTE ESTERASE UR QL STRIP: ABNORMAL
NITRITE UR QL STRIP: POSITIVE
NON-SQ EPI CELLS URNS QL MICRO: ABNORMAL /HPF
PH UR STRIP.AUTO: 6 [PH]
PROT UR STRIP-MCNC: ABNORMAL MG/DL
RBC #/AREA URNS AUTO: ABNORMAL /HPF
SP GR UR STRIP.AUTO: 1.02 (ref 1–1.03)
UROBILINOGEN UR QL STRIP.AUTO: 0.2 E.U./DL
WBC #/AREA URNS AUTO: ABNORMAL /HPF

## 2020-12-15 PROCEDURE — 99215 OFFICE O/P EST HI 40 MIN: CPT | Performed by: INTERNAL MEDICINE

## 2020-12-15 RX ORDER — ALPRAZOLAM 0.25 MG/1
0.25 TABLET ORAL 2 TIMES DAILY PRN
Qty: 60 TABLET | Refills: 0 | Status: SHIPPED | OUTPATIENT
Start: 2020-12-15 | End: 2021-01-11 | Stop reason: SDUPTHER

## 2020-12-15 RX ORDER — OXYCODONE HYDROCHLORIDE 15 MG/1
15 TABLET ORAL EVERY 4 HOURS PRN
Qty: 180 TABLET | Refills: 0 | Status: SHIPPED | OUTPATIENT
Start: 2020-12-15 | End: 2021-01-13 | Stop reason: SDUPTHER

## 2020-12-17 ENCOUNTER — HOSPITAL ENCOUNTER (OUTPATIENT)
Dept: INFUSION CENTER | Facility: HOSPITAL | Age: 55
Discharge: HOME | End: 2020-12-17
Attending: INTERNAL MEDICINE | Admitting: INTERNAL MEDICINE

## 2020-12-18 LAB — BACTERIA UR CULT: ABNORMAL

## 2020-12-22 ENCOUNTER — HOSPITAL ENCOUNTER (OUTPATIENT)
Dept: INFUSION CENTER | Facility: HOSPITAL | Age: 55
Discharge: HOME | End: 2020-12-22
Attending: INTERNAL MEDICINE

## 2020-12-28 ENCOUNTER — TELEPHONE (OUTPATIENT)
Dept: INTERNAL MEDICINE CLINIC | Facility: CLINIC | Age: 55
End: 2020-12-28

## 2020-12-28 ENCOUNTER — TELEPHONE (OUTPATIENT)
Dept: INFUSION CENTER | Facility: HOSPITAL | Age: 55
End: 2020-12-28

## 2020-12-28 ENCOUNTER — HOSPITAL ENCOUNTER (OUTPATIENT)
Dept: INFUSION CENTER | Facility: HOSPITAL | Age: 55
Discharge: HOME | End: 2020-12-28
Attending: INTERNAL MEDICINE

## 2020-12-28 ENCOUNTER — TELEMEDICINE (OUTPATIENT)
Dept: FAMILY MEDICINE CLINIC | Facility: CLINIC | Age: 55
End: 2020-12-28
Payer: MEDICARE

## 2020-12-28 DIAGNOSIS — Z20.822 ENCOUNTER FOR SCREENING LABORATORY TESTING FOR COVID-19 VIRUS: ICD-10-CM

## 2020-12-28 DIAGNOSIS — Z20.822 ENCOUNTER FOR SCREENING LABORATORY TESTING FOR COVID-19 VIRUS: Primary | ICD-10-CM

## 2020-12-28 PROCEDURE — U0003 INFECTIOUS AGENT DETECTION BY NUCLEIC ACID (DNA OR RNA); SEVERE ACUTE RESPIRATORY SYNDROME CORONAVIRUS 2 (SARS-COV-2) (CORONAVIRUS DISEASE [COVID-19]), AMPLIFIED PROBE TECHNIQUE, MAKING USE OF HIGH THROUGHPUT TECHNOLOGIES AS DESCRIBED BY CMS-2020-01-R: HCPCS | Performed by: PHYSICIAN ASSISTANT

## 2020-12-28 PROCEDURE — G0071 COMM SVCS BY RHC/FQHC 5 MIN: HCPCS | Performed by: PHYSICIAN ASSISTANT

## 2020-12-29 ENCOUNTER — HOSPITAL ENCOUNTER (OUTPATIENT)
Dept: INFUSION CENTER | Facility: HOSPITAL | Age: 55
End: 2020-12-29
Attending: INTERNAL MEDICINE

## 2020-12-29 LAB — SARS-COV-2 RNA SPEC QL NAA+PROBE: NOT DETECTED

## 2021-01-04 ENCOUNTER — HOSPITAL ENCOUNTER (OUTPATIENT)
Dept: INFUSION CENTER | Facility: HOSPITAL | Age: 56
Discharge: HOME | End: 2021-01-04
Attending: INTERNAL MEDICINE

## 2021-01-04 DIAGNOSIS — G89.3 CANCER RELATED PAIN: ICD-10-CM

## 2021-01-04 DIAGNOSIS — C66.1 UROTHELIAL CARCINOMA OF RIGHT DISTAL URETER (HCC): ICD-10-CM

## 2021-01-04 RX ORDER — OXYCODONE 36 MG/1
1 CAPSULE, EXTENDED RELEASE ORAL 3 TIMES DAILY
Qty: 90 CAPSULE | Refills: 0 | Status: SHIPPED | OUTPATIENT
Start: 2021-01-04 | End: 2021-01-31 | Stop reason: SDUPTHER

## 2021-01-05 ENCOUNTER — CLINICAL SUPPORT (OUTPATIENT)
Dept: FAMILY MEDICINE CLINIC | Facility: CLINIC | Age: 56
End: 2021-01-05
Payer: MEDICARE

## 2021-01-05 DIAGNOSIS — E87.1 HYPONATREMIA: ICD-10-CM

## 2021-01-05 DIAGNOSIS — T45.1X5A CHEMOTHERAPY INDUCED NEUTROPENIA (HCC): ICD-10-CM

## 2021-01-05 DIAGNOSIS — N13.30 HYDRONEPHROSIS OF RIGHT KIDNEY: ICD-10-CM

## 2021-01-05 DIAGNOSIS — N17.9 ACUTE KIDNEY INJURY (HCC): ICD-10-CM

## 2021-01-05 DIAGNOSIS — D70.1 CHEMOTHERAPY INDUCED NEUTROPENIA (HCC): ICD-10-CM

## 2021-01-05 PROCEDURE — 80053 COMPREHEN METABOLIC PANEL: CPT | Performed by: INTERNAL MEDICINE

## 2021-01-05 PROCEDURE — 85025 COMPLETE CBC W/AUTO DIFF WBC: CPT | Performed by: INTERNAL MEDICINE

## 2021-01-05 PROCEDURE — 36415 COLL VENOUS BLD VENIPUNCTURE: CPT

## 2021-01-05 PROCEDURE — 83735 ASSAY OF MAGNESIUM: CPT | Performed by: INTERNAL MEDICINE

## 2021-01-11 ENCOUNTER — TELEPHONE (OUTPATIENT)
Dept: HEMATOLOGY ONCOLOGY | Facility: CLINIC | Age: 56
End: 2021-01-11

## 2021-01-11 ENCOUNTER — HOSPITAL ENCOUNTER (OUTPATIENT)
Dept: INFUSION CENTER | Facility: HOSPITAL | Age: 56
Discharge: HOME | End: 2021-01-11

## 2021-01-11 DIAGNOSIS — F41.9 ANXIETY: Primary | ICD-10-CM

## 2021-01-11 RX ORDER — ALPRAZOLAM 0.25 MG/1
0.25 TABLET ORAL 2 TIMES DAILY PRN
Qty: 60 TABLET | Refills: 0 | Status: SHIPPED | OUTPATIENT
Start: 2021-01-11 | End: 2021-02-10 | Stop reason: SDUPTHER

## 2021-01-11 NOTE — TELEPHONE ENCOUNTER
BETO will be rescheduling this patient  Spoke with [employer]  Patient is aware of scheduling changes

## 2021-01-12 ENCOUNTER — TELEMEDICINE (OUTPATIENT)
Dept: HEMATOLOGY ONCOLOGY | Facility: CLINIC | Age: 56
End: 2021-01-12
Payer: MEDICARE

## 2021-01-12 DIAGNOSIS — C66.1 UROTHELIAL CARCINOMA OF RIGHT DISTAL URETER (HCC): Primary | ICD-10-CM

## 2021-01-12 PROCEDURE — 99214 OFFICE O/P EST MOD 30 MIN: CPT | Performed by: INTERNAL MEDICINE

## 2021-01-12 NOTE — PROGRESS NOTES
[facility] [place]  [address] [address]  [phone]    Nadja Tran,1965, [id number]  01/12/21    Discussion:   In summary, this is a 50-year-old male history of stage IV urothelial carcinoma as outlined  He has been on Gemzar/cisplatin  He seems to tolerate this without much difficulty  In the past couple of days he had an episode of diarrhea  He attributes this to some dietary contents  Diarrhea has now resolved  He had minor hypomagnesemia  Observation is favored  Anemia is mild, asymptomatic  This has been gradually improving  Probably anemia of chronic disease  Occasional LFT abnormalities, etiology unclear  Observation favored  We will continue his chemotherapy moving forward  I will see him back in 1 month for review with repeat imaging just prior to that visit  If disease is stable or improved, pivot to immunotherapy would be recommended at that time  I discussed the above with the patient  The patient  voiced understanding and agreement   ______________________________________________________________________    No chief complaint on file  HPI:  Oncology History   Urothelial carcinoma of right distal ureter ([facility] )   7/24/2020 Initial Diagnosis    Urothelial carcinoma of right distal ureter ([facility] [address] )     7/24/2020 Biopsy    Final Diagnosis    A  Lymph node, right iliac, biopsy:  -  Metastatic carcinoma, compatible with urothelial primary  -  Immunohistochemical stains performed with appropriate controls show the tumor cells to be positive for keratin AE1/3, CK7, CK20, CDX2, SHELIL-3, and uroplakin and negative for TTF-1, ER, PSA, and NKX3 1, supporting the diagnosis          8/11/2020 -  Chemotherapy    CISplatin (PLATINOL) 121 1 mg, mannitol 12 5 g in sodium chloride 0 9 % 500 mL IVPB, 70 mg/m2 = 121 1 mg, Intravenous, Once, 4 of 7 cycles  Administration: 121 1 mg (8/11/2020), 121 1 mg (9/15/2020), 121 1 mg (10/9/2020), 121 1 mg (11/12/2020)  fosaprepitant (EMEND) 150 mg in sodium chloride 0 9 % 250 mL IVPB, 150 mg, Intravenous, Once, 4 of 7 cycles  Administration: 150 mg (8/11/2020), 150 mg (9/15/2020), 150 mg (10/9/2020), 150 mg (11/12/2020)  gemcitabine (GEMZAR) 1,600 mg in sodium chloride 0 9 % 250 mL infusion, 1,557 mg (72 % of original dose 1,250 mg/m2), Intravenous, Once, 3 of 6 cycles  Dose modification: 900 mg/m2 (original dose 1,250 mg/m2, Cycle 1, Reason: Other (See Comments)), 900 mg/m2 (original dose 1,250 mg/m2, Cycle 3, Reason: Other (See Comments)), 900 mg/m2 (original dose 1,250 mg/m2, Cycle 4, Reason: Other (See Comments))  Administration: 1,600 mg (8/11/2020), 1,600 mg (8/24/2020), 1,600 mg (10/9/2020), 1,600 mg (10/16/2020), 1,600 mg (11/12/2020), 1,600 mg (11/23/2020)         Interval History:  Clinically stable  ECOG-  1 - Symptomatic but completely ambulatory    Review of Systems   Constitutional: Negative for chills and fever  HENT: Negative for nosebleeds  Eyes: Negative for discharge  Respiratory: Negative for cough and shortness of breath  Cardiovascular: Negative for chest pain  Gastrointestinal: Negative for abdominal pain, constipation and diarrhea  Endocrine: Negative for polydipsia  Genitourinary: Negative for hematuria  Musculoskeletal: Negative for arthralgias  Skin: Negative for color change  Allergic/Immunologic: Negative for immunocompromised state  Neurological: Negative for dizziness and headaches  Hematological: Negative for adenopathy  Psychiatric/Behavioral: Negative for agitation         Past Medical History:   Diagnosis Date    Asthma     [facility]     COPD (chronic obstructive pulmonary disease) [facility]     Disease of thyroid gland     Hypertension     Hyperthyroidism      Patient Active Problem List   Diagnosis    Allergic rhinitis    Anxiety disorder    [facility] disorder [facility]    Chronic lumbar radiculopathy    Contact dermatitis    Esophageal reflux    Migraine headache    Mild intermittent asthma without complication    Other emphysema (HCC)    Subclinical hyperthyroidism    Essential hypertension    Hydronephrosis of right kidney    Major depression    Panic disorder    Urothelial lesion    Cancer related pain    Chronic back pain    Retroperitoneal lymphadenopathy    Palliative care patient    Urothelial carcinoma of right distal ureter ([facility] )    Therapeutic opioid induced constipation    Acute kidney injury ([facility] [facility] )    Hyponatremia    UTI (urinary tract infection)    Bony metastasis (HCC)    Chemotherapy induced neutropenia (HCC)    Iron deficiency anemia due to chronic blood loss       Current Outpatient Medications:     acetaminophen (TYLENOL) 650 mg CR tablet, Take 1 tablet (650 mg total) by mouth every 8 (eight) hours, Disp: 30 tablet, Rfl: 0    albuterol (Ventolin HFA) 90 mcg/act inhaler, Inhale 2 puffs every 6 (six) hours as needed for wheezing or shortness of breath, Disp: 18 g, Rfl: 5    ALPRAZolam (XANAX) 0 25 mg tablet, Take 1 tablet (0 25 mg total) by mouth 2 (two) times a day as needed for anxiety, Disp: 60 tablet, Rfl: 0    carisoprodol (SOMA) 350 mg tablet, Take 1 tablet (350 mg total) by mouth 3 (three) times a day as needed for muscle spasms, Disp: 60 tablet, Rfl: 0    clonazePAM (KlonoPIN) 0 5 mg tablet, Take 1 tablet (0 5 mg total) by mouth 2 (two) times a day as needed for anxiety, Disp: 14 tablet, Rfl: 0    escitalopram (LEXAPRO) 20 mg tablet, , Disp: , Rfl:     fluticasone (FLONASE) 50 mcg/act nasal spray, 1 spray into each nostril daily, Disp: 1 Bottle, Rfl: 11    fluticasone (FLOVENT HFA) 220 mcg/act inhaler, Inhale 2 puffs 2 (two) times a day Rinse mouth after use , Disp: 1 Inhaler, Rfl: 5    naloxone (NARCAN) 4 mg/0 1 mL nasal spray, Administer 1 spray into a nostril  If no response after 2-3 minutes, give another dose in the other nostril using a new spray   (Patient not taking: Reported on 12/7/2020), Disp: 1 each, Rfl: 1    omeprazole (PriLOSEC) 40 MG capsule, TAKE 1 CAPSULE BY MOUTH EVERY DAY BEFORE BREAKFAST, Disp: 90 capsule, Rfl: 1    ondansetron (ZOFRAN) 8 mg tablet, Take 1 tablet (8 mg total) by mouth 3 (three) times a day before meals To prevent chemo nausea, Disp: 90 tablet, Rfl: 2    oxyCODONE (ROXICODONE) 15 mg immediate release tablet, Take 1 tablet (15 mg total) by mouth every 4 (four) hours as needed (cancer pain)Max Daily Amount: 90 mg, Disp: 180 tablet, Rfl: 0    oxyCODONE ER (Xtampza ER) 36 MG C12A, Take 1 each by mouth 3 (three) times a day To prevent cancer painMax Daily Amount: 3 each, Disp: 90 capsule, Rfl: 0    zolpidem (AMBIEN) 10 mg tablet, 1 TAB AT BEDTIME AS NEEDED FOR INSOMNIA, Disp: , Rfl:   Allergies   Allergen Reactions    Ketorolac Hives     Toradol     Past Surgical History:   Procedure Laterality Date    DENTAL SURGERY      FL RETROGRADE URETHROCYSTOGRAM  6/15/2020    HERNIA REPAIR Left     inguinal    IR LYMPH NODE BIOPSY/ASPIRATION  7/24/2020    IR TUBE PLACEMENT  6/25/2020    [place] CYSTOURETHROSCOPY,URETER CATHETER Right 6/15/2020    Procedure: CYSTOSCOPY RETROGRADE PYELOGRAM and ureteroscopy;  Surgeon: Lei Mcfarland MD;  [facility];  Service: Urology     Social History     Objective: There were no vitals filed for this visit  Physical Exam  Constitutional:       Appearance: He is well-developed  HENT:      Head: Normocephalic and atraumatic  Eyes:      Pupils: Pupils are equal, round, and reactive to light  Neck:      Musculoskeletal: Neck supple  Cardiovascular:      Rate and Rhythm: Normal rate and regular rhythm  Heart sounds: No murmur  Pulmonary:      Breath sounds: Normal breath sounds  No wheezing or rales  Abdominal:      Palpations: Abdomen is soft  Tenderness: There is no abdominal tenderness  Musculoskeletal: Normal range of motion  General: No tenderness     Lymphadenopathy:      Cervical: No cervical adenopathy  Skin:     Findings: No erythema or rash  Neurological:      Mental Status: He is alert and oriented to person, place, and time  Cranial Nerves: No cranial nerve deficit  Deep Tendon Reflexes: Reflexes are normal and symmetric  Psychiatric:         Behavior: Behavior normal            Labs: I personally reviewed the labs and imaging pertinent to this patient care

## 2021-01-13 ENCOUNTER — OFFICE VISIT (OUTPATIENT)
Dept: PALLIATIVE MEDICINE | Facility: CLINIC | Age: 56
End: 2021-01-13
Payer: MEDICARE

## 2021-01-13 ENCOUNTER — LAB (OUTPATIENT)
Dept: LAB | Facility: MEDICAL CENTER | Age: 56
End: 2021-01-13
Payer: MEDICARE

## 2021-01-13 ENCOUNTER — TELEPHONE (OUTPATIENT)
Dept: PALLIATIVE MEDICINE | Facility: HOSPITAL | Age: 56
End: 2021-01-13

## 2021-01-13 ENCOUNTER — TELEPHONE (OUTPATIENT)
Dept: PALLIATIVE MEDICINE | Facility: CLINIC | Age: 56
End: 2021-01-13

## 2021-01-13 VITALS
HEIGHT: 66 IN | DIASTOLIC BLOOD PRESSURE: 70 MMHG | WEIGHT: 142 LBS | OXYGEN SATURATION: 99 % | TEMPERATURE: 96.4 F | HEART RATE: 87 BPM | SYSTOLIC BLOOD PRESSURE: 124 MMHG | BODY MASS INDEX: 22.82 KG/M2

## 2021-01-13 DIAGNOSIS — C66.1 UROTHELIAL CARCINOMA OF RIGHT DISTAL URETER (HCC): ICD-10-CM

## 2021-01-13 DIAGNOSIS — C79.51 BONY METASTASIS (HCC): ICD-10-CM

## 2021-01-13 DIAGNOSIS — G89.3 CANCER RELATED PAIN: ICD-10-CM

## 2021-01-13 LAB
ALBUMIN SERPL BCP-MCNC: 3.5 G/DL (ref 3.5–5)
ALP SERPL-CCNC: 113 U/L (ref 46–116)
ALT SERPL W P-5'-P-CCNC: 69 U/L (ref 12–78)
ANION GAP SERPL CALCULATED.3IONS-SCNC: 4 MMOL/L (ref 4–13)
AST SERPL W P-5'-P-CCNC: 48 U/L (ref 5–45)
BASOPHILS # BLD AUTO: 0.09 THOUSANDS/ΜL (ref 0–0.1)
BASOPHILS NFR BLD AUTO: 1 % (ref 0–1)
BILIRUB SERPL-MCNC: 0.48 MG/DL (ref 0.2–1)
BUN SERPL-MCNC: 19 MG/DL (ref 5–25)
CALCIUM SERPL-MCNC: 9 MG/DL (ref 8.3–10.1)
CHLORIDE SERPL-SCNC: 96 MMOL/L (ref 100–108)
CO2 SERPL-SCNC: 28 MMOL/L (ref 21–32)
CREAT SERPL-MCNC: 1.11 MG/DL (ref 0.6–1.3)
EOSINOPHIL # BLD AUTO: 0.63 THOUSAND/ΜL (ref 0–0.61)
EOSINOPHIL NFR BLD AUTO: 9 % (ref 0–6)
ERYTHROCYTE [DISTWIDTH] IN BLOOD BY AUTOMATED COUNT: 13.2 % (ref 11.6–15.1)
GFR SERPL CREATININE-BSD FRML MDRD: 74 ML/MIN/1.73SQ M
GLUCOSE P FAST SERPL-MCNC: 93 MG/DL (ref 65–99)
HCT VFR BLD AUTO: 36.8 % (ref 36.5–49.3)
HGB BLD-MCNC: 12.2 G/DL (ref 12–17)
IMM GRANULOCYTES # BLD AUTO: 0.02 THOUSAND/UL (ref 0–0.2)
IMM GRANULOCYTES NFR BLD AUTO: 0 % (ref 0–2)
LYMPHOCYTES # BLD AUTO: 0.71 THOUSANDS/ΜL (ref 0.6–4.47)
LYMPHOCYTES NFR BLD AUTO: 10 % (ref 14–44)
MCH RBC QN AUTO: 33.1 PG (ref 26.8–34.3)
MCHC RBC AUTO-ENTMCNC: 33.2 G/DL (ref 31.4–37.4)
MCV RBC AUTO: 100 FL (ref 82–98)
MONOCYTES # BLD AUTO: 0.82 THOUSAND/ΜL (ref 0.17–1.22)
MONOCYTES NFR BLD AUTO: 12 % (ref 4–12)
NEUTROPHILS # BLD AUTO: 4.55 THOUSANDS/ΜL (ref 1.85–7.62)
NEUTS SEG NFR BLD AUTO: 68 % (ref 43–75)
NRBC BLD AUTO-RTO: 0 /100 WBCS
PLATELET # BLD AUTO: 297 THOUSANDS/UL (ref 149–390)
PMV BLD AUTO: 8.5 FL (ref 8.9–12.7)
POTASSIUM SERPL-SCNC: 4.5 MMOL/L (ref 3.5–5.3)
PROT SERPL-MCNC: 7.2 G/DL (ref 6.4–8.2)
RBC # BLD AUTO: 3.69 MILLION/UL (ref 3.88–5.62)
SODIUM SERPL-SCNC: 128 MMOL/L (ref 136–145)
WBC # BLD AUTO: 6.82 THOUSAND/UL (ref 4.31–10.16)

## 2021-01-13 PROCEDURE — 99214 OFFICE O/P EST MOD 30 MIN: CPT | Performed by: FAMILY MEDICINE

## 2021-01-13 PROCEDURE — 80053 COMPREHEN METABOLIC PANEL: CPT

## 2021-01-13 PROCEDURE — 85025 COMPLETE CBC W/AUTO DIFF WBC: CPT

## 2021-01-13 PROCEDURE — 36415 COLL VENOUS BLD VENIPUNCTURE: CPT

## 2021-01-13 RX ORDER — OXYCODONE HYDROCHLORIDE 15 MG/1
15 TABLET ORAL EVERY 4 HOURS PRN
Qty: 180 TABLET | Refills: 0 | Status: SHIPPED | OUTPATIENT
Start: 2021-01-13 | End: 2021-02-10

## 2021-01-13 NOTE — PATIENT INSTRUCTIONS
Please protect yourself from the COVID-19! Even though we do not good antiviral drugs for this infection, the following strategies can help you stay healthy:    = Wash your hands! Soap and water, or hand  with at least 60% alcohol, are both effective at killing the virus  = Wear a mask! This will help protect others from any virus particles you might spread  Your mouth and nose BOTH need to be covered  = Keep the distance! Keep 6 feet of distance from others people, even if they seem healthy  Keeping distance protects you from the other person's virus spread     = Get the vaccine! The [employer] and [employer] are approved for emergency use in the [place]  These vaccines have been shown to be 90+% effective at preventing severe infections when combined with masking, hand-washing, and distancing  As of 1/4/2021, only nursing home residents and front-line health workers have access to the first round of vaccines, but more are coming  We are recommending that all our Palliative and Supportive Care patients get two shots of either vaccine, as early as it is available to them  Numbers of Coronavirus cases are spiking in many [place] States  This is not a more dangerous virus, but a sign that more people in a community are spreading the virus  Please check the local disease reports near you if you consider travelling  As of 1/4/21, we do NOT advise travel outside the [facility] ([place] or [place])  Check out [employer] for [place] data that are updated daily:    http://www Metricly/     Global Epidemics  Org, from [facility] (OUTPATIENT CAMPUS), will give you Symtzl-yk-Zxfuih information on virus cases:    Https://globalepidemics  org/

## 2021-01-13 NOTE — TELEPHONE ENCOUNTER
LSW was asked by [facility] provider, Dr Irma Awad to reach out to patient due to Mental health and social issues  LSW placed call to patient  Patient reported that he is living with his daughter, Saran Lynn and she recently issued him a 30 day notice of eviction  Patient reported that he and his daughter don't get along and he believes that she did this because when asked by her maternal grandparents about boyfriends, patient informed grandparents that his daughter is nicholson  Patient reported that he does have a brother who is always institutionalized (snf)  Patient reported that he was set up by his daughter and a friend which resulted in his arrest and incarceration in the past  Patient reported that he lost his house and all his belongings  Patient reported that he was recently charged with possession with intent  Reported that he is waiting on court hearing to find out if he will get snf time  Patient unsure if he wants to get housing at this time because if he goes to senior living, he will lose it all again  Patient had to end call due to psychiatrist calling in for patient

## 2021-01-13 NOTE — Clinical Note
Pt has rather significant mental illness and social issues, with a pending court case and possible incarceration for probation violation  Most importantly, he is going to be forcibly evicted from his daughter's home within next 30 days  Any resources on your radar for him?

## 2021-01-13 NOTE — PROGRESS NOTES
Outpatient Follow-Up - Palliative and Supportive Care   Tl Scott 63 y o  male [id number]    Assessment & Plan  1  Cancer related pain    2  Bony metastasis ([address] )    3  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)        Medications adjusted this encounter:  Requested Prescriptions     Signed Prescriptions Disp Refills    oxyCODONE (ROXICODONE) 15 mg immediate release tablet 180 tablet 0     Sig: Take 1 tablet (15 mg total) by mouth every 4 (four) hours as needed (cancer pain)Max Daily Amount: 90 mg     No orders of the defined types were placed in this encounter  Medications Discontinued During This Encounter   Medication Reason    oxyCODONE (ROXICODONE) 15 mg immediate release tablet Reorder   - Trial of Xtampza helpful; will continue at this dose  Not due for refill until end of month   - OxyIR filled again today  Mr Hussein Shah was seen today for symptoms and planning cares related to above illnesses  I have reviewed the patient's controlled substance dispensing history in the Prescription Drug Monitoring Program in compliance with the [facility] regulations before prescribing any controlled substances  He is invited to continue to follow with us  If there are questions or concerns, please contact us through our clinic/answering service 24 hours a day, seven days a week  Mallika Ribera MD  [facility] Palliative and Supportive Care        Visit Information    Accompanied By: No one    Source of History: Self    History Limitations: pressured speech; hypomania today    Contacts: Follow up visit for:  symptom management    History of Present Illness      This 63 y o  fellow presents in f/up of his urothelial ca of R ureter  He follows with Ms Daniel Art and Dr Peter Hamman of Med/Onc, Dr Candie Carroll  Since last visit, he has felt improved pain control  Andrew Escamilla has been helpful, and he finally -- after much wrangling with insurance -- was able to transition off [place]    He feels that -- for the first time in a long while -- his pain control is decent, and sufficient to help him get through his day, to sleep well at night, and maintain his weight  Unfortunately, he is beset by several social challenges:   - court date pending; possession with intent, which occurred during a time of probation for a prior conviction  He contends that he was not using, but his daughter Royal Shin set him up  - Royal Shin is now threatening to kick him out of home within next 30 days  He has multiple challenges with the relationship he has with her re: her lifestyle and choice of associates, but he still appreciates that -- for the moment -- she puts the roof over his head  - re: medical decision making - Royal Shin is his daughter by current wife, and we reviewed that his three children from previous unions plus current wife Gar Hires would forma  Coalition that would make deciisons  Royal Shin would not have authority to contribute to these decisions  Five Wishes were discussed, and he is interested in this, but does not specify firmly to me today who would be his preferred decision makers  Previously, we reviewed that his pain may be closely correlated with an area of nerve injury previously noted on MRI, but he is not eager to obtain additional imaging, nor consider surgical options  We agreed to proceed conservatively with pain meds for now, and watchful waiting for the next month  He is due for a CT scan in meantime, for tumor staging, which may be a reasonable imaging modality to start with  Past medical, surgical, social, and family histories are reviewed and pertinent updates are made      Review of Systems   Unable to perform ROS: psychiatric disorder (hypomanic)         Vital Signs    /70 (BP Location: Left arm, Cuff Size: Large)   Pulse 87   Temp (!) 96 4 °F (35 8 °C) (Temporal)   Ht 5' 6" (1 676 m)   Wt 64 4 kg (142 lb)   SpO2 99%   BMI 22 92 kg/m²     Physical Exam and Objective Data  Physical Exam  Constitutional:       General: He is not in acute distress  Appearance: He is ill-appearing  He is not toxic-appearing or diaphoretic  Comments: Frail   HENT:      Head: Normocephalic and atraumatic  Right Ear: External ear normal       Left Ear: External ear normal    Eyes:      General:         Right eye: No discharge  Left eye: No discharge  Conjunctiva/sclera: Conjunctivae normal       Pupils: Pupils are equal, round, and reactive to light  Neck:      Trachea: No tracheal deviation  Cardiovascular:      Rate and Rhythm: Regular rhythm  Tachycardia present  Pulmonary:      Effort: Pulmonary effort is normal  No respiratory distress  Breath sounds: No stridor  Abdominal:      Palpations: Abdomen is soft  Comments: scaphoid   Skin:     General: Skin is warm and dry  Coloration: Skin is jaundiced and pale  Findings: No erythema or rash  Neurological:      General: No focal deficit present  Mental Status: He is alert and oriented to person, place, and time  Mental status is at baseline  Cranial Nerves: No cranial nerve deficit  Psychiatric:      Comments: Pressured speech; flight of ideas; redirectable today  Judgment and insight [id number]           Radiology and Laboratory:  I personally reviewed and interpreted the following results: reviewed historical MRI of spine  30+ minutes was spent face to face with Frankie with greater than 50% of the time spent in counseling or coordination of care including discussions of etiology of diagnosis, diagnostic results, impression, and recommendations, follow up requirements and compliance with treatment regimen   All of the patient's questions were answered during this discussion      This note was not shared with the patient due to privacy exception: note includes other individuals

## 2021-01-13 NOTE — TELEPHONE ENCOUNTER
Call placed to patient  Patient reported that he attends Bible Study with Linda Dodson which he started while incarcerated  Patient reported that he has a CM MH/DS, Bill  Patient reported that he is a recovering alcoholic for 20 something years  Patient reported that he spoke to his  and that he may be looking at 9-16 months either in [facility] or house arrest  Patient reported that he is unsure which he will receive  Patient reported that his hearing is set for 1/21/2021 @ 9:00am      Patient reported that his psychiatrist did fill his Klonopin for him  Patient not engaged in therapy at this time, was thinking about it but with the cancer and the hearing, not sure about it now  Patient is requesting assistance finding a shelter or boarding house  Patient agreeable to completing 5 wishes  May try to schedule meeting with his spouse/patient to discuss further over phone

## 2021-01-14 ENCOUNTER — HOSPITAL ENCOUNTER (OUTPATIENT)
Dept: INFUSION CENTER | Facility: HOSPITAL | Age: 56
Discharge: HOME | End: 2021-01-14
Attending: INTERNAL MEDICINE
Payer: MEDICARE

## 2021-01-14 VITALS
OXYGEN SATURATION: 96 % | DIASTOLIC BLOOD PRESSURE: 55 MMHG | RESPIRATION RATE: 16 BRPM | SYSTOLIC BLOOD PRESSURE: 105 MMHG | HEART RATE: 97 BPM

## 2021-01-14 VITALS
RESPIRATION RATE: 16 BRPM | BODY MASS INDEX: 22.99 KG/M2 | TEMPERATURE: 98.5 F | HEIGHT: 66 IN | WEIGHT: 143.08 LBS | SYSTOLIC BLOOD PRESSURE: 134 MMHG | HEART RATE: 99 BPM | DIASTOLIC BLOOD PRESSURE: 58 MMHG

## 2021-01-14 DIAGNOSIS — C66.1 UROTHELIAL CARCINOMA OF RIGHT DISTAL URETER (HCC): Primary | ICD-10-CM

## 2021-01-14 PROCEDURE — 96361 HYDRATE IV INFUSION ADD-ON: CPT

## 2021-01-14 PROCEDURE — 96367 TX/PROPH/DG ADDL SEQ IV INF: CPT

## 2021-01-14 PROCEDURE — 96413 CHEMO IV INFUSION 1 HR: CPT

## 2021-01-14 PROCEDURE — 96417 CHEMO IV INFUS EACH ADDL SEQ: CPT

## 2021-01-14 RX ORDER — OXYCODONE HYDROCHLORIDE AND ACETAMINOPHEN 5; 325 MG/1; MG/1
2 TABLET ORAL ONCE
Status: DISCONTINUED | OUTPATIENT
Start: 2021-01-14 | End: 2021-01-17 | Stop reason: HOSPADM

## 2021-01-14 RX ORDER — SODIUM CHLORIDE 9 MG/ML
20 INJECTION, SOLUTION INTRAVENOUS ONCE
Status: COMPLETED | OUTPATIENT
Start: 2021-01-14 | End: 2021-01-14

## 2021-01-14 RX ADMIN — SODIUM CHLORIDE 20 ML/HR: 0.9 INJECTION, SOLUTION INTRAVENOUS at 08:05

## 2021-01-14 RX ADMIN — DEXAMETHASONE SODIUM PHOSPHATE: 10 INJECTION, SOLUTION INTRAMUSCULAR; INTRAVENOUS at 09:44

## 2021-01-14 RX ADMIN — GEMCITABINE 1600 MG: 38 INJECTION, SOLUTION INTRAVENOUS at 11:01

## 2021-01-14 RX ADMIN — FOSAPREPITANT 150 MG: 150 INJECTION, POWDER, LYOPHILIZED, FOR SOLUTION INTRAVENOUS at 10:19

## 2021-01-14 RX ADMIN — CISPLATIN 121.1 MG: 1 INJECTION, SOLUTION INTRAVENOUS at 11:43

## 2021-01-14 RX ADMIN — SODIUM CHLORIDE 1000 ML: 0.9 INJECTION, SOLUTION INTRAVENOUS at 08:11

## 2021-01-14 RX ADMIN — SODIUM CHLORIDE 1000 ML: 0.9 INJECTION, SOLUTION INTRAVENOUS at 12:45

## 2021-01-14 NOTE — PLAN OF CARE
Problem: INFECTION - ADULT  Goal: Absence or prevention of progression during hospitalization  Description: INTERVENTIONS:  - Assess and monitor for signs and symptoms of infection  - Monitor lab/diagnostic results  - Monitor all insertion sites, i e  indwelling lines, tubes, and drains  - Monitor endotracheal if appropriate and nasal secretions for changes in amount and color  - [place] appropriate cooling/warming therapies per order  - Administer medications as ordered  - Instruct and encourage patient and family to use good hand hygiene technique  - Identify and instruct in appropriate isolation precautions for identified infection/condition  Outcome: Progressing     Problem: Knowledge Deficit  Goal: Patient/family/caregiver demonstrates understanding of disease process, treatment plan, medications, and discharge instructions  Description: Complete learning assessment and assess knowledge base    Interventions:  - Provide teaching at level of understanding  - Provide teaching via preferred learning methods  Outcome: Progressing

## 2021-01-18 ENCOUNTER — CLINICAL SUPPORT (OUTPATIENT)
Dept: FAMILY MEDICINE CLINIC | Facility: CLINIC | Age: 56
End: 2021-01-18
Payer: MEDICARE

## 2021-01-18 DIAGNOSIS — C66.1 UROTHELIAL CARCINOMA OF RIGHT DISTAL URETER (HCC): ICD-10-CM

## 2021-01-18 DIAGNOSIS — C66.1 UROTHELIAL CARCINOMA OF RIGHT DISTAL URETER (HCC): Primary | ICD-10-CM

## 2021-01-18 LAB
ALBUMIN SERPL BCP-MCNC: 3.2 G/DL (ref 3.5–5)
ALP SERPL-CCNC: 102 U/L (ref 46–116)
ALT SERPL W P-5'-P-CCNC: 45 U/L (ref 12–78)
ANION GAP SERPL CALCULATED.3IONS-SCNC: 3 MMOL/L (ref 4–13)
AST SERPL W P-5'-P-CCNC: 28 U/L (ref 5–45)
BASOPHILS # BLD AUTO: 0.03 THOUSANDS/ΜL (ref 0–0.1)
BASOPHILS NFR BLD AUTO: 1 % (ref 0–1)
BILIRUB SERPL-MCNC: 0.41 MG/DL (ref 0.2–1)
BUN SERPL-MCNC: 24 MG/DL (ref 5–25)
CALCIUM ALBUM COR SERPL-MCNC: 9.8 MG/DL (ref 8.3–10.1)
CALCIUM SERPL-MCNC: 9.2 MG/DL (ref 8.3–10.1)
CHLORIDE SERPL-SCNC: 98 MMOL/L (ref 100–108)
CO2 SERPL-SCNC: 30 MMOL/L (ref 21–32)
CREAT SERPL-MCNC: 1.13 MG/DL (ref 0.6–1.3)
EOSINOPHIL # BLD AUTO: 0.5 THOUSAND/ΜL (ref 0–0.61)
EOSINOPHIL NFR BLD AUTO: 10 % (ref 0–6)
ERYTHROCYTE [DISTWIDTH] IN BLOOD BY AUTOMATED COUNT: 12.9 % (ref 11.6–15.1)
GFR SERPL CREATININE-BSD FRML MDRD: 72 ML/MIN/1.73SQ M
GLUCOSE P FAST SERPL-MCNC: 124 MG/DL (ref 65–99)
HCT VFR BLD AUTO: 36.6 % (ref 36.5–49.3)
HGB BLD-MCNC: 11.9 G/DL (ref 12–17)
IMM GRANULOCYTES # BLD AUTO: 0.01 THOUSAND/UL (ref 0–0.2)
IMM GRANULOCYTES NFR BLD AUTO: 0 % (ref 0–2)
LYMPHOCYTES # BLD AUTO: 0.53 THOUSANDS/ΜL (ref 0.6–4.47)
LYMPHOCYTES NFR BLD AUTO: 10 % (ref 14–44)
MCH RBC QN AUTO: 32.6 PG (ref 26.8–34.3)
MCHC RBC AUTO-ENTMCNC: 32.5 G/DL (ref 31.4–37.4)
MCV RBC AUTO: 100 FL (ref 82–98)
MONOCYTES # BLD AUTO: 0.15 THOUSAND/ΜL (ref 0.17–1.22)
MONOCYTES NFR BLD AUTO: 3 % (ref 4–12)
NEUTROPHILS # BLD AUTO: 3.9 THOUSANDS/ΜL (ref 1.85–7.62)
NEUTS SEG NFR BLD AUTO: 76 % (ref 43–75)
NRBC BLD AUTO-RTO: 0 /100 WBCS
PLATELET # BLD AUTO: 236 THOUSANDS/UL (ref 149–390)
PMV BLD AUTO: 9.5 FL (ref 8.9–12.7)
POTASSIUM SERPL-SCNC: 4.8 MMOL/L (ref 3.5–5.3)
PROT SERPL-MCNC: 6.9 G/DL (ref 6.4–8.2)
RBC # BLD AUTO: 3.65 MILLION/UL (ref 3.88–5.62)
SODIUM SERPL-SCNC: 131 MMOL/L (ref 136–145)
WBC # BLD AUTO: 5.12 THOUSAND/UL (ref 4.31–10.16)

## 2021-01-18 PROCEDURE — 85025 COMPLETE CBC W/AUTO DIFF WBC: CPT | Performed by: NURSE PRACTITIONER

## 2021-01-18 PROCEDURE — 80053 COMPREHEN METABOLIC PANEL: CPT | Performed by: NURSE PRACTITIONER

## 2021-01-19 ENCOUNTER — HOSPITAL ENCOUNTER (OUTPATIENT)
Dept: INFUSION CENTER | Facility: HOSPITAL | Age: 56
Discharge: HOME | End: 2021-01-19

## 2021-01-20 ENCOUNTER — HOSPITAL ENCOUNTER (OUTPATIENT)
Dept: INFUSION CENTER | Facility: HOSPITAL | Age: 56
Discharge: HOME | End: 2021-01-20
Attending: INTERNAL MEDICINE
Payer: MEDICARE

## 2021-01-20 ENCOUNTER — HOSPITAL ENCOUNTER (OUTPATIENT)
Dept: NON INVASIVE DIAGNOSTICS | Facility: HOSPITAL | Age: 56
Discharge: HOME | End: 2021-01-20
Payer: MEDICARE

## 2021-01-20 VITALS
BODY MASS INDEX: 23.63 KG/M2 | TEMPERATURE: 95.9 F | RESPIRATION RATE: 16 BRPM | DIASTOLIC BLOOD PRESSURE: 64 MMHG | WEIGHT: 147.05 LBS | HEART RATE: 80 BPM | HEIGHT: 66 IN | SYSTOLIC BLOOD PRESSURE: 120 MMHG

## 2021-01-20 DIAGNOSIS — I82.401 ACUTE DEEP VEIN THROMBOSIS (DVT) OF RIGHT LOWER EXTREMITY, UNSPECIFIED VEIN (HCC): ICD-10-CM

## 2021-01-20 DIAGNOSIS — C66.1 UROTHELIAL CARCINOMA OF RIGHT DISTAL URETER (HCC): Primary | ICD-10-CM

## 2021-01-20 DIAGNOSIS — I82.401 ACUTE DEEP VEIN THROMBOSIS (DVT) OF RIGHT LOWER EXTREMITY, UNSPECIFIED VEIN (HCC): Primary | ICD-10-CM

## 2021-01-20 PROCEDURE — 93971 EXTREMITY STUDY: CPT

## 2021-01-20 PROCEDURE — 93971 EXTREMITY STUDY: CPT | Performed by: SURGERY

## 2021-01-20 PROCEDURE — 96367 TX/PROPH/DG ADDL SEQ IV INF: CPT

## 2021-01-20 PROCEDURE — 96413 CHEMO IV INFUSION 1 HR: CPT

## 2021-01-20 RX ORDER — SODIUM CHLORIDE 9 MG/ML
20 INJECTION, SOLUTION INTRAVENOUS ONCE
Status: COMPLETED | OUTPATIENT
Start: 2021-01-20 | End: 2021-01-20

## 2021-01-20 RX ADMIN — ONDANSETRON 8 MG: 2 INJECTION INTRAMUSCULAR; INTRAVENOUS at 14:34

## 2021-01-20 RX ADMIN — SODIUM CHLORIDE 20 ML/HR: 9 INJECTION, SOLUTION INTRAVENOUS at 13:42

## 2021-01-20 RX ADMIN — GEMCITABINE 1600 MG: 38 INJECTION, SOLUTION INTRAVENOUS at 15:07

## 2021-01-20 NOTE — PROGRESS NOTES
Patient self reported swelling in right lower leg with tenderness in calf area, he stated it has been swollen times 4 days  Reported same to [facility] she ordered patient to receive doppler but able to receive treatment  Explained same to patient he agreeable to same

## 2021-01-20 NOTE — PLAN OF CARE
Problem: INFECTION - ADULT  Goal: Absence or prevention of progression during hospitalization  Description: INTERVENTIONS:  - Assess and monitor for signs and symptoms of infection  - Monitor lab/diagnostic results  - Monitor all insertion sites, i e  indwelling lines, tubes, and drains  - Monitor endotracheal if appropriate and nasal secretions for changes in amount and color  - [place] appropriate cooling/warming therapies per order  - Administer medications as ordered  - Instruct and encourage patient and family to use good hand hygiene technique  - Identify and instruct in appropriate isolation precautions for identified infection/condition  Outcome: Progressing

## 2021-01-21 ENCOUNTER — HOSPITAL ENCOUNTER (OUTPATIENT)
Dept: INFUSION CENTER | Facility: HOSPITAL | Age: 56
Discharge: HOME | End: 2021-01-21
Attending: INTERNAL MEDICINE

## 2021-01-25 ENCOUNTER — TELEPHONE (OUTPATIENT)
Dept: PALLIATIVE MEDICINE | Facility: CLINIC | Age: 56
End: 2021-01-25

## 2021-01-25 ENCOUNTER — TELEPHONE (OUTPATIENT)
Dept: FAMILY MEDICINE CLINIC | Facility: CLINIC | Age: 56
End: 2021-01-25

## 2021-01-25 DIAGNOSIS — R10.9 ACUTE RIGHT FLANK PAIN: Primary | ICD-10-CM

## 2021-01-25 RX ORDER — HYDROMORPHONE HYDROCHLORIDE 4 MG/1
4 TABLET ORAL
Qty: 40 TABLET | Refills: 0 | Status: SHIPPED | OUTPATIENT
Start: 2021-01-25 | End: 2021-01-25

## 2021-01-25 RX ORDER — OXYCODONE HYDROCHLORIDE 10 MG/1
15 TABLET ORAL
Qty: 20 TABLET | Refills: 0 | Status: SHIPPED | OUTPATIENT
Start: 2021-01-25 | End: 2021-01-31

## 2021-01-25 NOTE — TELEPHONE ENCOUNTER
It appears that he had a venous duplex done last week due to right LE swelling  I would have him check with heme/onc to see if they would like him to have another study or if they would have him start medication  At this time I would recommend elevation and compression

## 2021-01-25 NOTE — TELEPHONE ENCOUNTER
Pt called office c/o increased flank pain over the weekend  He would like to avoid a visit to the ER as much as possible and is asking if there is any way he can adjust medication to relieve pain  Please advise

## 2021-01-25 NOTE — TELEPHONE ENCOUNTER
Second call from pt re flank pain  Per message, pt reports he has   45 tablets of  Xtampza ER 36 mg remaining  Does not want to wait too long to reorder as [employer] needs to order   Last fill 01/11 90/30     Call placed to pt to assess current level ,  Right flank, ( "like someone punched me in my kidney  Feels like a drill bit spinning around " ) per pt started Friday  10/10 or higher  As per previous note , asking if there is anything new to try  Asking if fentanyl patch is possible  Pt is taking tylenol 1000 mg (max 3000 mg)  As supplement         FYI  Barium swallow 1/28    swelling in right foot (pt called pcp)   Advised support stockings

## 2021-01-25 NOTE — TELEPHONE ENCOUNTER
1/25/2021 5:30 PM -  Pt experiencing chills, but not fevers nor sweats  R flank pain is new, but not asso with any dysuria  Advised pt that pyelo can be devastating, and that I will not give ABx for this over phone, and that he should be seen for this  However, renal obstruction or stones is not necessarily and emergency, and he confirms that he is still passing urine  We agreed to NOT use ED at this time, to trial augmentation of pain control with oxyIR 10mg, and confirmed with his pharmacy that this is in stock, and to f/up in PCP office tomorrow for in person eval for possible pyelo vs obstruction vs stone  He is in agreement with this plan

## 2021-01-25 NOTE — TELEPHONE ENCOUNTER
Received message from patient returning LSW call  Call placed to patient  Patient reported that his hearing was postponed  Patient reported that they aren't putting people in nursing home right now due to Afsaneh and with his poor immunity  Possibility of Intense Probation Program      Patient reported that his daughter is allowing him to stay in the home until sentencing is done and/or he returns to nursing home    Patient reported that he has been in contact with [facility] and they will be able to take him in for housing in emergency  Patient also reached out to a local person who has rooms she rents, all rentals are occupied but he is on the list      Reported that his wife has Afsaneh now, was diagnosed last week  Will wait until she is feeling better to complete 5 Wishes  [facility] to send copy of 5 wishes document to patient for his review

## 2021-01-26 ENCOUNTER — TELEPHONE (OUTPATIENT)
Dept: FAMILY MEDICINE CLINIC | Facility: CLINIC | Age: 56
End: 2021-01-26

## 2021-01-26 ENCOUNTER — CLINICAL SUPPORT (OUTPATIENT)
Dept: FAMILY MEDICINE CLINIC | Facility: CLINIC | Age: 56
End: 2021-01-26
Payer: MEDICARE

## 2021-01-26 DIAGNOSIS — R31.9 HEMATURIA, UNSPECIFIED TYPE: Primary | ICD-10-CM

## 2021-01-26 LAB
BACTERIA UR QL AUTO: ABNORMAL /HPF
BILIRUB UR QL STRIP: NEGATIVE
CLARITY UR: CLEAR
COLOR UR: YELLOW
GLUCOSE UR STRIP-MCNC: NEGATIVE MG/DL
HGB UR QL STRIP.AUTO: ABNORMAL
HYALINE CASTS #/AREA URNS LPF: ABNORMAL /LPF
KETONES UR STRIP-MCNC: NEGATIVE MG/DL
LEUKOCYTE ESTERASE UR QL STRIP: ABNORMAL
NITRITE UR QL STRIP: NEGATIVE
NON-SQ EPI CELLS URNS QL MICRO: ABNORMAL /HPF
PH UR STRIP.AUTO: 6.5 [PH]
PROT UR STRIP-MCNC: ABNORMAL MG/DL
RBC #/AREA URNS AUTO: ABNORMAL /HPF
SL AMB  POCT GLUCOSE, UA: NEGATIVE
SL AMB LEUKOCYTE ESTERASE,UA: NEGATIVE
SL AMB POCT BILIRUBIN,UA: NEGATIVE
SL AMB POCT BLOOD,UA: ABNORMAL
SL AMB POCT CLARITY,UA: ABNORMAL
SL AMB POCT COLOR,UA: ABNORMAL
SL AMB POCT KETONES,UA: ABNORMAL
SL AMB POCT NITRITE,UA: NEGATIVE
SL AMB POCT PH,UA: 5
SL AMB POCT SPECIFIC GRAVITY,UA: 1
SL AMB POCT URINE PROTEIN: 300
SL AMB POCT UROBILINOGEN: 0.2
SP GR UR STRIP.AUTO: 1.02 (ref 1–1.03)
UROBILINOGEN UR QL STRIP.AUTO: 1 E.U./DL
WBC #/AREA URNS AUTO: ABNORMAL /HPF

## 2021-01-26 PROCEDURE — 81001 URINALYSIS AUTO W/SCOPE: CPT

## 2021-01-26 PROCEDURE — 81002 URINALYSIS NONAUTO W/O SCOPE: CPT | Performed by: FAMILY MEDICINE

## 2021-01-26 RX ORDER — SULFAMETHOXAZOLE AND TRIMETHOPRIM 800; 160 MG/1; MG/1
1 TABLET ORAL EVERY 12 HOURS SCHEDULED
Qty: 14 TABLET | Refills: 0 | Status: SHIPPED | OUTPATIENT
Start: 2021-01-26 | End: 2021-02-02

## 2021-01-26 NOTE — TELEPHONE ENCOUNTER
Will treat empirically with bactrim x 7 days pending culture  Advise patient to report to the ED if unable to void, development of fever, or worsening flank pain

## 2021-01-28 NOTE — TELEPHONE ENCOUNTER
Pt request refill on Oxycodone 10 mg  Per pt this has helped reduce his pain  Has been taking Oxycodone 15 mg then oxycodone 10 mg about 1 hour later  Currently taking Bactrim per pcp  Awaiting callback from PCP re SOPHIA ramirez and S results

## 2021-01-29 NOTE — TELEPHONE ENCOUNTER
Pt calling x 2 this am  States his pain is bad   Requesting the Oxycodone 10 mg for breakthrough pain  " I'm going to lay down  At least when sleeping I don't feel the pain "  States he has arranged transportation to  medication

## 2021-01-31 ENCOUNTER — DOCUMENTATION (OUTPATIENT)
Dept: PALLIATIVE MEDICINE | Facility: HOSPITAL | Age: 56
End: 2021-01-31

## 2021-01-31 ENCOUNTER — TELEPHONE (OUTPATIENT)
Dept: OTHER | Facility: OTHER | Age: 56
End: 2021-01-31

## 2021-01-31 DIAGNOSIS — R11.2 CINV (CHEMOTHERAPY-INDUCED NAUSEA AND VOMITING): ICD-10-CM

## 2021-01-31 DIAGNOSIS — G89.3 CANCER RELATED PAIN: ICD-10-CM

## 2021-01-31 DIAGNOSIS — T45.1X5A CINV (CHEMOTHERAPY-INDUCED NAUSEA AND VOMITING): ICD-10-CM

## 2021-01-31 DIAGNOSIS — C66.1 UROTHELIAL CARCINOMA OF RIGHT DISTAL URETER (HCC): ICD-10-CM

## 2021-01-31 RX ORDER — ONDANSETRON HYDROCHLORIDE 8 MG/1
8 TABLET, FILM COATED ORAL
Qty: 90 TABLET | Refills: 0 | Status: SHIPPED | OUTPATIENT
Start: 2021-01-31

## 2021-01-31 RX ORDER — OXYCODONE 36 MG/1
1 CAPSULE, EXTENDED RELEASE ORAL 3 TIMES DAILY
Qty: 90 CAPSULE | Refills: 0 | Status: SHIPPED | OUTPATIENT
Start: 2021-01-31 | End: 2021-02-26 | Stop reason: SDUPTHER

## 2021-01-31 RX ORDER — OXYCODONE HYDROCHLORIDE 20 MG/1
20-30 TABLET ORAL
Qty: 120 TABLET | Refills: 0 | Status: SHIPPED | OUTPATIENT
Start: 2021-01-31 | End: 2021-02-10 | Stop reason: SDUPTHER

## 2021-01-31 RX ORDER — OXYCODONE 36 MG/1
1 CAPSULE, EXTENDED RELEASE ORAL 3 TIMES DAILY
Qty: 90 CAPSULE | Refills: 0 | Status: SHIPPED | OUTPATIENT
Start: 2021-01-31 | End: 2021-01-31 | Stop reason: SDUPTHER

## 2021-01-31 NOTE — TELEPHONE ENCOUNTER
Patient is in extreme pain, and does not want to go to the ER, and would like medication      LESIA Siddiqui

## 2021-01-31 NOTE — TELEPHONE ENCOUNTER
1/31/2021 10:40 AM -  Addressed over w/e, and discussed UA+micro with pt on Sunday, 1/31/2021     May 'done' task

## 2021-01-31 NOTE — TELEPHONE ENCOUNTER
1/31/2021 10:27 AM - Pt has been vomiting overnight; has not eaten since Friday  Confirmed that he still has 15mg tabs, and that he should take 2x of these per dose, q3hrs PRN, until he is able to get 20mg tabs  Once 20mg tabs obtained, he is safe to use 20-30 mg per dose    Sent this med, ondansetron, and Xtampza refill to his preferred pharmacies ([facility] will not carry Sowmya Dross)      Confirmed with pharmacist at [facility] that the oxyIR can be filled today, along with ondansetron

## 2021-01-31 NOTE — TELEPHONE ENCOUNTER
Pt  is in severe kidney pain where his cancer is  He does not want to go to the ER and is out of medication

## 2021-02-02 ENCOUNTER — TELEPHONE (OUTPATIENT)
Dept: HEMATOLOGY ONCOLOGY | Facility: CLINIC | Age: 56
End: 2021-02-02

## 2021-02-02 NOTE — TELEPHONE ENCOUNTER
Pt called, his CT  Was reschedule dto 2/9 due to [facility]  Pt has appt w/ Dr Hermelindo Archer on 2/5, he would like to know if this should be rescheduled until after the new CT appt  Please call him to advise and reschedule if necessary at [phone]

## 2021-02-03 ENCOUNTER — TELEPHONE (OUTPATIENT)
Dept: FAMILY MEDICINE CLINIC | Facility: HOME HEALTHCARE | Age: 56
End: 2021-02-03

## 2021-02-03 ENCOUNTER — TELEPHONE (OUTPATIENT)
Dept: PALLIATIVE MEDICINE | Facility: CLINIC | Age: 56
End: 2021-02-03

## 2021-02-03 ENCOUNTER — TELEPHONE (OUTPATIENT)
Dept: FAMILY MEDICINE CLINIC | Facility: CLINIC | Age: 56
End: 2021-02-03

## 2021-02-03 DIAGNOSIS — J45.20 MILD INTERMITTENT ASTHMA WITHOUT COMPLICATION: ICD-10-CM

## 2021-02-03 RX ORDER — ALBUTEROL SULFATE 90 UG/1
AEROSOL, METERED RESPIRATORY (INHALATION)
Qty: 18 INHALER | Refills: 5 | Status: SHIPPED | OUTPATIENT
Start: 2021-02-03 | End: 2021-06-07 | Stop reason: ALTCHOICE

## 2021-02-03 NOTE — TELEPHONE ENCOUNTER
Patient left message stating he is urinating blood again and would like a call back from provider or nurse  Not sure what to do and is panicking

## 2021-02-03 NOTE — TELEPHONE ENCOUNTER
Pt called this office to report having " blood in urine again "  Pt reports he did call his PCP  Left message on their line  Pt denied samreen blood, blood clots  Passing urine well  Drinking adequate water  Completed antibiotics 2 days ago  Instructed pt to wait for call back  If increase in bleeding, clots or inability to pass urine proceed to ED  Pt verbalized understanding

## 2021-02-03 NOTE — TELEPHONE ENCOUNTER
2/3/2021 2:47 PM -  Pt does note gross hematruia, but no samreen bleeding nor clots  Fevers, chills are denied  He is eating and drinking well  Pain is controlled  We agreed to defer further w/up to Dr Nicole Steve and the [facility] team; he has an appointment with Dr Nicole Steve on Friday  Pt was offered my suspicion that this illness may be more consistent with a kidney stone, rather than new cancer lesion, but that w/up and/or reimaging may be indicated  I advised him that none of this w/up needs to be urgent

## 2021-02-04 ENCOUNTER — TELEPHONE (OUTPATIENT)
Dept: PALLIATIVE MEDICINE | Facility: CLINIC | Age: 56
End: 2021-02-04

## 2021-02-04 NOTE — TELEPHONE ENCOUNTER
LSW placed call to patient to follow up on discussion about 5 Wishes  Patient reported that his spouse is currently in the hospital due to [place]  Reported that they are keeping her until they can get O2 in the home  Patient reported that he will work on the 5 Wishes and could have his daughter and her girlfriend witness the document  Patient to call with any questions/concerns regarding the document  Patient reported that he has his court date March 18th for sentencing  Reported that he just got off the phone with his TCM who is assisting him with housing for when his daughter tells him he has to leave  Reported that he is hoping that he will be placed on house arrest      [facility] placed two copies of 5 wishes in the mail for patient and spouse

## 2021-02-05 RX ORDER — OXYCODONE HYDROCHLORIDE AND ACETAMINOPHEN 5; 325 MG/1; MG/1
2 TABLET ORAL ONCE
Status: CANCELLED | OUTPATIENT
Start: 2021-02-12

## 2021-02-05 RX ORDER — SODIUM CHLORIDE 9 MG/ML
20 INJECTION, SOLUTION INTRAVENOUS ONCE
Status: CANCELLED | OUTPATIENT
Start: 2021-02-20

## 2021-02-05 RX ORDER — SODIUM CHLORIDE 9 MG/ML
20 INJECTION, SOLUTION INTRAVENOUS ONCE
Status: CANCELLED | OUTPATIENT
Start: 2021-02-12

## 2021-02-09 ENCOUNTER — HOSPITAL ENCOUNTER (OUTPATIENT)
Dept: CT IMAGING | Facility: HOSPITAL | Age: 56
Discharge: HOME | End: 2021-02-09
Attending: INTERNAL MEDICINE
Payer: MEDICARE

## 2021-02-09 DIAGNOSIS — C66.1 UROTHELIAL CARCINOMA OF RIGHT DISTAL URETER (HCC): ICD-10-CM

## 2021-02-09 PROCEDURE — G1004 CDSM NDSC: HCPCS

## 2021-02-09 PROCEDURE — 71260 CT THORAX DX C+: CPT

## 2021-02-09 PROCEDURE — 74177 CT ABD & PELVIS W/CONTRAST: CPT

## 2021-02-09 RX ADMIN — IOHEXOL 100 ML: 350 INJECTION, SOLUTION INTRAVENOUS at 08:31

## 2021-02-10 ENCOUNTER — OFFICE VISIT (OUTPATIENT)
Dept: PALLIATIVE MEDICINE | Facility: CLINIC | Age: 56
End: 2021-02-10
Payer: MEDICARE

## 2021-02-10 VITALS
WEIGHT: 148 LBS | OXYGEN SATURATION: 98 % | HEIGHT: 65 IN | BODY MASS INDEX: 24.66 KG/M2 | SYSTOLIC BLOOD PRESSURE: 102 MMHG | DIASTOLIC BLOOD PRESSURE: 60 MMHG | TEMPERATURE: 97.3 F | HEART RATE: 90 BPM

## 2021-02-10 DIAGNOSIS — C66.1 UROTHELIAL CARCINOMA OF RIGHT DISTAL URETER (HCC): ICD-10-CM

## 2021-02-10 DIAGNOSIS — F41.9 ANXIETY: ICD-10-CM

## 2021-02-10 DIAGNOSIS — G89.3 CANCER RELATED PAIN: ICD-10-CM

## 2021-02-10 PROCEDURE — 99214 OFFICE O/P EST MOD 30 MIN: CPT | Performed by: FAMILY MEDICINE

## 2021-02-10 RX ORDER — ALPRAZOLAM 0.25 MG/1
0.25 TABLET ORAL 3 TIMES DAILY PRN
Qty: 70 TABLET | Refills: 0 | Status: SHIPPED | OUTPATIENT
Start: 2021-02-10 | End: 2021-03-10 | Stop reason: SDUPTHER

## 2021-02-10 RX ORDER — OXYCODONE HYDROCHLORIDE 20 MG/1
20-40 TABLET ORAL EVERY 4 HOURS PRN
Qty: 180 TABLET | Refills: 0 | Status: SHIPPED | OUTPATIENT
Start: 2021-02-10 | End: 2021-02-26 | Stop reason: SDUPTHER

## 2021-02-10 NOTE — PROGRESS NOTES
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)    3  Cancer related pain      - Counseling on health screening and disease prevention, COVID-19 specific (CPT V65 49)    Medications adjusted this encounter:  Requested Prescriptions     Signed Prescriptions Disp Refills    ALPRAZolam (XANAX) 0 25 mg tablet 70 tablet 0     Sig: Take 1 tablet (0 25 mg total) by mouth 3 (three) times a day as needed for anxiety (no more than 3 tabs a day)    oxyCODONE (ROXICODONE) 20 MG TABS 180 tablet 0     Sig: Take 1-2 tablets (20-40 mg total) by mouth every 4 (four) hours as needed (20mg (1tab) for moderate pain, 40mg (2tab) for severe pain related to cancer  Max of 6tabs a day  )Max Daily Amount: 120 mg     No orders of the defined types were placed in this encounter  Medications Discontinued During This Encounter   Medication Reason    oxyCODONE (ROXICODONE) 15 mg immediate release tablet     ALPRAZolam (XANAX) 0 25 mg tablet Reorder    oxyCODONE (ROXICODONE) 20 MG TABS Reorder   - Increase to oxyIR only, refills sent for 1 month      Hardik Martinez was seen today for symptoms and planning cares related to above illnesses  I have reviewed the patient's controlled substance dispensing history in the Prescription Drug Monitoring Program in compliance with the [facility] regulations before prescribing any controlled substances  They are invited to continue to follow with us  If there are questions or concerns, please contact us through our clinic/answering service 24 hours a day, seven days a week      Burgess Richard MD  [facility] Palliative and Supportive Care  [phone]      Visit Information    Accompanied By: No one    Source of History: Self    History Limitations: None    Contacts: wife Dakotah Malone    History of Present Illness      Hardik Martinez is a 63 y o  male who presents in follow-up of symptoms related to his urothelial ca of R ureter  He follows with Ms Meeta Valero and Dr Eber Geronimo of Med/Onc, Dr Juanita Ocampo  Pertinent issues include: symptom management, depression or anxiety, support      Since last visit, he has moved out of daughter's home; now living with two roommates  Feels safe and stable in this new environment  On 3/18, he will have a sentencing hearing, and might be offered house arrest   He has come to learn that daughter was the party to turn him in  He is currently on probation  Zakiya Washington has been helpful, and he finally -- after much wrangling with insurance -- was able to transition off [place]  He feels that -- for the first time in a long while -- his pain control is decent, and sufficient to help him get through his day, to sleep well at night, and maintain his weight        Unfortunately, he is beset by several social challenges:               - court date pending; possession with intent, which occurred during a time of probation for a prior conviction  He contends that he was not using, but his daughter Norma Santamaria set him up  - Norma Santamaria is now threatening to kick him out of home within next 30 days  He has multiple challenges with the relationship he has with her re: her lifestyle and choice of associates, but he still appreciates that -- for the moment -- she puts the roof over his head  - re: medical decision making - Norma Santamaria is his daughter by current wife, and we reviewed that his three children from previous unions plus current wife Jose Rafael March would form a  Coalition that would make deciisons  Norma Santamaria would not have authority to contribute to these decisions    Five Wishes were discussed, and he is interested in this, but does not specify firmly to me today who would be his preferred decision makers        Previously, we reviewed that his pain may be closely correlated with an area of nerve injury previously noted on MRI, but he is not eager to obtain additional imaging, nor consider surgical options  We agreed to proceed conservatively with pain meds for now, and watchful waiting for the next month  He is due for a CT scan in meantime, for tumor staging, which may be a reasonable imaging modality to start with  Past medical, surgical, social, and family histories are reviewed and pertinent updates are made  Review of Systems   Constitution: Negative for decreased appetite, weight gain and weight loss  HENT: Negative for hoarse voice and nosebleeds  Eyes: Negative for vision loss in left eye and vision loss in right eye  Cardiovascular: Negative for chest pain and dyspnea on exertion  Respiratory: Negative for cough and shortness of breath  Endocrine: Negative for polydipsia, polyphagia and polyuria  Skin: Negative for flushing and itching  Musculoskeletal: Positive for arthritis and back pain  Negative for falls  Gastrointestinal: Negative for anorexia, jaundice, nausea and vomiting  Genitourinary: Negative for frequency  Neurological: Negative for dizziness  Psychiatric/Behavioral: Negative for depression and memory loss  The patient is not nervous/anxious  Vital Signs    /60 (BP Location: Left arm, Cuff Size: Standard)   Pulse 90   Temp (!) 97 3 °F (36 3 °C) (Temporal)   Ht 5' 5" (1 651 m)   Wt 67 1 kg (148 lb)   SpO2 98%   BMI 24 63 kg/m²     Physical Exam and Objective Data  Physical Exam  Constitutional:       General: He is not in acute distress  Appearance: He is not diaphoretic  Comments: slender   HENT:      Head: Normocephalic and atraumatic  Right Ear: External ear normal       Left Ear: External ear normal    Eyes:      General:         Right eye: No discharge  Left eye: No discharge  Conjunctiva/sclera: Conjunctivae normal       Pupils: Pupils are equal, round, and reactive to light  Neck:      Trachea: No tracheal deviation  Cardiovascular:      Rate and Rhythm: Regular rhythm  Tachycardia present  Pulmonary:      Effort: Pulmonary effort is normal  No respiratory distress  Breath sounds: No stridor  Abdominal:      Palpations: Abdomen is soft  Comments: scaphoid   Skin:     General: Skin is warm and dry  Findings: No erythema or rash  Radiology and Laboratory:  I personally reviewed and interpreted the following results: none new    30 minutes was spent face to face with Frankie with greater than 50% of the time spent in counseling or coordination of care including discussions of etiology of diagnosis, risks and benefits of treatment and patient and family counseling/involvement in care   Additional time was also spent in discussing coronavirus vaccine indications, availability, and logistical challenges  All of the patient's or agent's questions were answered during this discussion

## 2021-02-10 NOTE — PATIENT INSTRUCTIONS
Please protect yourself from COVID-19! Even though we do not good antiviral drugs for this infection, the following strategies can help you stay healthy:    = Wash your hands! Soap and water, or hand  with at least 60% alcohol, are both effective at killing the virus  = Wear a mask! This will help protect others from any virus particles you might spread  Your mouth and nose BOTH need to be covered  = Keep the distance! Keep 6 feet of distance from others people, even if they seem healthy  Keeping distance protects you from the other person's virus spread     = Get a vaccine! The [employer] and [employer] are approved for emergency use in the [place]  These vaccines have been shown to be 90+% effective at preventing severe infections when combined with masking, hand-washing, and distancing  As of 1/26/2021, the following priority groups may get either vaccine:  + nursing home residents and staff  + front-line health workers  + ALL persons over age 72 (ages 76 and up can be seen at [facility])  + ANY person over age 12 that has a qualifying risk factor, such as diabetes, lung disease, or obesity  ==> Please use  the following website  to check your eligibility in [place]:  [employer]   ==> If you are under age 72, but still qualify, you may get a shot from many other locations outside [facility]: [facility], [facility], [facility], [facility]      We are recommending that ALL our patients get two shots of either vaccine, as early as it is available to them  Please keep an eye on the eBillme,  Jessie Huitron, or call [phone] to see when you will become eligible  If you are eligible by the criteria above, please ask our team to get you a shot! Numbers of Coronavirus cases are spiking in many [place] States    This is not a more dangerous virus, but a sign that more people in a community are spreading the virus  Please check the local disease reports near you if you consider travelling  As of 1/25/21, we do NOT advise travel outside the [facility] or [place])  Check out [employer] for [place] data that are updated daily:    http://www [place] Emergency Registry/     Global Epidemics  Org, from [facility] will give you Aswgda-vu-Rjyfhs information on virus cases:    Https://globalepidemics  org/

## 2021-02-11 ENCOUNTER — TELEPHONE (OUTPATIENT)
Dept: PALLIATIVE MEDICINE | Facility: CLINIC | Age: 56
End: 2021-02-11

## 2021-02-11 ENCOUNTER — TELEMEDICINE (OUTPATIENT)
Dept: FAMILY MEDICINE CLINIC | Facility: CLINIC | Age: 56
End: 2021-02-11
Payer: MEDICARE

## 2021-02-11 ENCOUNTER — APPOINTMENT (OUTPATIENT)
Dept: LAB | Facility: HOSPITAL | Age: 56
End: 2021-02-11
Attending: INTERNAL MEDICINE
Payer: MEDICARE

## 2021-02-11 DIAGNOSIS — C66.1 UROTHELIAL CARCINOMA OF RIGHT DISTAL URETER (HCC): ICD-10-CM

## 2021-02-11 DIAGNOSIS — M79.604 RIGHT LEG PAIN: Primary | ICD-10-CM

## 2021-02-11 LAB — MAGNESIUM SERPL-MCNC: 1.5 MG/DL (ref 1.9–2.7)

## 2021-02-11 PROCEDURE — 83735 ASSAY OF MAGNESIUM: CPT

## 2021-02-11 PROCEDURE — G0071 COMM SVCS BY RHC/FQHC 5 MIN: HCPCS | Performed by: FAMILY MEDICINE

## 2021-02-11 NOTE — TELEPHONE ENCOUNTER
[facility] [place] [facility] placed call to patient  Patient reported that he moved to [facility] and has 2 roommates now and things have been going well so far (only there 3 days)  Patient reported that he didn't receive information from LSW yet but that he will reach out to his daughter and she will bring any mail on Friday to him  Patient was out walking to the lab to get blood work done so call was short  Patient was encouraged to reach out if needed

## 2021-02-11 NOTE — PROGRESS NOTES
Virtual Brief Visit    Assessment/Plan:    Problem List Items Addressed This Visit     None      Visit Diagnoses     Right leg pain    -  Primary    Relevant Medications    Diclofenac Sodium (VOLTAREN) 1 %        - Will sent script for diclofenac gel  Advised patient to follow up if pain persists or worsens   - Continue follow up with palliative and heme/onc as scheduled  Reason for visit is   Chief Complaint   Patient presents with    Leg Swelling     with pain/ x3 days/ no weeping    Virtual Brief Visit        Encounter provider Marcella Lyon PA-C    Provider located at [address] [place] [address] Drive    Recent Visits  No visits were found meeting these conditions  Showing recent visits within past 7 days and meeting all other requirements     Today's Visits  Date Type Provider Dept   02/11/21 Telemedicine DAMARIS Meng   Showing today's visits and meeting all other requirements     Future Appointments  No visits were found meeting these conditions  Showing future appointments within next 150 days and meeting all other requirements        After connecting through telephone, the patient was identified by name and date of birth  Tisha Marcelino was informed that this is a telemedicine visit and that the visit is being conducted through telephone  My office door was closed  No one else was in the room  He acknowledged consent and understanding of privacy and security of the platform  The patient has agreed to participate and understands he can discontinue the visit at any time  It was my intent to perform this visit via video technology but the patient was not able to do a video connection so the visit was completed via audio telephone only  Patient is aware this is a billable service  Subjective    Tisha Marcelino is a 63 y o  male      Juancarloskamala Caseyson is a 63year old male with stage IV urothelial carcinoma undergoing chemotherapy, presenting with concern for leg pain  Patient reports ongoing right leg pain and swelling  He denies erythema, open wounds, or drainage  Reports his neighbor gave him some diclofenac gel which helped his pain  He is requesting a script today  Patient did have a right LE doppler on 1/20 which was normal     Continues to follow with heme/onc and palliative regarding his urothelial cancer and pain management  He tells me that he recently moved to [place] and out of his daughters apartment which has provided significant stress relief for him  He is enjoying living with his roomates  States that he will likely be on house arrest later this month         Past Medical History:   Diagnosis Date    Asthma     Bipolar disorder ([facility] )     COPD (chronic obstructive pulmonary disease) ([facility]     Disease of thyroid gland     Hypertension     Hyperthyroidism        Past Surgical History:   Procedure Laterality Date    DENTAL SURGERY      FL RETROGRADE URETHROCYSTOGRAM  6/15/2020    HERNIA REPAIR Left     inguinal    IR LYMPH NODE BIOPSY/ASPIRATION  7/24/2020    IR TUBE PLACEMENT  6/25/2020    WY CYSTOURETHROSCOPY,URETER CATHETER Right 6/15/2020    Procedure: CYSTOSCOPY RETROGRADE PYELOGRAM and ureteroscopy;  Surgeon: Manoj Hyde MD;  Location[facility];  Service: Urology       Current Outpatient Medications   Medication Sig Dispense Refill    acetaminophen (TYLENOL) 650 mg CR tablet Take 1 tablet (650 mg total) by mouth every 8 (eight) hours 30 tablet 0    albuterol (PROVENTIL HFA,VENTOLIN HFA) 90 mcg/act inhaler TAKE 2 PUFFS BY MOUTH EVERY 6 HOURS AS NEEDED FOR WHEEZE OR FOR SHORTNESS OF BREATH 18 Inhaler 5    ALPRAZolam (XANAX) 0 25 mg tablet Take 1 tablet (0 25 mg total) by mouth 3 (three) times a day as needed for anxiety (no more than 3 tabs a day) 70 tablet 0    carisoprodol (SOMA) 350 mg tablet Take 1 tablet (350 mg total) by mouth 3 (three) times a day as needed for muscle spasms 60 tablet 0    clonazePAM (KlonoPIN) 0 5 mg tablet Take 1 tablet (0 5 mg total) by mouth 2 (two) times a day as needed for anxiety 14 tablet 0    escitalopram (LEXAPRO) 20 mg tablet       fluticasone (FLONASE) 50 mcg/act nasal spray 1 spray into each nostril daily 1 Bottle 11    fluticasone (FLOVENT HFA) 220 mcg/act inhaler Inhale 2 puffs 2 (two) times a day Rinse mouth after use  1 Inhaler 5    naloxone (NARCAN) 4 mg/0 1 mL nasal spray Administer 1 spray into a nostril  If no response after 2-3 minutes, give another dose in the other nostril using a new spray  1 each 1    omeprazole (PriLOSEC) 40 MG capsule TAKE 1 CAPSULE BY MOUTH EVERY DAY BEFORE BREAKFAST 90 capsule 1    ondansetron (ZOFRAN) 8 mg tablet Take 1 tablet (8 mg total) by mouth 3 (three) times a day before meals To prevent chemo nausea 90 tablet 0    oxyCODONE (ROXICODONE) 20 MG TABS Take 1-2 tablets (20-40 mg total) by mouth every 4 (four) hours as needed (20mg (1tab) for moderate pain, 40mg (2tab) for severe pain related to cancer  Max of 6tabs a day  )Max Daily Amount: 120 mg 180 tablet 0    oxyCODONE ER (Xtampza ER) 36 MG C12A Take 1 each by mouth 3 (three) times a day To prevent cancer painMax Daily Amount: 3 each 90 capsule 0    zolpidem (AMBIEN) 10 mg tablet 1 TAB AT BEDTIME AS NEEDED FOR INSOMNIA      Diclofenac Sodium (VOLTAREN) 1 % Apply 2 g topically 4 (four) times a day as needed (pain) 1 Tube 5     No current facility-administered medications for this visit  Allergies   Allergen Reactions    Ketorolac Hives     Toradol       Review of Systems   Constitutional: Negative for chills, diaphoresis and fever  HENT: Negative for congestion, ear pain, rhinorrhea, sinus pressure and sore throat  Respiratory: Negative for cough, chest tightness, shortness of breath and wheezing  Cardiovascular: Positive for leg swelling  Negative for chest pain and palpitations     Gastrointestinal: Negative for abdominal pain, blood in stool, constipation, diarrhea, nausea and vomiting  Musculoskeletal: Positive for arthralgias and myalgias  Skin: Negative for color change, rash and wound  Neurological: Negative for dizziness, syncope, weakness, light-headedness and headaches  There were no vitals filed for this visit  I spent 10 minutes directly with the patient during this visit    [address] [place] [address] Road acknowledges that he has consented to an online visit or consultation  He understands that the online visit is based solely on information provided by him, and that, in the absence of a face-to-face physical evaluation by the physician, the diagnosis he receives is both limited and provisional in terms of accuracy and completeness  This is not intended to replace a full medical face-to-face evaluation by the physician  Hardik Tran understands and accepts these terms

## 2021-02-12 ENCOUNTER — NURSE TRIAGE (OUTPATIENT)
Dept: HEMATOLOGY ONCOLOGY | Facility: CLINIC | Age: 56
End: 2021-02-12

## 2021-02-12 ENCOUNTER — TELEPHONE (OUTPATIENT)
Dept: INFUSION CENTER | Facility: HOSPITAL | Age: 56
End: 2021-02-12

## 2021-02-12 ENCOUNTER — HOSPITAL ENCOUNTER (OUTPATIENT)
Dept: INFUSION CENTER | Facility: HOSPITAL | Age: 56
Discharge: HOME | End: 2021-02-12
Attending: INTERNAL MEDICINE

## 2021-02-12 ENCOUNTER — TELEPHONE (OUTPATIENT)
Dept: HEMATOLOGY ONCOLOGY | Facility: CLINIC | Age: 56
End: 2021-02-12

## 2021-02-12 DIAGNOSIS — C66.1 UROTHELIAL CARCINOMA OF RIGHT DISTAL URETER (HCC): Primary | ICD-10-CM

## 2021-02-12 DIAGNOSIS — M79.89 SWELLING OF RIGHT LOWER EXTREMITY: ICD-10-CM

## 2021-02-12 NOTE — TELEPHONE ENCOUNTER
Patient is calling to cancel his treatment today  Patient called to report increased swelling in his right leg  Patient stated that he did a lot of walking yesterday   + moderate swelling from his thigh to his foot  Denies pain  Denies redness or open areas  Patient stated that he normally has swelling from the knee down but it has now worsened to the entire leg  Patient is elevating his leg and used a heating pad last night for 1 hour without relief of edema  Denies chest pain or SOB  [facility] [address] [facility] pass is patient's new pharmacy  (added to Epic)    Reason for Disposition   MODERATE swelling of both ankles (e g , swelling extends up to the knees) AND new onset or worsening    Protocols used: LEG SWELLING AND EDEMA-ADULT-OH    Will forward to Dr Holcomb's RN

## 2021-02-12 NOTE — TELEPHONE ENCOUNTER
Left patient message regarding his swelling, and that lasix was not advised  Stated in the VM, it was advised that he have a doppler  If he chooses not to have doppler, to go to the ED, with increased pain and swelling

## 2021-02-12 NOTE — TELEPHONE ENCOUNTER
Discussed pt's leg swelling with CRNP  Pt to have a doppler study  Reviewed low anc with pt and reviewed neutropenic precaution, and reasons to call  Pt denies cold symptoms    Doppler study to be scheduled by Med/Recptionist

## 2021-02-12 NOTE — TELEPHONE ENCOUNTER
Patient called in to follow up with Jam Nichole - stated that he was retuning her call - please call patient back - stated if he doesn't answer it is because he going to sleep

## 2021-02-12 NOTE — TELEPHONE ENCOUNTER
It looks like the patient takes [facility]  I called to find if he had another mode of transportation  [facility] usually enquires a weeks notice  I also need to know what locations we can check  The patient did not   I left the hope line number and asked that he call back  I explained that he needs to have this test relatively quickly due to the increased swelling

## 2021-02-12 NOTE — TELEPHONE ENCOUNTER
Patient called back  [facility] is his only mode of transportation  He still denies any redness or heat around swelling  He stated that he has had dopplers in the past and has shone just be fluid  He wants to know if [facility] could prescribe him a water pill  If he has to do the doppler It will be few days b/c of the transportation issue

## 2021-02-12 NOTE — TELEPHONE ENCOUNTER
Can you please schedule this doppler study with pt  Can you inform him of the date and time        Thank you

## 2021-02-12 NOTE — TELEPHONE ENCOUNTER
Reviewed [address] with CRNP  Spoke with pt, he denies cold symptoms, or fever, will call if he has temp and/or cold symptoms

## 2021-02-12 NOTE — TELEPHONE ENCOUNTER
Patient called and stated he has swelling in his legs and he couldn't walk  Per patient he would be calling Dr Jahiara Almanzar office this morning and speak to them

## 2021-02-16 ENCOUNTER — TELEMEDICINE (OUTPATIENT)
Dept: HEMATOLOGY ONCOLOGY | Facility: CLINIC | Age: 56
End: 2021-02-16
Payer: MEDICARE

## 2021-02-16 ENCOUNTER — TELEPHONE (OUTPATIENT)
Dept: HEMATOLOGY ONCOLOGY | Facility: CLINIC | Age: 56
End: 2021-02-16

## 2021-02-16 VITALS — BODY MASS INDEX: 24.63 KG/M2 | WEIGHT: 148 LBS

## 2021-02-16 DIAGNOSIS — C66.1 UROTHELIAL CARCINOMA OF RIGHT DISTAL URETER (HCC): Primary | ICD-10-CM

## 2021-02-16 PROCEDURE — 99215 OFFICE O/P EST HI 40 MIN: CPT | Performed by: INTERNAL MEDICINE

## 2021-02-16 NOTE — TELEPHONE ENCOUNTER
I phoned the patient to inform them that their virtual visit would be starting shortly  The patient was able to provide their weight, but not their temperature

## 2021-02-16 NOTE — PROGRESS NOTES
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  He has been treated with gem/cis  He has tolerated this relatively well  Recent CT scan shows stable disease compared to the study of 2 months earlier  This showed improvement compared to the prior study of August 2020, baseline  At this time, suspension of  Gemzar/cisplatin recommended  Transition to [facility] as maintenance therapy is recommended  We reviewed potential toxicities including but not limited to diarrhea, hypothyroidism, eczematoid rash  We reviewed that other immunologic or infusion reactions are possible  We reviewed monitoring to allow for early intervention as appropriate  The patient voiced understanding and agreement  We will make arrangements as outlined  I reviewed his medications, medical conditions, laboratory studies  Problem List Items Addressed This Visit     None                Reason for visit is   Chief Complaint   Patient presents with    Virtual Brief Visit        Encounter provider Justice Lanes, DO    Provider located at [address] [place] [id number] [phone]    Recent Visits  Date Type Provider Dept   02/12/21 Telephone Kalpana Lainez RN [facility]   02/11/21 Telemedicine DAMARIS Cooper  Cln   Showing recent visits within past 7 days and meeting all other requirements     Today's Visits  Date Type Provider Dept   02/16/21 Telephone Rishi Clemens [facility]   02/16/21 [address] [place] [address] [facility]   Showing today's visits and meeting all other requirements     Future Appointments  No visits were found meeting these conditions     Showing future appointments within next 150 days and meeting all other requirements        After connecting through telephone, the patient was identified by name and date of birth  Tl Scott was informed that this is a telemedicine visit and that the visit is being conducted through telephone  My office door was closed  No one else was in the room  He acknowledged consent and understanding of privacy and security of the platform  The patient has agreed to participate and understands he can discontinue the visit at any time  Patient is aware this is a billable service  Subjective    Tl Scott is a 63 y o  male with advanced urothelial carcinoma, stage IV  He has been on gem/cis  He has tolerated this relatively well  HPI  See above        Past Medical History:   Diagnosis Date    Asthma     [facility] [address] [facility]     COPD [facility]chronic obstructive pulmonary disease) [facility] [address] [facility]     Disease of thyroid gland     Hypertension     Hyperthyroidism        Past Surgical History:   Procedure Laterality Date    DENTAL SURGERY      FL RETROGRADE URETHROCYSTOGRAM  6/15/2020    HERNIA REPAIR Left     inguinal    IR LYMPH NODE BIOPSY/ASPIRATION  7/24/2020    IR TUBE PLACEMENT  6/25/2020    [place] CYSTOURETHROSCOPY,URETER CATHETER Right 6/15/2020    Procedure: CYSTOSCOPY RETROGRADE PYELOGRAM and ureteroscopy;  Surgeon: Roxi Reis MD;  Location: [facility];  Service: Urology       Current Outpatient Medications   Medication Sig Dispense Refill    acetaminophen (TYLENOL) 650 mg CR tablet Take 1 tablet (650 mg total) by mouth every 8 (eight) hours 30 tablet 0    albuterol (PROVENTIL HFA,VENTOLIN HFA) 90 mcg/act inhaler TAKE 2 PUFFS BY MOUTH EVERY 6 HOURS AS NEEDED FOR WHEEZE OR FOR SHORTNESS OF BREATH 18 Inhaler 5    ALPRAZolam (XANAX) 0 25 mg tablet Take 1 tablet (0 25 mg total) by mouth 3 (three) times a day as needed for anxiety (no more than 3 tabs a day) 70 tablet 0    carisoprodol (SOMA) 350 mg tablet Take 1 tablet (350 mg total) by mouth 3 (three) times a day as needed for muscle spasms 60 tablet 0    clonazePAM (KlonoPIN) 0 5 mg tablet Take 1 tablet (0 5 mg total) by mouth 2 (two) times a day as needed for anxiety 14 tablet 0    Diclofenac Sodium (VOLTAREN) 1 % Apply 2 g topically 4 (four) times a day as needed (pain) 1 Tube 5    escitalopram (LEXAPRO) 20 mg tablet       fluticasone (FLONASE) 50 mcg/act nasal spray 1 spray into each nostril daily 1 Bottle 11    fluticasone (FLOVENT HFA) 220 mcg/act inhaler Inhale 2 puffs 2 (two) times a day Rinse mouth after use  1 Inhaler 5    naloxone (NARCAN) 4 mg/0 1 mL nasal spray Administer 1 spray into a nostril  If no response after 2-3 minutes, give another dose in the other nostril using a new spray  1 each 1    omeprazole (PriLOSEC) 40 MG capsule TAKE 1 CAPSULE BY MOUTH EVERY DAY BEFORE BREAKFAST 90 capsule 1    ondansetron (ZOFRAN) 8 mg tablet Take 1 tablet (8 mg total) by mouth 3 (three) times a day before meals To prevent chemo nausea 90 tablet 0    oxyCODONE (ROXICODONE) 20 MG TABS Take 1-2 tablets (20-40 mg total) by mouth every 4 (four) hours as needed (20mg (1tab) for moderate pain, 40mg (2tab) for severe pain related to cancer  Max of 6tabs a day  )Max Daily Amount: 120 mg 180 tablet 0    oxyCODONE ER (Xtampza ER) 36 MG C12A Take 1 each by mouth 3 (three) times a day To prevent cancer painMax Daily Amount: 3 each 90 capsule 0    zolpidem (AMBIEN) 10 mg tablet 1 TAB AT BEDTIME AS NEEDED FOR INSOMNIA       No current facility-administered medications for this visit  Allergies   Allergen Reactions    Ketorolac Hives     Toradol       Review of Systems    Vitals:    02/16/21 1236   Weight: 67 1 kg (148 lb)         I spent 40 minutes directly with the patient during this visit    [address] [place] [address] Road acknowledges that he has consented to an online visit or consultation   He understands that the online visit is based solely on information provided by him, and that, in the absence of a face-to-face physical evaluation by the physician, the diagnosis he receives is both limited and provisional in terms of accuracy and completeness  This is not intended to replace a full medical face-to-face evaluation by the physician  Hardik Tran understands and accepts these terms

## 2021-02-17 RX ORDER — SODIUM CHLORIDE 9 MG/ML
20 INJECTION, SOLUTION INTRAVENOUS ONCE
Status: CANCELLED | OUTPATIENT
Start: 2021-03-19

## 2021-02-18 DIAGNOSIS — C66.1 UROTHELIAL CARCINOMA OF RIGHT DISTAL URETER (HCC): ICD-10-CM

## 2021-02-18 DIAGNOSIS — E03.9 HYPOTHYROIDISM (ACQUIRED): Primary | ICD-10-CM

## 2021-02-19 ENCOUNTER — TELEPHONE (OUTPATIENT)
Dept: HEMATOLOGY ONCOLOGY | Facility: CLINIC | Age: 56
End: 2021-02-19

## 2021-02-19 ENCOUNTER — TELEPHONE (OUTPATIENT)
Dept: INFUSION CENTER | Facility: HOSPITAL | Age: 56
End: 2021-02-19

## 2021-02-19 ENCOUNTER — HOSPITAL ENCOUNTER (OUTPATIENT)
Dept: INFUSION CENTER | Facility: HOSPITAL | Age: 56
Discharge: HOME | End: 2021-02-19
Attending: INTERNAL MEDICINE

## 2021-02-19 DIAGNOSIS — M79.89 SWELLING OF RIGHT LOWER EXTREMITY: Primary | ICD-10-CM

## 2021-02-19 NOTE — TELEPHONE ENCOUNTER
Patient called to cancel the apt due to a sore throat and his leg swelling  Patient left on the msg that he will try to get someone to take him to the hospital and would like someone to put in a doppler  Will send to Dr Nico Wang nurse for review

## 2021-02-19 NOTE — TELEPHONE ENCOUNTER
Pt to have doppler study  Pt does NOT want to have the study until Monday   (advised asap)  Med/Recp to schedule appt and advise pt

## 2021-02-19 NOTE — TELEPHONE ENCOUNTER
Would you please schedule pt a doppler study and let him know the date and time? He can not come in today, prefers Monday    Thank you

## 2021-02-19 NOTE — TELEPHONE ENCOUNTER
Spoke to patient regarding his r leg swelling  Pt would like a doppler study  Can not to until Monday  Explained that he should have the study asap, pt states he can not go until Monday as he does not have a ride  CE/CHACHA notified and verified need for doppler

## 2021-02-19 NOTE — TELEPHONE ENCOUNTER
Returned call to patient  Right leg is swollen from thigh to toes, started 1 week ago   Patient states toes and above ankle  are reddened "from scratching" Patient experiences pain when walking  Patient cannot gettransportation until Monday  Will send to Dr Stephanie Robb RN   Patient aware of plan

## 2021-02-19 NOTE — TELEPHONE ENCOUNTER
Patient is calling in indicating that his leg is swollen and he would like a doppler to be ordered to determine whether he has bloodclots   He can be reached back at [phone]

## 2021-02-22 ENCOUNTER — PATIENT OUTREACH (OUTPATIENT)
Dept: CASE MANAGEMENT | Facility: HOSPITAL | Age: 56
End: 2021-02-22

## 2021-02-22 ENCOUNTER — TELEPHONE (OUTPATIENT)
Dept: HEMATOLOGY ONCOLOGY | Facility: CLINIC | Age: 56
End: 2021-02-22

## 2021-02-22 ENCOUNTER — HOSPITAL ENCOUNTER (OUTPATIENT)
Dept: NON INVASIVE DIAGNOSTICS | Facility: HOSPITAL | Age: 56
Discharge: HOME | End: 2021-02-22
Attending: INTERNAL MEDICINE
Payer: MEDICARE

## 2021-02-22 ENCOUNTER — TELEMEDICINE (OUTPATIENT)
Dept: FAMILY MEDICINE CLINIC | Facility: CLINIC | Age: 56
End: 2021-02-22
Payer: MEDICARE

## 2021-02-22 ENCOUNTER — TELEPHONE (OUTPATIENT)
Dept: FAMILY MEDICINE CLINIC | Facility: CLINIC | Age: 56
End: 2021-02-22

## 2021-02-22 DIAGNOSIS — M79.89 SWELLING OF RIGHT LOWER EXTREMITY: ICD-10-CM

## 2021-02-22 DIAGNOSIS — L03.115 CELLULITIS OF RIGHT LEG: Primary | ICD-10-CM

## 2021-02-22 PROCEDURE — 93971 EXTREMITY STUDY: CPT | Performed by: SURGERY

## 2021-02-22 PROCEDURE — G0071 COMM SVCS BY RHC/FQHC 5 MIN: HCPCS | Performed by: FAMILY MEDICINE

## 2021-02-22 PROCEDURE — 93971 EXTREMITY STUDY: CPT

## 2021-02-22 RX ORDER — CEPHALEXIN 500 MG/1
500 CAPSULE ORAL EVERY 6 HOURS SCHEDULED
Qty: 28 CAPSULE | Refills: 0 | Status: SHIPPED | OUTPATIENT
Start: 2021-02-22 | End: 2021-03-01

## 2021-02-22 NOTE — TELEPHONE ENCOUNTER
Call from patient  Doppler was negative, patient states right leg is more swollen than when patient called on 2/19/2021  Right leg is red, swollen and painful  Patient does not have thermometer but states he has woken  up in a "sweat" the last 3 days  Patient states he had a temp check at a thrift shop on Saturday and his temp was 99 7  Will pass on to Dr Sukhjinder Johnson RN   Patient aware of plan

## 2021-02-22 NOTE — PROGRESS NOTES
Virtual Brief Visit    Assessment/Plan:    Problem List Items Addressed This Visit     None      Visit Diagnoses     Cellulitis of right leg    -  Primary    Relevant Medications    cephalexin (KEFLEX) 500 mg capsule        - Treat suspected cellulitis with keflex q6 hours x 7 days  Advised elevation and compression  Patient was advised that if the erythema and swelling continues to spread, he should report to the ED for further evaluation  Patient was agreeable to the plan  Reason for visit is   Chief Complaint   Patient presents with    Virtual Brief Visit        Encounter provider Denis Menjivar PA-C    Provider located at [address] [place] [address]    Recent Visits  No visits were found meeting these conditions  Showing recent visits within past 7 days and meeting all other requirements     Today's Visits  Date Type Provider Dept   02/22/21 [address] [place] [address] DAMARIS [place]   02/22/21 Telephone DANG Araujo  [place]ty[place]   Showing today's visits and meeting all other requirements     Future Appointments  No visits were found meeting these conditions  Showing future appointments within next 150 days and meeting all other requirements        After connecting through telephone, the patient was identified by name and date of birth  Rexanne Gottron was informed that this is a telemedicine visit and that the visit is being conducted through telephone  My office door was closed  No one else was in the room  He acknowledged consent and understanding of privacy and security of the platform  The patient has agreed to participate and understands he can discontinue the visit at any time  It was my intent to perform this visit via video technology but the patient was not able to do a video connection so the visit was completed via audio telephone only    Patient is aware this is a billable service  Subjective    Ozzie Baron is a 63 y o  male  Catrachita Hedrick is a 63year old male with stage IV urothelial carcinoma undergoing chemotherapy, presenting with concern for leg pain  Patient endorses ongoing right LE pain and swelling x 3 weeks  States that the swelling is from his thigh down to his toes  His toes are now red and warm to touch  He denies open wounds, weeping, bleeding or drainage  Notes some mild pitting  He has been taking tylenol for pain in addition to his current pain regimen prescribed per palliative care  He endorses sweats at night but is unaware if he has a fever  He did have a doppler done today which was negative for a clot  He states that the nurse told him to call us for an antibiotic as his leg looked infected         Past Medical History:   Diagnosis Date    Asthma     [facility] [address] [facility]     COPD (chronic obstructive pulmonary disease) ([facility]     Disease of thyroid gland     Hypertension     Hyperthyroidism        Past Surgical History:   Procedure Laterality Date    DENTAL SURGERY      FL RETROGRADE URETHROCYSTOGRAM  6/15/2020    HERNIA REPAIR Left     inguinal    IR LYMPH NODE BIOPSY/ASPIRATION  7/24/2020    IR TUBE PLACEMENT  6/25/2020    [place] CYSTOURETHROSCOPY,URETER CATHETER Right 6/15/2020    Procedure: CYSTOSCOPY RETROGRADE PYELOGRAM and ureteroscopy;  Surgeon: Asha Waite MD;  Location: [facility];  Service: Urology       Current Outpatient Medications   Medication Sig Dispense Refill    acetaminophen (TYLENOL) 650 mg CR tablet Take 1 tablet (650 mg total) by mouth every 8 (eight) hours 30 tablet 0    albuterol (PROVENTIL HFA,VENTOLIN HFA) 90 mcg/act inhaler TAKE 2 PUFFS BY MOUTH EVERY 6 HOURS AS NEEDED FOR WHEEZE OR FOR SHORTNESS OF BREATH 18 Inhaler 5    ALPRAZolam (XANAX) 0 25 mg tablet Take 1 tablet (0 25 mg total) by mouth 3 (three) times a day as needed for anxiety (no more than 3 tabs a day) 70 tablet 0    carisoprodol (SOMA) 350 mg tablet Take 1 tablet (350 mg total) by mouth 3 (three) times a day as needed for muscle spasms 60 tablet 0    cephalexin (KEFLEX) 500 mg capsule Take 1 capsule (500 mg total) by mouth every 6 (six) hours for 7 days 28 capsule 0    clonazePAM (KlonoPIN) 0 5 mg tablet Take 1 tablet (0 5 mg total) by mouth 2 (two) times a day as needed for anxiety 14 tablet 0    Diclofenac Sodium (VOLTAREN) 1 % Apply 2 g topically 4 (four) times a day as needed (pain) 1 Tube 5    escitalopram (LEXAPRO) 20 mg tablet       fluticasone (FLONASE) 50 mcg/act nasal spray 1 spray into each nostril daily 1 Bottle 11    fluticasone (FLOVENT HFA) 220 mcg/act inhaler Inhale 2 puffs 2 (two) times a day Rinse mouth after use  1 Inhaler 5    naloxone (NARCAN) 4 mg/0 1 mL nasal spray Administer 1 spray into a nostril  If no response after 2-3 minutes, give another dose in the other nostril using a new spray  1 each 1    omeprazole (PriLOSEC) 40 MG capsule TAKE 1 CAPSULE BY MOUTH EVERY DAY BEFORE BREAKFAST 90 capsule 1    ondansetron (ZOFRAN) 8 mg tablet Take 1 tablet (8 mg total) by mouth 3 (three) times a day before meals To prevent chemo nausea 90 tablet 0    oxyCODONE (ROXICODONE) 20 MG TABS Take 1-2 tablets (20-40 mg total) by mouth every 4 (four) hours as needed (20mg (1tab) for moderate pain, 40mg (2tab) for severe pain related to cancer  Max of 6tabs a day  )Max Daily Amount: 120 mg 180 tablet 0    oxyCODONE ER (Xtampza ER) 36 MG C12A Take 1 each by mouth 3 (three) times a day To prevent cancer painMax Daily Amount: 3 each 90 capsule 0    zolpidem (AMBIEN) 10 mg tablet 1 TAB AT BEDTIME AS NEEDED FOR INSOMNIA       No current facility-administered medications for this visit  Allergies   Allergen Reactions    Ketorolac Hives     Toradol       Review of Systems   Constitutional: Positive for diaphoresis  Negative for chills and fever  Respiratory: Negative for cough, chest tightness, shortness of breath and wheezing  Cardiovascular: Positive for leg swelling  Negative for chest pain and palpitations  Gastrointestinal: Negative for abdominal pain, blood in stool, constipation, diarrhea, nausea and vomiting  Skin: Positive for color change  Negative for rash and wound  Neurological: Negative for dizziness, syncope, weakness, light-headedness and headaches  There were no vitals filed for this visit  I spent 15 minutes directly with the patient during this visit    [address] [place] [address] Road acknowledges that he has consented to an online visit or consultation  He understands that the online visit is based solely on information provided by him, and that, in the absence of a face-to-face physical evaluation by the physician, the diagnosis he receives is both limited and provisional in terms of accuracy and completeness  This is not intended to replace a full medical face-to-face evaluation by the physician  Hardik Tran understands and accepts these terms

## 2021-02-22 NOTE — PROGRESS NOTES
Oncology LSW reached out to PT to introduce herself and to see if PT had any needs that LSW could help PT address  PT reported that he was upset with his provider's office, as they had told him to reach out to his PCP for antibiotic medication to help with the inflammation in his leg  PT stated that he had a doppler done on his leg around 3 times and no blood clot was found  LSW validated PT's frustrations and reiterated the value in only needing antibotics to treat his leg  PT reported he wasn't sure how he was going to walk to the pharmacy to get his medications due to his leg issue  LSW asked PT if he had anyone who he could think of that could have anyone go the the pharmacy to help him get the medications  PT reported that he did not  PT explained that he had just gotten back from an infusion treatment at [facility] and was continually concerned about his ambulation, given his leg issue  PT explained that he did not feel comfortable using a wheelchair, but was open to the idea of getting a walker  LSW explained that she would submit a request for an order for DME from PT's oncology provider and submit the request to an appropriate organization that could fill said request  PT was agreeable with this plan  LSW will follow up with PT as updates arise and to check to see how he is progressing through treatment  The following DME providers are within 10 miles of PT's home (directly from [facility])    [facility]  [address], [address]  [phone]    If this organization is not able to supply PT with a walker, LSW will expand her search for a DME provider beyond the 10 mile radius

## 2021-02-22 NOTE — TELEPHONE ENCOUNTER
Pt called and stated he finished his doppler test today  Stated his leg is swollen down to toes and Dr Anatoliy Morgan office suggested he call his PCP for antibiotics

## 2021-02-24 DIAGNOSIS — C66.1 UROTHELIAL CARCINOMA OF RIGHT DISTAL URETER (HCC): Primary | ICD-10-CM

## 2021-02-24 DIAGNOSIS — R26.9 GAIT ABNORMALITY: ICD-10-CM

## 2021-02-25 ENCOUNTER — PATIENT OUTREACH (OUTPATIENT)
Dept: CASE MANAGEMENT | Facility: HOSPITAL | Age: 56
End: 2021-02-25

## 2021-02-26 ENCOUNTER — PATIENT OUTREACH (OUTPATIENT)
Dept: CASE MANAGEMENT | Facility: HOSPITAL | Age: 56
End: 2021-02-26

## 2021-02-26 DIAGNOSIS — C66.1 UROTHELIAL CARCINOMA OF RIGHT DISTAL URETER (HCC): ICD-10-CM

## 2021-02-26 DIAGNOSIS — G89.3 CANCER RELATED PAIN: ICD-10-CM

## 2021-02-26 RX ORDER — OXYCODONE 36 MG/1
1 CAPSULE, EXTENDED RELEASE ORAL 3 TIMES DAILY
Qty: 90 CAPSULE | Refills: 0 | Status: SHIPPED | OUTPATIENT
Start: 2021-03-09 | End: 2021-03-10 | Stop reason: SDUPTHER

## 2021-02-26 RX ORDER — OXYCODONE HYDROCHLORIDE 20 MG/1
20-40 TABLET ORAL EVERY 4 HOURS PRN
Qty: 180 TABLET | Refills: 0 | Status: SHIPPED | OUTPATIENT
Start: 2021-03-09 | End: 2021-03-10 | Stop reason: SDUPTHER

## 2021-02-26 RX ORDER — SODIUM CHLORIDE 9 MG/ML
20 INJECTION, SOLUTION INTRAVENOUS ONCE
Status: CANCELLED | OUTPATIENT
Start: 2021-04-15

## 2021-02-26 NOTE — PROGRESS NOTES
Oncology LSW reached out to PT to inform him that she had submitted his walker order to [facility] in [place]  LSW explained that to PT and explained that a representative from the company should reach out directly to PT, but he did not hear anything by the end of next week to let LSW know so that she could reach out  PT was agreeable with this plan  PT explained that he felt that the antibiotics prescribed to treat his leg were helping, as the swelling had significantly decreased  PT stated that he was to start immunotherapy next week and was looking forward to not continuing chemotherapy, as it "knocked him on his ass "     LSW explained that she would follow up with him following the start of the immunotherapy next week to provide PT with continued support  PT was agreeable with this plan as well

## 2021-02-26 NOTE — TELEPHONE ENCOUNTER
Primary palliative medicine provider:   Michele    Medication requested:  1  Xtampza 36 mg  2  Oxycodone 20 mg IR     If for pain, how has the patient been taking their pain medicine? 1  Xtampza  1 cap 3 x day   2  Oxycodone  1 to 2 tablets every 4 hours prn  Max 6 tabs per day  Last appointment: 2/10/21     Next scheduled appointment: 3/10/21    PDMP review:  1  Xtampza 36 mg      Last fill 2/10  90/30  2  Oxycodone 20 mg  Last fill 2/10  180/30    Pt Left message acknowledging  Both medications will be due by 03/10  Verified with pharmacy [facility] Last will need to be ordered  Not in stock  Would need a few days   Pt asking if scripts can be sent before his next appointment to ensure pharmacist can fill by due date

## 2021-03-01 ENCOUNTER — TELEPHONE (OUTPATIENT)
Dept: PALLIATIVE MEDICINE | Facility: CLINIC | Age: 56
End: 2021-03-01

## 2021-03-01 NOTE — TELEPHONE ENCOUNTER
Pt's pain has been difficult to understand and/or control  I cannot provide him safe information for pain mangemant over the phone  He should be seen in person by myself or at his PCP office

## 2021-03-01 NOTE — TELEPHONE ENCOUNTER
This nurse spoke with patient this am com-plaining of R kidney area pain, severe today  He recently had cellulitis of his leg and he has been on keflex and  just finishing up today  He does not feel it is a UTI and has been drinking lots of cranberry juice  He  Reports taking his oxycodone 20mg every 4 hours, 2 tablets and no more than 6 daily  He reports he has enough left until 3/9/2021 but wants to know what to do about the pain

## 2021-03-02 NOTE — TELEPHONE ENCOUNTER
Pt returned call to office per message left  Instructed pt on Dr Maria Ines Girard note requesting pt be seen this week to evaluate pain and adjust per exam   Pt has appointment 03/10/21 and wants to keep that appointment  Pt instructed to proceed to ED should pain escalate

## 2021-03-04 ENCOUNTER — PATIENT OUTREACH (OUTPATIENT)
Dept: CASE MANAGEMENT | Facility: HOSPITAL | Age: 56
End: 2021-03-04

## 2021-03-04 NOTE — PROGRESS NOTES
Oncology LSW reached out to PT to provide him with emotional support and to see if he had been contacted by [employer] [place] in regard to securing a walker  PT reported that he was doing well  LSW explained that she had reviewed PT's chart and noticed that he had been experiencing some pain lately  PT replied, "I've been drinking a lot of cranberry juice, so it's been getting better " PT explained that he was was nervous about treatment tomorrow, as it was his first treatment of immunotherapy and he wasn't sure what to expect  LSW asked PT if he would be interested in having a nurse navigator reach out to primary to treatment to inform him about his immunotherapy treatment  PT declined nurse navigator outreach at this time, as he stated, "it's no problem, I'm sure that they'll give me the low down tomorrow " LSW proceeded to ask PT if [address] [place] [address] had reached out to PT to help him secure his walker  PT reported that he had not heard anything at this point  LSW explained that she would reach out to [place] today to look into it  PT was appreciative of and agreeable with this plan  LSW attempted to [employer] to follow up on PT's walker order  LSW left detailed voicemail with customer service requesting return call at their earliest convenience  LSW received return call form Carter Gonsalez, customer service with [address] [place] [address]  Carter Gonsalez reported that PT's walker would be out for delivery on Monday, 3/8   Carter Gonsalez also reported that she had relayed this information to PT

## 2021-03-05 ENCOUNTER — HOSPITAL ENCOUNTER (OUTPATIENT)
Dept: INFUSION CENTER | Facility: HOSPITAL | Age: 56
Discharge: HOME | End: 2021-03-05
Attending: INTERNAL MEDICINE

## 2021-03-05 ENCOUNTER — APPOINTMENT (EMERGENCY)
Dept: CT IMAGING | Facility: HOSPITAL | Age: 56
End: 2021-03-05
Payer: MEDICARE

## 2021-03-05 ENCOUNTER — TELEPHONE (OUTPATIENT)
Dept: INFUSION CENTER | Facility: HOSPITAL | Age: 56
End: 2021-03-05

## 2021-03-05 ENCOUNTER — HOSPITAL ENCOUNTER (EMERGENCY)
Facility: HOSPITAL | Age: 56
Discharge: HOME | End: 2021-03-05
Attending: EMERGENCY MEDICINE | Admitting: EMERGENCY MEDICINE
Payer: MEDICARE

## 2021-03-05 ENCOUNTER — TELEPHONE (OUTPATIENT)
Dept: PALLIATIVE MEDICINE | Facility: CLINIC | Age: 56
End: 2021-03-05

## 2021-03-05 VITALS
BODY MASS INDEX: 24.66 KG/M2 | OXYGEN SATURATION: 99 % | HEIGHT: 65 IN | TEMPERATURE: 98.2 F | RESPIRATION RATE: 18 BRPM | DIASTOLIC BLOOD PRESSURE: 76 MMHG | SYSTOLIC BLOOD PRESSURE: 136 MMHG | WEIGHT: 148 LBS | HEART RATE: 80 BPM

## 2021-03-05 DIAGNOSIS — R10.9 RIGHT FLANK PAIN: Primary | ICD-10-CM

## 2021-03-05 DIAGNOSIS — N39.0 UTI (URINARY TRACT INFECTION): ICD-10-CM

## 2021-03-05 LAB
ALBUMIN SERPL BCP-MCNC: 3.9 G/DL (ref 3.5–5.7)
ALP SERPL-CCNC: 86 U/L (ref 40–150)
ALT SERPL W P-5'-P-CCNC: 43 U/L (ref 7–52)
ANION GAP SERPL CALCULATED.3IONS-SCNC: 7 MMOL/L (ref 4–13)
AST SERPL W P-5'-P-CCNC: 37 U/L (ref 13–39)
BACTERIA UR QL AUTO: ABNORMAL /HPF
BASOPHILS # BLD AUTO: 0.1 THOUSANDS/ΜL (ref 0–0.1)
BASOPHILS NFR BLD AUTO: 2 % (ref 0–2)
BILIRUB SERPL-MCNC: 0.4 MG/DL (ref 0.2–1)
BILIRUB UR QL STRIP: NEGATIVE
BUN SERPL-MCNC: 15 MG/DL (ref 7–25)
CALCIUM SERPL-MCNC: 9 MG/DL (ref 8.6–10.5)
CHLORIDE SERPL-SCNC: 97 MMOL/L (ref 98–107)
CLARITY UR: CLEAR
CO2 SERPL-SCNC: 26 MMOL/L (ref 21–31)
COLOR UR: YELLOW
CREAT SERPL-MCNC: 0.97 MG/DL (ref 0.7–1.3)
EOSINOPHIL # BLD AUTO: 0.2 THOUSAND/ΜL (ref 0–0.61)
EOSINOPHIL NFR BLD AUTO: 3 % (ref 0–5)
ERYTHROCYTE [DISTWIDTH] IN BLOOD BY AUTOMATED COUNT: 14.8 % (ref 11.5–14.5)
GFR SERPL CREATININE-BSD FRML MDRD: 87 ML/MIN/1.73SQ M
GLUCOSE SERPL-MCNC: 113 MG/DL (ref 65–99)
GLUCOSE UR STRIP-MCNC: NEGATIVE MG/DL
HCT VFR BLD AUTO: 35.4 % (ref 42–47)
HGB BLD-MCNC: 12.2 G/DL (ref 14–18)
HGB UR QL STRIP.AUTO: NEGATIVE
KETONES UR STRIP-MCNC: NEGATIVE MG/DL
LEUKOCYTE ESTERASE UR QL STRIP: ABNORMAL
LIPASE SERPL-CCNC: 18 U/L (ref 11–82)
LYMPHOCYTES # BLD AUTO: 0.9 THOUSANDS/ΜL (ref 0.6–4.47)
LYMPHOCYTES NFR BLD AUTO: 11 % (ref 21–51)
MCH RBC QN AUTO: 33.2 PG (ref 26–34)
MCHC RBC AUTO-ENTMCNC: 34.4 G/DL (ref 31–37)
MCV RBC AUTO: 96 FL (ref 81–99)
MONOCYTES # BLD AUTO: 1.1 THOUSAND/ΜL (ref 0.17–1.22)
MONOCYTES NFR BLD AUTO: 13 % (ref 2–12)
MUCOUS THREADS UR QL AUTO: ABNORMAL
NEUTROPHILS # BLD AUTO: 6 THOUSANDS/ΜL (ref 1.4–6.5)
NEUTS SEG NFR BLD AUTO: 72 % (ref 42–75)
NITRITE UR QL STRIP: NEGATIVE
NON-SQ EPI CELLS URNS QL MICRO: ABNORMAL /HPF
PH UR STRIP.AUTO: 6 [PH]
PLATELET # BLD AUTO: 365 THOUSANDS/UL (ref 149–390)
PMV BLD AUTO: 6.3 FL (ref 8.6–11.7)
POTASSIUM SERPL-SCNC: 4 MMOL/L (ref 3.5–5.5)
PROT SERPL-MCNC: 7.1 G/DL (ref 6.4–8.9)
PROT UR STRIP-MCNC: ABNORMAL MG/DL
RBC # BLD AUTO: 3.68 MILLION/UL (ref 4.3–5.9)
RBC #/AREA URNS AUTO: ABNORMAL /HPF
SODIUM SERPL-SCNC: 130 MMOL/L (ref 134–143)
SP GR UR STRIP.AUTO: 1.02 (ref 1–1.03)
UROBILINOGEN UR QL STRIP.AUTO: 0.2 E.U./DL
WBC # BLD AUTO: 8.4 THOUSAND/UL (ref 4.8–10.8)
WBC #/AREA URNS AUTO: ABNORMAL /HPF

## 2021-03-05 PROCEDURE — 36415 COLL VENOUS BLD VENIPUNCTURE: CPT | Performed by: EMERGENCY MEDICINE

## 2021-03-05 PROCEDURE — 87086 URINE CULTURE/COLONY COUNT: CPT | Performed by: EMERGENCY MEDICINE

## 2021-03-05 PROCEDURE — 83690 ASSAY OF LIPASE: CPT | Performed by: EMERGENCY MEDICINE

## 2021-03-05 PROCEDURE — 96374 THER/PROPH/DIAG INJ IV PUSH: CPT

## 2021-03-05 PROCEDURE — 99285 EMERGENCY DEPT VISIT HI MDM: CPT | Performed by: EMERGENCY MEDICINE

## 2021-03-05 PROCEDURE — 80053 COMPREHEN METABOLIC PANEL: CPT | Performed by: EMERGENCY MEDICINE

## 2021-03-05 PROCEDURE — 81001 URINALYSIS AUTO W/SCOPE: CPT | Performed by: EMERGENCY MEDICINE

## 2021-03-05 PROCEDURE — G1004 CDSM NDSC: HCPCS

## 2021-03-05 PROCEDURE — 74177 CT ABD & PELVIS W/CONTRAST: CPT

## 2021-03-05 PROCEDURE — 96376 TX/PRO/DX INJ SAME DRUG ADON: CPT

## 2021-03-05 PROCEDURE — 99285 EMERGENCY DEPT VISIT HI MDM: CPT

## 2021-03-05 PROCEDURE — 96375 TX/PRO/DX INJ NEW DRUG ADDON: CPT

## 2021-03-05 PROCEDURE — 85025 COMPLETE CBC W/AUTO DIFF WBC: CPT | Performed by: EMERGENCY MEDICINE

## 2021-03-05 RX ORDER — FENTANYL CITRATE 50 UG/ML
25 INJECTION, SOLUTION INTRAMUSCULAR; INTRAVENOUS ONCE
Status: COMPLETED | OUTPATIENT
Start: 2021-03-05 | End: 2021-03-05

## 2021-03-05 RX ORDER — SULFAMETHOXAZOLE AND TRIMETHOPRIM 800; 160 MG/1; MG/1
1 TABLET ORAL 2 TIMES DAILY
Qty: 14 TABLET | Refills: 0 | Status: SHIPPED | OUTPATIENT
Start: 2021-03-05 | End: 2021-03-12

## 2021-03-05 RX ORDER — FENTANYL CITRATE 50 UG/ML
50 INJECTION, SOLUTION INTRAMUSCULAR; INTRAVENOUS ONCE
Status: COMPLETED | OUTPATIENT
Start: 2021-03-05 | End: 2021-03-05

## 2021-03-05 RX ORDER — MORPHINE SULFATE 4 MG/ML
4 INJECTION, SOLUTION INTRAMUSCULAR; INTRAVENOUS ONCE
Status: COMPLETED | OUTPATIENT
Start: 2021-03-05 | End: 2021-03-05

## 2021-03-05 RX ORDER — SULFAMETHOXAZOLE AND TRIMETHOPRIM 800; 160 MG/1; MG/1
1 TABLET ORAL ONCE
Status: COMPLETED | OUTPATIENT
Start: 2021-03-05 | End: 2021-03-05

## 2021-03-05 RX ADMIN — IOHEXOL 100 ML: 350 INJECTION, SOLUTION INTRAVENOUS at 02:57

## 2021-03-05 RX ADMIN — FENTANYL CITRATE 50 MCG: 50 INJECTION INTRAMUSCULAR; INTRAVENOUS at 03:34

## 2021-03-05 RX ADMIN — FENTANYL CITRATE 50 MCG: 50 INJECTION INTRAMUSCULAR; INTRAVENOUS at 05:00

## 2021-03-05 RX ADMIN — MORPHINE SULFATE 4 MG: 4 INJECTION INTRAVENOUS at 02:15

## 2021-03-05 RX ADMIN — SULFAMETHOXAZOLE AND TRIMETHOPRIM 1 TABLET: 800; 160 TABLET ORAL at 03:40

## 2021-03-05 RX ADMIN — FENTANYL CITRATE 25 MCG: 50 INJECTION INTRAMUSCULAR; INTRAVENOUS at 02:39

## 2021-03-05 NOTE — DISCHARGE INSTRUCTIONS
RETURN IF WORSE IN ANY WAY:     CHEST PAIN, SHORTNESS OF BREATH, INCREASED PAIN, FEVER OR FLU LIKE SYMPTOMS, OR NEW AND CONCERNING SYMPTOMS SIGNS OR SYMPTOMS:    PLEASE CALL YOUR PRIMARY DOCTOR IN THE MORNING TO SET UP FOLLOW UP   PLEASE REVIEW THE WORK UP RESULTS WITH YOUR DOCTOR

## 2021-03-05 NOTE — ED PROVIDER NOTES
History  No chief complaint on file  62-YEAR-OLD MALE    PATIENT IS HERE FOR RIGHT FLANK PAIN   patient has  Known kidney cancer    STATES THIS STARTED  YESTERDAY MORNING, CAME ON GRADUALLY   IT HAS BEEN COMING AND GOING, BUT IT HAS BEEN CONSTANT FOR THE LAST COUPLE OF HOURS  IT IS NOW  RATED 10/10   DESCRIBED AS SHARP      ASSOCIATED SYMPTOMS:  HE DOES NOT HAVE ANY ABDOMINAL PAIN ASSOCIATED    URINARY  SYMPTOMS: THERE IS NO DYSURIA, NO HEMATURIA, NO FREQUENCY    HE DENIES ANY NAUSEA OR VOMITING  NO FEVERS OR CHILLS  NO STOOL CHANGES    ALLEVIATING OR EXACERBATING FACTORS:  UNCERTAIN         INTERVENTIONS: NONE          History provided by:  Patient  Flank Pain  Pain location:  R flank  Pain quality: sharp    Relieved by:  Nothing  Worsened by:  Nothing  Associated symptoms: no chest pain, no chills, no constipation, no cough, no dysuria, no fatigue, no fever, no hematemesis, no hematochezia, no hematuria, no melena, no nausea, no shortness of breath and no vomiting        Prior to Admission Medications   Prescriptions Last Dose Informant Patient Reported? Taking?    ALPRAZolam (XANAX) 0 25 mg tablet   No No   Sig: Take 1 tablet (0 25 mg total) by mouth 3 (three) times a day as needed for anxiety (no more than 3 tabs a day)   Diclofenac Sodium (VOLTAREN) 1 %   No No   Sig: Apply 2 g topically 4 (four) times a day as needed (pain)   acetaminophen (TYLENOL) 650 mg CR tablet  Self No No   Sig: Take 1 tablet (650 mg total) by mouth every 8 (eight) hours   albuterol (PROVENTIL HFA,VENTOLIN HFA) 90 mcg/act inhaler  Self No No   Sig: TAKE 2 PUFFS BY MOUTH EVERY 6 HOURS AS NEEDED FOR WHEEZE OR FOR SHORTNESS OF BREATH   carisoprodol (SOMA) 350 mg tablet  Self No No   Sig: Take 1 tablet (350 mg total) by mouth 3 (three) times a day as needed for muscle spasms   clonazePAM (KlonoPIN) 0 5 mg tablet  Self No No   Sig: Take 1 tablet (0 5 mg total) by mouth 2 (two) times a day as needed for anxiety   escitalopram (LEXAPRO) 20 mg tablet  Self Yes No   fluticasone (FLONASE) 50 mcg/act nasal spray  Self No No   Si spray into each nostril daily   fluticasone (FLOVENT HFA) 220 mcg/act inhaler  Self No No   Sig: Inhale 2 puffs 2 (two) times a day Rinse mouth after use    naloxone (NARCAN) 4 mg/0 1 mL nasal spray  Self No No   Sig: Administer 1 spray into a nostril  If no response after 2-3 minutes, give another dose in the other nostril using a new spray  omeprazole (PriLOSEC) 40 MG capsule  Self No No   Sig: TAKE 1 CAPSULE BY MOUTH EVERY DAY BEFORE BREAKFAST   ondansetron (ZOFRAN) 8 mg tablet  Self No No   Sig: Take 1 tablet (8 mg total) by mouth 3 (three) times a day before meals To prevent chemo nausea   oxyCODONE (ROXICODONE) 20 MG TABS   No No   Sig: Take 1-2 tablets (20-40 mg total) by mouth every 4 (four) hours as needed (20mg (1tab) for moderate pain, 40mg (2tab) for severe pain related to cancer  Max of 6tabs a day  )Max Daily Amount: 120 mg   oxyCODONE ER (Xtampza ER) 36 MG C12A   No No   Sig: Take 1 each by mouth 3 (three) times a day To prevent cancer painMax Daily Amount: 3 each   zolpidem (AMBIEN) 10 mg tablet  Self Yes No   Si TAB AT BEDTIME AS NEEDED FOR INSOMNIA      Facility-Administered Medications: None       Past Medical History:   Diagnosis Date    Asthma     [facility] disorder ([facility] )     [facility]     Disease of thyroid gland     Hypertension     Hyperthyroidism        Past Surgical History:   Procedure Laterality Date    DENTAL SURGERY      FL RETROGRADE URETHROCYSTOGRAM  6/15/2020    HERNIA REPAIR Left     inguinal    IR LYMPH NODE BIOPSY/ASPIRATION  2020    IR TUBE PLACEMENT  2020    AK CYSTOURETHROSCOPY,URETER CATHETER Right 6/15/2020    Procedure: CYSTOSCOPY RETROGRADE PYELOGRAM and ureteroscopy;  Surgeon: Brigid Bowling MD;  Location: [facility];  Service: Urology       Family History   Problem Relation Age of Onset    Heart disease Mother    Roselyn Luu Hyperthyroidism Mother     Lung cancer Mother     Heart disease Father     Hyperthyroidism Father     Lung cancer Maternal Aunt     Lung cancer Maternal Uncle      I have reviewed and agree with the history as documented  E-Cigarette/Vaping    E-Cigarette Use Never User      E-Cigarette/Vaping Substances    Nicotine No     THC No     CBD No     Flavoring No     Other No     Unknown No      Social History     Tobacco Use    Smoking status: Current Every Day Smoker     Packs/day: 1 00     Years: 44 00     Pack years: 44 00    Smokeless tobacco: Never Used   Substance Use Topics    Alcohol use: Never     Frequency: Never    Drug use: No       Review of Systems   Constitutional: Negative for chills, diaphoresis, fatigue and fever  Respiratory: Negative for cough, shortness of breath, wheezing and stridor  Cardiovascular: Negative for chest pain, palpitations and leg swelling  Gastrointestinal: Negative for abdominal pain, blood in stool, constipation, hematemesis, hematochezia, melena, nausea and vomiting  Genitourinary: Positive for flank pain  Negative for difficulty urinating, dysuria, frequency and hematuria  Musculoskeletal: Negative for arthralgias, back pain, gait problem, joint swelling, myalgias, neck pain and neck stiffness  Skin: Negative for rash and wound  Neurological: Negative for dizziness, light-headedness and headaches  All other systems reviewed and are negative  Physical Exam  Physical Exam  Constitutional:       General: He is not in acute distress  Appearance: He is well-developed  He is not ill-appearing, toxic-appearing or diaphoretic  HENT:      Head: Normocephalic and atraumatic  Nose: Nose normal       Mouth/Throat:      Pharynx: No oropharyngeal exudate  Eyes:      General: No scleral icterus  Right eye: No discharge  Left eye: No discharge        Conjunctiva/sclera: Conjunctivae normal       Pupils: Pupils are equal, round, and reactive to light  Neck:      Musculoskeletal: Normal range of motion and neck supple  No neck rigidity or muscular tenderness  Vascular: No JVD  Trachea: No tracheal deviation  Cardiovascular:      Rate and Rhythm: Normal rate and regular rhythm  Heart sounds: Normal heart sounds  No murmur  No friction rub  No gallop  Pulmonary:      Effort: Pulmonary effort is normal  No respiratory distress  Breath sounds: Normal breath sounds  No stridor  No wheezing, rhonchi or rales  Chest:      Chest wall: No tenderness  Abdominal:      General: Bowel sounds are normal  There is no distension  Palpations: Abdomen is soft  There is no mass  Tenderness: There is no abdominal tenderness  There is right CVA tenderness  There is no left CVA tenderness, guarding or rebound  Hernia: No hernia is present  Musculoskeletal: Normal range of motion  General: No swelling, tenderness, deformity or signs of injury  Right lower leg: No edema  Left lower leg: No edema  Lymphadenopathy:      Cervical: No cervical adenopathy  Skin:     General: Skin is warm  Capillary Refill: Capillary refill takes less than 2 seconds  Coloration: Skin is not jaundiced or pale  Findings: No bruising, erythema, lesion or rash  Neurological:      General: No focal deficit present  Mental Status: He is alert and oriented to person, place, and time  Mental status is at baseline  Cranial Nerves: No cranial nerve deficit  Sensory: No sensory deficit  Motor: No weakness or abnormal muscle tone  Coordination: Coordination normal    Psychiatric:         Behavior: Behavior normal          Thought Content:  Thought content normal          Judgment: Judgment normal          Vital Signs  ED Triage Vitals   Temp Pulse Resp BP SpO2   -- -- -- -- --      Temp src Heart Rate Source Patient Position - Orthostatic VS BP Location FiO2 (%)   -- -- -- -- --      Pain Score       --           There were no vitals filed for this visit  Visual Acuity      ED Medications  Medications - No data to display    Diagnostic Studies  Results Reviewed     None                 No orders to display              Procedures  Procedures         ED Course  ED Course as of Mar 05 0533   Fri Mar 05, 2021   0233 CMP(!)   0234 Lipase: 18   0234 CBC and differential(!)   0242  Patient ambulatory to and from bathroom with steady and unassisted gait      0248  Patient en route to CT scan now  0174  Patient is continually asking for more pain medications  His vital signs are stable and within normal limits  Indicating that he may be malingering   Labs do not appear concerning  CT scan is pending  Urinalysis is  pending as well      0323 UA w Reflex to Microscopic w Reflex to Culture(!)   0323 CT ABDOMEN AND PELVIS WITH IV CONTRAST     FINDINGS:     ABDOMEN     LOWER CHEST:  No clinically significant abnormality identified in the visualized lower chest      LIVER/BILIARY TREE:  Unremarkable      GALLBLADDER:  No calcified gallstones  No pericholecystic inflammatory change      SPLEEN:  Unremarkable      PANCREAS:  Multiple small calcifications and atrophic pancreas in keeping with sequela of chronic pancreatitis      ADRENAL GLANDS:  Unremarkable      KIDNEYS/URETERS:  Atrophic right kidney  There is a ureteral stent extending from the right renal pelvis to the bladder  The left kidney is unremarkable  No hydronephrosis or urinary tract calculus  One or more sharply circumscribed subcentimeter   renal hypodensities are present, too small to accurately characterize, and statistically most likely benign findings   According to recent literature (Radiology 2019) no further workup of these findings is recommended      STOMACH AND BOWEL:  Unremarkable      APPENDIX:  No findings to suggest appendicitis      ABDOMINOPELVIC CAVITY:  Stable retrocaval conglomerate of enlarged lymph nodes and mild nodularity in the retrocrural region  Stable pericaval stranding  Stable infiltrative change along the right psoas muscle      VESSELS:  Moderate atherosclerotic calcifications      PELVIS     REPRODUCTIVE ORGANS:  Unremarkable for patient's age      URINARY BLADDER:  Unremarkable      ABDOMINAL WALL/INGUINAL REGIONS:  Unremarkable      OSSEOUS STRUCTURES:  No acute fracture or destructive osseous lesion  Grade 1 retrolisthesis of L5 on S1  Stable sclerotic change in the right iliac bone seen within the L3 and L5 vertebral bodies      IMPRESSION:     1  No acute inflammatory process identified within the abdomen or pelvis  2   Right ureteral stent in place  No significant hydronephrosis  3   Stable retrocaval conglomerate of enlarged lymph nodes and infiltrative change along the right psoas muscle  4   Stable osseous metastasis      0330  Patient understands results of his workup  No significant started judy aches  Be patient is now on his 3rd dose of narcotics IV  I discussed the patient that done clear if UTI is the reason for his pain  No kidney stones seen  I offered admission if the patient felt like he was very ill feeling or if his pain was too much  He wants to decide what he wants to do after another dose of fentanyl      0335  URINE CULTURES REVIEWED  [address] [place] [address]  Patient feels better after 2nd dose of fentanyl  I offered again to admit him if he felt his pain was too much or feet felt ill  He has declined this again  He wants to manage home  He will follow-up with his specialist later today  He will return if worse  He is very appreciative of his ED care                                              MDM    Disposition  Final diagnoses:   None     ED Disposition     None      Follow-up Information    None         Patient's Medications   Discharge Prescriptions    No medications on file     No discharge procedures on file      PDMP Review       Value Time User    PDMP Reviewed  Yes 2/10/2021  9:34 AM Burgess Richard MD          ED Provider  Electronically Signed by           Cheryl Lu MD  03/05/21 2979

## 2021-03-05 NOTE — TELEPHONE ENCOUNTER
Patient called the office this am to report he was in the ED yesterday and had increased pain which is significant still for R flank pain  He had a CT scan and  He was diagnosed with UTI and started on Bactrim  He was offered times 2 to stay at hospital for admission for pain control but opted to return home after two IV Fentanyl bolus'  This nurse educated him on his pain medication regime and when to present to the ED for further pain management  He has a follow up appt with Dr Wilkins Heading on the 10th of March which he has opted to keep

## 2021-03-05 NOTE — ED NOTES
[facility] Vickie Angela will pick patient up at [address] [place] [address] Rd , [address]       Cristofer Geronimo RN  03/05/21 3508

## 2021-03-05 NOTE — TELEPHONE ENCOUNTER
Attempted to return call to [facility] without success  Left VM with hope line phone #  Instructed pt to c/b to discuss his Kidney pain  Awaiting c/b

## 2021-03-05 NOTE — TELEPHONE ENCOUNTER
Patient called to cancel his apt for today due to Kidney pain    Please call patient at home, per his request

## 2021-03-05 NOTE — TELEPHONE ENCOUNTER
Attempted to call pt today for f/u without success  Left VM for him to return our call to assess his kidney pain

## 2021-03-06 LAB — BACTERIA UR CULT: NORMAL

## 2021-03-08 ENCOUNTER — TELEPHONE (OUTPATIENT)
Dept: PALLIATIVE MEDICINE | Facility: CLINIC | Age: 56
End: 2021-03-08

## 2021-03-08 NOTE — TELEPHONE ENCOUNTER
[facility] LSW attempted call to patient to follow up on 5 Wishes document, message left

## 2021-03-09 NOTE — TELEPHONE ENCOUNTER
Noted; pt should not need new meds for pain related to UTI, which is being treated appropriately  Will see him tomorrow in [place] to further discuss  May 'done' task

## 2021-03-10 ENCOUNTER — OFFICE VISIT (OUTPATIENT)
Dept: PALLIATIVE MEDICINE | Facility: CLINIC | Age: 56
End: 2021-03-10
Payer: MEDICARE

## 2021-03-10 VITALS
OXYGEN SATURATION: 98 % | HEIGHT: 65 IN | TEMPERATURE: 97.7 F | SYSTOLIC BLOOD PRESSURE: 100 MMHG | HEART RATE: 84 BPM | BODY MASS INDEX: 23.66 KG/M2 | WEIGHT: 142 LBS | DIASTOLIC BLOOD PRESSURE: 60 MMHG

## 2021-03-10 DIAGNOSIS — F41.9 ANXIETY: ICD-10-CM

## 2021-03-10 DIAGNOSIS — G89.3 CANCER RELATED PAIN: ICD-10-CM

## 2021-03-10 DIAGNOSIS — C66.1 UROTHELIAL CARCINOMA OF RIGHT DISTAL URETER (HCC): ICD-10-CM

## 2021-03-10 PROCEDURE — 99214 OFFICE O/P EST MOD 30 MIN: CPT | Performed by: FAMILY MEDICINE

## 2021-03-10 RX ORDER — ALPRAZOLAM 0.25 MG/1
0.25 TABLET ORAL 3 TIMES DAILY PRN
Qty: 70 TABLET | Refills: 1 | Status: SHIPPED | OUTPATIENT
Start: 2021-03-10 | End: 2021-04-01

## 2021-03-10 RX ORDER — OXYCODONE HYDROCHLORIDE 20 MG/1
20-40 TABLET ORAL EVERY 4 HOURS PRN
Qty: 180 TABLET | Refills: 0 | Status: SHIPPED | OUTPATIENT
Start: 2021-03-22 | End: 2021-04-01

## 2021-03-10 RX ORDER — OXYCODONE 36 MG/1
1 CAPSULE, EXTENDED RELEASE ORAL 3 TIMES DAILY
Qty: 90 CAPSULE | Refills: 0 | Status: SHIPPED | OUTPATIENT
Start: 2021-03-22 | End: 2021-04-01

## 2021-03-10 NOTE — PATIENT INSTRUCTIONS
Please protect yourself from COVID-19! Even though we do not good antiviral drugs for this infection, the following strategies can help you stay healthy:    = Wash your hands! Soap and water, or hand  with at least 60% alcohol, are both effective at killing the virus  = Wear a mask! This will help protect others from any virus particles you might spread  Your mouth and nose BOTH need to be covered  = Keep the distance! Keep 6 feet of distance from others people, even if they seem healthy  Keeping distance protects you from the other person's virus spread     = Get a vaccine! Three vaccines are approved for emergency use in the [place], made by [employer]en [employer], and [employer]  These vaccines have been shown to be 90+% effective at preventing severe infections when combined with masking, hand-washing, and distancing  As of 3/1/2021, the following priority groups may get one of the vaccines in [place]:  + nursing home residents and staff  + front-line health workers  + ALL persons over age 72 (ages 76 and up can be seen at [facility])  + ANY person over age 12 that has a qualifying risk factor, such as diabetes, lung disease, or obesity  ==> Please use  the following website  to check your eligibility in [place]:  [employer]ary[employer] pl   ==> If you are under age 72, but still qualify, you may get a shot from many other locations outside [facility]: [facility], [place], [address] [place] [address] [facility]      We are recommending that ALL our patients get a complete series of one of the three vaccines, as early as it is available to them  Please keep an eye on the [employer], Seng H[employer], or call [phone] to see when you will become eligible  If you are eligible by the criteria above, please ask our team to help get you a shot!       Informacion en espanol sobre vacunas, de nos companeros [facility] --  Elisha esquivel      Numbers of Coronavirus cases (and COVID deaths) are improving in many [place] States, but rates of transmission are still high  This means that many people in a community are still spreading the virus  Please check the local disease reports near you if you consider travelling  As of 3/1/21, we do NOT advise travel outside the [facility] ([place] or [place]) "        Check out [employer] for [place] data that are updated daily:    [employer] brant     Global Epidemics  Org, from [facility] (OUTPATIENT CAMP[facility]), will give you Foxmmh-iz-Xxmjih information on virus cases:    Https://globalepidemics  org/

## 2021-03-10 NOTE — Clinical Note
Pls note, pt will have a sentencing hearing for probation violation on 3/18/21  He's hoping for house arrest, but in case he is imprisoned, pls help us predict appropriate follow up for his cancer and other illnesses that may need to be carried forward to the corrections system

## 2021-03-10 NOTE — ACP (ADVANCE CARE PLANNING)
Serious Illness Conversation    1  What is your understanding now of where you are with your illness? 2  How much information about what is likely to be ahead with your illness would you like to have? Information: patient wants to be fully informed     3  What did you (clinician) communicate to the patient? 4  If your health situation worsens, what are your most important goals? Goals: live as long as possible, no matter what, be mentally aware, have my medical decisions respected, be emotionally at peace, be spiritually and emotionally at peace     5  What are the biggest fears and worries about the future and your health? Fears/Worries: loss of control, spiritual distress, emotional concerns, loss of dignity, pain, loss of mobility     6  What abilities are so critical to your life that you cannot imagine living without them? Unacceptable Function: being chronically confused or not being myself, being in pain or very uncomfortable, being unable to interact with others, being unconscious, not being myself, being unable to talk, being unable to communicate effectively, being in chronic severe pain     7  What gives you strength as you think about the future with your illness? 8  If you become sicker, how much are you willing to go through for the possibility of gaining more time? 9  How much does your proxy and family know about your priorities and wishes? Discussion: no discussion but plans to address these issues  Royal Shin is his daughter by current wife Rubne Edwards  We reviewed that his three children from previous unions plus current wife Ruben Edwards would form a coalition that would make decisions  Royal Shin would not have authority to contribute to these decisions  Five Wishes were discussed, and he is interested in this, but does not specify firmly to me today who would be his preferred decision makers              How does this plan sound to you?  I will do everything I can to help you through this   Patient verbalized understanding of the plan        Advanced directives  Five Wishes: Patient does not have Five Wishes- would like information

## 2021-03-12 ENCOUNTER — PATIENT OUTREACH (OUTPATIENT)
Dept: CASE MANAGEMENT | Facility: HOSPITAL | Age: 56
End: 2021-03-12

## 2021-03-12 NOTE — PROGRESS NOTES
Oncology LSW outreached PT to provide him with emotional support and to ensure that he had received his DME (walker)  PT reported that he was "doing alright" and that he had received his walker on Monday  PT explained that he was hoping for a Rolator or electric scooter, but understood that they were not coverage by insurance  PT then explained to LSW that he was worried about his upcoming sentencing date on 3/18 for his possession with intent to sell charge  PT stated that he "hoped he would only get house arrest" and was worried about continuing treatment should he have to go to skilled nursing  LSW explained that he should ask about that at his sentencing hearing  PT was agreeable with this plan  PT proceeded to explain to PT how he came to get his charges and how he saw a silver lining with his circumstances (has been clean off of drugs for nearly 2 years)  LSW engaged in active listening and provided emotional support at this time  LSW further explained that she would reach out to PT following his sentencing and treatment, understanding that PT may have been sentence to serve time if she is unable to get ahold of PT  PT was agreeable with this plan and thanked LSW for her time

## 2021-03-15 ENCOUNTER — TELEPHONE (OUTPATIENT)
Dept: PALLIATIVE MEDICINE | Facility: CLINIC | Age: 56
End: 2021-03-15

## 2021-03-15 DIAGNOSIS — S16.1XXA STRAIN OF NECK MUSCLE, INITIAL ENCOUNTER: Primary | ICD-10-CM

## 2021-03-15 RX ORDER — METHOCARBAMOL 500 MG/1
500 TABLET, FILM COATED ORAL 4 TIMES DAILY PRN
Qty: 40 TABLET | Refills: 0 | Status: SHIPPED | OUTPATIENT
Start: 2021-03-15 | End: 2021-06-07 | Stop reason: ALTCHOICE

## 2021-03-15 NOTE — TELEPHONE ENCOUNTER
Patient called and left a message on the refill line  He is asking for call back  I called him back and left him a voice message as to please return the call in am on 3/16/21

## 2021-03-15 NOTE — TELEPHONE ENCOUNTER
[facility] LSW call to patient  Patient reported that he is having a lot of pain in is neck for last 4 days, can't lift head  Patient reported that his right leg is swollen, from thigh to ankle  Patient reported that he doesn't want to go for another doppler on that leg  Patient asking if Dr Joey Julian could please call in something to [facility] on [address] in [facility] pass for his neck? Patient also reported that he still hasn't received the 5 Wishes document that LSW sent twice

## 2021-03-15 NOTE — TELEPHONE ENCOUNTER
3/15/2021 4:50 PM -  Appreciate the appropriate triage advice given by our LSW partner; doppler and w/up of unilateral leg swelling  He is not noting tactile fevers, and he has no access to thermometer  He declines going to hospital despite feeling more fatigued and in pain and feeling convinced that this is an infection  We agreed to trial robaxin for his symptoms  I did not offer ABx for no clear sign/source of infection  Hospital stay was rec'd for any worsening of symptoms

## 2021-03-17 ENCOUNTER — TELEPHONE (OUTPATIENT)
Dept: FAMILY MEDICINE CLINIC | Facility: CLINIC | Age: 56
End: 2021-03-17

## 2021-03-17 NOTE — TELEPHONE ENCOUNTER
Pt stated he has same thing in his leg again and is requesting an antibiotic be sent in to pharmacy  Said it is R leg and goes down to foot

## 2021-03-17 NOTE — PROGRESS NOTES
Discussed with [facility], patient ok to be treatment  Plan removed from hold and date changed

## 2021-03-18 ENCOUNTER — TELEPHONE (OUTPATIENT)
Dept: PALLIATIVE MEDICINE | Facility: CLINIC | Age: 56
End: 2021-03-18

## 2021-03-18 ENCOUNTER — TELEMEDICINE (OUTPATIENT)
Dept: FAMILY MEDICINE CLINIC | Facility: CLINIC | Age: 56
End: 2021-03-18
Payer: MEDICARE

## 2021-03-18 DIAGNOSIS — L03.115 CELLULITIS OF RIGHT LEG: Primary | ICD-10-CM

## 2021-03-18 PROCEDURE — G0071 COMM SVCS BY RHC/FQHC 5 MIN: HCPCS | Performed by: FAMILY MEDICINE

## 2021-03-18 RX ORDER — CEPHALEXIN 500 MG/1
500 CAPSULE ORAL EVERY 6 HOURS SCHEDULED
Qty: 20 CAPSULE | Refills: 0 | Status: SHIPPED | OUTPATIENT
Start: 2021-03-18 | End: 2021-03-23

## 2021-03-18 NOTE — TELEPHONE ENCOUNTER
Pt called office requesting a call back  Pt stated the call was regarding a personal matter and did not disclose any information

## 2021-03-18 NOTE — PROGRESS NOTES
Virtual Brief Visit    Assessment/Plan:    Problem List Items Addressed This Visit     None      Visit Diagnoses     Cellulitis of right leg    -  Primary    Relevant Medications    cephalexin (KEFLEX) 500 mg capsule        - Will treat with keflex x 5 days  Advised to report to the ED if symptoms  Will coordinate care with palliative and heme/onc for upcoming incarceration  Reason for visit is   Chief Complaint   Patient presents with    Leg Swelling    Virtual Brief Visit        Encounter provider Michaela Harvey PA-C    Provider located at [address] [address]    Recent Visits  Date Type Provider Dept   03/17/21 Telephone DANG Araujo   Showing recent visits within past 7 days and meeting all other requirements     Today's Visits  Date Type Provider Dept   03/18/21 Telemedicine DAMARIS Morrison   Showing today's visits and meeting all other requirements     Future Appointments  No visits were found meeting these conditions  Showing future appointments within next 150 days and meeting all other requirements        After connecting through telephone, the patient was identified by name and date of birth  Edwardo Dukes was informed that this is a telemedicine visit and that the visit is being conducted through telephone  My office door was closed  No one else was in the room  He acknowledged consent and understanding of privacy and security of the platform  The patient has agreed to participate and understands he can discontinue the visit at any time  It was my intent to perform this visit via video technology but the patient was not able to do a video connection so the visit was completed via audio telephone only  Patient is aware this is a billable service  Subjective    Edwardo Dukes is a 63 y o  male      Radha Christine is a 63year old male with stage IV urothelial carcinoma undergoing chemotherapy, presenting with concern for leg pain  Patient endorses right LE pain and swelling x 6 days  States that the swelling is from his thigh down to his toes  His leg is warm to touch and skin is pruritic  He denies open wounds, weeping, bleeding or drainage  Notes some mild pitting  Patient was treated for the same 2/22 with Keflex with improvement  Notes that he was also on Bactrim 3/4-3/11 due to a UTI  Patient had a sentencing hearing today due to possession charges from 2020  Reports that he will be going to [facility] next week and will be transferred to [facility] shortly thereafter as they are more equipped to deal with his medical problems  He is concerned about his leg being infected prior to going to senior care         Past Medical History:   Diagnosis Date    Asthma     [facility] disorder [facility]     COPD (chronic obstructive pulmonary disease) ([facility]     Disease of thyroid gland     Hypertension     Hyperthyroidism        Past Surgical History:   Procedure Laterality Date    DENTAL SURGERY      FL RETROGRADE URETHROCYSTOGRAM  6/15/2020    HERNIA REPAIR Left     inguinal    IR LYMPH NODE BIOPSY/ASPIRATION  7/24/2020    IR TUBE PLACEMENT  6/25/2020    ME CYSTOURETHROSCOPY,URETER CATHETER Right 6/15/2020    Procedure: CYSTOSCOPY RETROGRADE PYELOGRAM and ureteroscopy;  Surgeon: Asha Waite MD;  Location: [facility];  Service: Urology       Current Outpatient Medications   Medication Sig Dispense Refill    acetaminophen (TYLENOL) 650 mg CR tablet Take 1 tablet (650 mg total) by mouth every 8 (eight) hours 30 tablet 0    albuterol (PROVENTIL HFA,VENTOLIN HFA) 90 mcg/act inhaler TAKE 2 PUFFS BY MOUTH EVERY 6 HOURS AS NEEDED FOR WHEEZE OR FOR SHORTNESS OF BREATH 18 Inhaler 5    ALPRAZolam (XANAX) 0 25 mg tablet Take 1 tablet (0 25 mg total) by mouth 3 (three) times a day as needed for anxiety (no more than 3 tabs a day) 70 tablet 1    clonazePAM (KlonoPIN) 0 5 mg tablet Take 1 tablet (0 5 mg total) by mouth 2 (two) times a day as needed for anxiety 14 tablet 0    Diclofenac Sodium (VOLTAREN) 1 % Apply 2 g topically 4 (four) times a day as needed (pain) 1 Tube 5    escitalopram (LEXAPRO) 20 mg tablet       fluticasone (FLONASE) 50 mcg/act nasal spray 1 spray into each nostril daily 1 Bottle 11    fluticasone (FLOVENT HFA) 220 mcg/act inhaler Inhale 2 puffs 2 (two) times a day Rinse mouth after use  1 Inhaler 5    methocarbamol (ROBAXIN) 500 mg tablet Take 1 tablet (500 mg total) by mouth 4 (four) times a day as needed for muscle spasms 40 tablet 0    omeprazole (PriLOSEC) 40 MG capsule TAKE 1 CAPSULE BY MOUTH EVERY DAY BEFORE BREAKFAST 90 capsule 1    ondansetron (ZOFRAN) 8 mg tablet Take 1 tablet (8 mg total) by mouth 3 (three) times a day before meals To prevent chemo nausea 90 tablet 0    [START ON 3/22/2021] oxyCODONE (ROXICODONE) 20 MG TABS Take 1-2 tablets (20-40 mg total) by mouth every 4 (four) hours as needed (20mg (1tab) for moderate pain, 40mg (2tab) for severe pain related to cancer  Max of 6tabs a day  )Max Daily Amount: 120 mg 180 tablet 0    [START ON 3/22/2021] oxyCODONE ER (Xtampza ER) 36 MG C12A Take 1 each by mouth 3 (three) times a day To prevent cancer painMax Daily Amount: 3 each 90 capsule 0    zolpidem (AMBIEN) 10 mg tablet 1 TAB AT BEDTIME AS NEEDED FOR INSOMNIA      cephalexin (KEFLEX) 500 mg capsule Take 1 capsule (500 mg total) by mouth every 6 (six) hours for 5 days 20 capsule 0     No current facility-administered medications for this visit  Allergies   Allergen Reactions    Ketorolac Hives     Toradol       Review of Systems   Constitutional: Positive for diaphoresis  Negative for chills and fever  Respiratory: Negative for cough, chest tightness, shortness of breath and wheezing  Cardiovascular: Positive for leg swelling  Negative for chest pain and palpitations     Gastrointestinal: Negative for abdominal pain, blood in stool, constipation, diarrhea, nausea and vomiting  Skin: Negative for color change, rash and wound  Neurological: Negative for dizziness, syncope, weakness, light-headedness and headaches  There were no vitals filed for this visit  I spent 10 minutes directly with the patient during this visit    [address] [place] [address] Road acknowledges that he has consented to an online visit or consultation  He understands that the online visit is based solely on information provided by him, and that, in the absence of a face-to-face physical evaluation by the physician, the diagnosis he receives is both limited and provisional in terms of accuracy and completeness  This is not intended to replace a full medical face-to-face evaluation by the physician  Hardik Tran understands and accepts these terms

## 2021-03-19 ENCOUNTER — HOSPITAL ENCOUNTER (OUTPATIENT)
Dept: INFUSION CENTER | Facility: HOSPITAL | Age: 56
Discharge: HOME | End: 2021-03-19
Attending: INTERNAL MEDICINE
Payer: MEDICARE

## 2021-03-19 ENCOUNTER — APPOINTMENT (OUTPATIENT)
Dept: LAB | Facility: HOSPITAL | Age: 56
End: 2021-03-19
Attending: INTERNAL MEDICINE
Payer: MEDICARE

## 2021-03-19 ENCOUNTER — TELEPHONE (OUTPATIENT)
Dept: PALLIATIVE MEDICINE | Facility: CLINIC | Age: 56
End: 2021-03-19

## 2021-03-19 VITALS
HEART RATE: 83 BPM | DIASTOLIC BLOOD PRESSURE: 61 MMHG | HEIGHT: 66 IN | WEIGHT: 154.54 LBS | BODY MASS INDEX: 24.84 KG/M2 | SYSTOLIC BLOOD PRESSURE: 127 MMHG | RESPIRATION RATE: 18 BRPM | OXYGEN SATURATION: 100 % | TEMPERATURE: 96.4 F

## 2021-03-19 DIAGNOSIS — C66.1 UROTHELIAL CARCINOMA OF RIGHT DISTAL URETER (HCC): Primary | ICD-10-CM

## 2021-03-19 DIAGNOSIS — C66.1 UROTHELIAL CARCINOMA OF RIGHT DISTAL URETER (HCC): ICD-10-CM

## 2021-03-19 LAB
ALBUMIN SERPL BCP-MCNC: 2.7 G/DL (ref 3.5–5)
ALP SERPL-CCNC: 82 U/L (ref 46–116)
ALT SERPL W P-5'-P-CCNC: 57 U/L (ref 12–78)
ANION GAP SERPL CALCULATED.3IONS-SCNC: 5 MMOL/L (ref 4–13)
AST SERPL W P-5'-P-CCNC: 44 U/L (ref 5–45)
BASOPHILS # BLD AUTO: 0.05 THOUSANDS/ΜL (ref 0–0.1)
BASOPHILS NFR BLD AUTO: 1 % (ref 0–1)
BILIRUB SERPL-MCNC: 0.3 MG/DL (ref 0.2–1)
BUN SERPL-MCNC: 13 MG/DL (ref 5–25)
CALCIUM ALBUM COR SERPL-MCNC: 9.7 MG/DL (ref 8.3–10.1)
CALCIUM SERPL-MCNC: 8.7 MG/DL (ref 8.3–10.1)
CHLORIDE SERPL-SCNC: 100 MMOL/L (ref 100–108)
CO2 SERPL-SCNC: 32 MMOL/L (ref 21–32)
CREAT SERPL-MCNC: 1.07 MG/DL (ref 0.6–1.3)
EOSINOPHIL # BLD AUTO: 0.68 THOUSAND/ΜL (ref 0–0.61)
EOSINOPHIL NFR BLD AUTO: 11 % (ref 0–6)
ERYTHROCYTE [DISTWIDTH] IN BLOOD BY AUTOMATED COUNT: 13.6 % (ref 11.6–15.1)
GFR SERPL CREATININE-BSD FRML MDRD: 77 ML/MIN/1.73SQ M
GLUCOSE P FAST SERPL-MCNC: 103 MG/DL (ref 65–99)
HCT VFR BLD AUTO: 30.3 % (ref 36.5–49.3)
HGB BLD-MCNC: 9.9 G/DL (ref 12–17)
IMM GRANULOCYTES # BLD AUTO: 0.03 THOUSAND/UL (ref 0–0.2)
IMM GRANULOCYTES NFR BLD AUTO: 1 % (ref 0–2)
LYMPHOCYTES # BLD AUTO: 0.58 THOUSANDS/ΜL (ref 0.6–4.47)
LYMPHOCYTES NFR BLD AUTO: 9 % (ref 14–44)
MCH RBC QN AUTO: 32.8 PG (ref 26.8–34.3)
MCHC RBC AUTO-ENTMCNC: 32.7 G/DL (ref 31.4–37.4)
MCV RBC AUTO: 100 FL (ref 82–98)
MONOCYTES # BLD AUTO: 0.87 THOUSAND/ΜL (ref 0.17–1.22)
MONOCYTES NFR BLD AUTO: 14 % (ref 4–12)
NEUTROPHILS # BLD AUTO: 4.06 THOUSANDS/ΜL (ref 1.85–7.62)
NEUTS SEG NFR BLD AUTO: 64 % (ref 43–75)
NRBC BLD AUTO-RTO: 0 /100 WBCS
PLATELET # BLD AUTO: 217 THOUSANDS/UL (ref 149–390)
PMV BLD AUTO: 7.9 FL (ref 8.9–12.7)
POTASSIUM SERPL-SCNC: 4.9 MMOL/L (ref 3.5–5.3)
PROT SERPL-MCNC: 6.7 G/DL (ref 6.4–8.2)
RBC # BLD AUTO: 3.02 MILLION/UL (ref 3.88–5.62)
SODIUM SERPL-SCNC: 137 MMOL/L (ref 136–145)
TSH SERPL DL<=0.05 MIU/L-ACNC: 0.41 UIU/ML (ref 0.36–3.74)
WBC # BLD AUTO: 6.27 THOUSAND/UL (ref 4.31–10.16)

## 2021-03-19 PROCEDURE — 80053 COMPREHEN METABOLIC PANEL: CPT

## 2021-03-19 PROCEDURE — 96413 CHEMO IV INFUSION 1 HR: CPT

## 2021-03-19 PROCEDURE — 85025 COMPLETE CBC W/AUTO DIFF WBC: CPT

## 2021-03-19 PROCEDURE — 84443 ASSAY THYROID STIM HORMONE: CPT

## 2021-03-19 PROCEDURE — 36415 COLL VENOUS BLD VENIPUNCTURE: CPT

## 2021-03-19 RX ORDER — SODIUM CHLORIDE 9 MG/ML
20 INJECTION, SOLUTION INTRAVENOUS ONCE
Status: COMPLETED | OUTPATIENT
Start: 2021-03-19 | End: 2021-03-19

## 2021-03-19 RX ADMIN — DURVALUMAB 670 MG: 500 INJECTION, SOLUTION INTRAVENOUS at 09:24

## 2021-03-19 RX ADMIN — SODIUM CHLORIDE 20 ML/HR: 0.9 INJECTION, SOLUTION INTRAVENOUS at 08:44

## 2021-03-19 NOTE — TELEPHONE ENCOUNTER
[facility] LSW received message to call patient  Patient reported that he isn't doing well  Patient reported that he has to report to THE [facility] next Friday, he will then transfer to a [address] [place] [address] in [place]  Patient reported that he believes he will be there for about 1 year but he could be there for 3  Patient requesting that LSW placed call to Hilary Arellano at the [facility] to fax current medication list and patient requesting that Dr David Maciel write letter stating that due to his condition he needs bottom bunk, bottom tier, double mattress and double blankets  Patient is hoping that he will be able to maintain treatment while in [facility]  Patient also wanted a copy of his medication list to be mailed to him as well

## 2021-03-19 NOTE — PLAN OF CARE
Problem: INFECTION - ADULT  Goal: Absence or prevention of progression during hospitalization  Description: INTERVENTIONS:  - Assess and monitor for signs and symptoms of infection  - Monitor lab/diagnostic results  - Monitor all insertion sites, i e  indwelling lines, tubes, and drains  - Monitor endotracheal if appropriate and nasal secretions for changes in amount and color  - [place] appropriate cooling/warming therapies per order  - Administer medications as ordered  - Instruct and encourage patient and family to use good hand hygiene technique  - Identify and instruct in appropriate isolation precautions for identified infection/condition  Outcome: Progressing     Problem: Knowledge Deficit  Goal: Patient/family/caregiver demonstrates understanding of disease process, treatment plan, medications, and discharge instructions  Description: Complete learning assessment and assess knowledge base    Interventions:  - Provide teaching at level of understanding  - Provide teaching via preferred learning methods  Outcome: Progressing

## 2021-03-19 NOTE — LETTER
To whom it may concern:    Seth Montilla is a patient in my practice since 7/16/20  I have personally supervised or led his cares since 7/31/20 from the Palliative Care aspect  In brief, he has several important medical issues, not the least of which is urothelial or bladder cancer of the R renal collecting system  Additional medical problems are as follows:    Patient Active Problem List   Diagnosis    Allergic rhinitis    Anxiety disorder    Bipolar disorder (HCC)    Chronic lumbar radiculopathy    Contact dermatitis    Esophageal reflux    Migraine headache    Mild intermittent asthma without complication    Other emphysema (HCC)    Subclinical hyperthyroidism    Essential hypertension    Hydronephrosis of right kidney    Major depression    Panic disorder    Urothelial lesion    Cancer related pain    Chronic back pain    Retroperitoneal lymphadenopathy    Palliative care patient    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)    Therapeutic opioid induced constipation    Acute kidney injury ([address] )    Hyponatremia    UTI (urinary tract infection)    Bony metastasis (HCC)    Chemotherapy induced neutropenia (HCC)    Iron deficiency anemia due to chronic blood loss                    Since 7/16/20, he has followed with our practice for support of his multiple medical conditions and treatment of same, and we have specifically worked carefully with him to appropriately use opioid medications for severe cancer pain  This has proven challenging, almost entirely because his disease continues to change, and require frequent reassessments and investigations  There is a component of his mental illness, anxiety, and/or personality traits that has complicated his responsible use of controlled substances, but he has responded well and politely to frequent visits, emotional support, and careful limit-setting by myself and my practice partners    His medications as managed by our practice and our other medical partners are as follows:      Current Outpatient Medications:     acetaminophen (TYLENOL) 650 mg CR tablet, Take 1 tablet (650 mg total) by mouth every 8 (eight) hours, Disp: 30 tablet, Rfl: 0    albuterol (PROVENTIL HFA,VENTOLIN HFA) 90 mcg/act inhaler, TAKE 2 PUFFS BY MOUTH EVERY 6 HOURS AS NEEDED FOR WHEEZE OR FOR SHORTNESS OF BREATH, Disp: 18 Inhaler, Rfl: 5    ALPRAZolam (XANAX) 0 25 mg tablet, Take 1 tablet (0 25 mg total) by mouth 3 (three) times a day as needed for anxiety (no more than 3 tabs a day), Disp: 70 tablet, Rfl: 1    cephalexin (KEFLEX) 500 mg capsule, Take 1 capsule (500 mg total) by mouth every 6 (six) hours for 5 days, Disp: 20 capsule, Rfl: 0    clonazePAM (KlonoPIN) 0 5 mg tablet, Take 1 tablet (0 5 mg total) by mouth 2 (two) times a day as needed for anxiety, Disp: 14 tablet, Rfl: 0    Diclofenac Sodium (VOLTAREN) 1 %, Apply 2 g topically 4 (four) times a day as needed (pain), Disp: 1 Tube, Rfl: 5    escitalopram (LEXAPRO) 20 mg tablet, , Disp: , Rfl:     fluticasone (FLONASE) 50 mcg/act nasal spray, 1 spray into each nostril daily, Disp: 1 Bottle, Rfl: 11    fluticasone (FLOVENT HFA) 220 mcg/act inhaler, Inhale 2 puffs 2 (two) times a day Rinse mouth after use , Disp: 1 Inhaler, Rfl: 5    methocarbamol (ROBAXIN) 500 mg tablet, Take 1 tablet (500 mg total) by mouth 4 (four) times a day as needed for muscle spasms, Disp: 40 tablet, Rfl: 0    omeprazole (PriLOSEC) 40 MG capsule, TAKE 1 CAPSULE BY MOUTH EVERY DAY BEFORE BREAKFAST, Disp: 90 capsule, Rfl: 1    ondansetron (ZOFRAN) 8 mg tablet, Take 1 tablet (8 mg total) by mouth 3 (three) times a day before meals To prevent chemo nausea, Disp: 90 tablet, Rfl: 0    [START ON 3/22/2021] oxyCODONE (ROXICODONE) 20 MG TABS, Take 1-2 tablets (20-40 mg total) by mouth every 4 (four) hours as needed (20mg (1tab) for moderate pain, 40mg (2tab) for severe pain related to cancer  Max of 6tabs a day  )Max Daily Amount: 120 mg, Disp: 180 tablet, Rfl: 0    [START ON 3/22/2021] oxyCODONE ER (Xtampza ER) 36 MG C12A, Take 1 each by mouth 3 (three) times a day To prevent cancer painMax Daily Amount: 3 each, Disp: 90 capsule, Rfl: 0    zolpidem (AMBIEN) 10 mg tablet, 1 TAB AT BEDTIME AS NEEDED FOR INSOMNIA, Disp: , Rfl:   No current facility-administered medications for this visit  Importantly, we would direct your attention to the ongoing need for cancer care to maintain quiescene of his cancer disease  His [facility] oncologist Dr Ascencion Welsh has recenlty evaluated Mr Corrinne Flavin in clinic, and recommended the following overall care plan for therapy for our patient:   "Imfinzi (immunotherapy) every two weeks  Repeat imaging every three months (next due in June 2021)  A full course of treatment would last for one full calendar year, so long as interval scans show stability or improvement  Beginning in April 2022, his immunotherapy might be stopped without further treatment, based on the opinion and advice of a medical oncologist "      Given the above, our role in Palliative and Supportive Care has been to assist in goal-setting and management of other illnesses that would prevent or complicate treatment of the underlying and primary cancer process  Aside from management of pain and anxiety as noted elsewhere, the nutritional and functional status of Mr Tran have been our concern as well  We note that his BMI is perhaps elevated over what his physical exam would suggest   To my professional exam on 3/10/21, he is a slender and chronically ill person, with a scaphoid abdomen, temporal wasting, and sunken eyes  His latest lab testing shows a depressed albumin level of 2 7  These constitute a general status of muscle wasting or sarcopenia, and moderate protein-calorie malnutrition, which is not unexpected for a person with advanced cancer such as Mr Corrinne Flavin    Standard cares for his skin hygiene to prevent development of pressure ulcers over bony prominences would include a hospital bed with gel overlay  In a correction, a double mattress with double blankets would be a reasonable accommodation  His general frailty -- as well as his demonstrable bony metastases -- put him at greater risk of falls, and of catastrophic injury from falls  He should have his fall risks minimized any any setting he is in  In a correction, we would prescribe bottom tier accommodations, on the ground level of the cell block  He should be allotted a bottom bunk, a minimum distance from the floor  Fall pads below the bed would also be appropriate to cushion his bones from the floor, if there were to be a fall        Raya Knox MD          Palliative and [address] [place] [address]  Service: [phone]

## 2021-03-19 NOTE — PROGRESS NOTES
Patient arrived to unit, states his Rt foot is still swollen, he called his family dr Erin Shrestha and they started him on keflex for 5 days, patient did not start medication yet  Teams msg out to [facility] RN to advise is patient should be treated or if treatment should be held today  Awatiing msg back at this time

## 2021-03-22 ENCOUNTER — PATIENT OUTREACH (OUTPATIENT)
Dept: CASE MANAGEMENT | Facility: HOSPITAL | Age: 56
End: 2021-03-22

## 2021-03-22 NOTE — PROGRESS NOTES
Oncology LSW outreached PT after receiving his voicemail from 3/18/21  PT reported that he was sentenced to 1-3 years for a possession charge  PT explained that he needed to report to the [facility] on 3/26/21 to start his sentence  LSW explained that she was aware of this, as she had reviewed PT's chart notes, specifically from palliative care  LSW explained that the note also mentioned that he would be transitioning to a "medical prsion in [place] " PT reported that he was not aware of this transition, to which LSW explained that if may have been her fellow LSW's interpretation of their previous conversation  PT was receptive to this idea, reporting that he "hoped" that that would be the case  LSW also explained that, per viewing palliative's notes, she noticed that Dr Rico Mariela office "Our office is working with his personal  to help negotiate appropriate cancer cares " PT noted his appreciation  PT further explained that his landlord was agreeable to holding his room for him until his release from [facility], so long as he paid her a lump sum of his stimulus check  PT stated that he had "talked with his friend" and felt that he would "probably be out by [place] for good behavior " LSW engaged in active listening at this time and provided emotional support  PT explained that he would reach out to LSW again once he was released  LSW was agreeable with this plan and wished PT the best of luck

## 2021-03-22 NOTE — TELEPHONE ENCOUNTER
[facility] placed call to [address] office, spoke with Lian Beal reported that documentation could be faxed to her at [phone] and she will make sure that the  gets the paperwork  [facility] faxed over requested documentation to 's office

## 2021-03-29 ENCOUNTER — TELEPHONE (OUTPATIENT)
Dept: PALLIATIVE MEDICINE | Facility: CLINIC | Age: 56
End: 2021-03-29

## 2021-03-29 DIAGNOSIS — F41.9 ANXIETY DISORDER, UNSPECIFIED TYPE: Primary | ICD-10-CM

## 2021-03-29 DIAGNOSIS — G89.3 CANCER RELATED PAIN: ICD-10-CM

## 2021-03-29 NOTE — TELEPHONE ENCOUNTER
Wife calling to say he is incarcerated and he hasn't received his pain medication  She was wondering if their is anything you can do for him  She can be reached at [phone]

## 2021-03-31 ENCOUNTER — TELEPHONE (OUTPATIENT)
Dept: INFUSION CENTER | Facility: HOSPITAL | Age: 56
End: 2021-03-31

## 2021-03-31 NOTE — TELEPHONE ENCOUNTER
Spoke with Carlos Pollard at [facility]    I told her patient has appointment with us on Friday and she said was not aware of this and there was absolutely no way they could get him here for his treatment, rescheduled for 4-15-21 at 8 am

## 2021-03-31 NOTE — TELEPHONE ENCOUNTER
4/1/2021 9:16 AM -  Hardcopy faxed to [facility] for non-opioid, non-BZD symptom management:    Requested Prescriptions     Signed Prescriptions Disp Refills    gabapentin (NEURONTIN) 300 mg capsule 60 capsule 0     Sig: Take 1 capsule (300 mg total) by mouth 2 (two) times a day May uptaper by 1 capsule per day, every 5 days, until: max of 1200mg/day, OR pain control or anxiety stable, OR other toxicity occurs  Authorizing Provider: Osei Early          4/1/2021 9:15 AM -  Reached a nurse in the medical unit, and was informed that no opioids nor benzos whatsoever will be used in the [facility]  There are protocols for alternative medications in place to reduce harms and prevent adverse effects, and non-opioid, non-BZD substances may be faxed to  [phone], attn: Dr Estee Allen  Called pt's wife and explained our resources nad limitations; she would wish for us to do what is allowed for non-opioids / non-benzo symptom relief, and also to investigate how/if the pt can get immunotherapies as rec'd by Dr Anthony Gracia  For the near future, pt will be in [facility] -- Xfer to a medical [facility] in [facility] has not yet been planned  4/1/2021 9:15 AM -  Attempted call to [employer], asked to [employer] to Medical   No medical staff available;  requested that we call back at a later time  No direct line was offered, a msg could not be left

## 2021-03-31 NOTE — TELEPHONE ENCOUNTER
This nurse called wife who reports patient is in [facility] and she does not have a phone number  She asks if there is anything we can do for patient who is reporting he is only receiving tylenol there  She will call us back with the phone number to the [facility]

## 2021-04-01 RX ORDER — GABAPENTIN 300 MG/1
300 CAPSULE ORAL 2 TIMES DAILY
Qty: 60 CAPSULE | Refills: 0 | Status: SHIPPED | OUTPATIENT
Start: 2021-04-01 | End: 2021-06-07 | Stop reason: ALTCHOICE

## 2021-04-02 ENCOUNTER — HOSPITAL ENCOUNTER (OUTPATIENT)
Dept: INFUSION CENTER | Facility: HOSPITAL | Age: 56
Discharge: HOME | End: 2021-04-02

## 2021-04-06 NOTE — TELEPHONE ENCOUNTER
Looks as if the infusion center reached out to the correction to confirm infusion appt and they rescheduled for 4/15/21  This task can be done

## 2021-04-13 ENCOUNTER — TELEPHONE (OUTPATIENT)
Dept: HEMATOLOGY ONCOLOGY | Facility: CLINIC | Age: 56
End: 2021-04-13

## 2021-04-13 DIAGNOSIS — C66.1 UROTHELIAL CARCINOMA OF RIGHT DISTAL URETER (HCC): Primary | ICD-10-CM

## 2021-04-13 RX ORDER — SODIUM CHLORIDE 9 MG/ML
20 INJECTION, SOLUTION INTRAVENOUS ONCE
Status: CANCELLED | OUTPATIENT
Start: 2021-04-30

## 2021-04-13 NOTE — TELEPHONE ENCOUNTER
Attempted to call Liu Lemus regarding patient's upcoming treatment of imfinzi  Left a message with the medical facility phone #  Awaiting c/b

## 2021-04-15 ENCOUNTER — TELEPHONE (OUTPATIENT)
Dept: INFUSION CENTER | Facility: HOSPITAL | Age: 56
End: 2021-04-15

## 2021-04-15 ENCOUNTER — TELEPHONE (OUTPATIENT)
Dept: HEMATOLOGY ONCOLOGY | Facility: CLINIC | Age: 56
End: 2021-04-15

## 2021-04-15 ENCOUNTER — HOSPITAL ENCOUNTER (OUTPATIENT)
Dept: INFUSION CENTER | Facility: HOSPITAL | Age: 56
Discharge: HOME | End: 2021-04-15
Attending: INTERNAL MEDICINE
Payer: MEDICARE

## 2021-04-15 ENCOUNTER — APPOINTMENT (OUTPATIENT)
Dept: LAB | Facility: HOSPITAL | Age: 56
End: 2021-04-15
Attending: INTERNAL MEDICINE
Payer: MEDICARE

## 2021-04-15 VITALS
BODY MASS INDEX: 24.45 KG/M2 | DIASTOLIC BLOOD PRESSURE: 80 MMHG | SYSTOLIC BLOOD PRESSURE: 138 MMHG | RESPIRATION RATE: 16 BRPM | OXYGEN SATURATION: 100 % | HEIGHT: 66 IN | WEIGHT: 152.12 LBS | HEART RATE: 77 BPM | TEMPERATURE: 97.8 F

## 2021-04-15 DIAGNOSIS — C66.1 UROTHELIAL CARCINOMA OF RIGHT DISTAL URETER (HCC): ICD-10-CM

## 2021-04-15 DIAGNOSIS — C66.1 UROTHELIAL CARCINOMA OF RIGHT DISTAL URETER (HCC): Primary | ICD-10-CM

## 2021-04-15 LAB
ALBUMIN SERPL BCP-MCNC: 3.3 G/DL (ref 3.5–5)
ALP SERPL-CCNC: 116 U/L (ref 46–116)
ALT SERPL W P-5'-P-CCNC: 83 U/L (ref 12–78)
ANION GAP SERPL CALCULATED.3IONS-SCNC: 9 MMOL/L (ref 4–13)
AST SERPL W P-5'-P-CCNC: 38 U/L (ref 5–45)
BASOPHILS # BLD AUTO: 0.06 THOUSANDS/ΜL (ref 0–0.1)
BASOPHILS NFR BLD AUTO: 1 % (ref 0–1)
BILIRUB SERPL-MCNC: 0.34 MG/DL (ref 0.2–1)
BUN SERPL-MCNC: 24 MG/DL (ref 5–25)
CALCIUM ALBUM COR SERPL-MCNC: 9.6 MG/DL (ref 8.3–10.1)
CALCIUM SERPL-MCNC: 9 MG/DL (ref 8.3–10.1)
CHLORIDE SERPL-SCNC: 106 MMOL/L (ref 100–108)
CO2 SERPL-SCNC: 24 MMOL/L (ref 21–32)
CREAT SERPL-MCNC: 1.14 MG/DL (ref 0.6–1.3)
EOSINOPHIL # BLD AUTO: 0.48 THOUSAND/ΜL (ref 0–0.61)
EOSINOPHIL NFR BLD AUTO: 6 % (ref 0–6)
ERYTHROCYTE [DISTWIDTH] IN BLOOD BY AUTOMATED COUNT: 14.1 % (ref 11.6–15.1)
GFR SERPL CREATININE-BSD FRML MDRD: 71 ML/MIN/1.73SQ M
GLUCOSE P FAST SERPL-MCNC: 102 MG/DL (ref 65–99)
HCT VFR BLD AUTO: 36.2 % (ref 36.5–49.3)
HGB BLD-MCNC: 11.8 G/DL (ref 12–17)
IMM GRANULOCYTES # BLD AUTO: 0.03 THOUSAND/UL (ref 0–0.2)
IMM GRANULOCYTES NFR BLD AUTO: 0 % (ref 0–2)
LYMPHOCYTES # BLD AUTO: 0.95 THOUSANDS/ΜL (ref 0.6–4.47)
LYMPHOCYTES NFR BLD AUTO: 12 % (ref 14–44)
MCH RBC QN AUTO: 32.8 PG (ref 26.8–34.3)
MCHC RBC AUTO-ENTMCNC: 32.6 G/DL (ref 31.4–37.4)
MCV RBC AUTO: 101 FL (ref 82–98)
MONOCYTES # BLD AUTO: 1.16 THOUSAND/ΜL (ref 0.17–1.22)
MONOCYTES NFR BLD AUTO: 14 % (ref 4–12)
NEUTROPHILS # BLD AUTO: 5.57 THOUSANDS/ΜL (ref 1.85–7.62)
NEUTS SEG NFR BLD AUTO: 67 % (ref 43–75)
NRBC BLD AUTO-RTO: 0 /100 WBCS
PLATELET # BLD AUTO: 241 THOUSANDS/UL (ref 149–390)
PMV BLD AUTO: 8.2 FL (ref 8.9–12.7)
POTASSIUM SERPL-SCNC: 5.1 MMOL/L (ref 3.5–5.3)
PROT SERPL-MCNC: 7.7 G/DL (ref 6.4–8.2)
RBC # BLD AUTO: 3.6 MILLION/UL (ref 3.88–5.62)
SODIUM SERPL-SCNC: 139 MMOL/L (ref 136–145)
T4 FREE SERPL-MCNC: 1.04 NG/DL (ref 0.76–1.46)
TSH SERPL DL<=0.05 MIU/L-ACNC: 0.04 UIU/ML (ref 0.36–3.74)
WBC # BLD AUTO: 8.25 THOUSAND/UL (ref 4.31–10.16)

## 2021-04-15 PROCEDURE — 80053 COMPREHEN METABOLIC PANEL: CPT

## 2021-04-15 PROCEDURE — 96413 CHEMO IV INFUSION 1 HR: CPT

## 2021-04-15 PROCEDURE — 84439 ASSAY OF FREE THYROXINE: CPT

## 2021-04-15 PROCEDURE — 85025 COMPLETE CBC W/AUTO DIFF WBC: CPT

## 2021-04-15 PROCEDURE — 84443 ASSAY THYROID STIM HORMONE: CPT

## 2021-04-15 PROCEDURE — 36415 COLL VENOUS BLD VENIPUNCTURE: CPT

## 2021-04-15 RX ORDER — SODIUM CHLORIDE 9 MG/ML
20 INJECTION, SOLUTION INTRAVENOUS ONCE
Status: COMPLETED | OUTPATIENT
Start: 2021-04-15 | End: 2021-04-15

## 2021-04-15 RX ADMIN — DURVALUMAB 670 MG: 500 INJECTION, SOLUTION INTRAVENOUS at 09:02

## 2021-04-15 RX ADMIN — SODIUM CHLORIDE 20 ML/HR: 0.9 INJECTION, SOLUTION INTRAVENOUS at 08:30

## 2021-04-15 NOTE — TELEPHONE ENCOUNTER
Hieu Long called back, patient will be going to [address] [facility] which is in [place]  Will pass on to [facility]

## 2021-04-15 NOTE — PROGRESS NOTES
Patient arrived to unit with 2 correctional officers  Patient states his legs are still swollen and he had a doppler done at the skilled nursing which revealed no clot  Will pass on to Dr Eric Lorenzo nurse

## 2021-04-15 NOTE — PROGRESS NOTES
Patient tolerated infusion well, patient left in stable condition  AVS printed, and given to the guard

## 2021-04-15 NOTE — PLAN OF CARE
Problem: INFECTION - ADULT  Goal: Absence or prevention of progression during hospitalization  Description: INTERVENTIONS:  - Assess and monitor for signs and symptoms of infection  - Monitor lab/diagnostic results  - Monitor all insertion sites, i e  indwelling lines, tubes, and drains  - Monitor endotracheal if appropriate and nasal secretions for changes in amount and color  - [place] appropriate cooling/warming therapies per order  - Administer medications as ordered  - Instruct and encourage patient and family to use good hand hygiene technique  - Identify and instruct in appropriate isolation precautions for identified infection/condition  Outcome: Progressing     Problem: Knowledge Deficit  Goal: Patient/family/caregiver demonstrates understanding of disease process, treatment plan, medications, and discharge instructions  Description: Complete learning assessment and assess knowledge base    Interventions:  - Provide teaching at level of understanding  - Provide teaching via preferred learning methods  Outcome: Progressing

## 2021-04-15 NOTE — TELEPHONE ENCOUNTER
Received a call from [facility] at [facility]  Patient is going to be transferred tentatively on April 21st to a state facility  [facility] will find out his intial destination so that we can give to Dr Bebeto Arcos nurse for a follow up  Awaiting return call at this time

## 2021-04-15 NOTE — TELEPHONE ENCOUNTER
Can you please schedule this pt for his CT chest/abd/pelvis  He is in [facility]  Can you let me know the date and the time and I will call the correction  I would think he would [facility]    Thank you

## 2021-04-15 NOTE — TELEPHONE ENCOUNTER
Returned call to [employer] @ [facility] and left a message for [employer]     Left a VM advising that Wash is scheduled for a CT on 4/21/21 @ 1000 am

## 2021-04-16 ENCOUNTER — TELEPHONE (OUTPATIENT)
Dept: HEMATOLOGY ONCOLOGY | Facility: CLINIC | Age: 56
End: 2021-04-16

## 2021-04-16 NOTE — TELEPHONE ENCOUNTER
Hieu Long calling from [employer] to speak with Julieta   She can be reached back at [phone] option 5 for medical

## 2021-04-16 NOTE — TELEPHONE ENCOUNTER
Returned call to [facility]  She stated Jalyn To will be transferred to [facility] for temporary holding on 4/21 so he will not be able to do the CT on that date  She advised me all of his paperwork and medical information would be transferred with him and that someone would reach out to coordinate care once transfer process is done  Will cancel 4/21 CT

## 2021-04-27 NOTE — TELEPHONE ENCOUNTER
Called [facility] and spoke with Francisco Robb, RN and pt is no longer there  He is in state residential  I asked Francisco Robb for the phone number and she stated that pts leave [facility] and go to a processing center for about 2 weeks them get sent to a state residential from there  She is not sure which one pt is at  She stated that they have our records so they should be contacting us to make f/u appointment

## 2021-04-28 NOTE — PROGRESS NOTES
[address] [place] [address] and spoke with Mallory Smith from the [facility]  Confirmed that the facility will be providing transportation for his treatment on 4/30/21 @ 0800  Brayan Medrano/SHELBY is aware

## 2021-04-30 ENCOUNTER — HOSPITAL ENCOUNTER (OUTPATIENT)
Dept: INFUSION CENTER | Facility: HOSPITAL | Age: 56
Discharge: HOME | End: 2021-04-30
Payer: MEDICARE

## 2021-04-30 ENCOUNTER — TELEPHONE (OUTPATIENT)
Dept: HEMATOLOGY ONCOLOGY | Facility: CLINIC | Age: 56
End: 2021-04-30

## 2021-04-30 ENCOUNTER — HOSPITAL ENCOUNTER (OUTPATIENT)
Dept: INFUSION CENTER | Facility: HOSPITAL | Age: 56
Discharge: HOME | End: 2021-04-30
Attending: INTERNAL MEDICINE

## 2021-04-30 VITALS
HEIGHT: 66 IN | SYSTOLIC BLOOD PRESSURE: 119 MMHG | OXYGEN SATURATION: 96 % | HEART RATE: 74 BPM | DIASTOLIC BLOOD PRESSURE: 63 MMHG | RESPIRATION RATE: 16 BRPM | WEIGHT: 143.52 LBS | TEMPERATURE: 98 F | BODY MASS INDEX: 23.07 KG/M2

## 2021-04-30 DIAGNOSIS — C66.1 UROTHELIAL CARCINOMA OF RIGHT DISTAL URETER (HCC): Primary | ICD-10-CM

## 2021-04-30 PROCEDURE — 96413 CHEMO IV INFUSION 1 HR: CPT

## 2021-04-30 RX ORDER — ACETAMINOPHEN AND CODEINE PHOSPHATE 300; 30 MG/1; MG/1
1 TABLET ORAL EVERY 4 HOURS PRN
COMMUNITY

## 2021-04-30 RX ORDER — LISINOPRIL AND HYDROCHLOROTHIAZIDE 25; 20 MG/1; MG/1
1 TABLET ORAL DAILY
COMMUNITY
End: 2021-06-07 | Stop reason: ALTCHOICE

## 2021-04-30 RX ORDER — SODIUM CHLORIDE 9 MG/ML
20 INJECTION, SOLUTION INTRAVENOUS ONCE
Status: COMPLETED | OUTPATIENT
Start: 2021-04-30 | End: 2021-04-30

## 2021-04-30 RX ORDER — FENTANYL 25 UG/H
1 PATCH TRANSDERMAL
COMMUNITY

## 2021-04-30 RX ORDER — LISINOPRIL 10 MG/1
10 TABLET ORAL DAILY
COMMUNITY
End: 2021-06-07 | Stop reason: ALTCHOICE

## 2021-04-30 RX ADMIN — SODIUM CHLORIDE 20 ML/HR: 0.9 INJECTION, SOLUTION INTRAVENOUS at 08:20

## 2021-04-30 RX ADMIN — DURVALUMAB 670 MG: 500 INJECTION, SOLUTION INTRAVENOUS at 08:50

## 2021-04-30 NOTE — PROGRESS NOTES
Patient tolerated infusion well, patient left in stable condition  AVS printed and given to the guards

## 2021-04-30 NOTE — PROGRESS NOTES
Patient arrived to unit with 2 correctional officers  Patient states he did not have his CT scan, it was schedule for the day he was processed out of [facility] facility  Shawna Moser RN to see if his CT scan can be setup with this next treatment due to the correctional guards are from [place] which is 3 hours from here  Per Marcela Amador she will find out if this can happen

## 2021-04-30 NOTE — PLAN OF CARE
Problem: INFECTION - ADULT  Goal: Absence or prevention of progression during hospitalization  Description: INTERVENTIONS:  - Assess and monitor for signs and symptoms of infection  - Monitor lab/diagnostic results  - Monitor all insertion sites, i e  indwelling lines, tubes, and drains  - Monitor endotracheal if appropriate and nasal secretions for changes in amount and color  - [place] appropriate cooling/warming therapies per order  - Administer medications as ordered  - Instruct and encourage patient and family to use good hand hygiene technique  - Identify and instruct in appropriate isolation precautions for identified infection/condition  Outcome: Progressing     Problem: Knowledge Deficit  Goal: Patient/family/caregiver demonstrates understanding of disease process, treatment plan, medications, and discharge instructions  Description: Complete learning assessment and assess knowledge base    Interventions:  - Provide teaching at level of understanding  - Provide teaching via preferred learning methods  Outcome: Progressing

## 2021-04-30 NOTE — TELEPHONE ENCOUNTER
Would you try to set up this CT for 5/14/21? Pt already has a treatment scheduled that day and would like to have this done with his treat day  Thank you?

## 2021-05-04 ENCOUNTER — TELEPHONE (OUTPATIENT)
Dept: HEMATOLOGY ONCOLOGY | Facility: CLINIC | Age: 56
End: 2021-05-04

## 2021-05-04 NOTE — TELEPHONE ENCOUNTER
Attempted to call to schedule pt for his CT on 5/14/21 @ 1500    Called several times without success on 5/3/21  Called today and spoke with RN/JENNIFER Blevins Jr  [facility]  RN will send the appropriate email to their scheduling dept re this CT Chest/abd Pelv NANOO Con          Left [id number] [facility] Phone # as c/b

## 2021-05-07 RX ORDER — SODIUM CHLORIDE 9 MG/ML
20 INJECTION, SOLUTION INTRAVENOUS ONCE
Status: CANCELLED | OUTPATIENT
Start: 2021-05-14

## 2021-05-14 ENCOUNTER — HOSPITAL ENCOUNTER (OUTPATIENT)
Dept: CT IMAGING | Facility: HOSPITAL | Age: 56
Discharge: HOME | End: 2021-05-14
Attending: INTERNAL MEDICINE
Payer: MEDICARE

## 2021-05-14 ENCOUNTER — HOSPITAL ENCOUNTER (OUTPATIENT)
Dept: INFUSION CENTER | Facility: HOSPITAL | Age: 56
Discharge: HOME | End: 2021-05-14
Attending: INTERNAL MEDICINE
Payer: MEDICARE

## 2021-05-14 VITALS
SYSTOLIC BLOOD PRESSURE: 126 MMHG | BODY MASS INDEX: 24.55 KG/M2 | OXYGEN SATURATION: 96 % | WEIGHT: 152.78 LBS | HEIGHT: 66 IN | DIASTOLIC BLOOD PRESSURE: 55 MMHG | HEART RATE: 95 BPM | RESPIRATION RATE: 16 BRPM | TEMPERATURE: 97.9 F

## 2021-05-14 DIAGNOSIS — C66.1 UROTHELIAL CARCINOMA OF RIGHT DISTAL URETER (HCC): Primary | ICD-10-CM

## 2021-05-14 DIAGNOSIS — C66.1 UROTHELIAL CARCINOMA OF RIGHT DISTAL URETER (HCC): ICD-10-CM

## 2021-05-14 PROCEDURE — 74177 CT ABD & PELVIS W/CONTRAST: CPT

## 2021-05-14 PROCEDURE — 96413 CHEMO IV INFUSION 1 HR: CPT

## 2021-05-14 PROCEDURE — 71260 CT THORAX DX C+: CPT

## 2021-05-14 PROCEDURE — G1004 CDSM NDSC: HCPCS

## 2021-05-14 RX ORDER — ACETAMINOPHEN 325 MG/1
650 TABLET ORAL EVERY 6 HOURS PRN
Status: DISCONTINUED | OUTPATIENT
Start: 2021-05-14 | End: 2021-05-17 | Stop reason: HOSPADM

## 2021-05-14 RX ORDER — SULFAMETHOXAZOLE AND TRIMETHOPRIM 800; 160 MG/1; MG/1
1 TABLET ORAL EVERY 12 HOURS SCHEDULED
COMMUNITY

## 2021-05-14 RX ORDER — ACETAMINOPHEN 325 MG/1
650 TABLET ORAL EVERY 6 HOURS PRN
Status: CANCELLED
Start: 2021-05-14

## 2021-05-14 RX ORDER — SODIUM CHLORIDE 9 MG/ML
20 INJECTION, SOLUTION INTRAVENOUS ONCE
Status: COMPLETED | OUTPATIENT
Start: 2021-05-14 | End: 2021-05-14

## 2021-05-14 RX ADMIN — IOHEXOL 100 ML: 350 INJECTION, SOLUTION INTRAVENOUS at 14:09

## 2021-05-14 RX ADMIN — SODIUM CHLORIDE 20 ML/HR: 0.9 INJECTION, SOLUTION INTRAVENOUS at 12:23

## 2021-05-14 RX ADMIN — ACETAMINOPHEN 650 MG: 325 TABLET ORAL at 13:28

## 2021-05-14 RX ADMIN — DURVALUMAB 670 MG: 120 INJECTION, SOLUTION INTRAVENOUS at 12:42

## 2021-05-14 NOTE — PROGRESS NOTES
Patient arrived to unit with 2 correctional officers, patient was put on antibiotic for leg swelling as a precautionary due to possible infection  Legs do not look infected, normal color  Spoke to [employer] RN okay to treat today

## 2021-05-14 NOTE — PROGRESS NOTES
Patient complained of headache, reached out to Dr Jennifer Odom office, tylenol added for patient  Okay to give prior to CT scan

## 2021-05-14 NOTE — PROGRESS NOTES
Patient tolerated infusion well, patient left with IV intact to CT scan, accompanied by 2 correctional officers   AVS printed and given to guard

## 2021-05-14 NOTE — PLAN OF CARE
Problem: INFECTION - ADULT  Goal: Absence or prevention of progression during hospitalization  Description: INTERVENTIONS:  - Assess and monitor for signs and symptoms of infection  - Monitor lab/diagnostic results  - Monitor all insertion sites, i e  indwelling lines, tubes, and drains  - Monitor endotracheal if appropriate and nasal secretions for changes in amount and color  - [place] appropriate cooling/warming therapies per order  - Administer medications as ordered  - Instruct and encourage patient and family to use good hand hygiene technique  - Identify and instruct in appropriate isolation precautions for identified infection/condition  Outcome: Progressing     Problem: Knowledge Deficit  Goal: Patient/family/caregiver demonstrates understanding of disease process, treatment plan, medications, and discharge instructions  Description: Complete learning assessment and assess knowledge base    Interventions:  - Provide teaching at level of understanding  - Provide teaching via preferred learning methods  Outcome: Progressing

## 2021-05-21 RX ORDER — SODIUM CHLORIDE 9 MG/ML
20 INJECTION, SOLUTION INTRAVENOUS ONCE
Status: CANCELLED | OUTPATIENT
Start: 2021-05-28

## 2021-05-28 ENCOUNTER — HOSPITAL ENCOUNTER (OUTPATIENT)
Dept: INFUSION CENTER | Facility: HOSPITAL | Age: 56
Discharge: HOME | End: 2021-05-28
Attending: INTERNAL MEDICINE
Payer: MEDICARE

## 2021-05-28 VITALS
RESPIRATION RATE: 16 BRPM | SYSTOLIC BLOOD PRESSURE: 98 MMHG | TEMPERATURE: 97.7 F | HEIGHT: 66 IN | WEIGHT: 153.22 LBS | HEART RATE: 74 BPM | BODY MASS INDEX: 24.62 KG/M2 | OXYGEN SATURATION: 94 % | DIASTOLIC BLOOD PRESSURE: 51 MMHG

## 2021-05-28 DIAGNOSIS — C66.1 UROTHELIAL CARCINOMA OF RIGHT DISTAL URETER (HCC): Primary | ICD-10-CM

## 2021-05-28 PROCEDURE — 96413 CHEMO IV INFUSION 1 HR: CPT

## 2021-05-28 RX ORDER — ACETAMINOPHEN 325 MG/1
650 TABLET ORAL ONCE
Status: CANCELLED
Start: 2021-05-28

## 2021-05-28 RX ORDER — SODIUM CHLORIDE 9 MG/ML
20 INJECTION, SOLUTION INTRAVENOUS ONCE
Status: COMPLETED | OUTPATIENT
Start: 2021-05-28 | End: 2021-05-28

## 2021-05-28 RX ORDER — ACETAMINOPHEN 325 MG/1
650 TABLET ORAL ONCE
Status: COMPLETED | OUTPATIENT
Start: 2021-05-28 | End: 2021-05-28

## 2021-05-28 RX ADMIN — ACETAMINOPHEN 650 MG: 325 TABLET, FILM COATED ORAL at 14:01

## 2021-05-28 RX ADMIN — SODIUM CHLORIDE 20 ML/HR: 0.9 INJECTION, SOLUTION INTRAVENOUS at 13:25

## 2021-05-28 RX ADMIN — DURVALUMAB 670 MG: 120 INJECTION, SOLUTION INTRAVENOUS at 13:43

## 2021-05-28 NOTE — PLAN OF CARE
Problem: INFECTION - ADULT  Goal: Absence or prevention of progression during hospitalization  Description: INTERVENTIONS:  - Assess and monitor for signs and symptoms of infection  - Monitor lab/diagnostic results  - Monitor all insertion sites, i e  indwelling lines, tubes, and drains  - Monitor endotracheal if appropriate and nasal secretions for changes in amount and color  - [place] appropriate cooling/warming therapies per order  - Administer medications as ordered  - Instruct and encourage patient and family to use good hand hygiene technique  - Identify and instruct in appropriate isolation precautions for identified infection/condition  Outcome: Progressing     Problem: Knowledge Deficit  Goal: Patient/family/caregiver demonstrates understanding of disease process, treatment plan, medications, and discharge instructions  Description: Complete learning assessment and assess knowledge base    Interventions:  - Provide teaching at level of understanding  - Provide teaching via preferred learning methods  Outcome: Progressing

## 2021-05-28 NOTE — PROGRESS NOTES
Patient tolerated immunotherapy well, patient left in stable condition with 2 correctional officers

## 2021-06-07 ENCOUNTER — OFFICE VISIT (OUTPATIENT)
Dept: FAMILY MEDICINE CLINIC | Facility: CLINIC | Age: 56
End: 2021-06-07
Payer: COMMERCIAL

## 2021-06-07 VITALS
HEIGHT: 66 IN | BODY MASS INDEX: 24.85 KG/M2 | RESPIRATION RATE: 18 BRPM | WEIGHT: 154.6 LBS | DIASTOLIC BLOOD PRESSURE: 68 MMHG | SYSTOLIC BLOOD PRESSURE: 116 MMHG | HEART RATE: 77 BPM | OXYGEN SATURATION: 94 % | TEMPERATURE: 98.6 F

## 2021-06-07 DIAGNOSIS — K21.9 GASTROESOPHAGEAL REFLUX DISEASE, UNSPECIFIED WHETHER ESOPHAGITIS PRESENT: ICD-10-CM

## 2021-06-07 DIAGNOSIS — J45.20 MILD INTERMITTENT ASTHMA WITHOUT COMPLICATION: ICD-10-CM

## 2021-06-07 DIAGNOSIS — C66.1 UROTHELIAL CARCINOMA OF RIGHT DISTAL URETER (HCC): Primary | ICD-10-CM

## 2021-06-07 PROBLEM — N39.0 UTI (URINARY TRACT INFECTION): Status: RESOLVED | Noted: 2020-08-19 | Resolved: 2021-06-07

## 2021-06-07 LAB
ALBUMIN SERPL BCP-MCNC: 3.4 G/DL (ref 3.5–5)
ALP SERPL-CCNC: 95 U/L (ref 46–116)
ALT SERPL W P-5'-P-CCNC: 22 U/L (ref 12–78)
ANION GAP SERPL CALCULATED.3IONS-SCNC: 5 MMOL/L (ref 4–13)
AST SERPL W P-5'-P-CCNC: 23 U/L (ref 5–45)
BASOPHILS # BLD AUTO: 0.03 THOUSANDS/ΜL (ref 0–0.1)
BASOPHILS NFR BLD AUTO: 1 % (ref 0–1)
BILIRUB SERPL-MCNC: 0.58 MG/DL (ref 0.2–1)
BUN SERPL-MCNC: 24 MG/DL (ref 5–25)
CALCIUM ALBUM COR SERPL-MCNC: 9.8 MG/DL (ref 8.3–10.1)
CALCIUM SERPL-MCNC: 9.3 MG/DL (ref 8.3–10.1)
CHLORIDE SERPL-SCNC: 108 MMOL/L (ref 100–108)
CO2 SERPL-SCNC: 28 MMOL/L (ref 21–32)
CREAT SERPL-MCNC: 1 MG/DL (ref 0.6–1.3)
EOSINOPHIL # BLD AUTO: 0.29 THOUSAND/ΜL (ref 0–0.61)
EOSINOPHIL NFR BLD AUTO: 6 % (ref 0–6)
ERYTHROCYTE [DISTWIDTH] IN BLOOD BY AUTOMATED COUNT: 12.2 % (ref 11.6–15.1)
GFR SERPL CREATININE-BSD FRML MDRD: 83 ML/MIN/1.73SQ M
GLUCOSE P FAST SERPL-MCNC: 100 MG/DL (ref 65–99)
HCT VFR BLD AUTO: 35.9 % (ref 36.5–49.3)
HGB BLD-MCNC: 11.7 G/DL (ref 12–17)
IMM GRANULOCYTES # BLD AUTO: 0.01 THOUSAND/UL (ref 0–0.2)
IMM GRANULOCYTES NFR BLD AUTO: 0 % (ref 0–2)
LYMPHOCYTES # BLD AUTO: 1.07 THOUSANDS/ΜL (ref 0.6–4.47)
LYMPHOCYTES NFR BLD AUTO: 21 % (ref 14–44)
MCH RBC QN AUTO: 31.6 PG (ref 26.8–34.3)
MCHC RBC AUTO-ENTMCNC: 32.6 G/DL (ref 31.4–37.4)
MCV RBC AUTO: 97 FL (ref 82–98)
MONOCYTES # BLD AUTO: 0.83 THOUSAND/ΜL (ref 0.17–1.22)
MONOCYTES NFR BLD AUTO: 17 % (ref 4–12)
NEUTROPHILS # BLD AUTO: 2.79 THOUSANDS/ΜL (ref 1.85–7.62)
NEUTS SEG NFR BLD AUTO: 55 % (ref 43–75)
NRBC BLD AUTO-RTO: 0 /100 WBCS
PLATELET # BLD AUTO: 227 THOUSANDS/UL (ref 149–390)
PMV BLD AUTO: 10.6 FL (ref 8.9–12.7)
POTASSIUM SERPL-SCNC: 4.6 MMOL/L (ref 3.5–5.3)
PROT SERPL-MCNC: 6.9 G/DL (ref 6.4–8.2)
RBC # BLD AUTO: 3.7 MILLION/UL (ref 3.88–5.62)
SODIUM SERPL-SCNC: 141 MMOL/L (ref 136–145)
T4 FREE SERPL-MCNC: 2.46 NG/DL (ref 0.76–1.46)
TSH SERPL DL<=0.05 MIU/L-ACNC: <0.007 UIU/ML (ref 0.36–3.74)
WBC # BLD AUTO: 5.02 THOUSAND/UL (ref 4.31–10.16)

## 2021-06-07 PROCEDURE — 85025 COMPLETE CBC W/AUTO DIFF WBC: CPT | Performed by: PHYSICIAN ASSISTANT

## 2021-06-07 PROCEDURE — 99214 OFFICE O/P EST MOD 30 MIN: CPT | Performed by: FAMILY MEDICINE

## 2021-06-07 PROCEDURE — 84439 ASSAY OF FREE THYROXINE: CPT | Performed by: PHYSICIAN ASSISTANT

## 2021-06-07 PROCEDURE — 80053 COMPREHEN METABOLIC PANEL: CPT | Performed by: PHYSICIAN ASSISTANT

## 2021-06-07 PROCEDURE — 84443 ASSAY THYROID STIM HORMONE: CPT | Performed by: PHYSICIAN ASSISTANT

## 2021-06-07 RX ORDER — LEVALBUTEROL TARTRATE 45 UG/1
1-2 AEROSOL, METERED ORAL EVERY 4 HOURS PRN
COMMUNITY

## 2021-06-07 RX ORDER — OXYCODONE HYDROCHLORIDE 15 MG/1
15 TABLET ORAL EVERY 8 HOURS PRN
COMMUNITY

## 2021-06-07 RX ORDER — SODIUM CHLORIDE 9 MG/ML
20 INJECTION, SOLUTION INTRAVENOUS ONCE
Status: CANCELLED | OUTPATIENT
Start: 2021-06-11

## 2021-06-07 NOTE — PROGRESS NOTES
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)  -     CBC and differential  -     Comprehensive metabolic panel  -     TSH, 3rd generation with Free T4 reflex    Gastroesophageal reflux disease, unspecified whether esophagitis present    Mild intermittent asthma without complication    Other orders  -     oxyCODONE (ROXICODONE) 15 mg immediate release tablet; Take 15 mg by mouth every 8 (eight) hours as needed for moderate pain  -     ciclesonide (ALVESCO) 80 MCG/ACT inhaler; Inhale 1 puff 2 (two) times a day  -     levalbuterol (XOPENEX HFA) 45 mcg/act inhaler; Inhale 1-2 puffs every 4 (four) hours as needed for wheezing        - Continue current medications and follow up with hem/onc as scheduled  - Labs drawn in the office today    Return in about 6 months (around 12/7/2021) for Next scheduled follow up  Subjective:        Patient ID: Abraham Manuel is a 61 y o  male  Chief Complaint   Patient presents with   Ronan Zeng is a 61year old male with stage IV urothelial carcinoma undergoing chemotherapy, hx of asthma, HTN, GERD, and chronic back pain, presenting for routine follow up  Patient is currently incarcerated and is accompanied by 2 prison guards today  Urothelial cancer is managed per Dr Marcy James  He is scheduled for follow up 6/11  Patient currently denies dysuria, hematuria, frequency, urgency, or incomplete bladder emptying  Right leg has been swollen for the past few months with multiple negative dopplers  He has been using compression stockings with some improvement  Asthma is well controlled with Alvesco BID and levalbuterol PRN  He denies current cough, SOB, wheezing, or chest tightness  GERD is well controlled with omeprazole  Reports he received the [employer] COVID vaccine 5/7        The following portions of the patient's history were reviewed and updated as appropriate: allergies, current medications, past family history, past medical history, past social history, past surgical history and problem list     Patient Active Problem List   Diagnosis    Allergic rhinitis    Anxiety disorder    [facility]    Chronic lumbar radiculopathy    Contact dermatitis    Esophageal reflux    Migraine headache    Mild intermittent asthma without complication    Other emphysema ([facility] )    Subclinical hyperthyroidism    Essential hypertension    Hydronephrosis of right kidney    Major depression    Panic disorder    Urothelial lesion    Cancer related pain    Chronic back pain    Retroperitoneal lymphadenopathy    Palliative care patient    Urothelial carcinoma of right distal ureter ([facility] )    Therapeutic opioid induced constipation    Acute kidney injury ([facility] )    Hyponatremia    Bony metastasis (HCC)    Chemotherapy induced neutropenia (HCC)    Iron deficiency anemia due to chronic blood loss       Current Outpatient Medications   Medication Sig Dispense Refill    acetaminophen-codeine (TYLENOL #3) 300-30 mg per tablet Take 1 tablet by mouth every 4 (four) hours as needed for moderate pain      ciclesonide (ALVESCO) 80 MCG/ACT inhaler Inhale 1 puff 2 (two) times a day      escitalopram (LEXAPRO) 20 mg tablet       levalbuterol (XOPENEX HFA) 45 mcg/act inhaler Inhale 1-2 puffs every 4 (four) hours as needed for wheezing      omeprazole (PriLOSEC) 40 MG capsule TAKE 1 CAPSULE BY MOUTH EVERY DAY BEFORE BREAKFAST (Patient taking differently: Take 40 mg by mouth daily 20mg DAILY) 90 capsule 1    ondansetron (ZOFRAN) 8 mg tablet Take 1 tablet (8 mg total) by mouth 3 (three) times a day before meals To prevent chemo nausea (Patient taking differently: Take 8 mg by mouth 3 (three) times a day before meals 4mg PRN     To prevent chemo nausea) 90 tablet 0    oxyCODONE (ROXICODONE) 15 mg immediate release tablet Take 15 mg by mouth every 8 (eight) hours as needed for moderate pain      sulfamethoxazole-trimethoprim (BACTRIM DS) 800-160 mg per tablet Take 1 tablet by mouth every 12 (twelve) hours Stop 5/18/2021      acetaminophen (TYLENOL) 650 mg CR tablet Take 1 tablet (650 mg total) by mouth every 8 (eight) hours (Patient not taking: Reported on 6/7/2021) 30 tablet 0    Diclofenac Sodium (VOLTAREN) 1 % Apply 2 g topically 4 (four) times a day as needed (pain) (Patient not taking: Reported on 6/7/2021) 1 Tube 5    fentaNYL (DURAGESIC) 12 mcg/hr TD 72 hr patch Place 1 patch on the skin every third day      fluticasone (FLONASE) 50 mcg/act nasal spray 1 spray into each nostril daily (Patient not taking: Reported on 6/7/2021) 1 Bottle 11     No current facility-administered medications for this visit           Past Medical History:   Diagnosis Date    Asthma     [facility]     COPD (chronic obstructive pulmonary disease) ([facility]     Disease of thyroid gland     Hypertension     Hyperthyroidism         Past Surgical History:   Procedure Laterality Date    DENTAL SURGERY      FL RETROGRADE URETHROCYSTOGRAM  6/15/2020    HERNIA REPAIR Left     inguinal    IR LYMPH NODE BIOPSY/ASPIRATION  7/24/2020    IR TUBE PLACEMENT  6/25/2020    GA CYSTOURETHROSCOPY,URETER CATHETER Right 6/15/2020    Procedure: CYSTOSCOPY RETROGRADE PYELOGRAM and ureteroscopy;  Surgeon: Irasema Cardona MD;  Location: [facility];  Service: Urology        Social History     Socioeconomic History    Marital status: Single     Spouse name: Not on file    Number of children: Not on file    Years of education: Not on file    Highest education level: Not on file   Occupational History    Not on file   Social Needs    Financial resource strain: Not on file    Food insecurity     Worry: Not on file     Inability: Not on file    Transportation needs     Medical: Not on file     Non-medical: Not on file   Tobacco Use    Smoking status: Former Smoker     Packs/day: 1 00     Years: 44 00     Pack years: 44 00 Types: Cigarettes    Smokeless tobacco: Never Used   Substance and Sexual Activity    Alcohol use: Never     Frequency: Never    Drug use: No    Sexual activity: Not Currently     Partners: Female   Lifestyle    Physical activity     Days per week: Not on file     Minutes per session: Not on file    Stress: Not on file   Relationships    Social connections     Talks on phone: Not on file     Gets together: Not on file     Attends [employer] service: Not on file     Active member of club or organization: Not on file     Attends meetings of clubs or organizations: Not on file     Relationship status: Not on file    Intimate partner violence     Fear of current or ex partner: Not on file     Emotionally abused: Not on file     Physically abused: Not on file     Forced sexual activity: Not on file   Other Topics Concern    Not on file   Social History Narrative    Patient is  from the mother of his 1st child  Patient has 4 adult children, from 4 different unions  Patient lives with his youngest daughter Sharan Valente has multiple sclerosis but is able to participate fully in the patient's care  Patient does not have any advanced directives  Review of Systems   Constitutional: Negative for chills, diaphoresis and fever  HENT: Negative for congestion, ear pain, postnasal drip, rhinorrhea, sinus pressure and sore throat  Respiratory: Negative for cough, chest tightness, shortness of breath and wheezing  Cardiovascular: Positive for leg swelling  Negative for chest pain and palpitations  Gastrointestinal: Negative for abdominal pain, blood in stool, constipation, diarrhea, nausea and vomiting  Genitourinary: Negative for difficulty urinating, dysuria, frequency, hematuria and urgency  Musculoskeletal: Positive for back pain  Skin: Negative for rash and wound  Neurological: Positive for headaches  Negative for dizziness, syncope, weakness and light-headedness  Objective:      /68 (BP Location: Left arm, Patient Position: Sitting, Cuff Size: Adult)   Pulse 77   Temp 98 6 °F (37 °C) (Tympanic)   Resp 18   Ht 5' 6" (1 676 m)   Wt 70 1 kg (154 lb 9 6 oz)   SpO2 94%   BMI 24 95 kg/m²          Physical Exam  Vitals signs and nursing note reviewed  Constitutional:       General: He is not in acute distress  Appearance: Normal appearance  HENT:      Head: Normocephalic and atraumatic  Right Ear: Tympanic membrane, ear canal and external ear normal       Left Ear: Tympanic membrane, ear canal and external ear normal       Nose: Nose normal       Mouth/Throat:      Mouth: Mucous membranes are moist       Pharynx: Oropharynx is clear  No oropharyngeal exudate  Eyes:      Extraocular Movements: Extraocular movements intact  Conjunctiva/sclera: Conjunctivae normal       Pupils: Pupils are equal, round, and reactive to light  Neck:      Musculoskeletal: Normal range of motion and neck supple  Cardiovascular:      Rate and Rhythm: Normal rate and regular rhythm  Heart sounds: Normal heart sounds  No murmur  Pulmonary:      Effort: Pulmonary effort is normal  No respiratory distress  Breath sounds: Normal breath sounds  No wheezing  Musculoskeletal:      Right lower leg: Edema present  Left lower leg: No edema  Comments: Diffuse non-pitting edema of the right leg  No current erythema, rash, or open wounds  Lymphadenopathy:      Cervical: No cervical adenopathy  Skin:     General: Skin is warm and dry  Neurological:      General: No focal deficit present  Mental Status: He is alert and oriented to person, place, and time  Cranial Nerves: No cranial nerve deficit     Psychiatric:         Mood and Affect: Mood normal          Behavior: Behavior normal

## 2021-06-11 ENCOUNTER — PATIENT OUTREACH (OUTPATIENT)
Dept: CASE MANAGEMENT | Facility: HOSPITAL | Age: 56
End: 2021-06-11

## 2021-06-11 ENCOUNTER — OFFICE VISIT (OUTPATIENT)
Dept: HEMATOLOGY ONCOLOGY | Facility: CLINIC | Age: 56
End: 2021-06-11
Payer: COMMERCIAL

## 2021-06-11 ENCOUNTER — HOSPITAL ENCOUNTER (OUTPATIENT)
Dept: INFUSION CENTER | Facility: HOSPITAL | Age: 56
Discharge: HOME | End: 2021-06-11
Attending: INTERNAL MEDICINE
Payer: MEDICARE

## 2021-06-11 VITALS
OXYGEN SATURATION: 98 % | HEIGHT: 66 IN | BODY MASS INDEX: 24.06 KG/M2 | HEART RATE: 73 BPM | SYSTOLIC BLOOD PRESSURE: 98 MMHG | DIASTOLIC BLOOD PRESSURE: 53 MMHG | RESPIRATION RATE: 16 BRPM | WEIGHT: 149.69 LBS | TEMPERATURE: 97.3 F

## 2021-06-11 VITALS
TEMPERATURE: 96.8 F | DIASTOLIC BLOOD PRESSURE: 76 MMHG | WEIGHT: 150 LBS | HEART RATE: 62 BPM | OXYGEN SATURATION: 98 % | SYSTOLIC BLOOD PRESSURE: 134 MMHG | BODY MASS INDEX: 24.11 KG/M2 | HEIGHT: 66 IN

## 2021-06-11 DIAGNOSIS — C66.1 UROTHELIAL CARCINOMA OF RIGHT DISTAL URETER (HCC): Primary | ICD-10-CM

## 2021-06-11 DIAGNOSIS — R59.0 RETROPERITONEAL LYMPHADENOPATHY: ICD-10-CM

## 2021-06-11 DIAGNOSIS — E05.90 HYPERTHYROIDISM: ICD-10-CM

## 2021-06-11 DIAGNOSIS — G89.3 CANCER RELATED PAIN: ICD-10-CM

## 2021-06-11 DIAGNOSIS — C79.51 BONY METASTASIS (HCC): ICD-10-CM

## 2021-06-11 PROBLEM — T45.1X5A CHEMOTHERAPY INDUCED NEUTROPENIA (HCC): Status: RESOLVED | Noted: 2020-11-02 | Resolved: 2021-06-11

## 2021-06-11 PROBLEM — D70.1 CHEMOTHERAPY INDUCED NEUTROPENIA (HCC): Status: RESOLVED | Noted: 2020-11-02 | Resolved: 2021-06-11

## 2021-06-11 PROBLEM — D50.0 IRON DEFICIENCY ANEMIA DUE TO CHRONIC BLOOD LOSS: Status: RESOLVED | Noted: 2020-12-15 | Resolved: 2021-06-11

## 2021-06-11 PROBLEM — E87.1 HYPONATREMIA: Status: RESOLVED | Noted: 2020-08-18 | Resolved: 2021-06-11

## 2021-06-11 PROCEDURE — 99215 OFFICE O/P EST HI 40 MIN: CPT | Performed by: INTERNAL MEDICINE

## 2021-06-11 PROCEDURE — 96413 CHEMO IV INFUSION 1 HR: CPT

## 2021-06-11 RX ORDER — SODIUM CHLORIDE 9 MG/ML
20 INJECTION, SOLUTION INTRAVENOUS ONCE
Status: COMPLETED | OUTPATIENT
Start: 2021-06-11 | End: 2021-06-11

## 2021-06-11 RX ADMIN — SODIUM CHLORIDE 20 ML/HR: 0.9 INJECTION, SOLUTION INTRAVENOUS at 09:50

## 2021-06-11 RX ADMIN — DURVALUMAB 670 MG: 500 INJECTION, SOLUTION INTRAVENOUS at 10:13

## 2021-06-11 NOTE — PROGRESS NOTES
[facility] [place]  [address] [address] [phone]    Jess Tran,1965, [id number]  06/11/21    Discussion:   In summary, this is a 24-year-old male history of urothelial carcinoma, lymph node and possible bone metastases  He was treated with chemotherapy with some improvement  He underwent radiation therapy to his bony foci with improvement in his pain  He continues on fentanyl 25 and oxycodone throughout the day  Pain control is fair  He has right lower extremity lymphedema which is stable  CBC and CMP are normal   TSH is depressed, T4 is elevated  This suggests hyperthyroid state  Patient tells me that he has had hyperthyroidism on and off for many years, prior to diagnosis or treatment initiation  I suspect this is unrelated  Recent CT scan of the chest abdomen pelvis shows stable retroperitoneal lymphadenopathy up to 2 2 cm compared to prior study of 3 and 4 months ago  He is clinically stable  He is currently incarcerated  He was accompanied by guards from the facility  Treatment continuation is recommended  Reassessment in 3 months  I discussed the above with the patient  The patient  voiced understanding and agreement   ______________________________________________________________________    Chief Complaint   Patient presents with    Follow-up       HPI:  Oncology History   Urothelial carcinoma of right distal ureter ([facility] )   7/24/2020 Initial Diagnosis    Urothelial carcinoma of right distal ureter ([facility] )     7/24/2020 Biopsy    Final Diagnosis    A  Lymph node, right iliac, biopsy:  -  Metastatic carcinoma, compatible with urothelial primary  -  Immunohistochemical stains performed with appropriate controls show the tumor cells to be positive for keratin AE1/3, CK7, CK20, CDX2, SHELLI-3, and uroplakin and negative for TTF-1, ER, PSA, and NKX3 1, supporting the diagnosis          8/11/2020 - 2/11/2021 Chemotherapy CISplatin (PLATINOL) 121 1 mg, mannitol 12 5 g in sodium chloride 0 9 % 500 mL IVPB, 70 mg/m2 = 121 1 mg, Intravenous, Once, 5 of 7 cycles  Administration: 121 1 mg (8/11/2020), 121 1 mg (9/15/2020), 121 1 mg (10/9/2020), 121 1 mg (11/12/2020), 121 1 mg (1/14/2021)  fosaprepitant (EMEND) 150 mg in sodium chloride 0 9 % 250 mL IVPB, 150 mg, Intravenous, Once, 5 of 7 cycles  Administration: 150 mg (8/11/2020), 150 mg (9/15/2020), 150 mg (10/9/2020), 150 mg (11/12/2020), 150 mg (1/14/2021)  gemcitabine (GEMZAR) 1,600 mg in sodium chloride 0 9 % 250 mL infusion, 1,557 mg (72 % of original dose 1,250 mg/m2), Intravenous, Once, 4 of 6 cycles  Dose modification: 900 mg/m2 (original dose 1,250 mg/m2, Cycle 1, Reason: Other (See Comments)), 900 mg/m2 (original dose 1,250 mg/m2, Cycle 3, Reason: Other (See Comments)), 900 mg/m2 (original dose 1,250 mg/m2, Cycle 4, Reason: Other (See Comments))  Administration: 1,600 mg (8/11/2020), 1,600 mg (8/24/2020), 1,600 mg (10/9/2020), 1,600 mg (10/16/2020), 1,600 mg (11/12/2020), 1,600 mg (11/23/2020), 1,600 mg (1/14/2021), 1,600 mg (1/20/2021)     3/19/2021 -  Chemotherapy    durvalumab (IMFINZI) 670 mg in sodium chloride 0 9 % 100 mL IVPB, 10 mg/kg = 670 mg, Intravenous, Once, 5 of 6 cycles  Administration: 670 mg (3/19/2021), 670 mg (4/30/2021), 670 mg (5/14/2021), 670 mg (5/28/2021), 670 mg (4/15/2021)         Interval History:  Clinically stable  ECOG-  1 - Symptomatic but completely ambulatory    Review of Systems   Constitutional: Negative for chills and fever  HENT: Negative for nosebleeds  Eyes: Negative for discharge  Respiratory: Negative for cough and shortness of breath  Cardiovascular: Negative for chest pain  Gastrointestinal: Negative for abdominal pain, constipation and diarrhea  Endocrine: Negative for polydipsia  Genitourinary: Negative for hematuria  Musculoskeletal: Negative for arthralgias  Skin: Negative for color change  Allergic/Immunologic: Negative for immunocompromised state  Neurological: Negative for dizziness and headaches  Hematological: Negative for adenopathy  Psychiatric/Behavioral: Negative for agitation         Past Medical History:   Diagnosis Date    Asthma     [facility] disorder ([facility] )     [facility]     Disease of thyroid gland     Hypertension     Hyperthyroidism      Patient Active Problem List   Diagnosis    Allergic rhinitis    Anxiety disorder    Bipolar disorder ([place] [address] )    Chronic lumbar radiculopathy    Contact dermatitis    Esophageal reflux    Migraine headache    Mild intermittent asthma without complication    Other emphysema (HCC)    Subclinical hyperthyroidism    Essential hypertension    Hydronephrosis of right kidney    Major depression    Panic disorder    Urothelial lesion    Cancer related pain    Chronic back pain    Retroperitoneal lymphadenopathy    Palliative care patient    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)    Therapeutic opioid induced constipation    Acute kidney injury ([place] [address] )    Hyponatremia    Bony metastasis (HCC)    Chemotherapy induced neutropenia (HCC)    Iron deficiency anemia due to chronic blood loss       Current Outpatient Medications:     ciclesonide (ALVESCO) 80 MCG/ACT inhaler, Inhale 1 puff 2 (two) times a day, Disp: , Rfl:     escitalopram (LEXAPRO) 20 mg tablet, , Disp: , Rfl:     fentaNYL (DURAGESIC) 25 mcg/hr, Place 1 patch on the skin every third day , Disp: , Rfl:     levalbuterol (XOPENEX HFA) 45 mcg/act inhaler, Inhale 1-2 puffs every 4 (four) hours as needed for wheezing, Disp: , Rfl:     omeprazole (PriLOSEC) 40 MG capsule, TAKE 1 CAPSULE BY MOUTH EVERY DAY BEFORE BREAKFAST (Patient taking differently: Take 40 mg by mouth daily 20mg DAILY), Disp: 90 capsule, Rfl: 1    oxyCODONE (ROXICODONE) 15 mg immediate release tablet, Take 15 mg by mouth every 8 (eight) hours as needed for moderate pain, Disp: , Rfl:     acetaminophen (TYLENOL) 650 mg CR tablet, Take 1 tablet (650 mg total) by mouth every 8 (eight) hours (Patient not taking: Reported on 6/7/2021), Disp: 30 tablet, Rfl: 0    acetaminophen-codeine (TYLENOL #3) 300-30 mg per tablet, Take 1 tablet by mouth every 4 (four) hours as needed for moderate pain, Disp: , Rfl:     Diclofenac Sodium (VOLTAREN) 1 %, Apply 2 g topically 4 (four) times a day as needed (pain) (Patient not taking: Reported on 6/7/2021), Disp: 1 Tube, Rfl: 5    fluticasone (FLONASE) 50 mcg/act nasal spray, 1 spray into each nostril daily (Patient not taking: Reported on 6/7/2021), Disp: 1 Bottle, Rfl: 11    ondansetron (ZOFRAN) 8 mg tablet, Take 1 tablet (8 mg total) by mouth 3 (three) times a day before meals To prevent chemo nausea (Patient not taking: Reported on 6/11/2021), Disp: 90 tablet, Rfl: 0    sulfamethoxazole-trimethoprim (BACTRIM DS) 800-160 mg per tablet, Take 1 tablet by mouth every 12 (twelve) hours Stop 5/18/2021, Disp: , Rfl:   Allergies   Allergen Reactions    Ketorolac Hives     Toradol     Past Surgical History:   Procedure Laterality Date    DENTAL SURGERY      FL RETROGRADE URETHROCYSTOGRAM  6/15/2020    HERNIA REPAIR Left     inguinal    IR LYMPH NODE BIOPSY/ASPIRATION  7/24/2020    IR TUBE PLACEMENT  6/25/2020    MA CYSTOURETHROSCOPY,URETER CATHETER Right 6/15/2020    Procedure: CYSTOSCOPY RETROGRADE PYELOGRAM and ureteroscopy;  Surgeon: Camden Huang MD;  Location: [facility];  Service: Urology     Social History     Objective:  Vitals:    06/11/21 0842   BP: 134/76   BP Location: Left arm   Patient Position: Sitting   Pulse: 62   Temp: (!) 96 8 °F (36 °C)   TempSrc: Tympanic   SpO2: 98%   Weight: 68 kg (150 lb)   Height: 5' 6" (1 676 m)     Physical Exam  Constitutional:       Appearance: He is well-developed  HENT:      Head: Normocephalic and atraumatic  Eyes:      Pupils: Pupils are equal, round, and reactive to light  Neck:      Musculoskeletal: Neck supple  Cardiovascular:      Rate and Rhythm: Normal rate and regular rhythm  Heart sounds: No murmur  Pulmonary:      Breath sounds: Normal breath sounds  No wheezing or rales  Abdominal:      Palpations: Abdomen is soft  Tenderness: There is no abdominal tenderness  Musculoskeletal: Normal range of motion  General: No tenderness  Lymphadenopathy:      Cervical: No cervical adenopathy  Skin:     Findings: No erythema or rash  Neurological:      Mental Status: He is alert and oriented to person, place, and time  Cranial Nerves: No cranial nerve deficit  Deep Tendon Reflexes: Reflexes are normal and symmetric  Psychiatric:         Behavior: Behavior normal            Labs: I personally reviewed the labs and imaging pertinent to this patient care

## 2021-06-11 NOTE — PROGRESS NOTES
Oncology LSW met with PT chairside in the infusion center to formally introduce herself to PT in person  It has been several months since NAIDAW has outreached PT, as he is currently incarcerated  PT appeared cheerful and reported that he was "doing well " LSW and PT then engaged in light conversation  Emotional support provided

## 2021-06-11 NOTE — PLAN OF CARE
Problem: INFECTION - ADULT  Goal: Absence or prevention of progression during hospitalization  Description: INTERVENTIONS:  - Assess and monitor for signs and symptoms of infection  - Monitor lab/diagnostic results  - Monitor all insertion sites, i e  indwelling lines, tubes, and drains  - Monitor endotracheal if appropriate and nasal secretions for changes in amount and color  - Kemp appropriate cooling/warming therapies per order  - Administer medications as ordered  - Instruct and encourage patient and family to use good hand hygiene technique  - Identify and instruct in appropriate isolation precautions for identified infection/condition  Outcome: Progressing     Problem: Knowledge Deficit  Goal: Patient/family/caregiver demonstrates understanding of disease process, treatment plan, medications, and discharge instructions  Description: Complete learning assessment and assess knowledge base    Interventions:  - Provide teaching at level of understanding  - Provide teaching via preferred learning methods  Outcome: Progressing

## 2021-06-16 ENCOUNTER — TELEPHONE (OUTPATIENT)
Dept: HEMATOLOGY ONCOLOGY | Facility: CLINIC | Age: 56
End: 2021-06-16

## 2021-06-16 NOTE — TELEPHONE ENCOUNTER
Incoming call from Tania at   [facility] [address] over at [place], [place] requesting a full note of patient's visit on 06- with Dr Yvonne Benitez to be faxed over at [facility]: [phone]  Tania could be reached at [facility]: [phone]

## 2021-06-22 DIAGNOSIS — C66.1 UROTHELIAL CARCINOMA OF RIGHT DISTAL URETER (HCC): Primary | ICD-10-CM

## 2021-06-22 RX ORDER — SODIUM CHLORIDE 9 MG/ML
20 INJECTION, SOLUTION INTRAVENOUS ONCE
Status: CANCELLED | OUTPATIENT
Start: 2021-06-24

## 2021-06-24 ENCOUNTER — HOSPITAL ENCOUNTER (OUTPATIENT)
Dept: INFUSION CENTER | Facility: HOSPITAL | Age: 56
Discharge: HOME | End: 2021-06-24
Attending: INTERNAL MEDICINE

## 2021-08-06 ENCOUNTER — HOSPITAL ENCOUNTER (OUTPATIENT)
Dept: INFUSION CENTER | Facility: HOSPITAL | Age: 56
Discharge: HOME | End: 2021-08-06
Attending: INTERNAL MEDICINE

## 2021-09-17 ENCOUNTER — TELEPHONE (OUTPATIENT)
Dept: HEMATOLOGY ONCOLOGY | Facility: CLINIC | Age: 56
End: 2021-09-17

## 2021-09-17 NOTE — TELEPHONE ENCOUNTER
Patient was no show for visit with Dr Deborah Leong ,saw in chart where he had all txs were cancelled  Spoke with Yana RYAN at [facility] said they got a call that he was being [facility] to another [facility] where he could get chemo treatment  See chart note of 6/16/21  Dr Deborah Leong made aware

## 2022-01-25 NOTE — PROGRESS NOTES
Patient having transportation and scheduling issues for radiation and chemotherapy apts  9/15/2020 is patients apt he needs to be in [facility] radiation at 945; but has all day treatment at [facility]  Per Myles Betancur RN we will have to start 2 IV's, and run hydration concurrently with chemotherapy  [facility] will take [facility] to Radiation and  will bring patient to [facility] infusion center  Rigoefren Franco will speak to Dr Yvonne Benitez on Monday to discuss further and will reach out to [facility] infusion center Monday  Per Myles Betancur RN patients ex  will pick him up after treatment  Fall

## 2025-07-23 NOTE — ASSESSMENT & PLAN NOTE
Continue IV ceftriaxone, and monitor urine cultures that are pending at this time  CT without any evidence of obstruction  Urinalysis with positive nitrites, moderate leuks, and innumerable WBCs on microscopy  Physical Therapy    Patient not seen in therapy.     On hold due to medical condition    Re-attempt plan: later today or tomorrow    Physical therapy orders received and acknowledged. Patient was not available for their therapy session at this time due to: physical therapy evaluation held at this time due to patient being s/p primary c section this AM, 7/23/2025.  Will re-attempt later today and Will re-attempt tomorrow.     OBJECTIVE                       Documented in the chart in the following areas:  Assessment/Plan.     Therapy procedure time and total treatment time can be found documented on the Time Entry flowsheet.

## (undated) DEVICE — LUBRICANT SURGILUBE TUBE 4 OZ  FLIP TOP

## (undated) DEVICE — Device: Brand: LEVEL 1

## (undated) DEVICE — TRANSPOSAL ULTRAFLEX DUO/QUAD ULTRA CART MANIFOLD

## (undated) DEVICE — URO CATCHER BAG STERILE 0-UC32

## (undated) DEVICE — TUBING SUCTION 5MM X 12 FT

## (undated) DEVICE — 3M™ STERI-STRIP™ COMPOUND BENZOIN TINCTURE 40 BAGS/CARTON 4 CARTONS/CASE C1544: Brand: 3M™ STERI-STRIP™

## (undated) DEVICE — SPECIMEN CONTAINER STERILE PEEL PACK

## (undated) DEVICE — SWABSTCK, BENZOIN TINCTURE, 1/PK, STRL: Brand: APLICARE

## (undated) DEVICE — GLOVE SRG BIOGEL 7

## (undated) DEVICE — GUIDEWIRE STRGHT TIP 0.035 IN  SOLO PLUS

## (undated) DEVICE — CATH URETERAL 5FR X 70 CM FLEX TIP POLYUR BARD

## (undated) DEVICE — CHLORHEXIDINE 4PCT 4 OZ

## (undated) DEVICE — MAT FLOOR STEP DRI 24 X 36 IN N-STRL

## (undated) DEVICE — SCD SEQUENTIAL COMPRESSION COMFORT SLEEVE MEDIUM KNEE LENGTH: Brand: KENDALL SCD

## (undated) DEVICE — PACK TUR

## (undated) DEVICE — INFUSER BAG 3000ML

## (undated) DEVICE — 3M™ TEGADERM™ TRANSPARENT FILM DRESSING FRAME STYLE, 1624W, 2-3/8 IN X 2-3/4 IN (6 CM X 7 CM), 100/CT 4CT/CASE: Brand: 3M™ TEGADERM™

## (undated) DEVICE — ENDOSCOPIC VALVE WITH ADAPTER.: Brand: SURSEAL® II